# Patient Record
Sex: FEMALE | Race: WHITE | Employment: UNEMPLOYED | URBAN - METROPOLITAN AREA
[De-identification: names, ages, dates, MRNs, and addresses within clinical notes are randomized per-mention and may not be internally consistent; named-entity substitution may affect disease eponyms.]

---

## 2018-07-02 ENCOUNTER — HOSPITAL ENCOUNTER (OUTPATIENT)
Dept: GENERAL RADIOLOGY | Age: 63
Discharge: HOME | End: 2018-07-04
Payer: MEDICARE

## 2018-07-02 ENCOUNTER — HOSPITAL ENCOUNTER (OUTPATIENT)
Dept: PREADMISSION TESTING | Age: 63
Discharge: HOME | End: 2018-07-06
Payer: MEDICARE

## 2018-07-02 VITALS
WEIGHT: 214.51 LBS | HEART RATE: 79 BPM | HEIGHT: 62 IN | DIASTOLIC BLOOD PRESSURE: 61 MMHG | OXYGEN SATURATION: 98 % | RESPIRATION RATE: 16 BRPM | SYSTOLIC BLOOD PRESSURE: 131 MMHG | BODY MASS INDEX: 39.47 KG/M2

## 2018-07-02 LAB
ANION GAP SERPL CALCULATED.3IONS-SCNC: 9 MMOL/L (ref 9–17)
BUN BLDV-MCNC: 10 MG/DL (ref 8–23)
BUN/CREAT BLD: 19 (ref 9–20)
CALCIUM SERPL-MCNC: 9.5 MG/DL (ref 8.6–10.4)
CHLORIDE BLD-SCNC: 97 MMOL/L (ref 98–107)
CO2: 29 MMOL/L (ref 20–31)
CREAT SERPL-MCNC: 0.53 MG/DL (ref 0.5–0.9)
EKG ATRIAL RATE: 72 BPM
EKG P AXIS: 40 DEGREES
EKG P-R INTERVAL: 184 MS
EKG Q-T INTERVAL: 428 MS
EKG QRS DURATION: 104 MS
EKG QTC CALCULATION (BAZETT): 468 MS
EKG R AXIS: -5 DEGREES
EKG T AXIS: 46 DEGREES
EKG VENTRICULAR RATE: 72 BPM
GFR AFRICAN AMERICAN: >60 ML/MIN
GFR NON-AFRICAN AMERICAN: >60 ML/MIN
GFR SERPL CREATININE-BSD FRML MDRD: ABNORMAL ML/MIN/{1.73_M2}
GFR SERPL CREATININE-BSD FRML MDRD: ABNORMAL ML/MIN/{1.73_M2}
GLUCOSE BLD-MCNC: 425 MG/DL (ref 70–99)
HCT VFR BLD CALC: 36.6 % (ref 36–46)
HEMOGLOBIN: 12.1 G/DL (ref 12–16)
MCH RBC QN AUTO: 30.2 PG (ref 26–34)
MCHC RBC AUTO-ENTMCNC: 33.1 G/DL (ref 31–37)
MCV RBC AUTO: 91.3 FL (ref 80–100)
NRBC AUTOMATED: ABNORMAL PER 100 WBC
PDW BLD-RTO: 13.6 % (ref 11.5–14.5)
PLATELET # BLD: 94 K/UL (ref 130–400)
PMV BLD AUTO: 8.2 FL (ref 6–12)
POTASSIUM SERPL-SCNC: 4.2 MMOL/L (ref 3.7–5.3)
RBC # BLD: 4.01 M/UL (ref 4–5.2)
SODIUM BLD-SCNC: 135 MMOL/L (ref 135–144)
WBC # BLD: 4.6 K/UL (ref 3.5–11)

## 2018-07-02 PROCEDURE — 71046 X-RAY EXAM CHEST 2 VIEWS: CPT

## 2018-07-02 PROCEDURE — 93005 ELECTROCARDIOGRAM TRACING: CPT

## 2018-07-02 PROCEDURE — 36415 COLL VENOUS BLD VENIPUNCTURE: CPT

## 2018-07-02 PROCEDURE — 80048 BASIC METABOLIC PNL TOTAL CA: CPT

## 2018-07-02 PROCEDURE — 85027 COMPLETE CBC AUTOMATED: CPT

## 2018-07-02 RX ORDER — BACLOFEN 10 MG/1
10 TABLET ORAL NIGHTLY
Status: ON HOLD | COMMUNITY
End: 2018-10-01 | Stop reason: HOSPADM

## 2018-07-02 NOTE — H&P
History and Physical Service   [facility] 12    HISTORY AND PHYSICAL EXAMINATION            Date of Evaluation: 7/2/2018  Patient name:  Nakita Miguel  MRN:   [id number]  YOB: 1955  PCP:    Santos Adam    History Obtained From:     Patient    History of Present Illness: This is Nakita Miguel a 65 y.o. female who presents for a pre-admission testing appointment for an upcoming right breast lumpectomy with needle localization at 1100 by Dr. Arelis Chopra scheduled on 07- at 1300 due to right breast mass, abnormal mammogram. The  patient's chief complaint is a right breast lump which the pt discovered upon self-breast examination 4 months ago. Denies nippled drainage and breast pain. Pt states she made an appointment with her PCP one month after she discovered the right breast lump. Her PCP ordered a mammogram and breast ultrasound which were both completed on 04-. Right breast biopsy was done on June 21, 2018 in Dr. Yo American office. Prior treatment includes: none. Denies recent falls and injury. Patient presents for surgical correction. History of asthma, COPD (pt denies), diabetes, hyperlipidemia, hypertension, and sleep apnea (denies use of CPAP). Pt states she sleeps in a recliner. Alert and oriented without apparent distress. Denies chest pain, dyspnea, and dizziness. Pt's blood glucose in PAT was 425. Her fasting blood glucose at home is 329-400's and her last Hgb A1C was 13% per pt. Denies polydipsia, polyuria, and dizziness. Pt is currently taking Metformin and has intentionally lost 60 pounds since December 2017 by dieting. Pt denies seeing an endocrinologist at this time. Her last hypoglycemic event was 2 years ago when her blood glucose dropped to 100. Instructed pt to call her PCP as soon as possible prior to surgery for evaluation of her diabetes. Pt voiced understanding.     Small, open, non-draining lesion on second right toe.  Instructed pt to follow-up with her PCP if lesion does not continue to heal or becomes infected. Pt voiced understanding. Past Medical History:     Past Medical History:   Diagnosis Date    Anemia     h/o anemia,transfused 2/2014    Anxiety disorder     can get SOB with an anxiety attack    Arthritis     Asthma     Blood transfusion reaction     x 2 unit prbc's 2/2014    COPD (chronic obstructive pulmonary disease) ([facility] [address]     patient denies COPD, only has asthma    Depression     Diabetes mellitus (HCC)     GERD (gastroesophageal reflux disease)     Hammer toe of right foot     Hernia, abdominal 1998    Hyperlipidemia     Hypertension     Restless leg syndrome     Seasonal allergies     Sleep apnea     doesn't use CPAP, sleeps in a recliner    Spondylosis     lower back        Past Surgical History:     Past Surgical History:   Procedure Laterality Date    APPENDECTOMY      COLONOSCOPY      ROTATOR CUFF REPAIR Right         Medications Prior to Admission:     Prior to Admission medications    Medication Sig Start Date End Date Taking? Authorizing Provider   baclofen (LIORESAL) 10 MG tablet Take 10 mg by mouth nightly   Yes Historical Provider, MD   aspirin 81 MG EC tablet Take 81 mg by mouth daily. Yes Historical Provider, MD   citalopram (CELEXA) 40 MG tablet Take 40 mg by mouth daily. Yes Historical Provider, MD   fluticasone (FLONASE) 50 MCG/ACT nasal spray 1 spray by Nasal route 2 times daily as needed for Rhinitis. Yes Historical Provider, MD   loratadine-pseudoephedrine (LORATADINE-D 24HR)  MG per tablet Take 1 tablet by mouth daily. Yes Historical Provider, MD   metFORMIN ER (GLUCOPHAGE-XR) 500 MG XR tablet Take 1,000 mg by mouth 2 times daily (with meals). Yes Historical Provider, MD   gabapentin (NEURONTIN) 300 MG capsule Take 300 mg by mouth 2 times daily. Yes Historical Provider, MD   omeprazole (PRILOSEC) 20 MG capsule Take 20 mg by mouth daily.    Yes

## 2018-07-02 NOTE — PRE-PROCEDURE INSTRUCTIONS
[facility] ON Wednesday, 7/11/2018 at 10:00 AM    Once you enter the hospital lobby, take the elevators to the second floor. Check-In is at the surgery registration desk. Continue to take your home medications as you normally do up to and including the night before surgery with the exception of any blood thinning medications. Please stop any blood thinning medications as directed by your surgeon or prescribing physician. Failure to stop certain medications may interfere with your scheduled surgery. These may include:  Aspirin, Warfarin (Coumadin), Clopidogrel (Plavix), Ibuprofen (Motrin, Advil), Naproxen (Aleve), Meloxicam (Mobic), Celecoxib (Celebrex), Eliquis, Pradaxa, Xarelto, Effient, Fish Oil, Herbal supplements. Aspirin    If you are diabetic, do not take any of your diabetic medications by mouth the morning of surgery. If you are taking insulin contact the doctor that manages your diabetes for instructions about any changes to your insulin dosages the day before surgery. Do not inject insulin or other injectable diabetic medications the morning of surgery unless otherwise instructed by the doctor who manages your diabetes. Please take the following medication(s) the day of surgery with a small sip of water:  Percocet (if needed), Omeprazole, Loratadine, Metoprolol    Please use your inhalers at home the day of surgery. PREPARING FOR YOUR SURGERY:     Before surgery, you can play an important role in your own health. Because skin is not sterile, we need to be sure that your skin is as free of germs as possible before surgery by carefully washing before surgery. Preparing or prepping skin before surgery can reduce the risk of a surgical site infection.   Do not shave the area of your body where your surgery will be performed unless you received specific permission from your physician.     You will need to shower at home the night before surgery and the morning of surgery with a special soap called chlorhexidine gluconate (CHG*). *Not to be used by people allergic to Chlorhexidine Gluconate (CHG). Following these instructions will help you be sure that your skin is clean before surgery. Instructions on cleaning your skin before surgery: The night before your surgery:      You will need to shower with warm water (not hot) and the CHG soap.  Use a clean wash cloth and a clean towel. Have clean clothes available to put on after the shower.    First wash your hair with regular shampoo. Rinse your hair and body thoroughly to remove the shampoo.  Wash your face and genital area (private parts) with your regular soap or water only. Thoroughly rinse your body with warm water from the neck down.  Turn water off to prevent rinsing the soap off too soon.  With a clean wet washcloth and half of the CHG soap in the bottle, lather your entire body from the neck down. Do not use CHG soap near your eyes or ears to avoid injury to those areas.  Wash thoroughly, paying special attention to the area where your surgery will be performed.  Wash your body gently for five (5) minutes. Avoid scrubbing your skin too hard.  Turn the water back on and rinse your body thoroughly.  Pat yourself dry with a clean, soft towel. Do not apply lotion, cream or powder.  Dress with clean freshly washed clothes. The morning of surgery:     Repeat shower following steps above - using remaining half of CHG soap in bottle. Patient Instructions:    [facility] If you are having any type of anesthesia you are to have nothing to eat or drink after midnight the night before your surgery. This includes gum, hard candy, mints, water or smoking or chewing tobacco.  The only exception to this is a small sip of water to take with any morning dose of heart, blood pressure, or seizure medications. No alcoholic beverages for 24 hours prior to surgery.      Brush your teeth discharge instructions. A taxi cab or any other form of public transportation is not acceptable. Your friend or family member must stay at the hospital throughout your procedure. Someone must remain with you for the first 24 hours after your surgery if you receive anesthesia or medication. If you do not have someone to stay with you, your procedure may be cancelled.       If you have any other questions regarding your procedure or the day of surgery, please call [phone]      _________________________  ____________________________  Signature (Patient)              Signature (Provider) & date

## 2018-08-08 ENCOUNTER — HOSPITAL ENCOUNTER (EMERGENCY)
Age: 63
Discharge: ANOTHER HOSPITAL | DRG: 872 | End: 2018-08-08
Admitting: INTERNAL MEDICINE
Payer: MEDICARE

## 2018-08-08 ENCOUNTER — APPOINTMENT (OUTPATIENT)
Dept: GENERAL RADIOLOGY | Age: 63
DRG: 872 | End: 2018-08-08
Payer: MEDICARE

## 2018-08-08 ENCOUNTER — HOSPITAL ENCOUNTER (INPATIENT)
Age: 63
LOS: 8 days | Discharge: NURSING, REHAB OR LONG-TERM CARE | DRG: 871 | End: 2018-08-16
Attending: INTERNAL MEDICINE | Admitting: INTERNAL MEDICINE
Payer: MEDICARE

## 2018-08-08 VITALS
RESPIRATION RATE: 16 BRPM | WEIGHT: 220.7 LBS | SYSTOLIC BLOOD PRESSURE: 90 MMHG | OXYGEN SATURATION: 98 % | HEIGHT: 62 IN | TEMPERATURE: 98.3 F | DIASTOLIC BLOOD PRESSURE: 45 MMHG | BODY MASS INDEX: 40.61 KG/M2 | HEART RATE: 75 BPM

## 2018-08-08 DIAGNOSIS — L03.115 CELLULITIS OF RIGHT LOWER EXTREMITY: Primary | ICD-10-CM

## 2018-08-08 DIAGNOSIS — L03.119 CELLULITIS AND ABSCESS OF LEG: Primary | ICD-10-CM

## 2018-08-08 DIAGNOSIS — A41.9 SEPTICEMIA (HCC): ICD-10-CM

## 2018-08-08 DIAGNOSIS — L02.419 CELLULITIS AND ABSCESS OF LEG: Primary | ICD-10-CM

## 2018-08-08 PROBLEM — R65.21 SEPTIC SHOCK (HCC): Status: ACTIVE | Noted: 2018-08-08

## 2018-08-08 LAB
-: ABNORMAL
ABSOLUTE EOS #: 0 K/UL (ref 0–0.4)
ABSOLUTE IMMATURE GRANULOCYTE: ABNORMAL K/UL (ref 0–0.3)
ABSOLUTE LYMPH #: 1.82 K/UL (ref 1–4.8)
ABSOLUTE MONO #: 0.66 K/UL (ref 0.2–0.8)
ALBUMIN SERPL-MCNC: 3 G/DL (ref 3.5–5.2)
ALBUMIN/GLOBULIN RATIO: ABNORMAL (ref 1–2.5)
ALP BLD-CCNC: 144 U/L (ref 35–104)
ALT SERPL-CCNC: 40 U/L (ref 5–33)
AMORPHOUS: ABNORMAL
AMYLASE: 31 U/L (ref 28–100)
ANION GAP SERPL CALCULATED.3IONS-SCNC: 14 MMOL/L (ref 9–17)
AST SERPL-CCNC: 57 U/L
BACTERIA: ABNORMAL
BASOPHILS # BLD: 0 %
BASOPHILS ABSOLUTE: 0 K/UL (ref 0–0.2)
BILIRUB SERPL-MCNC: 1.17 MG/DL (ref 0.3–1.2)
BILIRUBIN URINE: NEGATIVE
BUN BLDV-MCNC: 29 MG/DL (ref 8–23)
BUN/CREAT BLD: 20 (ref 9–20)
CALCIUM SERPL-MCNC: 9.6 MG/DL (ref 8.6–10.4)
CASTS UA: ABNORMAL /LPF
CHLORIDE BLD-SCNC: 94 MMOL/L (ref 98–107)
CO2: 23 MMOL/L (ref 20–31)
COLOR: YELLOW
COMMENT UA: ABNORMAL
CREAT SERPL-MCNC: 1.44 MG/DL (ref 0.5–0.9)
CRYSTALS, UA: ABNORMAL /HPF
DIFFERENTIAL TYPE: ABNORMAL
EKG ATRIAL RATE: 67 BPM
EKG P AXIS: 35 DEGREES
EKG P-R INTERVAL: 184 MS
EKG Q-T INTERVAL: 450 MS
EKG QRS DURATION: 114 MS
EKG QTC CALCULATION (BAZETT): 475 MS
EKG R AXIS: -9 DEGREES
EKG T AXIS: 44 DEGREES
EKG VENTRICULAR RATE: 67 BPM
EOSINOPHILS RELATIVE PERCENT: 0 % (ref 1–4)
EPITHELIAL CELLS UA: ABNORMAL /HPF (ref 0–5)
GFR AFRICAN AMERICAN: 45 ML/MIN
GFR NON-AFRICAN AMERICAN: 37 ML/MIN
GFR SERPL CREATININE-BSD FRML MDRD: ABNORMAL ML/MIN/{1.73_M2}
GFR SERPL CREATININE-BSD FRML MDRD: ABNORMAL ML/MIN/{1.73_M2}
GLUCOSE BLD-MCNC: 331 MG/DL (ref 70–99)
GLUCOSE URINE: ABNORMAL
HCT VFR BLD CALC: 39.5 % (ref 36–46)
HEMOGLOBIN: 12.9 G/DL (ref 12–16)
IMMATURE GRANULOCYTES: ABNORMAL %
KETONES, URINE: NEGATIVE
LACTIC ACID, SEPSIS WHOLE BLOOD: ABNORMAL MMOL/L (ref 0.5–1.9)
LACTIC ACID, SEPSIS WHOLE BLOOD: ABNORMAL MMOL/L (ref 0.5–1.9)
LACTIC ACID, SEPSIS: 3 MMOL/L (ref 0.5–1.9)
LACTIC ACID, SEPSIS: 3.3 MMOL/L (ref 0.5–1.9)
LEUKOCYTE ESTERASE, URINE: ABNORMAL
LIPASE: 27 U/L (ref 13–60)
LYMPHOCYTES # BLD: 11 % (ref 24–44)
MCH RBC QN AUTO: 29.7 PG (ref 26–34)
MCHC RBC AUTO-ENTMCNC: 32.7 G/DL (ref 31–37)
MCV RBC AUTO: 91 FL (ref 80–100)
MONOCYTES # BLD: 4 % (ref 1–7)
MORPHOLOGY: ABNORMAL
MUCUS: ABNORMAL
NITRITE, URINE: NEGATIVE
NRBC AUTOMATED: ABNORMAL PER 100 WBC
OTHER OBSERVATIONS UA: ABNORMAL
PDW BLD-RTO: 14 % (ref 11.5–14.5)
PH UA: 5.5 (ref 5–8)
PLATELET # BLD: 112 K/UL (ref 130–400)
PLATELET ESTIMATE: ABNORMAL
PMV BLD AUTO: 9.6 FL (ref 6–12)
POTASSIUM SERPL-SCNC: 3.7 MMOL/L (ref 3.7–5.3)
PROCALCITONIN: 8.44 NG/ML
PROTEIN UA: NEGATIVE
RBC # BLD: 4.35 M/UL (ref 4–5.2)
RBC # BLD: ABNORMAL 10*6/UL
RBC UA: ABNORMAL /HPF (ref 0–2)
RENAL EPITHELIAL, UA: ABNORMAL /HPF
SEG NEUTROPHILS: 85 % (ref 36–66)
SEGMENTED NEUTROPHILS ABSOLUTE COUNT: 14.02 K/UL (ref 1.8–7.7)
SODIUM BLD-SCNC: 131 MMOL/L (ref 135–144)
SPECIFIC GRAVITY UA: 1.01 (ref 1–1.03)
TOTAL PROTEIN: 6.3 G/DL (ref 6.4–8.3)
TRICHOMONAS: ABNORMAL
TURBIDITY: CLEAR
URINE HGB: ABNORMAL
UROBILINOGEN, URINE: NORMAL
WBC # BLD: 16.5 K/UL (ref 3.5–11)
WBC # BLD: ABNORMAL 10*3/UL
WBC UA: ABNORMAL /HPF (ref 0–5)
YEAST: ABNORMAL

## 2018-08-08 PROCEDURE — 6360000002 HC RX W HCPCS

## 2018-08-08 PROCEDURE — 71046 X-RAY EXAM CHEST 2 VIEWS: CPT

## 2018-08-08 PROCEDURE — 96367 TX/PROPH/DG ADDL SEQ IV INF: CPT

## 2018-08-08 PROCEDURE — 2500000003 HC RX 250 WO HCPCS

## 2018-08-08 PROCEDURE — 2580000003 HC RX 258

## 2018-08-08 PROCEDURE — 87040 BLOOD CULTURE FOR BACTERIA: CPT

## 2018-08-08 PROCEDURE — 87077 CULTURE AEROBIC IDENTIFY: CPT

## 2018-08-08 PROCEDURE — 83605 ASSAY OF LACTIC ACID: CPT

## 2018-08-08 PROCEDURE — 80053 COMPREHEN METABOLIC PANEL: CPT

## 2018-08-08 PROCEDURE — 2000000000 HC ICU R&B

## 2018-08-08 PROCEDURE — 87186 SC STD MICRODIL/AGAR DIL: CPT

## 2018-08-08 PROCEDURE — 81001 URINALYSIS AUTO W/SCOPE: CPT

## 2018-08-08 PROCEDURE — 93005 ELECTROCARDIOGRAM TRACING: CPT

## 2018-08-08 PROCEDURE — 83690 ASSAY OF LIPASE: CPT

## 2018-08-08 PROCEDURE — 84145 PROCALCITONIN (PCT): CPT

## 2018-08-08 PROCEDURE — 82150 ASSAY OF AMYLASE: CPT

## 2018-08-08 PROCEDURE — 96365 THER/PROPH/DIAG IV INF INIT: CPT

## 2018-08-08 PROCEDURE — 87086 URINE CULTURE/COLONY COUNT: CPT

## 2018-08-08 PROCEDURE — 85025 COMPLETE CBC W/AUTO DIFF WBC: CPT

## 2018-08-08 PROCEDURE — 6370000000 HC RX 637 (ALT 250 FOR IP)

## 2018-08-08 PROCEDURE — 99285 EMERGENCY DEPT VISIT HI MDM: CPT

## 2018-08-08 RX ORDER — LISINOPRIL 20 MG/1
20 TABLET ORAL DAILY
Status: ON HOLD | COMMUNITY
End: 2018-08-15 | Stop reason: HOSPADM

## 2018-08-08 RX ORDER — DOCUSATE SODIUM 100 MG/1
100 CAPSULE, LIQUID FILLED ORAL 2 TIMES DAILY
Status: ON HOLD | COMMUNITY
End: 2019-05-25

## 2018-08-08 RX ORDER — OXYCODONE HYDROCHLORIDE AND ACETAMINOPHEN 5; 325 MG/1; MG/1
2 TABLET ORAL ONCE
Status: COMPLETED | OUTPATIENT
Start: 2018-08-08 | End: 2018-08-08

## 2018-08-08 RX ORDER — 0.9 % SODIUM CHLORIDE 0.9 %
30 INTRAVENOUS SOLUTION INTRAVENOUS ONCE
Status: COMPLETED | OUTPATIENT
Start: 2018-08-08 | End: 2018-08-08

## 2018-08-08 RX ORDER — SODIUM CHLORIDE 0.9 % (FLUSH) 0.9 %
10 SYRINGE (ML) INJECTION PRN
Status: DISCONTINUED | OUTPATIENT
Start: 2018-08-08 | End: 2018-08-09 | Stop reason: HOSPADM

## 2018-08-08 RX ORDER — INSULIN GLARGINE 100 [IU]/ML
40 INJECTION, SOLUTION SUBCUTANEOUS NIGHTLY
Status: ON HOLD | COMMUNITY
End: 2018-10-01

## 2018-08-08 RX ORDER — SODIUM CHLORIDE 0.9 % (FLUSH) 0.9 %
10 SYRINGE (ML) INJECTION EVERY 12 HOURS SCHEDULED
Status: DISCONTINUED | OUTPATIENT
Start: 2018-08-08 | End: 2018-08-09 | Stop reason: HOSPADM

## 2018-08-08 RX ORDER — MIDAZOLAM HYDROCHLORIDE 1 MG/ML
4 INJECTION INTRAMUSCULAR; INTRAVENOUS ONCE
Status: DISCONTINUED | OUTPATIENT
Start: 2018-08-08 | End: 2018-08-09 | Stop reason: HOSPADM

## 2018-08-08 RX ORDER — ETOMIDATE 2 MG/ML
10 INJECTION INTRAVENOUS ONCE
Status: DISCONTINUED | OUTPATIENT
Start: 2018-08-08 | End: 2018-08-09 | Stop reason: HOSPADM

## 2018-08-08 RX ADMIN — OXYCODONE HYDROCHLORIDE AND ACETAMINOPHEN 2 TABLET: 5; 325 TABLET ORAL at 21:36

## 2018-08-08 RX ADMIN — TAZOBACTAM SODIUM AND PIPERACILLIN SODIUM 3.38 G: 375; 3 INJECTION, SOLUTION INTRAVENOUS at 16:39

## 2018-08-08 RX ADMIN — SODIUM CHLORIDE 3003 ML: 9 INJECTION, SOLUTION INTRAVENOUS at 16:35

## 2018-08-08 RX ADMIN — VANCOMYCIN HYDROCHLORIDE 2000 MG: 1 INJECTION, POWDER, LYOPHILIZED, FOR SOLUTION INTRAVENOUS at 17:10

## 2018-08-08 ASSESSMENT — ENCOUNTER SYMPTOMS
COUGH: 0
ABDOMINAL PAIN: 1
NAUSEA: 1
CHOKING: 0
SHORTNESS OF BREATH: 1
PHOTOPHOBIA: 0
SORE THROAT: 0
CHEST TIGHTNESS: 0
ABDOMINAL DISTENTION: 1
SINUS PAIN: 0

## 2018-08-08 ASSESSMENT — PAIN SCALES - GENERAL
PAINLEVEL_OUTOF10: 5
PAINLEVEL_OUTOF10: 8
PAINLEVEL_OUTOF10: 9

## 2018-08-08 ASSESSMENT — PATIENT HEALTH QUESTIONNAIRE - PHQ9: SUM OF ALL RESPONSES TO PHQ QUESTIONS 1-9: 11

## 2018-08-08 NOTE — CARE COORDINATION
Sw was consulted by RN. Pt had expressed a desire to hurt herself when speaking with the RN. Sw met with pt. Pt denied any suicidal/homicidal ideation. Pt did report in June of this year, she had a plan to \"take some pills\" and hurt herself. Pt did not go into details. Pt reported living in a two bedroom apartment with her daughter, son in law and grandson. Pt shares rent with her daughter and sleeps on couch due to only having two bedrooms. Pt reported she is ok with sleeping on couch. Pt reported feeling like her grandson disrespects her and does not like her. Pt feels frustrated with her daughter, in law and grandson. Pts best support is her brother, who lives nearby with her parents. Pt reported she does feel safe in her home. Pt reported she takes Celexa she gets from her pcp for her depression/anxiety but is interested in getting some counseling. Pt has done counseling in the past but had to stop due to her insurance no longer covering it. Sw and pt discussed some mental health facilities in the area such as [facility] or [facility] as places pt can go for counseling. Pt was tearful throughout conversation, train of thought.

## 2018-08-08 NOTE — ED PROVIDER NOTES
[address] [place] [address] ED  eMERGENCY dEPARTMENT eNCOUnter      Pt Name: Treva So  MRN: [id number]  [place] 1955  Date of evaluation: 8/8/2018  Provider: Ricky Mclain MD    [address] [place] [address]       Chief Complaint   Patient presents with    Cellulitis     right leg for 3 days    Hypotension         HISTORY OF PRESENT ILLNESS  (Location/Symptom, Timing/Onset, Context/Setting, Quality, Duration, Modifying Factors, Severity.)   Treva So is a 65 y.o. female who presents to the emergency department Presents from Dr. Kitchen Pa office with cellulitis and low blood pressure. She states that her right leg has been bothering her for the past 3 days it started off as a bump in the right upper medial right thigh and has rales and is gone and involve the whole leg. She states that the pains a 8 on a scale of 1-10 exacerbated by palpation but not ambulation. Denies any history of trauma. She does have a past medical history of diabetes COPD hypertension has difficulty with compliance with her medications. He is allergic to sulfa. 8, sepsis is called at this time  5:02 PM      Nursing Notes were reviewed. ALLERGIES     Sulfa antibiotics and Tape [adhesive tape]    CURRENT MEDICATIONS       Previous Medications    ASPIRIN 81 MG EC TABLET    Take 81 mg by mouth daily. BACLOFEN (LIORESAL) 10 MG TABLET    Take 10 mg by mouth nightly    CITALOPRAM (CELEXA) 40 MG TABLET    Take 40 mg by mouth daily. DICLOFENAC SODIUM 1 % GEL    Apply 2 g topically 2 times daily    DOCUSATE SODIUM (COLACE) 100 MG CAPSULE    Take 100 mg by mouth 2 times daily    FLUTICASONE (FLONASE) 50 MCG/ACT NASAL SPRAY    1 spray by Nasal route 2 times daily as needed for Rhinitis. GABAPENTIN (NEURONTIN) 300 MG CAPSULE    Take 300 mg by mouth 2 times daily.     INSULIN GLARGINE (LANTUS) 100 UNIT/ML INJECTION VIAL    Inject 40 Units into the skin nightly    LISINOPRIL (PRINIVIL;ZESTRIL) 20 MG TABLET    Take 20 mg by mouth daily heard.  Pulmonary/Chest: Effort normal and breath sounds normal.   Abdominal: Soft. Bowel sounds are normal.   Musculoskeletal: Normal range of motion. She exhibits edema and tenderness. She exhibits no deformity. Neurological: She is alert and oriented to person, place, and time. Skin: Skin is warm and dry. Rash noted. There is erythema. Psychiatric: She has a normal mood and affect. Nursing note and vitals reviewed. DIAGNOSTIC RESULTS     EKG: All EKG's are interpreted by the Emergency Department Physician who either signs or Co-signs this chart in the absence of a cardiologist.    Normal sinus rhythm without acute process    RADIOLOGY:   Non-plain film images such as CT, Ultrasound and MRI are read by the radiologist. [place] radiographic images are visualized and preliminarily interpreted by the emergency physician with the below findings:      Xr Chest Standard (2 Vw)    Result Date: 8/8/2018  EXAMINATION: TWO VIEWS OF THE CHEST 8/8/2018 4:26 pm COMPARISON: July 2, 2018 HISTORY: ORDERING SYSTEM PROVIDED HISTORY: cellulitis TECHNOLOGIST PROVIDED HISTORY: Reason for exam:->cellulitis Acuity: Acute Type of Exam: Unknown FINDINGS: The patient's head and neck obscures a portion of the lung apices. The cardiomediastinal silhouette is unchanged in appearance. There is no consolidation, pneumothorax, or evidence of edema. No effusion is appreciated. The osseous structures are unchanged in appearance. Unchanged appearance of the chest without acute airspace disease identified. Interpretation per the Radiologist below, if available at the time of this note:    XR CHEST STANDARD (2 VW)   Final Result   Unchanged appearance of the chest without acute airspace disease identified.                ED BEDSIDE ULTRASOUND:   Performed by ED Physician - none    LABS:  Labs Reviewed   LACTATE, SEPSIS - Abnormal; Notable for the following:        Result Value    Lactic Acid, Sepsis 3.3 (*)     All other components within normal limits   LACTATE, SEPSIS - Abnormal; Notable for the following:     Lactic Acid, Sepsis 3.0 (*)     All other components within normal limits   PROCALCITONIN - Abnormal; Notable for the following:     Procalcitonin 8.44 (*)     All other components within normal limits   COMPREHENSIVE METABOLIC PANEL W/ REFLEX TO MG FOR LOW K - Abnormal; Notable for the following:     Glucose 331 (*)     BUN 29 (*)     CREATININE 1.44 (*)     Sodium 131 (*)     Chloride 94 (*)     Alkaline Phosphatase 144 (*)     ALT 40 (*)     AST 57 (*)     Total Protein 6.3 (*)     Alb 3.0 (*)     GFR Non- 37 (*)     GFR  45 (*)     All other components within normal limits   URINE RT REFLEX TO CULTURE - Abnormal; Notable for the following:     Glucose, Ur 1+ (*)     Urine Hgb TRACE (*)     Leukocyte Esterase, Urine LARGE (*)     All other components within normal limits   CBC WITH AUTO DIFFERENTIAL - Abnormal; Notable for the following:     WBC 16.5 (*)     Platelets 827 (*)     Seg Neutrophils 85 (*)     Lymphocytes 11 (*)     Eosinophils % 0 (*)     Segs Absolute 14.02 (*)     All other components within normal limits   MICROSCOPIC URINALYSIS - Abnormal; Notable for the following:     Bacteria, UA FEW (*)     All other components within normal limits   CULTURE BLOOD #1   CULTURE BLOOD #2   URINE CULTURE CLEAN CATCH   AMYLASE   LIPASE       All other labs were within normal range or not returned as of this dictation. EMERGENCY DEPARTMENT COURSE and DIFFERENTIAL DIAGNOSIS/MDM:   Vitals:    Vitals:    08/08/18 2041 08/08/18 2056 08/08/18 2111 08/08/18 2126   BP: (!) 98/44 (!) 101/40 (!) 98/43 (!) 109/38   Pulse: 72 70 72 72   Resp: 17 17 20 19   Temp:       TempSrc:       SpO2: 100% 100% 100% 100%   Weight:       Height:         Patient's had a code sepsis called antibiotics are started after cultures are obtained started on Zosyn and vancomycin.   She will be admitted to the hospital

## 2018-08-09 PROBLEM — E11.9 DIABETES MELLITUS (HCC): Status: ACTIVE | Noted: 2018-08-09

## 2018-08-09 PROBLEM — I10 ESSENTIAL HYPERTENSION: Status: ACTIVE | Noted: 2018-08-09

## 2018-08-09 PROBLEM — L03.115 CELLULITIS OF RIGHT LOWER EXTREMITY: Status: ACTIVE | Noted: 2018-08-09

## 2018-08-09 PROBLEM — E66.01 MORBID OBESITY (HCC): Status: ACTIVE | Noted: 2018-08-09

## 2018-08-09 PROBLEM — G47.33 OSA (OBSTRUCTIVE SLEEP APNEA): Status: ACTIVE | Noted: 2018-08-09

## 2018-08-09 LAB
ABSOLUTE EOS #: 0.51 K/UL (ref 0–0.44)
ABSOLUTE IMMATURE GRANULOCYTE: 0.17 K/UL (ref 0–0.3)
ABSOLUTE LYMPH #: 1.52 K/UL (ref 1.1–3.7)
ABSOLUTE MONO #: 1.35 K/UL (ref 0.1–1.2)
ANION GAP SERPL CALCULATED.3IONS-SCNC: 12 MMOL/L (ref 9–17)
BASOPHILS # BLD: 1 % (ref 0–2)
BASOPHILS ABSOLUTE: 0.17 K/UL (ref 0–0.2)
BUN BLDV-MCNC: 26 MG/DL (ref 8–23)
BUN/CREAT BLD: ABNORMAL (ref 9–20)
C-REACTIVE PROTEIN: 254.9 MG/L (ref 0–5)
CALCIUM SERPL-MCNC: 8.8 MG/DL (ref 8.6–10.4)
CHLORIDE BLD-SCNC: 96 MMOL/L (ref 98–107)
CO2: 21 MMOL/L (ref 20–31)
CREAT SERPL-MCNC: 0.77 MG/DL (ref 0.5–0.9)
DIFFERENTIAL TYPE: ABNORMAL
EOSINOPHILS RELATIVE PERCENT: 3 % (ref 1–4)
GFR AFRICAN AMERICAN: >60 ML/MIN
GFR NON-AFRICAN AMERICAN: >60 ML/MIN
GFR SERPL CREATININE-BSD FRML MDRD: ABNORMAL ML/MIN/{1.73_M2}
GFR SERPL CREATININE-BSD FRML MDRD: ABNORMAL ML/MIN/{1.73_M2}
GLUCOSE BLD-MCNC: 182 MG/DL (ref 65–105)
GLUCOSE BLD-MCNC: 221 MG/DL (ref 65–105)
GLUCOSE BLD-MCNC: 229 MG/DL (ref 70–99)
GLUCOSE BLD-MCNC: 255 MG/DL (ref 65–105)
GLUCOSE BLD-MCNC: 298 MG/DL (ref 65–105)
HCT VFR BLD CALC: 41.1 % (ref 36.3–47.1)
HEMOGLOBIN: 13.1 G/DL (ref 11.9–15.1)
IMMATURE GRANULOCYTES: 1 %
LACTIC ACID, WHOLE BLOOD: 2.8 MMOL/L (ref 0.7–2.1)
LYMPHOCYTES # BLD: 9 % (ref 24–43)
MAGNESIUM: 1.8 MG/DL (ref 1.6–2.6)
MCH RBC QN AUTO: 29.5 PG (ref 25.2–33.5)
MCHC RBC AUTO-ENTMCNC: 31.9 G/DL (ref 28.4–34.8)
MCV RBC AUTO: 92.6 FL (ref 82.6–102.9)
MONOCYTES # BLD: 8 % (ref 3–12)
MORPHOLOGY: ABNORMAL
MRSA, DNA, NASAL: NORMAL
NRBC AUTOMATED: 0 PER 100 WBC
PDW BLD-RTO: 13.2 % (ref 11.8–14.4)
PHOSPHORUS: 2.6 MG/DL (ref 2.6–4.5)
PLATELET # BLD: ABNORMAL K/UL (ref 138–453)
PLATELET ESTIMATE: ABNORMAL
PLATELET, FLUORESCENCE: 99 K/UL (ref 138–453)
PLATELET, IMMATURE FRACTION: 5.1 % (ref 1.1–10.3)
PMV BLD AUTO: ABNORMAL FL (ref 8.1–13.5)
POTASSIUM SERPL-SCNC: 4 MMOL/L (ref 3.7–5.3)
RBC # BLD: 4.44 M/UL (ref 3.95–5.11)
RBC # BLD: ABNORMAL 10*6/UL
SEG NEUTROPHILS: 78 % (ref 36–65)
SEGMENTED NEUTROPHILS ABSOLUTE COUNT: 13.18 K/UL (ref 1.5–8.1)
SODIUM BLD-SCNC: 129 MMOL/L (ref 135–144)
SPECIMEN DESCRIPTION: NORMAL
WBC # BLD: 16.9 K/UL (ref 3.5–11.3)
WBC # BLD: ABNORMAL 10*3/UL

## 2018-08-09 PROCEDURE — 6370000000 HC RX 637 (ALT 250 FOR IP): Performed by: HOSPITALIST

## 2018-08-09 PROCEDURE — 1200000000 HC SEMI PRIVATE

## 2018-08-09 PROCEDURE — 80048 BASIC METABOLIC PNL TOTAL CA: CPT

## 2018-08-09 PROCEDURE — 86140 C-REACTIVE PROTEIN: CPT

## 2018-08-09 PROCEDURE — 83735 ASSAY OF MAGNESIUM: CPT

## 2018-08-09 PROCEDURE — 76937 US GUIDE VASCULAR ACCESS: CPT

## 2018-08-09 PROCEDURE — 85055 RETICULATED PLATELET ASSAY: CPT

## 2018-08-09 PROCEDURE — 82947 ASSAY GLUCOSE BLOOD QUANT: CPT

## 2018-08-09 PROCEDURE — 2580000003 HC RX 258: Performed by: EMERGENCY MEDICINE

## 2018-08-09 PROCEDURE — 2580000003 HC RX 258: Performed by: INTERNAL MEDICINE

## 2018-08-09 PROCEDURE — 84100 ASSAY OF PHOSPHORUS: CPT

## 2018-08-09 PROCEDURE — 6360000002 HC RX W HCPCS: Performed by: INTERNAL MEDICINE

## 2018-08-09 PROCEDURE — 6360000002 HC RX W HCPCS: Performed by: EMERGENCY MEDICINE

## 2018-08-09 PROCEDURE — 85025 COMPLETE CBC W/AUTO DIFF WBC: CPT

## 2018-08-09 PROCEDURE — 6370000000 HC RX 637 (ALT 250 FOR IP): Performed by: EMERGENCY MEDICINE

## 2018-08-09 PROCEDURE — 2500000003 HC RX 250 WO HCPCS: Performed by: EMERGENCY MEDICINE

## 2018-08-09 PROCEDURE — 36415 COLL VENOUS BLD VENIPUNCTURE: CPT

## 2018-08-09 PROCEDURE — 99223 1ST HOSP IP/OBS HIGH 75: CPT | Performed by: INTERNAL MEDICINE

## 2018-08-09 PROCEDURE — 87641 MR-STAPH DNA AMP PROBE: CPT

## 2018-08-09 PROCEDURE — 83605 ASSAY OF LACTIC ACID: CPT

## 2018-08-09 RX ORDER — LORATADINE AND PSEUDOEPHEDRINE 10; 240 MG/1; MG/1
1 TABLET, EXTENDED RELEASE ORAL DAILY
Status: DISCONTINUED | OUTPATIENT
Start: 2018-08-09 | End: 2018-08-09 | Stop reason: CLARIF

## 2018-08-09 RX ORDER — BACLOFEN 10 MG/1
10 TABLET ORAL NIGHTLY
Status: DISCONTINUED | OUTPATIENT
Start: 2018-08-09 | End: 2018-08-16 | Stop reason: HOSPADM

## 2018-08-09 RX ORDER — SODIUM CHLORIDE 0.9 % (FLUSH) 0.9 %
10 SYRINGE (ML) INJECTION EVERY 12 HOURS SCHEDULED
Status: DISCONTINUED | OUTPATIENT
Start: 2018-08-09 | End: 2018-08-16 | Stop reason: HOSPADM

## 2018-08-09 RX ORDER — NICOTINE POLACRILEX 4 MG
15 LOZENGE BUCCAL PRN
Status: DISCONTINUED | OUTPATIENT
Start: 2018-08-09 | End: 2018-08-16 | Stop reason: HOSPADM

## 2018-08-09 RX ORDER — DEXTROSE MONOHYDRATE 25 G/50ML
12.5 INJECTION, SOLUTION INTRAVENOUS PRN
Status: DISCONTINUED | OUTPATIENT
Start: 2018-08-09 | End: 2018-08-16 | Stop reason: HOSPADM

## 2018-08-09 RX ORDER — SODIUM CHLORIDE 0.9 % (FLUSH) 0.9 %
10 SYRINGE (ML) INJECTION PRN
Status: DISCONTINUED | OUTPATIENT
Start: 2018-08-09 | End: 2018-08-16 | Stop reason: HOSPADM

## 2018-08-09 RX ORDER — 0.9 % SODIUM CHLORIDE 0.9 %
500 INTRAVENOUS SOLUTION INTRAVENOUS ONCE
Status: COMPLETED | OUTPATIENT
Start: 2018-08-09 | End: 2018-08-09

## 2018-08-09 RX ORDER — GABAPENTIN 300 MG/1
300 CAPSULE ORAL 2 TIMES DAILY
Status: DISCONTINUED | OUTPATIENT
Start: 2018-08-09 | End: 2018-08-14

## 2018-08-09 RX ORDER — SODIUM CHLORIDE 9 MG/ML
INJECTION, SOLUTION INTRAVENOUS CONTINUOUS
Status: DISCONTINUED | OUTPATIENT
Start: 2018-08-09 | End: 2018-08-09

## 2018-08-09 RX ORDER — OXYCODONE HYDROCHLORIDE AND ACETAMINOPHEN 5; 325 MG/1; MG/1
1 TABLET ORAL EVERY 6 HOURS PRN
Status: DISCONTINUED | OUTPATIENT
Start: 2018-08-09 | End: 2018-08-16 | Stop reason: HOSPADM

## 2018-08-09 RX ORDER — ASPIRIN 81 MG/1
81 TABLET ORAL DAILY
Status: DISCONTINUED | OUTPATIENT
Start: 2018-08-09 | End: 2018-08-16 | Stop reason: HOSPADM

## 2018-08-09 RX ORDER — CETIRIZINE HYDROCHLORIDE 10 MG/1
10 TABLET ORAL DAILY
Status: DISCONTINUED | OUTPATIENT
Start: 2018-08-09 | End: 2018-08-16 | Stop reason: HOSPADM

## 2018-08-09 RX ORDER — INSULIN GLARGINE 100 [IU]/ML
40 INJECTION, SOLUTION SUBCUTANEOUS NIGHTLY
Status: DISCONTINUED | OUTPATIENT
Start: 2018-08-09 | End: 2018-08-16 | Stop reason: HOSPADM

## 2018-08-09 RX ORDER — ONDANSETRON 2 MG/ML
4 INJECTION INTRAMUSCULAR; INTRAVENOUS EVERY 6 HOURS PRN
Status: DISCONTINUED | OUTPATIENT
Start: 2018-08-09 | End: 2018-08-16 | Stop reason: HOSPADM

## 2018-08-09 RX ORDER — DOCUSATE SODIUM 100 MG/1
100 CAPSULE, LIQUID FILLED ORAL 2 TIMES DAILY
Status: DISCONTINUED | OUTPATIENT
Start: 2018-08-09 | End: 2018-08-16 | Stop reason: HOSPADM

## 2018-08-09 RX ORDER — PRAVASTATIN SODIUM 20 MG
10 TABLET ORAL DAILY
Status: DISCONTINUED | OUTPATIENT
Start: 2018-08-09 | End: 2018-08-16 | Stop reason: HOSPADM

## 2018-08-09 RX ORDER — ALBUTEROL SULFATE 2.5 MG/3ML
2.5 SOLUTION RESPIRATORY (INHALATION) EVERY 6 HOURS PRN
Status: DISCONTINUED | OUTPATIENT
Start: 2018-08-09 | End: 2018-08-16 | Stop reason: HOSPADM

## 2018-08-09 RX ORDER — DEXTROSE MONOHYDRATE 50 MG/ML
100 INJECTION, SOLUTION INTRAVENOUS PRN
Status: DISCONTINUED | OUTPATIENT
Start: 2018-08-09 | End: 2018-08-16 | Stop reason: HOSPADM

## 2018-08-09 RX ORDER — CITALOPRAM 20 MG/1
40 TABLET ORAL DAILY
Status: DISCONTINUED | OUTPATIENT
Start: 2018-08-09 | End: 2018-08-16 | Stop reason: HOSPADM

## 2018-08-09 RX ORDER — METFORMIN HYDROCHLORIDE 500 MG/1
1000 TABLET, EXTENDED RELEASE ORAL 2 TIMES DAILY WITH MEALS
Status: DISCONTINUED | OUTPATIENT
Start: 2018-08-09 | End: 2018-08-09

## 2018-08-09 RX ADMIN — ASPIRIN 81 MG: 81 TABLET, DELAYED RELEASE ORAL at 08:07

## 2018-08-09 RX ADMIN — DEXTROSE MONOHYDRATE 5 MILLION UNITS: 5 INJECTION INTRAVENOUS at 18:09

## 2018-08-09 RX ADMIN — ENOXAPARIN SODIUM 40 MG: 100 INJECTION SUBCUTANEOUS at 08:07

## 2018-08-09 RX ADMIN — OXYCODONE HYDROCHLORIDE AND ACETAMINOPHEN 1 TABLET: 5; 325 TABLET ORAL at 04:46

## 2018-08-09 RX ADMIN — INSULIN LISPRO 3 UNITS: 100 INJECTION, SOLUTION INTRAVENOUS; SUBCUTANEOUS at 16:50

## 2018-08-09 RX ADMIN — DOCUSATE SODIUM 100 MG: 100 CAPSULE ORAL at 08:08

## 2018-08-09 RX ADMIN — VANCOMYCIN HYDROCHLORIDE 1500 MG: 10 INJECTION, POWDER, LYOPHILIZED, FOR SOLUTION INTRAVENOUS at 18:50

## 2018-08-09 RX ADMIN — INSULIN LISPRO 3 UNITS: 100 INJECTION, SOLUTION INTRAVENOUS; SUBCUTANEOUS at 12:27

## 2018-08-09 RX ADMIN — BACLOFEN 10 MG: 10 TABLET ORAL at 01:41

## 2018-08-09 RX ADMIN — BACLOFEN 10 MG: 10 TABLET ORAL at 21:18

## 2018-08-09 RX ADMIN — DOCUSATE SODIUM 100 MG: 100 CAPSULE ORAL at 01:41

## 2018-08-09 RX ADMIN — DOCUSATE SODIUM 100 MG: 100 CAPSULE ORAL at 21:18

## 2018-08-09 RX ADMIN — Medication 10 ML: at 08:07

## 2018-08-09 RX ADMIN — SODIUM CHLORIDE 500 ML: 9 INJECTION, SOLUTION INTRAVENOUS at 02:38

## 2018-08-09 RX ADMIN — INSULIN LISPRO 3 UNITS: 100 INJECTION, SOLUTION INTRAVENOUS; SUBCUTANEOUS at 21:08

## 2018-08-09 RX ADMIN — Medication 2 MCG/MIN: at 03:42

## 2018-08-09 RX ADMIN — PSEUDOEPHEDRINE HYDROCHLORIDE 240 MG: 240 TABLET, EXTENDED RELEASE ORAL at 12:27

## 2018-08-09 RX ADMIN — CITALOPRAM 40 MG: 20 TABLET, FILM COATED ORAL at 08:07

## 2018-08-09 RX ADMIN — CETIRIZINE HYDROCHLORIDE 10 MG: 10 TABLET ORAL at 12:27

## 2018-08-09 RX ADMIN — PIPERACILLIN AND TAZOBACTAM 4.5 G: 4; .5 INJECTION, POWDER, LYOPHILIZED, FOR SOLUTION INTRAVENOUS; PARENTERAL at 03:01

## 2018-08-09 RX ADMIN — PIPERACILLIN AND TAZOBACTAM 4.5 G: 4; .5 INJECTION, POWDER, LYOPHILIZED, FOR SOLUTION INTRAVENOUS; PARENTERAL at 08:09

## 2018-08-09 RX ADMIN — SODIUM CHLORIDE: 9 INJECTION, SOLUTION INTRAVENOUS at 01:39

## 2018-08-09 RX ADMIN — PRAVASTATIN SODIUM 10 MG: 20 TABLET ORAL at 08:06

## 2018-08-09 RX ADMIN — GABAPENTIN 300 MG: 300 CAPSULE ORAL at 12:27

## 2018-08-09 RX ADMIN — INSULIN GLARGINE 40 UNITS: 100 INJECTION, SOLUTION SUBCUTANEOUS at 21:09

## 2018-08-09 RX ADMIN — GABAPENTIN 300 MG: 300 CAPSULE ORAL at 21:18

## 2018-08-09 RX ADMIN — DEXTROSE MONOHYDRATE 5 MILLION UNITS: 5 INJECTION INTRAVENOUS at 23:50

## 2018-08-09 RX ADMIN — INSULIN GLARGINE 40 UNITS: 100 INJECTION, SOLUTION SUBCUTANEOUS at 02:52

## 2018-08-09 RX ADMIN — OXYCODONE HYDROCHLORIDE AND ACETAMINOPHEN 1 TABLET: 5; 325 TABLET ORAL at 12:27

## 2018-08-09 RX ADMIN — OXYCODONE HYDROCHLORIDE AND ACETAMINOPHEN 1 TABLET: 5; 325 TABLET ORAL at 21:18

## 2018-08-09 ASSESSMENT — PAIN DESCRIPTION - ORIENTATION
ORIENTATION: RIGHT;LOWER
ORIENTATION: RIGHT
ORIENTATION: RIGHT

## 2018-08-09 ASSESSMENT — ENCOUNTER SYMPTOMS
ALLERGIC/IMMUNOLOGIC NEGATIVE: 1
GASTROINTESTINAL NEGATIVE: 1
RESPIRATORY NEGATIVE: 1
COLOR CHANGE: 1
EYES NEGATIVE: 1

## 2018-08-09 ASSESSMENT — PAIN SCALES - GENERAL
PAINLEVEL_OUTOF10: 2
PAINLEVEL_OUTOF10: 6
PAINLEVEL_OUTOF10: 9
PAINLEVEL_OUTOF10: 4
PAINLEVEL_OUTOF10: 9
PAINLEVEL_OUTOF10: 0

## 2018-08-09 ASSESSMENT — PAIN DESCRIPTION - PAIN TYPE
TYPE: ACUTE PAIN

## 2018-08-09 ASSESSMENT — PAIN DESCRIPTION - LOCATION
LOCATION: LEG

## 2018-08-09 NOTE — CARE COORDINATION
Case Management Initial Discharge Plan  Manuel Quinonez,         Readmission Risk              Risk of Unplanned Readmission:        15               Met with:patient to discuss discharge plans. Information verified: address, contacts, phone number, , insurance Yes  PCP: Benjamin Kent  Date of last visit: 18    Insurance Provider: [address] Medicare    Discharge Planning    Living Arrangements:  Children   Support Systems:  Family Members    Home has 1 stories  no stairs to climb to get into front door, no stairs to climb to reach second floor  Location of bedroom/bathroom in home main level    Patient able to perform ADL's:Independent    Current Services (outpatient & in home) None  DME equipment:  [address] [employer], [employer] [email or web address]  DME provider: n/a    Pharmacy: [facility]   Potential Assistance Purchasing Medications:  Yes  Does patient want to participate in local refill/ meds to beds program?  Yes (May need Med Assist)    Potential Assistance Needed:  Durable Medical Equipment    Patient agreeable to home care: Yes  Freedom of choice provided:  yes    Prior SNF/Rehab Placement and Facility: No  Agreeable to SNF/Rehab: No  [place] of choice provided: yes   Evaluation: yes    Expected Discharge date:  18  Patient expects to be discharged to:  Home  Follow Up Appointment: Best Day/ Time:      Transportation provider: Self  Transportation arrangements needed for discharge: No pt daughter to provide transportation    Discharge Plan: Met with pt she states she lives in a 2-BR apartment with her daughter and grandson. She sleeps in a recliner due to needing to be upright as she does not have a CPAP as the one she had fell and broke greater than 1 yr ago. She has difficulty paying for and obtaining her medications.  Referral placed to HELP - spoke with Chris Velasquez he will see the pt 8/10 AM, Call placed to [address] [place] [address] spoke with Liliam Day to inquire on ability to obtain

## 2018-08-09 NOTE — H&P
Negative             []  Positive (Details:  )        Assessment and Plan     Patient Active Problem List   Diagnosis    Septic shock ([address]         Additional assessment:    Plan:  Cellulitis : Iv vancomycin, Zosyn  Monitor hydration status  Fluids : 0.9 % NaCl for one bolus : iv 500 ml. Blood culture. Pain control : Asprin and percocet prn    Uncontrolled and unmonitored diabetic :  Insulin- Lantus  40 units sc   Monitor glucose levels  Hold oral hypoglycemics : patient was on home dose of metformin. On hold per pharmacy.     DVT prophylaxis  :  Enoxaparin  Gi prophylaxis : Alice 64  PGY-1 Internal Medicine Resident  [facility], [address] [place] [address]    8/9/2018, 4:08 AM

## 2018-08-09 NOTE — CONSULTS
red, erysipelas on exam, w small blister on the right foot  No past hx      Interval [email or web address]     Summary of relevant labs:  Labs:    Micro:    Imaging:      I have personally reviewed the past medical history, past surgical history, medications, social history, and family history, and I have updated the database accordingly. Past Medical History:     Past Medical History:   Diagnosis Date    Anemia     h/o anemia,transfused 2/2014    Anxiety disorder     can get SOB with an anxiety attack    Arthritis     Asthma     Blood transfusion reaction     x 2 unit prbc's 2/2014    COPD (chronic obstructive pulmonary disease) ([facility])     patient denies COPD, only has asthma    Depression     Diabetes mellitus ([facility])     GERD (gastroesophageal reflux disease)     Hammer toe of right foot     Hernia, abdominal 1998    Hyperlipidemia     Hypertension     Restless leg syndrome     Seasonal allergies     Sleep apnea     doesn't use CPAP, sleeps in a recliner    Spondylosis     lower back       Past Surgical  History:     Past Surgical History:   Procedure Laterality Date    APPENDECTOMY      COLONOSCOPY      ROTATOR CUFF REPAIR Right        Medications:      aspirin  81 mg Oral Daily    baclofen  10 mg Oral Nightly    citalopram  40 mg Oral Daily    pravastatin  10 mg Oral Daily    insulin glargine  40 Units Subcutaneous Nightly    gabapentin  300 mg Oral BID    docusate sodium  100 mg Oral BID    sodium chloride flush  10 mL Intravenous 2 times per day    enoxaparin  40 mg Subcutaneous Daily    piperacillin-tazobactam  4.5 g Intravenous Q8H    vancomycin  1,500 mg Intravenous Q24H    vancomycin (VANCOCIN) intermittent dosing (placeholder)   Other RX Placeholder    insulin lispro  0-6 Units Subcutaneous 4x Daily AC & HS    cetirizine  10 mg Oral Daily    And    pseudoephedrine  240 mg Oral Daily       Social History:     Social History     Social History    Marital status:   care of this patient. Please call with questions. Ferny Samson MD  Office: [phone]    This note is created with the assistance of a speech recognition program.  While intending to generate a document that actually reflects the content of the visit, the document can still have some errors including those of syntax and sound a like substitutions which may escape proof reading. It such instances, actual meaning can be extrapolated by contextual diversion.

## 2018-08-09 NOTE — PROGRESS NOTES
and/or poor aeration   Sputum    []  Small amount of thin secretions []  Moderate amount of viscous secretions []  Copius, Viscious Yellow/ Secretions   CXR as reported by physician []  clear  []  Unavailable []  Infiltrates and/or consolidation  []  Unavailable []  Mucus Plugging and or lobar consolidation  []  Unavailable   Cough []  Strong, productive cough []  Weak productive cough []  No cough or weak non-productive cough   Shelly Lundy  1:04 AM                            FEMALE                                  MALE                            FEV1 Predicted Normal Values                        FEV1 Predicted Normal Values          Age                                     Height in Feet and Inches       Age                                     Height in Feet and Inches       4' 11\" 5' 1\" 5' 3\" 5' 5\" 5' 7\" 5' 9\" 5' 11\" 6' 1\"  4' 11\" 5' 1\" 5' 3\" 5' 5\" 5' 7\" 5' 9\" 5' 11\" 6' 1\"   42 - 45 2.49 2.66 2.84 3.03 3.22 3.42 3.62 3.83 42 - 45 2.82 3.03 3.26 3.49 3.72 3.96 4.22 4.47   46 - 49 2.40 2.57 2.76 2.94 3.14 3.33 3.54 3.75 46 - 49 2.70 2.92 3.14 3.37 3.61 3.85 4.10 4.36   50 - 53 2.31 2.48 2.66 2.85 3.04 3.24 3.45 3.66 50 - 53 2.58 2.80 3.02 3.25 3.49 3.73 3.98 4.24   54 - 57 2.21 2.38 2.57 2.75 2.95 3.14 3.35 3.56 54 - 57 2.46 2.67 2.89 3.12 3.36 3.60 3.85 4.11   58 - 61 2.10 2.28 2.46 2.65 2.84 3.04 3.24 3.45 58 - 61 2.32 2.54 2.76 2.99 3.23 3.47 3.72 3.98   62 - 65 1.99 2.17 2.35 2.54 2.73 2.93 3.13 3.34 62 - 65 2.19 2.40 2.62 2.85 3.09 3.33 3.58 3.84   66 - 69 1.88 2.05 2.23 2.42 2.61 2.81 3.02 3.23 66 - 69 2.04 2.26 2.48 2.71 2.95 3.19 3.44 3.70   70+ 1.82 1.99 2.17 2.36 2.55 2.75 2.95 3.16 70+ 1.97 2.19 2.41 2.64 2.87 3.12 3.37 3.62             Predicted Peak Expiratory Flow Rate                                       Height (in)  Female       Height (in) Male           Age 64 62 64 63 57 71 78 74 Age            21 344 357 372 [phone]  60 62 64 66 68 70 72 74 76   25 337 [phone]

## 2018-08-10 LAB
ABSOLUTE EOS #: 0.51 K/UL (ref 0–0.4)
ABSOLUTE IMMATURE GRANULOCYTE: 0 K/UL (ref 0–0.3)
ABSOLUTE LYMPH #: 1.72 K/UL (ref 1–4.8)
ABSOLUTE MONO #: 0.71 K/UL (ref 0.1–0.8)
ANION GAP SERPL CALCULATED.3IONS-SCNC: 11 MMOL/L (ref 9–17)
BASOPHILS # BLD: 0 % (ref 0–2)
BASOPHILS ABSOLUTE: 0 K/UL (ref 0–0.2)
BUN BLDV-MCNC: 15 MG/DL (ref 8–23)
BUN/CREAT BLD: ABNORMAL (ref 9–20)
C-REACTIVE PROTEIN: 228.3 MG/L (ref 0–5)
CALCIUM SERPL-MCNC: 8.7 MG/DL (ref 8.6–10.4)
CHLORIDE BLD-SCNC: 97 MMOL/L (ref 98–107)
CO2: 24 MMOL/L (ref 20–31)
CREAT SERPL-MCNC: 0.52 MG/DL (ref 0.5–0.9)
DIFFERENTIAL TYPE: ABNORMAL
EOSINOPHILS RELATIVE PERCENT: 5 % (ref 1–4)
GFR AFRICAN AMERICAN: >60 ML/MIN
GFR NON-AFRICAN AMERICAN: >60 ML/MIN
GFR SERPL CREATININE-BSD FRML MDRD: ABNORMAL ML/MIN/{1.73_M2}
GFR SERPL CREATININE-BSD FRML MDRD: ABNORMAL ML/MIN/{1.73_M2}
GLUCOSE BLD-MCNC: 167 MG/DL (ref 65–105)
GLUCOSE BLD-MCNC: 199 MG/DL (ref 65–105)
GLUCOSE BLD-MCNC: 201 MG/DL (ref 70–99)
GLUCOSE BLD-MCNC: 245 MG/DL (ref 65–105)
GLUCOSE BLD-MCNC: 251 MG/DL (ref 65–105)
GLUCOSE BLD-MCNC: 256 MG/DL (ref 65–105)
HCT VFR BLD CALC: 35.3 % (ref 36.3–47.1)
HEMOGLOBIN: 11.4 G/DL (ref 11.9–15.1)
IMMATURE GRANULOCYTES: 0 %
LYMPHOCYTES # BLD: 17 % (ref 24–44)
MAGNESIUM: 1.6 MG/DL (ref 1.6–2.6)
MCH RBC QN AUTO: 29.3 PG (ref 25.2–33.5)
MCHC RBC AUTO-ENTMCNC: 32.3 G/DL (ref 28.4–34.8)
MCV RBC AUTO: 90.7 FL (ref 82.6–102.9)
MONOCYTES # BLD: 7 % (ref 1–7)
MORPHOLOGY: NORMAL
NRBC AUTOMATED: 0 PER 100 WBC
PDW BLD-RTO: 13.5 % (ref 11.8–14.4)
PHOSPHORUS: 2.9 MG/DL (ref 2.6–4.5)
PLATELET # BLD: 125 K/UL (ref 138–453)
PLATELET ESTIMATE: ABNORMAL
PMV BLD AUTO: 12 FL (ref 8.1–13.5)
POTASSIUM SERPL-SCNC: 3.8 MMOL/L (ref 3.7–5.3)
RBC # BLD: 3.89 M/UL (ref 3.95–5.11)
RBC # BLD: ABNORMAL 10*6/UL
SEG NEUTROPHILS: 71 % (ref 36–66)
SEGMENTED NEUTROPHILS ABSOLUTE COUNT: 7.16 K/UL (ref 1.8–7.7)
SODIUM BLD-SCNC: 132 MMOL/L (ref 135–144)
WBC # BLD: 10.1 K/UL (ref 3.5–11.3)
WBC # BLD: ABNORMAL 10*3/UL

## 2018-08-10 PROCEDURE — 94762 N-INVAS EAR/PLS OXIMTRY CONT: CPT

## 2018-08-10 PROCEDURE — 93970 EXTREMITY STUDY: CPT

## 2018-08-10 PROCEDURE — 84100 ASSAY OF PHOSPHORUS: CPT

## 2018-08-10 PROCEDURE — 83735 ASSAY OF MAGNESIUM: CPT

## 2018-08-10 PROCEDURE — 99223 1ST HOSP IP/OBS HIGH 75: CPT | Performed by: INTERNAL MEDICINE

## 2018-08-10 PROCEDURE — 2580000003 HC RX 258: Performed by: EMERGENCY MEDICINE

## 2018-08-10 PROCEDURE — 99232 SBSQ HOSP IP/OBS MODERATE 35: CPT | Performed by: INTERNAL MEDICINE

## 2018-08-10 PROCEDURE — 36415 COLL VENOUS BLD VENIPUNCTURE: CPT

## 2018-08-10 PROCEDURE — 6360000002 HC RX W HCPCS: Performed by: EMERGENCY MEDICINE

## 2018-08-10 PROCEDURE — 1200000000 HC SEMI PRIVATE

## 2018-08-10 PROCEDURE — 6370000000 HC RX 637 (ALT 250 FOR IP): Performed by: EMERGENCY MEDICINE

## 2018-08-10 PROCEDURE — 6360000002 HC RX W HCPCS: Performed by: INTERNAL MEDICINE

## 2018-08-10 PROCEDURE — 80048 BASIC METABOLIC PNL TOTAL CA: CPT

## 2018-08-10 PROCEDURE — 85025 COMPLETE CBC W/AUTO DIFF WBC: CPT

## 2018-08-10 PROCEDURE — 6370000000 HC RX 637 (ALT 250 FOR IP): Performed by: STUDENT IN AN ORGANIZED HEALTH CARE EDUCATION/TRAINING PROGRAM

## 2018-08-10 PROCEDURE — 6370000000 HC RX 637 (ALT 250 FOR IP): Performed by: HOSPITALIST

## 2018-08-10 PROCEDURE — 86140 C-REACTIVE PROTEIN: CPT

## 2018-08-10 PROCEDURE — 82947 ASSAY GLUCOSE BLOOD QUANT: CPT

## 2018-08-10 PROCEDURE — 2580000003 HC RX 258: Performed by: INTERNAL MEDICINE

## 2018-08-10 RX ORDER — PANTOPRAZOLE SODIUM 40 MG/1
40 TABLET, DELAYED RELEASE ORAL
Status: DISCONTINUED | OUTPATIENT
Start: 2018-08-11 | End: 2018-08-16 | Stop reason: HOSPADM

## 2018-08-10 RX ORDER — SENNA PLUS 8.6 MG/1
1 TABLET ORAL NIGHTLY
Status: DISCONTINUED | OUTPATIENT
Start: 2018-08-10 | End: 2018-08-16 | Stop reason: HOSPADM

## 2018-08-10 RX ADMIN — BACLOFEN 10 MG: 10 TABLET ORAL at 21:37

## 2018-08-10 RX ADMIN — DEXTROSE MONOHYDRATE 5 MILLION UNITS: 5 INJECTION INTRAVENOUS at 23:30

## 2018-08-10 RX ADMIN — CITALOPRAM 40 MG: 20 TABLET, FILM COATED ORAL at 08:06

## 2018-08-10 RX ADMIN — INSULIN LISPRO 1 UNITS: 100 INJECTION, SOLUTION INTRAVENOUS; SUBCUTANEOUS at 07:54

## 2018-08-10 RX ADMIN — Medication 10 ML: at 08:07

## 2018-08-10 RX ADMIN — ASPIRIN 81 MG: 81 TABLET, DELAYED RELEASE ORAL at 08:06

## 2018-08-10 RX ADMIN — INSULIN GLARGINE 40 UNITS: 100 INJECTION, SOLUTION SUBCUTANEOUS at 23:37

## 2018-08-10 RX ADMIN — Medication 1500 MG: at 17:57

## 2018-08-10 RX ADMIN — DEXTROSE MONOHYDRATE 5 MILLION UNITS: 5 INJECTION INTRAVENOUS at 16:52

## 2018-08-10 RX ADMIN — GABAPENTIN 300 MG: 300 CAPSULE ORAL at 12:27

## 2018-08-10 RX ADMIN — DEXTROSE MONOHYDRATE 5 MILLION UNITS: 5 INJECTION INTRAVENOUS at 12:26

## 2018-08-10 RX ADMIN — PRAVASTATIN SODIUM 10 MG: 20 TABLET ORAL at 08:13

## 2018-08-10 RX ADMIN — SENNA 8.6 MG: 8.6 TABLET, COATED ORAL at 23:36

## 2018-08-10 RX ADMIN — CETIRIZINE HYDROCHLORIDE 10 MG: 10 TABLET ORAL at 08:06

## 2018-08-10 RX ADMIN — GABAPENTIN 300 MG: 300 CAPSULE ORAL at 21:37

## 2018-08-10 RX ADMIN — INSULIN LISPRO 3 UNITS: 100 INJECTION, SOLUTION INTRAVENOUS; SUBCUTANEOUS at 18:00

## 2018-08-10 RX ADMIN — DOCUSATE SODIUM 100 MG: 100 CAPSULE ORAL at 21:37

## 2018-08-10 RX ADMIN — DEXTROSE MONOHYDRATE 5 MILLION UNITS: 5 INJECTION INTRAVENOUS at 05:35

## 2018-08-10 RX ADMIN — ENOXAPARIN SODIUM 40 MG: 100 INJECTION SUBCUTANEOUS at 08:14

## 2018-08-10 RX ADMIN — OXYCODONE HYDROCHLORIDE AND ACETAMINOPHEN 1 TABLET: 5; 325 TABLET ORAL at 21:37

## 2018-08-10 RX ADMIN — OXYCODONE HYDROCHLORIDE AND ACETAMINOPHEN 1 TABLET: 5; 325 TABLET ORAL at 08:04

## 2018-08-10 RX ADMIN — PSEUDOEPHEDRINE HYDROCHLORIDE 240 MG: 240 TABLET, EXTENDED RELEASE ORAL at 08:06

## 2018-08-10 RX ADMIN — DOCUSATE SODIUM 100 MG: 100 CAPSULE ORAL at 08:14

## 2018-08-10 RX ADMIN — INSULIN LISPRO 1 UNITS: 100 INJECTION, SOLUTION INTRAVENOUS; SUBCUTANEOUS at 23:34

## 2018-08-10 ASSESSMENT — ENCOUNTER SYMPTOMS
EYES NEGATIVE: 1
ALLERGIC/IMMUNOLOGIC NEGATIVE: 1
RESPIRATORY NEGATIVE: 1
COLOR CHANGE: 1
GASTROINTESTINAL NEGATIVE: 1

## 2018-08-10 ASSESSMENT — PAIN SCALES - GENERAL
PAINLEVEL_OUTOF10: 2
PAINLEVEL_OUTOF10: 9
PAINLEVEL_OUTOF10: 7
PAINLEVEL_OUTOF10: 6

## 2018-08-10 ASSESSMENT — PAIN DESCRIPTION - ORIENTATION: ORIENTATION: RIGHT

## 2018-08-10 ASSESSMENT — PAIN DESCRIPTION - LOCATION: LOCATION: GENERALIZED;LEG

## 2018-08-10 ASSESSMENT — PAIN DESCRIPTION - PAIN TYPE: TYPE: CHRONIC PAIN;ACUTE PAIN

## 2018-08-10 ASSESSMENT — PAIN DESCRIPTION - ONSET: ONSET: ON-GOING

## 2018-08-10 ASSESSMENT — PAIN DESCRIPTION - DESCRIPTORS: DESCRIPTORS: TENDER;SORE;ACHING;DISCOMFORT

## 2018-08-10 ASSESSMENT — PAIN DESCRIPTION - FREQUENCY: FREQUENCY: CONTINUOUS

## 2018-08-10 ASSESSMENT — PAIN DESCRIPTION - PROGRESSION: CLINICAL_PROGRESSION: NOT CHANGED

## 2018-08-10 NOTE — PROGRESS NOTES
[address] [facility]    PROGRESS NOTE             Date:   8/10/2018  Patient name:  Beatriz Kiran  Date of admission:  8/9/2018 12:06 AM  MRN:   [id number]  YOB: 1955    CHIEF COMPLAINT     History Obtained From:  Patient and chart review. HISTORY OF PRESENT ILLNESS      Patient with past medical history of diabetes mellitus, depression, came to the emergency room for right lower extremity swelling and pain since monday. Initially it was a small dimple that progressed into or thigh associated with pain, fever or chills. Per patient, she has not noticed any drug bite or travel outside. On initial encounter, patient was hypertensive. Patient was started on IV hydration that improve the blood pressure. Patient started on IV vancomycin and Zosyn. Redness improving. 8-10-18  Patient remained hemodynamically stable over the last 24 hours. Patient remained afebrile. Patient has more blister formation on the right lower extremity. Patient denies any nausea, vomiting or abdominal pain. PAST MEDICAL HISTORY       has a past medical history of Anemia; Anxiety disorder; Arthritis; Asthma; Blood transfusion reaction; COPD (chronic obstructive pulmonary disease) ([address] Depression; Diabetes mellitus ([address] GERD (gastroesophageal reflux disease); Hammer toe of right foot; Hernia, abdominal; Hyperlipidemia; Hypertension; Restless leg syndrome; Seasonal allergies; Sleep apnea; and Spondylosis. PAST SURGICAL HISTORY       has a past surgical history that includes Appendectomy; Rotator cuff repair (Right); and Colonoscopy. HOME MEDICATIONS        Prior to Admission medications    Medication Sig Start Date End Date Taking?  Authorizing Provider   diclofenac sodium 1 % GEL Apply 2 g topically 2 times daily    Historical Provider, MD   insulin glargine (LANTUS) 100 UNIT/ML injection vial Inject 40 Units into the skin nightly

## 2018-08-10 NOTE — PROGRESS NOTES
Critical care team - Resident sign-out to medicine service      Date and time: 8/9/2018 8:31 PM  Patient's name:  Anna Ang Record Number: [id number]  Patient's account/billing number: [de-identified]  Patient's YOB: 1955  Age: 65 y.o. Date of Admission: 8/9/2018 12:06 AM  Length of stay during current admission: 0    Primary Care Physician: Allegra Olmstead    Code Status: Full Code    Mode of physician to physician communication:        [x] Via telephone   [] In person     Date and time of sign-out: 8/9/2018 8:31 PM    Accepting Internal Medicine resident: Dr. Chris Rodriguez    Accepting Medicine team: IM Team     Accepting team's attending: Dr. Linette Abdul    Patient's current ICU Bed:  119      Patient's assigned bed on floor:  244        [x] Med-Surg Monitored [] Step-down       [] Psychiatry ICU       [] Psych floor     Reason for ICU admission:     Concern for septic shock    ICU course summary:     Patient was admitted for sepsis likely 2/2 cellulitus that was rapidly progressing. Patient was hypotensive and started on Levophed. She was treated with IV hydration, levophed, vanc and zosyn. Patient BP improved significantly and was weaned off of Levophed. ID was consulted for cellulitus treatment. Patient is stable for transfer to med/surg monitored.      Procedures during patient's ICU stay:     None    Current Vitals:     BP (!) 104/54   Pulse 82   Temp 98.1 °F (36.7 °C) (Oral)   Resp 22   Ht 5' 1.81\" (1.57 m)   Wt 220 lb 10.9 oz (100.1 kg)   SpO2 99%   BMI 40.61 kg/m²       Cultures:     Blood cultures:                 [] None drawn      [x] Negative             [x]  Positive (Details:  )   Urine Culture:                   [x] None drawn      [] Negative             []  Positive (Details:  )  Sputum Culture:               [x] None drawn       [] Negative             []  Positive (Details:  )   Endotracheal aspirate:     [x] None drawn       [] Negative             []  Positive

## 2018-08-10 NOTE — PROGRESS NOTES
GRACY LIZAMA, [facility] Septic shock ([address] [A41.9, R65.21] . Vitals:    08/10/18 0341   BP: (!) 117/53   Pulse: 74   Resp: 22   Temp: 98.4 °F (36.9 °C)   SpO2:    . Patients home meds are   Prior to Admission medications    Medication Sig Start Date End Date Taking? Authorizing Provider   diclofenac sodium 1 % GEL Apply 2 g topically 2 times daily    Historical Provider, MD   insulin glargine (LANTUS) 100 UNIT/ML injection vial Inject 40 Units into the skin nightly    Historical Provider, MD   lisinopril (PRINIVIL;ZESTRIL) 20 MG tablet Take 20 mg by mouth daily    Historical Provider, MD   docusate sodium (COLACE) 100 MG capsule Take 100 mg by mouth 2 times daily    Historical Provider, MD   baclofen (LIORESAL) 10 MG tablet Take 10 mg by mouth nightly    Historical Provider, MD   aspirin 81 MG EC tablet Take 81 mg by mouth daily. Historical Provider, MD   citalopram (CELEXA) 40 MG tablet Take 40 mg by mouth daily. Historical Provider, MD   fluticasone (FLONASE) 50 MCG/ACT nasal spray 1 spray by Nasal route 2 times daily as needed for Rhinitis. Historical Provider, MD   loratadine-pseudoephedrine (LORATADINE-D 24HR)  MG per tablet Take 1 tablet by mouth daily. Historical Provider, MD   metFORMIN ER (GLUCOPHAGE-XR) 500 MG XR tablet Take 1,000 mg by mouth 2 times daily (with meals). Historical Provider, MD   gabapentin (NEURONTIN) 300 MG capsule Take 300 mg by mouth 2 times daily. Historical Provider, MD   omeprazole (PRILOSEC) 20 MG capsule Take 20 mg by mouth daily. Historical Provider, MD   oxyCODONE-acetaminophen (PERCOCET) 5-325 MG per tablet Take 1 tablet by mouth 3 times daily as needed for Pain. Historical Provider, MD   pravastatin (PRAVACHOL) 10 MG tablet Take 10 mg by mouth daily. Historical Provider, MD   montelukast (SINGULAIR) 10 MG tablet Take 10 mg by mouth nightly.     Historical Provider, MD   metoprolol succinate (TOPROL XL) 25 MG extended release tablet Take 25 mg by mouth daily     Historical Provider, MD   }       RR 18  Breath Sounds: clear      · Bronchodilator assessment at level  1  · Hyperinflation assessment at level   · Secretion Management assessment at level    ·   · [x]    Bronchodilator Assessment  BRONCHODILATOR ASSESSMENT SCORE  Score 0 1 2 3 4 5   Breath Sounds   []  Patient Baseline [x]  No Wheeze good aeration []  Faint, scattered wheezing, good aeration []  Expiratory Wheezing and or moderately diminished []  Insp/Exp wheeze and/or very diminished []  Insp/Exp and/ or marked distress   Respiratory Rate   []  Patient Baseline [x]  Less than 20 [x]  Less than 20 []  20-25 []  Greater than 25 []  Greater than 25   Peak flow % of Pred or PB [x]  NA   []  Greater than 90%  []  81-90% []  71-80% []  Less than or equal to 70%  or unable to perform []  Unable due to Respiratory Distress   Dyspnea re []  Patient Baseline [x]  No SOB [x]  No SOB []  SOB on exertion []  SOB min activity []  At rest/acute   e FEV% Predicted       [x]  NA []  Above 69%  []  Unable []  Above 60-69%  []  Unable []  Above 50-59%  []  Unable []  Above 35-49%  []  Unable []  Less than 35%  []  Unable                 []  Hyperinflation Assessment  Score 1 2 3   CXR and Breath Sounds   []  Clear []  No atelectasis  Basilar aeration []  Atelectasis or absent basilar breath sounds   Incentive Spirometry Volume  (Per IBW)   []  Greater than or equal to 15ml/Kg []  less than 15ml/Kg []  less than 15ml/Kg   Surgery within last 2 weeks []  None or general   []  Abdominal or thoracic surgery  []  Abdominal or thoracic   Chronic Pulmonary Historyre []  No []  Yes []  Yes     []  Secretion Management Assessment  Score 1 2 3   Bilateral Breath Sounds   []  Occasional Rhonchi []  Scattered Rhonchi []  Course Rhonchi and/or poor aeration   Sputum    []  Small amount of thin secretions []  Moderate amount of viscous secretions []  Copius, Viscious Yellow/ Secretions   CXR as reported by physician []  clear  []  Unavailable []  Infiltrates and/or consolidation  []  Unavailable []  Mucus Plugging and or lobar consolidation  []  Unavailable   Cough []  Strong, productive cough []  Weak productive cough []  No cough or weak non-productive cough   GRACY LIZAMA  2:05 PM                            FEMALE                                  MALE                            FEV1 Predicted Normal Values                        FEV1 Predicted Normal Values          Age                                     Height in Feet and Inches       Age                                     Height in Feet and Inches       4' 11\" 5' 1\" 5' 3\" 5' 5\" 5' 7\" 5' 9\" 5' 11\" 6' 1\"  4' 11\" 5' 1\" 5' 3\" 5' 5\" 5' 7\" 5' 9\" 5' 11\" 6' 1\"   42 - 45 2.49 2.66 2.84 3.03 3.22 3.42 3.62 3.83 42 - 45 2.82 3.03 3.26 3.49 3.72 3.96 4.22 4.47   46 - 49 2.40 2.57 2.76 2.94 3.14 3.33 3.54 3.75 46 - 49 2.70 2.92 3.14 3.37 3.61 3.85 4.10 4.36   50 - 53 2.31 2.48 2.66 2.85 3.04 3.24 3.45 3.66 50 - 53 2.58 2.80 3.02 3.25 3.49 3.73 3.98 4.24   54 - 57 2.21 2.38 2.57 2.75 2.95 3.14 3.35 3.56 54 - 57 2.46 2.67 2.89 3.12 3.36 3.60 3.85 4.11   58 - 61 2.10 2.28 2.46 2.65 2.84 3.04 3.24 3.45 58 - 61 2.32 2.54 2.76 2.99 3.23 3.47 3.72 3.98   62 - 65 1.99 2.17 2.35 2.54 2.73 2.93 3.13 3.34 62 - 65 2.19 2.40 2.62 2.85 3.09 3.33 3.58 3.84   66 - 69 1.88 2.05 2.23 2.42 2.61 2.81 3.02 3.23 66 - 69 2.04 2.26 2.48 2.71 2.95 3.19 3.44 3.70   70+ 1.82 1.99 2.17 2.36 2.55 2.75 2.95 3.16 70+ 1.97 2.19 2.41 2.64 2.87 3.12 3.37 3.62             Predicted Peak Expiratory Flow Rate                                       Height (in)  Female       Height (in) Male           Age 64 62 64 63 57 71 78 74 Age            20 837 483 758 439 900 536 053 352  91 16 96 04 65 18 20 82 93   25 337 352 366 381 396 411 426 441 25 447 476 505 533 562 [phone]   30 329 344 359 374 389 404 419 434 30 437 466 494 523 552 [phone]   35 322 337 351 366 381 [phone]

## 2018-08-10 NOTE — PROGRESS NOTES
[facility] - Initial Consult Note  admission date 8/9/2018  Impression :   Current:  · Severe whole  R lower extremity cellulitis -strept likely  · Septic shock  · Leukocytosis  · Lactic acidosis  ·  CRP elevation     Other:  ·    Discussion / summary of stay / plan of care   · Rapid progression of the R lower extremity cellulitis starting from the thigh and involving very quickly The whole leg. Treated with a severe sepsis and septic shock, leukocytosis, lactic acidosis  · Picture of erysipelas, no culture available  · no DVT right leg,  ACE started to improve the swelling  · Slow resolution of the cellulitis     Recommendations   · PNG iv  and Vanco-aim level 12-15  ·  ACE wrap to thr whole right leg starting from the toes till the thigh  · Supportive care  · Following the redness, mostly at the groin area where it seems to be progressing  · No signs of fasciitis at this time  · Watch creatinine under Vanco     Infection Control Recommendations   · [place] Precautions     Antimicrobial Stewardship Recommendations   · Simplification of therapy  · Targeted therapy        Coordination of Outpatient Care:   · Estimated Length of IV antimicrobials:  · Patient will need Midline Catheter Insertion:   · Patient will need PICC line Insertion:   · Patient will need SNF :  · Patient will need outpatient wound care:   Chief complaint/reason for consultation:   Severe lower extremity cellulitis/chest pain     History of Present Illness:   Initial history: Nakita Miguel is a 65y.o.-year-old  Who presented because of redness over the right lower extremity, ongoing for about 5 days now, started like a pimple and progressed into the thigh with a sharp pain. Went to the whole leg within a day. warm and she had a fever, presented to the emergency room today, the edema is associated with redness. No nausea, vomiting, diarrhea.   In the hospital.  She was found hypotensive and septic shock.  Leukocytosis associated at 16.  UA normal   CXR normal     Right whole leg is red, erysipelas on exam, w small blister on the right foot  No past hx        Interval changes8/10/2018   No fever, chills, right leg DVT is negative and Ace wrap started, redness seems to have improved over the thigh area, but continues over the leg, a new fungal rash over the groin area is present. No abdominal pain or diarrhea not short of breath. Feels much better than when she came in  Has been transferred to the floor from ICU  No positive cultures so far and the CRP has a slow decline since yesterday  WBC corrected from 16-10  Summary of relevant labs:  Labs:   -  228  WBC 10  Creatinine normal  Micro:  Urine culture E. Coli  Blood culture ×2 negative  Imaging:  Venous Doppler both lower extremities -8/10/18    I have personally reviewed the past medical history, past surgical history, medications, social history, and family history, and I have updated the database accordingly.   Past Medical History:     Past Medical History:   Diagnosis Date    Anemia     h/o anemia,transfused 2/2014    Anxiety disorder     can get SOB with an anxiety attack    Arthritis     Asthma     Blood transfusion reaction     x 2 unit prbc's 2/2014    COPD (chronic obstructive pulmonary disease) [address] [address]     patient denies COPD, only has asthma    Depression     Diabetes mellitus (HCC)     GERD (gastroesophageal reflux disease)     Hammer toe of right foot     Hernia, abdominal 1998    Hyperlipidemia     Hypertension     Restless leg syndrome     Seasonal allergies     Sleep apnea     doesn't use CPAP, sleeps in a recliner    Spondylosis     lower back       Past Surgical  History:     Past Surgical History:   Procedure Laterality Date    APPENDECTOMY      COLONOSCOPY      ROTATOR CUFF REPAIR Right        Medications:      vancomycin  1,500 mg Intravenous Q12H    [START ON 8/11/2018] pantoprazole  40 mg Oral QAM AC    Oral 84 20 95 %       Physical Exam   Constitutional: She is oriented to person, place, and time and well-developed, well-nourished, and in no distress. No distress. HENT:   Head: Normocephalic and atraumatic. Mouth/Throat: No oropharyngeal exudate. Eyes: Conjunctivae and EOM are normal. No scleral icterus. Neck: Neck supple. No tracheal deviation present. Cardiovascular: Normal rate and normal heart sounds. No murmur heard. Pulmonary/Chest: Breath sounds normal. No respiratory distress. She has no wheezes. Abdominal: Bowel sounds are normal. She exhibits no distension. There is no tenderness. Genitourinary:   Genitourinary Comments: No Best in place, no vaginal drainage   Musculoskeletal: She exhibits edema. Neurological: She is alert and oriented to person, place, and time. No cranial nerve deficit. Skin: Skin is warm and dry. Rash noted. She is not diaphoretic. There is erythema. Erythema. The whole right leg, rash, patchy over the groin area bilaterally, more like a fungal rash   Psychiatric: Affect and judgment normal.         Medical Decision Making:   I have independently reviewed/ordered the following labs:    CBC with Differential: Recent Labs      08/09/18   0502  08/10/18   0615   WBC  16.9*  10.1   HGB  13.1  11.4*   HCT  41.1  35.3*   PLT  See Reflexed IPF Result  125*   LYMPHOPCT  9*  17*   MONOPCT  8  7     BMP: Recent Labs      08/09/18   0502  08/10/18   0615   NA  129*  132*   K  4.0  3.8   CL  96*  97*   CO2  21  24   BUN  26*  15   CREATININE  0.77  0.52   MG  1.8  1.6     Hepatic Function Panel: Recent Labs      08/08/18   1620   PROT  6.3*   LABALBU  3.0*   BILITOT  1.17   ALKPHOS  144*   ALT  40*   AST  57*     No results for input(s): RPR in the last 72 hours. No results for input(s): HIV in the last 72 hours. No results for input(s): BC in the last 72 hours.   Lab Results   Component Value Date    CREATININE 0.52 08/10/2018    GLUCOSE 201 08/10/2018       Detailed

## 2018-08-11 LAB
ABSOLUTE EOS #: 0.27 K/UL (ref 0–0.44)
ABSOLUTE IMMATURE GRANULOCYTE: 0.13 K/UL (ref 0–0.3)
ABSOLUTE LYMPH #: 1.71 K/UL (ref 1.1–3.7)
ABSOLUTE MONO #: 1.18 K/UL (ref 0.1–1.2)
ANION GAP SERPL CALCULATED.3IONS-SCNC: 15 MMOL/L (ref 9–17)
BASOPHILS # BLD: 1 % (ref 0–2)
BASOPHILS ABSOLUTE: 0.04 K/UL (ref 0–0.2)
BUN BLDV-MCNC: 11 MG/DL (ref 8–23)
BUN/CREAT BLD: ABNORMAL (ref 9–20)
CALCIUM SERPL-MCNC: 8.6 MG/DL (ref 8.6–10.4)
CHLORIDE BLD-SCNC: 100 MMOL/L (ref 98–107)
CO2: 21 MMOL/L (ref 20–31)
CREAT SERPL-MCNC: 0.46 MG/DL (ref 0.5–0.9)
CULTURE: ABNORMAL
DIFFERENTIAL TYPE: ABNORMAL
EOSINOPHILS RELATIVE PERCENT: 3 % (ref 1–4)
GFR AFRICAN AMERICAN: >60 ML/MIN
GFR NON-AFRICAN AMERICAN: >60 ML/MIN
GFR SERPL CREATININE-BSD FRML MDRD: ABNORMAL ML/MIN/{1.73_M2}
GFR SERPL CREATININE-BSD FRML MDRD: ABNORMAL ML/MIN/{1.73_M2}
GLUCOSE BLD-MCNC: 129 MG/DL (ref 65–105)
GLUCOSE BLD-MCNC: 154 MG/DL (ref 70–99)
GLUCOSE BLD-MCNC: 240 MG/DL (ref 65–105)
GLUCOSE BLD-MCNC: 243 MG/DL (ref 65–105)
GLUCOSE BLD-MCNC: 248 MG/DL (ref 65–105)
HCT VFR BLD CALC: 35.1 % (ref 36.3–47.1)
HEMOGLOBIN: 11 G/DL (ref 11.9–15.1)
IMMATURE GRANULOCYTES: 2 %
LYMPHOCYTES # BLD: 20 % (ref 24–43)
Lab: ABNORMAL
MAGNESIUM: 1.7 MG/DL (ref 1.6–2.6)
MCH RBC QN AUTO: 28.9 PG (ref 25.2–33.5)
MCHC RBC AUTO-ENTMCNC: 31.3 G/DL (ref 28.4–34.8)
MCV RBC AUTO: 92.4 FL (ref 82.6–102.9)
MONOCYTES # BLD: 14 % (ref 3–12)
NRBC AUTOMATED: 0 PER 100 WBC
ORGANISM: ABNORMAL
PDW BLD-RTO: 13.8 % (ref 11.8–14.4)
PHOSPHORUS: 3.9 MG/DL (ref 2.6–4.5)
PLATELET # BLD: 128 K/UL (ref 138–453)
PLATELET ESTIMATE: ABNORMAL
PMV BLD AUTO: 11.2 FL (ref 8.1–13.5)
POTASSIUM SERPL-SCNC: 3.8 MMOL/L (ref 3.7–5.3)
RBC # BLD: 3.8 M/UL (ref 3.95–5.11)
RBC # BLD: ABNORMAL 10*6/UL
SEG NEUTROPHILS: 60 % (ref 36–65)
SEGMENTED NEUTROPHILS ABSOLUTE COUNT: 5.1 K/UL (ref 1.5–8.1)
SODIUM BLD-SCNC: 136 MMOL/L (ref 135–144)
SPECIMEN DESCRIPTION: ABNORMAL
STATUS: ABNORMAL
WBC # BLD: 8.4 K/UL (ref 3.5–11.3)
WBC # BLD: ABNORMAL 10*3/UL

## 2018-08-11 PROCEDURE — 6360000002 HC RX W HCPCS: Performed by: EMERGENCY MEDICINE

## 2018-08-11 PROCEDURE — 6360000002 HC RX W HCPCS: Performed by: INTERNAL MEDICINE

## 2018-08-11 PROCEDURE — 6370000000 HC RX 637 (ALT 250 FOR IP): Performed by: HOSPITALIST

## 2018-08-11 PROCEDURE — 83735 ASSAY OF MAGNESIUM: CPT

## 2018-08-11 PROCEDURE — 82947 ASSAY GLUCOSE BLOOD QUANT: CPT

## 2018-08-11 PROCEDURE — 99233 SBSQ HOSP IP/OBS HIGH 50: CPT | Performed by: INTERNAL MEDICINE

## 2018-08-11 PROCEDURE — 99232 SBSQ HOSP IP/OBS MODERATE 35: CPT | Performed by: INTERNAL MEDICINE

## 2018-08-11 PROCEDURE — 80048 BASIC METABOLIC PNL TOTAL CA: CPT

## 2018-08-11 PROCEDURE — 6370000000 HC RX 637 (ALT 250 FOR IP): Performed by: STUDENT IN AN ORGANIZED HEALTH CARE EDUCATION/TRAINING PROGRAM

## 2018-08-11 PROCEDURE — 6370000000 HC RX 637 (ALT 250 FOR IP): Performed by: EMERGENCY MEDICINE

## 2018-08-11 PROCEDURE — 36415 COLL VENOUS BLD VENIPUNCTURE: CPT

## 2018-08-11 PROCEDURE — 2580000003 HC RX 258: Performed by: INTERNAL MEDICINE

## 2018-08-11 PROCEDURE — 84100 ASSAY OF PHOSPHORUS: CPT

## 2018-08-11 PROCEDURE — 2580000003 HC RX 258: Performed by: EMERGENCY MEDICINE

## 2018-08-11 PROCEDURE — 85025 COMPLETE CBC W/AUTO DIFF WBC: CPT

## 2018-08-11 PROCEDURE — 1200000000 HC SEMI PRIVATE

## 2018-08-11 RX ORDER — PENICILLIN G 2000000 [IU]/50ML
2 INJECTION, SOLUTION INTRAVENOUS EVERY 6 HOURS
Status: DISCONTINUED | OUTPATIENT
Start: 2018-08-11 | End: 2018-08-11

## 2018-08-11 RX ADMIN — SENNA 8.6 MG: 8.6 TABLET, COATED ORAL at 21:06

## 2018-08-11 RX ADMIN — GABAPENTIN 300 MG: 300 CAPSULE ORAL at 11:44

## 2018-08-11 RX ADMIN — INSULIN GLARGINE 40 UNITS: 100 INJECTION, SOLUTION SUBCUTANEOUS at 21:06

## 2018-08-11 RX ADMIN — DEXTROSE MONOHYDRATE 5 MILLION UNITS: 5 INJECTION INTRAVENOUS at 04:45

## 2018-08-11 RX ADMIN — OXYCODONE HYDROCHLORIDE AND ACETAMINOPHEN 1 TABLET: 5; 325 TABLET ORAL at 21:41

## 2018-08-11 RX ADMIN — DOCUSATE SODIUM 100 MG: 100 CAPSULE ORAL at 21:06

## 2018-08-11 RX ADMIN — Medication 10 ML: at 21:07

## 2018-08-11 RX ADMIN — DOCUSATE SODIUM 100 MG: 100 CAPSULE ORAL at 08:41

## 2018-08-11 RX ADMIN — OXYCODONE HYDROCHLORIDE AND ACETAMINOPHEN 1 TABLET: 5; 325 TABLET ORAL at 11:33

## 2018-08-11 RX ADMIN — PRAVASTATIN SODIUM 10 MG: 20 TABLET ORAL at 08:42

## 2018-08-11 RX ADMIN — ASPIRIN 81 MG: 81 TABLET, DELAYED RELEASE ORAL at 08:40

## 2018-08-11 RX ADMIN — INSULIN LISPRO 2 UNITS: 100 INJECTION, SOLUTION INTRAVENOUS; SUBCUTANEOUS at 18:02

## 2018-08-11 RX ADMIN — GABAPENTIN 300 MG: 300 CAPSULE ORAL at 18:06

## 2018-08-11 RX ADMIN — ENOXAPARIN SODIUM 40 MG: 100 INJECTION SUBCUTANEOUS at 08:42

## 2018-08-11 RX ADMIN — PSEUDOEPHEDRINE HYDROCHLORIDE 240 MG: 240 TABLET, EXTENDED RELEASE ORAL at 08:41

## 2018-08-11 RX ADMIN — DEXTROSE MONOHYDRATE 2000000 UNITS: 50 INJECTION, SOLUTION INTRAVENOUS at 17:29

## 2018-08-11 RX ADMIN — INSULIN LISPRO 2 UNITS: 100 INJECTION, SOLUTION INTRAVENOUS; SUBCUTANEOUS at 21:06

## 2018-08-11 RX ADMIN — OXYCODONE HYDROCHLORIDE AND ACETAMINOPHEN 1 TABLET: 5; 325 TABLET ORAL at 17:36

## 2018-08-11 RX ADMIN — PANTOPRAZOLE SODIUM 40 MG: 40 TABLET, DELAYED RELEASE ORAL at 05:50

## 2018-08-11 RX ADMIN — OXYCODONE HYDROCHLORIDE AND ACETAMINOPHEN 1 TABLET: 5; 325 TABLET ORAL at 05:50

## 2018-08-11 RX ADMIN — Medication 1500 MG: at 05:55

## 2018-08-11 RX ADMIN — BACLOFEN 10 MG: 10 TABLET ORAL at 21:06

## 2018-08-11 RX ADMIN — DEXTROSE MONOHYDRATE 5 MILLION UNITS: 5 INJECTION INTRAVENOUS at 11:34

## 2018-08-11 RX ADMIN — CETIRIZINE HYDROCHLORIDE 10 MG: 10 TABLET ORAL at 08:41

## 2018-08-11 RX ADMIN — CITALOPRAM 40 MG: 20 TABLET, FILM COATED ORAL at 08:41

## 2018-08-11 RX ADMIN — INSULIN LISPRO 2 UNITS: 100 INJECTION, SOLUTION INTRAVENOUS; SUBCUTANEOUS at 11:49

## 2018-08-11 RX ADMIN — DEXTROSE MONOHYDRATE 2000000 UNITS: 50 INJECTION, SOLUTION INTRAVENOUS at 23:55

## 2018-08-11 ASSESSMENT — ENCOUNTER SYMPTOMS
COLOR CHANGE: 1
GASTROINTESTINAL NEGATIVE: 1
RESPIRATORY NEGATIVE: 1
EYES NEGATIVE: 1
ALLERGIC/IMMUNOLOGIC NEGATIVE: 1

## 2018-08-11 ASSESSMENT — PAIN SCALES - GENERAL
PAINLEVEL_OUTOF10: 4
PAINLEVEL_OUTOF10: 5
PAINLEVEL_OUTOF10: 2
PAINLEVEL_OUTOF10: 5
PAINLEVEL_OUTOF10: 7
PAINLEVEL_OUTOF10: 5
PAINLEVEL_OUTOF10: 6
PAINLEVEL_OUTOF10: 2

## 2018-08-11 ASSESSMENT — PAIN DESCRIPTION - DESCRIPTORS: DESCRIPTORS: DISCOMFORT;SORE;TENDER;ACHING

## 2018-08-11 ASSESSMENT — PAIN DESCRIPTION - LOCATION: LOCATION: GENERALIZED;LEG

## 2018-08-11 ASSESSMENT — PAIN DESCRIPTION - PAIN TYPE: TYPE: ACUTE PAIN;CHRONIC PAIN

## 2018-08-11 ASSESSMENT — PAIN DESCRIPTION - ONSET: ONSET: ON-GOING

## 2018-08-11 ASSESSMENT — PAIN DESCRIPTION - FREQUENCY: FREQUENCY: CONTINUOUS

## 2018-08-11 ASSESSMENT — PAIN DESCRIPTION - ORIENTATION: ORIENTATION: RIGHT

## 2018-08-11 ASSESSMENT — PAIN DESCRIPTION - PROGRESSION: CLINICAL_PROGRESSION: NOT CHANGED

## 2018-08-11 NOTE — PROGRESS NOTES
changes, diplopia, scleral icterus. · ENT: Negative for Headaches, hearing loss, vertigo, mouth sores, sore throat. · Cardiovascular: Negative for lightheadedness/orthostatic symptoms ,chest pain, dyspnea on exertion, palpitations or loss of consciousness. · Respiratory: Negative for cough or wheezing, sputum production, hemoptysis, pleuritic pain. · Gastrointestinal: Negative for nausea/vomiting, change in bowel habits, abdominal pain, dysphagia/appetite loss, hematemesis, blood in stools. · Genitourinary:Negative for change in bladder habits, dysuria, trouble voiding, hematuria. · Musculoskeletal: Right lower extremity pain. · Neurological: Negative for headache, dizziness, change in muscle strength, numbness/tingling, balance, coordination,   · Psychiatric: negative for change in mood, affect, memory, mentation, behavior. · Endocrine: negative for temperature intolerance, excessive thirst, fluid intake, or urination, tremor. · Hematologic/Lymphatic: negative for abnormal bruising or bleeding, blood clots, swollen lymph nodes. · Allergic/Immunologic: negative for nasal congestion, pruritis, hives. PHYSICAL EXAM      BP (!) 117/53   Pulse 77   Temp 98.2 °F (36.8 °C) (Oral)   Resp 16   Ht 5' 1.81\" (1.57 m)   Wt 239 lb 8 oz (108.6 kg)   SpO2 98%   BMI 44.07 kg/m²      · General appearance: well nourished,Morbid obesity  · HEENT: Head: Normocephalic, no lesions, without obvious abnormality. · Lungs: clear to auscultation bilaterally  · Heart: regular rate and rhythm, S1, S2 normal, no murmur, click, rub or gallop  · Abdomen: soft, non-tender; bowel sounds normal; no masses,  no organomegaly  · Extremities: Right lower extremity redness and swelling more pronounced below knee. 5 cm bullous posterior side of the leg   · Neurological: Gait normal. Reflexes normal and symmetric.  Sensation grossly normal  · Skin - no rash, no lump   · Eye no icterus no redness  · Psych-normal affect   · NEURO-no limb

## 2018-08-12 LAB
ABSOLUTE EOS #: 0.32 K/UL (ref 0–0.44)
ABSOLUTE IMMATURE GRANULOCYTE: 0.24 K/UL (ref 0–0.3)
ABSOLUTE LYMPH #: 1.58 K/UL (ref 1.1–3.7)
ABSOLUTE MONO #: 0.94 K/UL (ref 0.1–1.2)
ANION GAP SERPL CALCULATED.3IONS-SCNC: 10 MMOL/L (ref 9–17)
BASOPHILS # BLD: 1 % (ref 0–2)
BASOPHILS ABSOLUTE: 0.03 K/UL (ref 0–0.2)
BUN BLDV-MCNC: 8 MG/DL (ref 8–23)
BUN/CREAT BLD: ABNORMAL (ref 9–20)
CALCIUM SERPL-MCNC: 8.5 MG/DL (ref 8.6–10.4)
CHLORIDE BLD-SCNC: 100 MMOL/L (ref 98–107)
CO2: 26 MMOL/L (ref 20–31)
CREAT SERPL-MCNC: 0.44 MG/DL (ref 0.5–0.9)
DIFFERENTIAL TYPE: ABNORMAL
EOSINOPHILS RELATIVE PERCENT: 5 % (ref 1–4)
GFR AFRICAN AMERICAN: >60 ML/MIN
GFR NON-AFRICAN AMERICAN: >60 ML/MIN
GFR SERPL CREATININE-BSD FRML MDRD: ABNORMAL ML/MIN/{1.73_M2}
GFR SERPL CREATININE-BSD FRML MDRD: ABNORMAL ML/MIN/{1.73_M2}
GLUCOSE BLD-MCNC: 140 MG/DL (ref 65–105)
GLUCOSE BLD-MCNC: 147 MG/DL (ref 70–99)
GLUCOSE BLD-MCNC: 189 MG/DL (ref 65–105)
GLUCOSE BLD-MCNC: 238 MG/DL (ref 65–105)
GLUCOSE BLD-MCNC: 263 MG/DL (ref 65–105)
HCT VFR BLD CALC: 33.5 % (ref 36.3–47.1)
HEMOGLOBIN: 10.7 G/DL (ref 11.9–15.1)
IMMATURE GRANULOCYTES: 4 %
LYMPHOCYTES # BLD: 25 % (ref 24–43)
MAGNESIUM: 1.6 MG/DL (ref 1.6–2.6)
MCH RBC QN AUTO: 28.6 PG (ref 25.2–33.5)
MCHC RBC AUTO-ENTMCNC: 31.9 G/DL (ref 28.4–34.8)
MCV RBC AUTO: 89.6 FL (ref 82.6–102.9)
MONOCYTES # BLD: 15 % (ref 3–12)
NRBC AUTOMATED: 0 PER 100 WBC
PDW BLD-RTO: 13.7 % (ref 11.8–14.4)
PHOSPHORUS: 3.7 MG/DL (ref 2.6–4.5)
PLATELET # BLD: 133 K/UL (ref 138–453)
PLATELET ESTIMATE: ABNORMAL
PMV BLD AUTO: 11.4 FL (ref 8.1–13.5)
POTASSIUM SERPL-SCNC: 3.7 MMOL/L (ref 3.7–5.3)
RBC # BLD: 3.74 M/UL (ref 3.95–5.11)
RBC # BLD: ABNORMAL 10*6/UL
SEG NEUTROPHILS: 50 % (ref 36–65)
SEGMENTED NEUTROPHILS ABSOLUTE COUNT: 3.14 K/UL (ref 1.5–8.1)
SODIUM BLD-SCNC: 136 MMOL/L (ref 135–144)
WBC # BLD: 6.3 K/UL (ref 3.5–11.3)
WBC # BLD: ABNORMAL 10*3/UL

## 2018-08-12 PROCEDURE — 6370000000 HC RX 637 (ALT 250 FOR IP): Performed by: HOSPITALIST

## 2018-08-12 PROCEDURE — 2580000003 HC RX 258: Performed by: EMERGENCY MEDICINE

## 2018-08-12 PROCEDURE — 80048 BASIC METABOLIC PNL TOTAL CA: CPT

## 2018-08-12 PROCEDURE — 83735 ASSAY OF MAGNESIUM: CPT

## 2018-08-12 PROCEDURE — 99233 SBSQ HOSP IP/OBS HIGH 50: CPT | Performed by: INTERNAL MEDICINE

## 2018-08-12 PROCEDURE — 6370000000 HC RX 637 (ALT 250 FOR IP): Performed by: STUDENT IN AN ORGANIZED HEALTH CARE EDUCATION/TRAINING PROGRAM

## 2018-08-12 PROCEDURE — 85025 COMPLETE CBC W/AUTO DIFF WBC: CPT

## 2018-08-12 PROCEDURE — 6370000000 HC RX 637 (ALT 250 FOR IP): Performed by: INTERNAL MEDICINE

## 2018-08-12 PROCEDURE — 6360000002 HC RX W HCPCS: Performed by: INTERNAL MEDICINE

## 2018-08-12 PROCEDURE — 6370000000 HC RX 637 (ALT 250 FOR IP): Performed by: EMERGENCY MEDICINE

## 2018-08-12 PROCEDURE — 84100 ASSAY OF PHOSPHORUS: CPT

## 2018-08-12 PROCEDURE — 2580000003 HC RX 258: Performed by: INTERNAL MEDICINE

## 2018-08-12 PROCEDURE — 36415 COLL VENOUS BLD VENIPUNCTURE: CPT

## 2018-08-12 PROCEDURE — 6360000002 HC RX W HCPCS: Performed by: EMERGENCY MEDICINE

## 2018-08-12 PROCEDURE — 1200000000 HC SEMI PRIVATE

## 2018-08-12 PROCEDURE — 99232 SBSQ HOSP IP/OBS MODERATE 35: CPT | Performed by: INTERNAL MEDICINE

## 2018-08-12 PROCEDURE — 82947 ASSAY GLUCOSE BLOOD QUANT: CPT

## 2018-08-12 RX ORDER — FLUCONAZOLE 200 MG/1
200 TABLET ORAL DAILY
Status: DISCONTINUED | OUTPATIENT
Start: 2018-08-12 | End: 2018-08-14

## 2018-08-12 RX ADMIN — BACLOFEN 10 MG: 10 TABLET ORAL at 20:31

## 2018-08-12 RX ADMIN — PSEUDOEPHEDRINE HYDROCHLORIDE 240 MG: 240 TABLET, EXTENDED RELEASE ORAL at 08:56

## 2018-08-12 RX ADMIN — GABAPENTIN 300 MG: 300 CAPSULE ORAL at 18:35

## 2018-08-12 RX ADMIN — INSULIN LISPRO 1 UNITS: 100 INJECTION, SOLUTION INTRAVENOUS; SUBCUTANEOUS at 13:19

## 2018-08-12 RX ADMIN — OXYCODONE HYDROCHLORIDE AND ACETAMINOPHEN 1 TABLET: 5; 325 TABLET ORAL at 09:57

## 2018-08-12 RX ADMIN — OXYCODONE HYDROCHLORIDE AND ACETAMINOPHEN 1 TABLET: 5; 325 TABLET ORAL at 22:26

## 2018-08-12 RX ADMIN — INSULIN LISPRO 2 UNITS: 100 INJECTION, SOLUTION INTRAVENOUS; SUBCUTANEOUS at 17:19

## 2018-08-12 RX ADMIN — INSULIN LISPRO 3 UNITS: 100 INJECTION, SOLUTION INTRAVENOUS; SUBCUTANEOUS at 20:32

## 2018-08-12 RX ADMIN — DEXTROSE MONOHYDRATE 2000000 UNITS: 50 INJECTION, SOLUTION INTRAVENOUS at 20:31

## 2018-08-12 RX ADMIN — Medication 10 ML: at 20:31

## 2018-08-12 RX ADMIN — DOCUSATE SODIUM 100 MG: 100 CAPSULE ORAL at 08:56

## 2018-08-12 RX ADMIN — ENOXAPARIN SODIUM 40 MG: 100 INJECTION SUBCUTANEOUS at 08:56

## 2018-08-12 RX ADMIN — PANTOPRAZOLE SODIUM 40 MG: 40 TABLET, DELAYED RELEASE ORAL at 06:22

## 2018-08-12 RX ADMIN — CETIRIZINE HYDROCHLORIDE 10 MG: 10 TABLET ORAL at 08:57

## 2018-08-12 RX ADMIN — PRAVASTATIN SODIUM 10 MG: 20 TABLET ORAL at 08:56

## 2018-08-12 RX ADMIN — CITALOPRAM 40 MG: 20 TABLET, FILM COATED ORAL at 08:56

## 2018-08-12 RX ADMIN — DEXTROSE MONOHYDRATE 2000000 UNITS: 50 INJECTION, SOLUTION INTRAVENOUS at 15:03

## 2018-08-12 RX ADMIN — INSULIN GLARGINE 40 UNITS: 100 INJECTION, SOLUTION SUBCUTANEOUS at 20:32

## 2018-08-12 RX ADMIN — OXYCODONE HYDROCHLORIDE AND ACETAMINOPHEN 1 TABLET: 5; 325 TABLET ORAL at 03:30

## 2018-08-12 RX ADMIN — DOCUSATE SODIUM 100 MG: 100 CAPSULE ORAL at 20:31

## 2018-08-12 RX ADMIN — FLUCONAZOLE 200 MG: 200 TABLET ORAL at 12:41

## 2018-08-12 RX ADMIN — OXYCODONE HYDROCHLORIDE AND ACETAMINOPHEN 1 TABLET: 5; 325 TABLET ORAL at 16:03

## 2018-08-12 RX ADMIN — SENNA 8.6 MG: 8.6 TABLET, COATED ORAL at 20:31

## 2018-08-12 RX ADMIN — DEXTROSE MONOHYDRATE 2000000 UNITS: 50 INJECTION, SOLUTION INTRAVENOUS at 06:50

## 2018-08-12 RX ADMIN — ASPIRIN 81 MG: 81 TABLET, DELAYED RELEASE ORAL at 08:57

## 2018-08-12 RX ADMIN — GABAPENTIN 300 MG: 300 CAPSULE ORAL at 12:41

## 2018-08-12 ASSESSMENT — PAIN SCALES - GENERAL
PAINLEVEL_OUTOF10: 6
PAINLEVEL_OUTOF10: 2
PAINLEVEL_OUTOF10: 5
PAINLEVEL_OUTOF10: 5
PAINLEVEL_OUTOF10: 7
PAINLEVEL_OUTOF10: 5

## 2018-08-12 ASSESSMENT — PAIN DESCRIPTION - FREQUENCY: FREQUENCY: CONTINUOUS

## 2018-08-12 ASSESSMENT — PAIN DESCRIPTION - PROGRESSION
CLINICAL_PROGRESSION: NOT CHANGED

## 2018-08-12 ASSESSMENT — PAIN DESCRIPTION - LOCATION: LOCATION: GENERALIZED;LEG

## 2018-08-12 ASSESSMENT — PAIN DESCRIPTION - DESCRIPTORS: DESCRIPTORS: CONSTANT;TENDER;SORE

## 2018-08-12 ASSESSMENT — PAIN DESCRIPTION - PAIN TYPE: TYPE: ACUTE PAIN;CHRONIC PAIN

## 2018-08-12 ASSESSMENT — PAIN DESCRIPTION - ONSET: ONSET: ON-GOING

## 2018-08-12 ASSESSMENT — PAIN DESCRIPTION - ORIENTATION: ORIENTATION: RIGHT

## 2018-08-12 NOTE — PLAN OF CARE
Problem: Suicide risk  Goal: Provide patient with safe environment  Provide patient with safe environment   Outcome: Met This Shift      Problem: Falls - Risk of:  Goal: Will remain free from falls  Will remain free from falls   Outcome: Met This Shift    Goal: Absence of physical injury  Absence of physical injury   Outcome: Met This Shift      Problem: Discharge Planning:  Goal: Participates in care planning  Participates in care planning   Outcome: Met This Shift    Goal: Discharged to appropriate level of care  Discharged to appropriate level of care   Outcome: Ongoing      Problem: Cardiac Output - Decreased:  Goal: Hemodynamic stability will improve  Hemodynamic stability will improve   Outcome: Met This Shift      Problem: Fluid Volume - Imbalance:  Goal: Absence of imbalanced fluid volume signs and symptoms  Absence of imbalanced fluid volume signs and symptoms   Outcome: Ongoing      Problem: Pain:  Goal: Pain level will decrease  Pain level will decrease   Outcome: Met This Shift    Goal: Recognizes and communicates pain  Recognizes and communicates pain   Outcome: Met This Shift    Goal: Control of acute pain  Control of acute pain   Outcome: Met This Shift    Goal: Control of chronic pain  Control of chronic pain   Outcome: Met This Shift      Problem: Serum Glucose Level - Abnormal:  Goal: Ability to maintain appropriate glucose levels will improve to within specified parameters  Ability to maintain appropriate glucose levels will improve to within specified parameters   Outcome: Ongoing      Problem: Skin Integrity - Impaired:  Goal: Will show no infection signs and symptoms  Will show no infection signs and symptoms   Outcome: Ongoing    Goal: Absence of new skin breakdown  Absence of new skin breakdown   Outcome: Ongoing      Problem: Sleep Pattern Disturbance:  Goal: Appears well-rested  Appears well-rested   Outcome: Met This Shift      Problem: Risk for Impaired Skin Integrity  Goal: Tissue

## 2018-08-12 NOTE — PROGRESS NOTES
[address].    PROGRESS NOTE             Date:   8/12/2018  Patient name:  Gabino Soliman  Date of admission:  8/9/2018 12:06 AM  MRN:   [id number]  YOB: 1955    CHIEF COMPLAINT     History Obtained From:  Patient and chart review. HISTORY OF PRESENT ILLNESS      Patient with past medical history of diabetes mellitus, depression, came to the emergency room for right lower extremity swelling and pain since monday. Initially it was a small dimple that progressed into or thigh associated with pain, fever or chills. Per patient, she has not noticed any drug bite or travel outside. On initial encounter, patient was hypertensive. Patient was started on IV hydration that improve the blood pressure. Patient started on IV vancomycin and Zosyn. Redness improving. CURRENT EVALUATION 08/12/18     Afebrile , hemodynamically stable   On penicillin G,   vancomycin discontinued   Swelling is better than yesterday. Pain is better now. BC remained negative, WBC normal, Hb stable 10.7  On oral diet, tolerating well     PAST MEDICAL HISTORY       has a past medical history of Anemia; Anxiety disorder; Arthritis; Asthma; Blood transfusion reaction; COPD (chronic obstructive pulmonary disease) ([address] Depression; Diabetes mellitus ([address] GERD (gastroesophageal reflux disease); Hammer toe of right foot; Hernia, abdominal; Hyperlipidemia; Hypertension; Restless leg syndrome; Seasonal allergies; Sleep apnea; and Spondylosis. PAST SURGICAL HISTORY       has a past surgical history that includes Appendectomy; Rotator cuff repair (Right); and Colonoscopy. HOME MEDICATIONS        Prior to Admission medications    Medication Sig Start Date End Date Taking?  Authorizing Provider   diclofenac sodium 1 % GEL Apply 2 g topically 2 times daily    Historical Provider, MD   insulin glargine (LANTUS) 100 UNIT/ML injection vial Inject 40 Units extremity    Diabetes mellitus ([facility] [address]    ARLYN (obstructive sleep apnea)    Morbid obesity (HCC)    Essential hypertension    Lactic acidosis    Bandemia    CRP elevated       PLAN        Right lower extremity cellulitis. likley streptococcal , BC negative   Continue Penicillin G  2 mU q 6 hr  as per ID recommendations. Compression with Ace bandage  Diabetes mellitus. 40 units of Lantus nightly. start medium dose sliding scale,  Hold metformin  Essential hypertension. holding lisinopril , BP is normal  ARLYN:  broke CPAP 2 years back. Patient will follow-up with Dr. Memo Briseno as an outpatient. DVT prophylaxis. Lovenox 40 mg once a day. Jorge Klein MD  PGY-2, Internal Medicine resident  Critical access hospital, [facility].    8/12/2018 at 9:26 AM

## 2018-08-12 NOTE — CARE COORDINATION
Transition Plan:    Met with patient today to discuss transition planning and to see if there would be any changes. Patient states she will be going home with her daughter. [facility]. Patient states she has talked to SW and was told she makes too much money. Patient verbalized she will be able to care for herself at home. Will continue to follow. Offered [place] [facility] for [place] again, patient adamantly refused HH.

## 2018-08-12 NOTE — PROGRESS NOTES
[facility] - Progress Note  Today's Date and Time: 8/12/2018, 11:27 AM    Impression :   · Severe whole  R lower extremity cellulitis -streptococcal  · Septic shock  · Leukocytosis  · Lactic acidosis  ·  CRP elevation    Recommendations:   · PNG 2 million U q 6 hr V  · ACE wraps to decrease edema  · Supportive care       Medical Decision Making/Summary/Discussion:   · Rapid progression of the R lower extremity cellulitis starting from the thigh and involving very quickly the whole leg.  Treated for severe sepsis and septic shock, leukocytosis, lactic acidosis  · Picture of erysipelas, no culture available  · no DVT right leg,  ACE started to improve the swelling  · Slow resolution of the cellulitis  Infection Control Recommendations   · Bond Precautions    Antimicrobial Stewardship Recommendations     · Simplification of therapy  ·   Coordination of Outpatient Care:     · Estimated Length of IV antimicrobials: TBD  · Patient will need Midline Catheter Insertion: No  · Patient will need PICC line Insertion: No  · Patient will need SNF :TBD  · Patient will need outpatient wound care: Yes  Chief complaint/reason for consultation:   Severe lower extremity cellulitis/chest pain      History of Present Illness: Beatriz Kiran is a 65y.o.-year-old  female who was initially admitted on 8/9/2018. Patient seen at the request of Ann-Marie Deleon. INITIAL HISTORY:     Marshal Eid is a 65y.o.-year-old who presented because of redness over the right lower extremity. Reports sudden onset in Rt thigh area with rapid spread. Ongoing for about 5 days by time of admission.      Reports malaise and feeling poorly at onset. No nausea, vomiting, diarrhea. In the hospital she was found to be hypotensive and in septic shock.   Leukocytosis  UA normal   CXR normal     Rt leg with erysipelas.     CURRENT EVALUATION :8/11/2018     Afebrile  VS stable     Feels better  Not toxic Daily    insulin glargine  40 Units Subcutaneous Nightly    gabapentin  300 mg Oral BID    docusate sodium  100 mg Oral BID    sodium chloride flush  10 mL Intravenous 2 times per day    enoxaparin  40 mg Subcutaneous Daily    insulin lispro  0-6 Units Subcutaneous 4x Daily AC & HS    cetirizine  10 mg Oral Daily    And    pseudoephedrine  240 mg Oral Daily       Social History:     Social History     Social History    Marital status:      Spouse name: N/A    Number of children: N/A    Years of education: N/A     Occupational History    Not on file. Social History Main Topics    Smoking status: Never Smoker    Smokeless tobacco: Never Used    Alcohol use No    Drug use: No    Sexual activity: Not on file     Other Topics Concern    Not on file     Social History Narrative    No narrative on file       Family History:     Family History   Problem Relation Age of Onset    Heart Disease Mother     Pacemaker Mother     Hypertension Mother     Diabetes Father     Breast Cancer Neg Hx         Allergies:   Sulfa antibiotics and Tape [adhesive tape]     Review of Systems:   Constitutional: No fevers or chills. No systemic complaints  Head: No headaches  Eyes: No double vision or blurry vision. No conjunctival inflammation. ENT: No sore throat or runny nose. . No hearing loss, tinnitus or vertigo. Cardiovascular: No chest pain or palpitations. No shortness of breath. No SORTO  Lung: No shortness of breath or cough. No sputum production  Abdomen: No nausea, vomiting, diarrhea, or abdominal pain. Jenet Lanius No cramps. Genitourinary: No increased urinary frequency, or dysuria. No hematuria. No suprapubic or CVA pain  Musculoskeletal: No muscle aches or pains. No joint effusions, swelling or deformities  Hematologic: No bleeding or bruising. Neurologic: No headache, weakness, numbness, or tingling. Integument: Extensive cellulitis  Psychiatric: No depression.    Endocrine: No polyuria, no polydipsia, no the last 72 hours. Lab Results   Component Value Date    MUCUS NOT REPORTED 08/08/2018    RBC 3.74 08/12/2018    TRICHOMONAS NOT REPORTED 08/08/2018    WBC 6.3 08/12/2018    YEAST NOT REPORTED 08/08/2018    TURBIDITY CLEAR 08/08/2018     Lab Results   Component Value Date    CREATININE 0.44 08/12/2018    GLUCOSE 147 08/12/2018       Medical Decision Making-Imaging:     EXAMINATION:   TWO VIEWS OF THE CHEST       8/8/2018 4:26 pm       COMPARISON:   July 2, 2018       HISTORY:   ORDERING SYSTEM PROVIDED HISTORY: cellulitis   TECHNOLOGIST PROVIDED HISTORY:   Reason for exam:->cellulitis   Acuity: Acute   Type of Exam: Unknown       FINDINGS:   The patient's head and neck obscures a portion of the lung apices.  The   cardiomediastinal silhouette is unchanged in appearance. There is no   consolidation, pneumothorax, or evidence of edema. No effusion is   appreciated. The osseous structures are unchanged in appearance.           Impression   Unchanged appearance of the chest without acute airspace disease identified. Medical Decision Making-Other:   8/11/2018  9:55 AM - German Brandt Incoming Lab Results From [facility]     Component Results     Component Collected Lab   Specimen Description 08/08/2018  6:30  [facility]   . CLEAN CATCH URINE    Special Requests 08/08/2018  6:30  [facility]   NOT REPORTED    Culture  (Abnormal) 08/08/2018  6:30 PM [address] 10 to 50,000 CFU/ML     Status 08/08/2018  6:30  [address]   FINAL 08/11/2018    Organism 08/08/2018  6:30  [address]   EC    Testing Performed By     Lab - Abbreviation Name Director Address Valid Date Range   [address] [address] 08/30/17 1201-Present   [address] [place] [address] 1246 [address] LAB Dayana Bermudez  [address] 08/30/17 1157-Present   Culture & Susceptibility     ESCHERICHIA COLI     Antibiotic Interpretation LEONARD Status   Confirmatory Extended Spectrum Beta-Lactamase Sensitive NEGATIVE Final   amikacin  NOT REPORTED Final   ampicillin Resistant >=32 RESISTANT Final   ampicillin-sulbactam  NOT REPORTED Final   aztreonam Sensitive <=1 SUSCEPTIBLE Final   ceFAZolin Sensitive <=4 SUSCEPTIBLE  Cefazolin sensitivity results can be used to predict the effectiveness of oral   cephalosporins (eg. Cephalexin) in uncomplicated Urinary Tract Infections due   to E. coli, K. pneumoniae, and P. mirabilis Final   cefTRIAXone Sensitive <=1 SUSCEPTIBLE Final   cefepime  NOT REPORTED Final   ciprofloxacin Sensitive <=0.25 SUSCEPTIBLE Final   ertapenem  NOT REPORTED Final   gentamicin Sensitive <=1 SUSCEPTIBLE Final   meropenem  NOT REPORTED Final   nitrofurantoin Sensitive <=16 SUSCEPTIBLE Final   piperacillin-tazobactam Sensitive <=4 SUSCEPTIBLE Final   tigecycline  NOT REPORTED Final   tobramycin Sensitive <=1 SUSCEPTIBLE Final   trimethoprim-sulfamethoxazole Sensitive <=20 SUSCEPTIBLE Final        8/9/2018  3:00 PM - Rikki, [facility] Incoming Lab Results From [facility]     Component Results     Component Value Ref Range & Units Status Collected Lab   Specimen Description . NASAL SWAB   Final 08/09/2018  7:38  [address]   MRSA, DNA, Nasal  NMRSAA Final 08/09/2018  7:38  [address]   NEGATIVE:  MRSA DNA not detected by nucleic acid amplification. Thank you for allowing us to participate in the care of this patient. Please call with questions.     Spencer Cedeno MD  Pager: [phone] - Office: [phone]

## 2018-08-12 NOTE — PROGRESS NOTES
[facility] Septic [facility] ([facility] [address] [A41.9, R65.21] . Vitals:    08/12/18 1614   BP: (!) 91/56   Pulse: 84   Resp: 14   Temp: 99.6 °F (37.6 °C)   SpO2: 97%   . Patients home meds are   Prior to Admission medications    Medication Sig Start Date End Date Taking? Authorizing Provider   diclofenac sodium 1 % GEL Apply 2 g topically 2 times daily    Historical Provider, MD   insulin glargine (LANTUS) 100 UNIT/ML injection vial Inject 40 Units into the skin nightly    Historical Provider, MD   lisinopril (PRINIVIL;ZESTRIL) 20 MG tablet Take 20 mg by mouth daily    Historical Provider, MD   docusate sodium (COLACE) 100 MG capsule Take 100 mg by mouth 2 times daily    Historical Provider, MD   baclofen (LIORESAL) 10 MG tablet Take 10 mg by mouth nightly    Historical Provider, MD   aspirin 81 MG EC tablet Take 81 mg by mouth daily. Historical Provider, MD   citalopram (CELEXA) 40 MG tablet Take 40 mg by mouth daily. Historical Provider, MD   fluticasone (FLONASE) 50 MCG/ACT nasal spray 1 spray by Nasal route 2 times daily as needed for Rhinitis. Historical Provider, MD   loratadine-pseudoephedrine (LORATADINE-D 24HR)  MG per tablet Take 1 tablet by mouth daily. Historical Provider, MD   metFORMIN ER (GLUCOPHAGE-XR) 500 MG XR tablet Take 1,000 mg by mouth 2 times daily (with meals). Historical Provider, MD   gabapentin (NEURONTIN) 300 MG capsule Take 300 mg by mouth 2 times daily. Historical Provider, MD   omeprazole (PRILOSEC) 20 MG capsule Take 20 mg by mouth daily. Historical Provider, MD   oxyCODONE-acetaminophen (PERCOCET) 5-325 MG per tablet Take 1 tablet by mouth 3 times daily as needed for Pain. Historical Provider, MD   pravastatin (PRAVACHOL) 10 MG tablet Take 10 mg by mouth daily. Historical Provider, MD   montelukast (SINGULAIR) 10 MG tablet Take 10 mg by mouth nightly.     Historical Provider, MD   metoprolol succinate (TOPROL XL) 25 MG extended 76   25 337 352 366 381 396 66 31 35   45 989 116 978 494 820 318 477 256 15 523 736 261 920 260 886 096 234 368   88 963 341 272 646 757 266 729 221 57 178 127 774 386 994 445 151 006 046   89 879 558 015 084 223 486 173 735 62 598 795 645 006 148 542 751 600 686   04 122 309 445 378 997 773 836 624 97 575 606 467 440 880 772 359 127 040   20 801 359 938 599 929 333 437 889 21 759 459 189 000 964 347 357 878 998   70 269 284 299 314 329 344 359 374 70 353 382 410 439 468 496 525 554 583   75 261 274 289 305 319 334 348 364 75 344 372 400 429 458 487 515 544 573   80 253 266 282 296 312 327 342 356 80 335 362 390 419 448 [phone]

## 2018-08-13 LAB
ABSOLUTE EOS #: 0.45 K/UL (ref 0–0.44)
ABSOLUTE IMMATURE GRANULOCYTE: 0.25 K/UL (ref 0–0.3)
ABSOLUTE LYMPH #: 1.88 K/UL (ref 1.1–3.7)
ABSOLUTE MONO #: 1.06 K/UL (ref 0.1–1.2)
ANION GAP SERPL CALCULATED.3IONS-SCNC: 13 MMOL/L (ref 9–17)
BASOPHILS # BLD: 0 % (ref 0–2)
BASOPHILS ABSOLUTE: 0.03 K/UL (ref 0–0.2)
BUN BLDV-MCNC: 6 MG/DL (ref 8–23)
BUN/CREAT BLD: ABNORMAL (ref 9–20)
CALCIUM SERPL-MCNC: 8.8 MG/DL (ref 8.6–10.4)
CHLORIDE BLD-SCNC: 99 MMOL/L (ref 98–107)
CO2: 25 MMOL/L (ref 20–31)
CREAT SERPL-MCNC: 0.52 MG/DL (ref 0.5–0.9)
DIFFERENTIAL TYPE: ABNORMAL
EOSINOPHILS RELATIVE PERCENT: 6 % (ref 1–4)
GFR AFRICAN AMERICAN: >60 ML/MIN
GFR NON-AFRICAN AMERICAN: >60 ML/MIN
GFR SERPL CREATININE-BSD FRML MDRD: ABNORMAL ML/MIN/{1.73_M2}
GFR SERPL CREATININE-BSD FRML MDRD: ABNORMAL ML/MIN/{1.73_M2}
GLUCOSE BLD-MCNC: 101 MG/DL (ref 65–105)
GLUCOSE BLD-MCNC: 110 MG/DL (ref 70–99)
GLUCOSE BLD-MCNC: 174 MG/DL (ref 65–105)
GLUCOSE BLD-MCNC: 188 MG/DL (ref 65–105)
GLUCOSE BLD-MCNC: 258 MG/DL (ref 65–105)
HCT VFR BLD CALC: 35.7 % (ref 36.3–47.1)
HEMOGLOBIN: 11.2 G/DL (ref 11.9–15.1)
IMMATURE GRANULOCYTES: 3 %
LYMPHOCYTES # BLD: 24 % (ref 24–43)
MAGNESIUM: 1.7 MG/DL (ref 1.6–2.6)
MCH RBC QN AUTO: 28.4 PG (ref 25.2–33.5)
MCHC RBC AUTO-ENTMCNC: 31.4 G/DL (ref 28.4–34.8)
MCV RBC AUTO: 90.6 FL (ref 82.6–102.9)
MONOCYTES # BLD: 14 % (ref 3–12)
NRBC AUTOMATED: 0 PER 100 WBC
PDW BLD-RTO: 13.5 % (ref 11.8–14.4)
PHOSPHORUS: 3.8 MG/DL (ref 2.6–4.5)
PLATELET # BLD: 166 K/UL (ref 138–453)
PLATELET ESTIMATE: ABNORMAL
PMV BLD AUTO: 10.8 FL (ref 8.1–13.5)
POTASSIUM SERPL-SCNC: 4.2 MMOL/L (ref 3.7–5.3)
RBC # BLD: 3.94 M/UL (ref 3.95–5.11)
RBC # BLD: ABNORMAL 10*6/UL
SEG NEUTROPHILS: 53 % (ref 36–65)
SEGMENTED NEUTROPHILS ABSOLUTE COUNT: 4.13 K/UL (ref 1.5–8.1)
SODIUM BLD-SCNC: 137 MMOL/L (ref 135–144)
WBC # BLD: 7.8 K/UL (ref 3.5–11.3)
WBC # BLD: ABNORMAL 10*3/UL

## 2018-08-13 PROCEDURE — 2580000003 HC RX 258: Performed by: EMERGENCY MEDICINE

## 2018-08-13 PROCEDURE — 2580000003 HC RX 258: Performed by: INTERNAL MEDICINE

## 2018-08-13 PROCEDURE — 82947 ASSAY GLUCOSE BLOOD QUANT: CPT

## 2018-08-13 PROCEDURE — 6370000000 HC RX 637 (ALT 250 FOR IP): Performed by: HOSPITALIST

## 2018-08-13 PROCEDURE — 80048 BASIC METABOLIC PNL TOTAL CA: CPT

## 2018-08-13 PROCEDURE — 6370000000 HC RX 637 (ALT 250 FOR IP): Performed by: EMERGENCY MEDICINE

## 2018-08-13 PROCEDURE — 83735 ASSAY OF MAGNESIUM: CPT

## 2018-08-13 PROCEDURE — 6370000000 HC RX 637 (ALT 250 FOR IP): Performed by: STUDENT IN AN ORGANIZED HEALTH CARE EDUCATION/TRAINING PROGRAM

## 2018-08-13 PROCEDURE — 1200000000 HC SEMI PRIVATE

## 2018-08-13 PROCEDURE — 6370000000 HC RX 637 (ALT 250 FOR IP): Performed by: INTERNAL MEDICINE

## 2018-08-13 PROCEDURE — 99232 SBSQ HOSP IP/OBS MODERATE 35: CPT | Performed by: INTERNAL MEDICINE

## 2018-08-13 PROCEDURE — 2500000003 HC RX 250 WO HCPCS: Performed by: INTERNAL MEDICINE

## 2018-08-13 PROCEDURE — 84100 ASSAY OF PHOSPHORUS: CPT

## 2018-08-13 PROCEDURE — 6360000002 HC RX W HCPCS: Performed by: INTERNAL MEDICINE

## 2018-08-13 PROCEDURE — 94762 N-INVAS EAR/PLS OXIMTRY CONT: CPT

## 2018-08-13 PROCEDURE — 85025 COMPLETE CBC W/AUTO DIFF WBC: CPT

## 2018-08-13 PROCEDURE — 76937 US GUIDE VASCULAR ACCESS: CPT

## 2018-08-13 PROCEDURE — 36415 COLL VENOUS BLD VENIPUNCTURE: CPT

## 2018-08-13 PROCEDURE — 6360000002 HC RX W HCPCS: Performed by: EMERGENCY MEDICINE

## 2018-08-13 RX ORDER — ACETIC ACID 0.25 G/100ML
1000 IRRIGANT IRRIGATION 2 TIMES DAILY
Status: DISCONTINUED | OUTPATIENT
Start: 2018-08-13 | End: 2018-08-16 | Stop reason: HOSPADM

## 2018-08-13 RX ADMIN — GABAPENTIN 300 MG: 300 CAPSULE ORAL at 12:27

## 2018-08-13 RX ADMIN — ACETIC ACID 1000 ML: 250 IRRIGANT IRRIGATION at 22:58

## 2018-08-13 RX ADMIN — OXYCODONE HYDROCHLORIDE AND ACETAMINOPHEN 1 TABLET: 5; 325 TABLET ORAL at 03:48

## 2018-08-13 RX ADMIN — Medication 10 ML: at 08:31

## 2018-08-13 RX ADMIN — ENOXAPARIN SODIUM 40 MG: 100 INJECTION SUBCUTANEOUS at 08:31

## 2018-08-13 RX ADMIN — FLUCONAZOLE 200 MG: 200 TABLET ORAL at 08:32

## 2018-08-13 RX ADMIN — CITALOPRAM 40 MG: 20 TABLET, FILM COATED ORAL at 08:32

## 2018-08-13 RX ADMIN — INSULIN LISPRO 1 UNITS: 100 INJECTION, SOLUTION INTRAVENOUS; SUBCUTANEOUS at 22:52

## 2018-08-13 RX ADMIN — DEXTROSE MONOHYDRATE 2000000 UNITS: 50 INJECTION, SOLUTION INTRAVENOUS at 15:10

## 2018-08-13 RX ADMIN — Medication 10 ML: at 22:47

## 2018-08-13 RX ADMIN — DOCUSATE SODIUM 100 MG: 100 CAPSULE ORAL at 22:46

## 2018-08-13 RX ADMIN — INSULIN LISPRO 1 UNITS: 100 INJECTION, SOLUTION INTRAVENOUS; SUBCUTANEOUS at 12:26

## 2018-08-13 RX ADMIN — OXYCODONE HYDROCHLORIDE AND ACETAMINOPHEN 1 TABLET: 5; 325 TABLET ORAL at 10:03

## 2018-08-13 RX ADMIN — ASPIRIN 81 MG: 81 TABLET, DELAYED RELEASE ORAL at 08:32

## 2018-08-13 RX ADMIN — SENNA 8.6 MG: 8.6 TABLET, COATED ORAL at 22:46

## 2018-08-13 RX ADMIN — DEXTROSE MONOHYDRATE 2000000 UNITS: 50 INJECTION, SOLUTION INTRAVENOUS at 08:31

## 2018-08-13 RX ADMIN — DEXTROSE MONOHYDRATE 2000000 UNITS: 50 INJECTION, SOLUTION INTRAVENOUS at 22:46

## 2018-08-13 RX ADMIN — BACLOFEN 10 MG: 10 TABLET ORAL at 22:46

## 2018-08-13 RX ADMIN — OXYCODONE HYDROCHLORIDE AND ACETAMINOPHEN 1 TABLET: 5; 325 TABLET ORAL at 16:57

## 2018-08-13 RX ADMIN — INSULIN GLARGINE 40 UNITS: 100 INJECTION, SOLUTION SUBCUTANEOUS at 22:51

## 2018-08-13 RX ADMIN — CETIRIZINE HYDROCHLORIDE 10 MG: 10 TABLET ORAL at 08:32

## 2018-08-13 RX ADMIN — DOCUSATE SODIUM 100 MG: 100 CAPSULE ORAL at 08:32

## 2018-08-13 RX ADMIN — PANTOPRAZOLE SODIUM 40 MG: 40 TABLET, DELAYED RELEASE ORAL at 05:52

## 2018-08-13 RX ADMIN — GABAPENTIN 300 MG: 300 CAPSULE ORAL at 17:00

## 2018-08-13 RX ADMIN — PSEUDOEPHEDRINE HYDROCHLORIDE 240 MG: 240 TABLET, EXTENDED RELEASE ORAL at 08:32

## 2018-08-13 RX ADMIN — DEXTROSE MONOHYDRATE 2000000 UNITS: 50 INJECTION, SOLUTION INTRAVENOUS at 02:14

## 2018-08-13 RX ADMIN — INSULIN LISPRO 3 UNITS: 100 INJECTION, SOLUTION INTRAVENOUS; SUBCUTANEOUS at 17:00

## 2018-08-13 RX ADMIN — OXYCODONE HYDROCHLORIDE AND ACETAMINOPHEN 1 TABLET: 5; 325 TABLET ORAL at 22:47

## 2018-08-13 RX ADMIN — PRAVASTATIN SODIUM 10 MG: 20 TABLET ORAL at 08:31

## 2018-08-13 ASSESSMENT — PAIN DESCRIPTION - LOCATION
LOCATION: LEG
LOCATION: LEG

## 2018-08-13 ASSESSMENT — PAIN DESCRIPTION - PROGRESSION

## 2018-08-13 ASSESSMENT — PAIN SCALES - GENERAL
PAINLEVEL_OUTOF10: 3
PAINLEVEL_OUTOF10: 4
PAINLEVEL_OUTOF10: 6
PAINLEVEL_OUTOF10: 7
PAINLEVEL_OUTOF10: 6
PAINLEVEL_OUTOF10: 6
PAINLEVEL_OUTOF10: 4
PAINLEVEL_OUTOF10: 4

## 2018-08-13 ASSESSMENT — PAIN DESCRIPTION - PAIN TYPE
TYPE: CHRONIC PAIN
TYPE: ACUTE PAIN

## 2018-08-13 ASSESSMENT — PAIN DESCRIPTION - FREQUENCY: FREQUENCY: CONTINUOUS

## 2018-08-13 ASSESSMENT — ENCOUNTER SYMPTOMS
CHOKING: 0
ALLERGIC/IMMUNOLOGIC NEGATIVE: 1
EYES NEGATIVE: 1
RESPIRATORY NEGATIVE: 1
GASTROINTESTINAL NEGATIVE: 1
COLOR CHANGE: 1

## 2018-08-13 ASSESSMENT — PAIN DESCRIPTION - ORIENTATION
ORIENTATION: RIGHT
ORIENTATION: RIGHT

## 2018-08-13 ASSESSMENT — PAIN DESCRIPTION - DESCRIPTORS: DESCRIPTORS: ACHING

## 2018-08-13 NOTE — PROGRESS NOTES
Received fax report from [facility] of urine culture taken on 8/8 that was growing E. Coli. Dr Carolina Hong notified via perfect.

## 2018-08-13 NOTE — PROGRESS NOTES
[facility]    PROGRESS NOTE             Date:   8/13/2018  Patient name:  Gianfranco Victor  Date of admission:  8/9/2018 12:06 AM  MRN:   [id number]  YOB: 1955    CHIEF COMPLAINT     History Obtained From:  Patient and chart review. HISTORY OF PRESENT ILLNESS      Patient with past medical history of diabetes mellitus, depression, came to the emergency room for right lower extremity swelling and pain since monday. Initially it was a small dimple that progressed into or thigh associated with pain, fever or chills. Per patient, she has not noticed any drug bite or travel outside. On initial encounter, patient was hypertensive. Patient was started on IV hydration that improve the blood pressure. Patient started on IV vancomycin and Zosyn. Redness improving. CURRENT EVALUATION 08/13/18   Afebrile , hemodynamically stable   On penicillin G,   vancomycin discontinued   Swelling is better than yesterday. Pain is better now. Sleeping comfortably. BC remained negative, WBC normal, Hb stable  On oral diet, tolerating well     PAST MEDICAL HISTORY       has a past medical history of Anemia; Anxiety disorder; Arthritis; Asthma; Blood transfusion reaction; COPD (chronic obstructive pulmonary disease) ([address] Depression; Diabetes mellitus ([address] GERD (gastroesophageal reflux disease); Hammer toe of right foot; Hernia, abdominal; Hyperlipidemia; Hypertension; Restless leg syndrome; Seasonal allergies; Sleep apnea; and Spondylosis. PAST SURGICAL HISTORY       has a past surgical history that includes Appendectomy; Rotator cuff repair (Right); and Colonoscopy. HOME MEDICATIONS        Prior to Admission medications    Medication Sig Start Date End Date Taking?  Authorizing Provider   diclofenac sodium 1 % GEL Apply 2 g topically 2 times daily    Historical Provider, MD   insulin glargine (LANTUS) 100 UNIT/ML injection rash, no lump   · Eye no icterus no redness  · Psych-normal affect   · NEURO-no limb weakness  No facial droop  · Lymphatic system-no lymphadenopathy no splenomegaly      8/8/2018 on admission                      DIAGNOSTICS      Laboratory Testing:  CBC:   Recent Labs      08/13/18   0551   WBC  7.8   HGB  11.2*   PLT  166     BMP:    Recent Labs      08/11/18   0610  08/12/18   0555  08/13/18   0551   NA  136  136  137   K  3.8  3.7  4.2   CL  100  100  99   CO2  21  26  25   BUN  11  8  6*   CREATININE  0.46*  0.44*  0.52   GLUCOSE  154*  147*  110*     S. Calcium:  Recent Labs      08/13/18   0551   CALCIUM  8.8     S. Ionized Calcium:No results for input(s): IONCA in the last 72 hours. S. Magnesium:  Recent Labs      08/13/18   0551   MG  1.7     S. Phosphorus:  Recent Labs      08/13/18   0551   PHOS  3.8     S. Glucose:  Recent Labs      08/12/18   1716  08/12/18   2026  08/13/18   0551   POCGLU  238*  263*  101     Glycosylated hemoglobin A1C: No results for input(s): LABA1C in the last 72 hours. INR: No results for input(s): INR in the last 72 hours. Hepatic functions:   No results for input(s): ALKPHOS, ALT, AST, PROT, BILITOT, BILIDIR, LABALBU in the last 72 hours. Pancreatic functions:  No results for input(s): LACTA, AMYLASE in the last 72 hours. S. Lactic Acid: No results for input(s): LACTA in the last 72 hours. Cardiac enzymes:No results for input(s): CKTOTAL, CKMB, CKMBINDEX, TROPONINI in the last 72 hours. BNP:No results for input(s): BNP in the last 72 hours. Lipid profile: No results for input(s): CHOL, TRIG, HDL, LDLCALC in the last 72 hours. Invalid input(s): LDL  Blood Gases: No results found for: PH, PCO2, PO2, HCO3, O2SAT  Thyroid functions:   Lab Results   Component Value Date    TSH 2.34 05/19/2014        Imaging/Diagonstics:      CXR: No acute cardiopulmonary findings.     ASSESSMENT     Patient Active Problem List   Diagnosis    Septic shock (HCC)    Cellulitis of right

## 2018-08-13 NOTE — PLAN OF CARE
Problem: Suicide risk  Goal: Provide patient with safe environment  Provide patient with safe environment   Outcome: Met This Shift      Problem: Falls - Risk of:  Goal: Will remain free from falls  Will remain free from falls   Outcome: Met This Shift    Goal: Absence of physical injury  Absence of physical injury   Outcome: Met This Shift      Problem: Discharge Planning:  Goal: Participates in care planning  Participates in care planning   Outcome: Met This Shift    Goal: Discharged to appropriate level of care  Discharged to appropriate level of care   Outcome: Ongoing      Problem: Cardiac Output - Decreased:  Goal: Hemodynamic stability will improve  Hemodynamic stability will improve   Outcome: Ongoing      Problem: Fluid Volume - Imbalance:  Goal: Absence of imbalanced fluid volume signs and symptoms  Absence of imbalanced fluid volume signs and symptoms   Outcome: Ongoing      Problem: Pain:  Goal: Pain level will decrease  Pain level will decrease   Outcome: Met This Shift    Goal: Recognizes and communicates pain  Recognizes and communicates pain   Outcome: Met This Shift    Goal: Control of acute pain  Control of acute pain   Outcome: Met This Shift    Goal: Control of chronic pain  Control of chronic pain   Outcome: Met This Shift      Problem: Serum Glucose Level - Abnormal:  Goal: Ability to maintain appropriate glucose levels will improve to within specified parameters  Ability to maintain appropriate glucose levels will improve to within specified parameters   Outcome: Ongoing      Problem: Skin Integrity - Impaired:  Goal: Will show no infection signs and symptoms  Will show no infection signs and symptoms   Outcome: Ongoing      Problem: Sleep Pattern Disturbance:  Goal: Appears well-rested  Appears well-rested   Outcome: Met This Shift      Problem: Risk for Impaired Skin Integrity  Goal: Tissue integrity - skin and mucous membranes  Structural intactness and normal physiological function of skin

## 2018-08-13 NOTE — PROGRESS NOTES
NOT FOR BILLING PURPOSE--STUDENT NOTE    PROGRESS NOTE             Date:   8/13/2018  Patient name:  Veneta Ahumada  Date of admission:  8/9/2018 12:06 AM  MRN:   [id number]  YOB: 1955    CHIEF COMPLAINT     History Obtained From:  Patient and chart review. HISTORY OF PRESENT ILLNESS      The patient is a 65 y.o.  female who was transferred here from [address] [place] [address] and was found to have right lower extremity edema, erythema, and pain since Monday 8/6/18. Initially she noticed a small papule on the inner right thigh and it quickly progressed to cover the entirety of the thigh and spread distally and was associated with pain, fever, and chills. Patient is unaware of a specific bug bite, although she believes that maybe while she was sleeping something did bite her. She denies any recent travel. The patient was initially hypotensive and she was given 3000ml Sepsis bolus at [facility]. Adriana's 8/9/18. She was also started on IV vancomycin and Zosyn. Patient has a medical history significant of hypertension, diabetes mellitus, hyperlipidemia, ARLYN, and COPD. Her blood pressure medications was stopped and held on initial encounter due to hypotension, have not been restarted. Eval 8/10/18  Erythema of nearly the entire leg from the mid thigh and distal. Anterior surface of R. Thigh remains clear, posterior surface erythematous. Multiple bullae can be seen on the lateral forefoot, lateral distal leg, and medial proximal leg. Patient states that she is feeling better, pain is more manageable, but leg occasionally still hurts. Fever has subsided. CURRENT EVALUATION 08/13/18   · Patient currently afebrile, hemodynamically stable--blood pressure improved 130/  · Vancomycin was discontinued and patient is currently on Penicillin G 2mU q 6 hrs  · Proximal erythema has decreased, only area that remains is from foot to epicondylar femur  · Swelling has decreased, ACE bandage wrap kept over infected leg.  Medial bullae ruptured, other bullae remain around distal foot. · Pain has subsided except for when she stands and says she feels fluid collecting in her feet. · Has had no problem sleeping, feels comfortable overall  · Blood cultures were negative and WBC count 7.8, Hb stable at 11.2  · Glucose in afternoon 174 (8/13/18)    PAST MEDICAL HISTORY       has a past medical history of Anemia; Anxiety disorder; Arthritis; Asthma; Blood transfusion reaction; COPD (chronic obstructive pulmonary disease) ([facility] [address] Depression; Diabetes mellitus ([facility] [address] GERD (gastroesophageal reflux disease); Hammer toe of right foot; Hernia, abdominal; Hyperlipidemia; Hypertension; Restless leg syndrome; Seasonal allergies; Sleep apnea; and Spondylosis. PAST SURGICAL HISTORY       has a past surgical history that includes Appendectomy; Rotator cuff repair (Right); and Colonoscopy. HOME MEDICATIONS        Prior to Admission medications    Medication Sig Start Date End Date Taking? Authorizing Provider   diclofenac sodium 1 % GEL Apply 2 g topically 2 times daily    Historical Provider, MD   insulin glargine (LANTUS) 100 UNIT/ML injection vial Inject 40 Units into the skin nightly    Historical Provider, MD   lisinopril (PRINIVIL;ZESTRIL) 20 MG tablet Take 20 mg by mouth daily    Historical Provider, MD   docusate sodium (COLACE) 100 MG capsule Take 100 mg by mouth 2 times daily    Historical Provider, MD   baclofen (LIORESAL) 10 MG tablet Take 10 mg by mouth nightly    Historical Provider, MD   aspirin 81 MG EC tablet Take 81 mg by mouth daily. Historical Provider, MD   citalopram (CELEXA) 40 MG tablet Take 40 mg by mouth daily. Historical Provider, MD   fluticasone (FLONASE) 50 MCG/ACT nasal spray 1 spray by Nasal route 2 times daily as needed for Rhinitis. Historical Provider, MD   loratadine-pseudoephedrine (LORATADINE-D 24HR)  MG per tablet Take 1 tablet by mouth daily.     Historical Provider, MD   metFORMIN ER (GLUCOPHAGE-XR) 500 MG XR tablet Take 1,000 mg by mouth 2 times daily (with meals). Historical Provider, MD   gabapentin (NEURONTIN) 300 MG capsule Take 300 mg by mouth 2 times daily. Historical Provider, MD   omeprazole (PRILOSEC) 20 MG capsule Take 20 mg by mouth daily. Historical Provider, MD   oxyCODONE-acetaminophen (PERCOCET) 5-325 MG per tablet Take 1 tablet by mouth 3 times daily as needed for Pain. Historical Provider, MD   pravastatin (PRAVACHOL) 10 MG tablet Take 10 mg by mouth daily. Historical Provider, MD   montelukast (SINGULAIR) 10 MG tablet Take 10 mg by mouth nightly. Historical Provider, MD   metoprolol succinate (TOPROL XL) 25 MG extended release tablet Take 25 mg by mouth daily     Historical Provider, MD       ALLERGIES      Sulfa antibiotics and Tape [adhesive tape]    SOCIAL HISTORY       reports that she has never smoked. She has never used smokeless tobacco. She reports that she does not drink alcohol or use drugs. FAMILY HISTORY      family history includes Diabetes in her father; Heart Disease in her mother; Hypertension in her mother; Pacemaker in her mother. REVIEW OF SYSTEMS      · Constitutional: Negative for weight loss. · Eyes: Negative for visual changes, diplopia, scleral icterus. · ENT: Negative for Headaches, hearing loss, vertigo, mouth sores, sore throat. · Cardiovascular: Negative for lightheadedness/orthostatic symptoms ,chest pain, dyspnea on exertion, palpitations or loss of consciousness. · Respiratory: Negative for cough or wheezing, sputum production, hemoptysis, pleuritic pain. · Gastrointestinal: Negative for nausea/vomiting, change in bowel habits, abdominal pain, dysphagia/appetite loss, hematemesis, blood in stools. · Genitourinary:Negative for change in bladder habits, dysuria, trouble voiding, hematuria. · Musculoskeletal: Negative for gait disturbance, weakness, joint complaints.   · Integumentary: + Erythematous rash R. LE, edema, bullae  · Neurological: Negative for headache, dizziness, change in muscle strength, numbness/tingling, change in gait, balance, coordination,   · Psychiatric: negative for change in mood, affect, memory, mentation, behavior. · Endocrine: negative for temperature intolerance, excessive thirst, fluid intake, or urination, tremor. · Hematologic/Lymphatic: negative for abnormal bruising or bleeding, blood clots, swollen lymph nodes. · Allergic/Immunologic: negative for nasal congestion, pruritis, hives. PHYSICAL EXAM      BP (!) 130/40   Pulse 89   Temp 98.5 °F (36.9 °C) (Oral)   Resp 17   Ht 5' 1.81\" (1.57 m)   Wt 240 lb (108.9 kg)   SpO2 100%   BMI 44.17 kg/m²      · General appearance: well nourished, alert and oriented, pleasant and compliant  · HEENT: Head: Normocephalic, no lesions, without obvious abnormality. · Lungs: clear to auscultation bilaterally  · Heart: regular rate and rhythm, S1, S2 normal, no murmur, click, rub or gallop  · Abdomen: soft, non-tender; bowel sounds normal; no masses,  no organomegaly  · Extremities: + R. LE erythema and edema distal to femur epicondyles. Movement in extremities equal.  · Neurological: Gait normal. Reflexes normal and symmetric. Sensation grossly normal  · Skin - Erythema, bullae formation on distal R. Leg and distal R. Foot. · Eye no icterus no redness  · Psych-normal affect   · NEURO-no limb weakness  No facial droop  · Lymphatic system-no lymphadenopathy no splenomegaly     DIAGNOSTICS      Laboratory Testing:  CBC:   Recent Labs      08/13/18   0551   WBC  7.8   HGB  11.2*   PLT  166     BMP:    Recent Labs      08/11/18   0610  08/12/18   0555  08/13/18   0551   NA  136  136  137   K  3.8  3.7  4.2   CL  100  100  99   CO2  21  26  25   BUN  11  8  6*   CREATININE  0.46*  0.44*  0.52   GLUCOSE  154*  147*  110*     S. Calcium:  Recent Labs      08/13/18   0551   CALCIUM  8.8     S.  Ionized Calcium:No results for input(s): IONCA in the last 72 Kiera Oconnell   Medical Isaac

## 2018-08-13 NOTE — PROGRESS NOTES
[facility] - Initial Consult Note  admission date 8/9/2018  Impression :   Current:  · Severe whole  R lower extremity cellulitis -strept likely  · Septic shock  · Leukocytosis  · Lactic acidosis  ·  CRP elevation     Other:  ·    Discussion / summary of stay / plan of care   · Rapid progression of the R lower extremity cellulitis starting from the thigh and involving very quickly The whole leg. Treated with a severe sepsis and septic shock, leukocytosis, lactic acidosis  · Picture of erysipelas, no culture available  · no DVT right leg,  ACE started to improve the swelling  · Slow resolution of the cellulitis     Recommendations   · PNG iv  x 2 more weeks in infusion pump at home, possibly po amox after that if still needed  · Midline ordered  · ACE wrap to the whole right leg starting from the toes till the thigh  · Acetic acid on the right inner leg wounds, bid W/D  · Supportive care  · See office in 2-3 weeks    Infection Control Recommendations   · Arrey Precautions     Antimicrobial Stewardship Recommendations   · Simplification of therapy  · Targeted therapy        Coordination of Outpatient Care:   · Estimated Length of IV antimicrobials:  · Patient will need Midline Catheter Insertion:   · Patient will need PICC line Insertion:   · Patient will need SNF :  · Patient will need outpatient wound care:   Chief complaint/reason for consultation:   Severe lower extremity cellulitis/chest pain     History of Present Illness:   Initial history: Joshua Alston is a 65y.o.-year-old  Who presented because of redness over the right lower extremity, ongoing for about 5 days now, started like a pimple and progressed into the thigh with a sharp pain. Went to the whole leg within a day. warm and she had a fever, presented to the emergency room today, the edema is associated with redness. No nausea, vomiting, diarrhea.   In the hospital.  She was found hypotensive and septic 2,000,000 Units Intravenous Q6H    pantoprazole  40 mg Oral QAM AC    senna  1 tablet Oral Nightly    aspirin  81 mg Oral Daily    baclofen  10 mg Oral Nightly    citalopram  40 mg Oral Daily    pravastatin  10 mg Oral Daily    insulin glargine  40 Units Subcutaneous Nightly    gabapentin  300 mg Oral BID    docusate sodium  100 mg Oral BID    sodium chloride flush  10 mL Intravenous 2 times per day    enoxaparin  40 mg Subcutaneous Daily    insulin lispro  0-6 Units Subcutaneous 4x Daily AC & HS    cetirizine  10 mg Oral Daily    And    pseudoephedrine  240 mg Oral Daily       Social History:     Social History     Social History    Marital status:      Spouse name: N/A    Number of children: N/A    Years of education: N/A     Occupational History    Not on file. Social History Main Topics    Smoking status: Never Smoker    Smokeless tobacco: Never Used    Alcohol use No    Drug use: No    Sexual activity: Not on file     Other Topics Concern    Not on file     Social History Narrative    No narrative on file       Family History:     Family History   Problem Relation Age of Onset    Heart Disease Mother     Pacemaker Mother     Hypertension Mother     Diabetes Father     Breast Cancer Neg Hx         Allergies:   Sulfa antibiotics and Tape [adhesive tape]     Review of Systems:     Review of Systems   Constitutional: Negative. HENT: Negative. Eyes: Negative. Respiratory: Negative. Negative for choking. Cardiovascular: Positive for leg swelling. Gastrointestinal: Negative. Endocrine: Negative. Genitourinary: Negative. Negative for dysuria. Flank pain:      Musculoskeletal: Negative. Skin: Positive for color change. Allergic/Immunologic: Negative. Neurological: Negative. Hematological: Negative. Psychiatric/Behavioral: Negative.         Physical Examination :     Patient Vitals for the past 24 hrs:   BP Temp Temp src Pulse Resp SpO2 Weight

## 2018-08-14 LAB
ABSOLUTE EOS #: 0.36 K/UL (ref 0–0.44)
ABSOLUTE IMMATURE GRANULOCYTE: 0.12 K/UL (ref 0–0.3)
ABSOLUTE LYMPH #: 1.46 K/UL (ref 1.1–3.7)
ABSOLUTE MONO #: 0.88 K/UL (ref 0.1–1.2)
ANION GAP SERPL CALCULATED.3IONS-SCNC: 9 MMOL/L (ref 9–17)
BASOPHILS # BLD: 1 % (ref 0–2)
BASOPHILS ABSOLUTE: 0.03 K/UL (ref 0–0.2)
BUN BLDV-MCNC: 6 MG/DL (ref 8–23)
BUN/CREAT BLD: ABNORMAL (ref 9–20)
CALCIUM SERPL-MCNC: 8.7 MG/DL (ref 8.6–10.4)
CHLORIDE BLD-SCNC: 98 MMOL/L (ref 98–107)
CO2: 26 MMOL/L (ref 20–31)
CREAT SERPL-MCNC: 0.49 MG/DL (ref 0.5–0.9)
CULTURE: NORMAL
CULTURE: NORMAL
DIFFERENTIAL TYPE: ABNORMAL
EOSINOPHILS RELATIVE PERCENT: 6 % (ref 1–4)
GFR AFRICAN AMERICAN: >60 ML/MIN
GFR NON-AFRICAN AMERICAN: >60 ML/MIN
GFR SERPL CREATININE-BSD FRML MDRD: ABNORMAL ML/MIN/{1.73_M2}
GFR SERPL CREATININE-BSD FRML MDRD: ABNORMAL ML/MIN/{1.73_M2}
GLUCOSE BLD-MCNC: 133 MG/DL (ref 70–99)
GLUCOSE BLD-MCNC: 165 MG/DL (ref 65–105)
GLUCOSE BLD-MCNC: 201 MG/DL (ref 65–105)
GLUCOSE BLD-MCNC: 233 MG/DL (ref 65–105)
HCT VFR BLD CALC: 32.2 % (ref 36.3–47.1)
HEMOGLOBIN: 10.3 G/DL (ref 11.9–15.1)
IMMATURE GRANULOCYTES: 2 %
LYMPHOCYTES # BLD: 24 % (ref 24–43)
Lab: NORMAL
Lab: NORMAL
MAGNESIUM: 1.8 MG/DL (ref 1.6–2.6)
MCH RBC QN AUTO: 29.4 PG (ref 25.2–33.5)
MCHC RBC AUTO-ENTMCNC: 32 G/DL (ref 28.4–34.8)
MCV RBC AUTO: 92 FL (ref 82.6–102.9)
MONOCYTES # BLD: 14 % (ref 3–12)
MYOGLOBIN: 64 NG/ML (ref 25–58)
NRBC AUTOMATED: 0 PER 100 WBC
PDW BLD-RTO: 13.6 % (ref 11.8–14.4)
PHOSPHORUS: 3.6 MG/DL (ref 2.6–4.5)
PLATELET # BLD: 154 K/UL (ref 138–453)
PLATELET ESTIMATE: ABNORMAL
PMV BLD AUTO: 10.9 FL (ref 8.1–13.5)
POTASSIUM SERPL-SCNC: 4 MMOL/L (ref 3.7–5.3)
RBC # BLD: 3.5 M/UL (ref 3.95–5.11)
RBC # BLD: ABNORMAL 10*6/UL
SEG NEUTROPHILS: 53 % (ref 36–65)
SEGMENTED NEUTROPHILS ABSOLUTE COUNT: 3.29 K/UL (ref 1.5–8.1)
SODIUM BLD-SCNC: 133 MMOL/L (ref 135–144)
SPECIMEN DESCRIPTION: NORMAL
SPECIMEN DESCRIPTION: NORMAL
STATUS: NORMAL
STATUS: NORMAL
TOTAL CK: 53 U/L (ref 26–192)
WBC # BLD: 6.1 K/UL (ref 3.5–11.3)
WBC # BLD: ABNORMAL 10*3/UL

## 2018-08-14 PROCEDURE — 6360000002 HC RX W HCPCS: Performed by: EMERGENCY MEDICINE

## 2018-08-14 PROCEDURE — 76937 US GUIDE VASCULAR ACCESS: CPT

## 2018-08-14 PROCEDURE — 6370000000 HC RX 637 (ALT 250 FOR IP): Performed by: EMERGENCY MEDICINE

## 2018-08-14 PROCEDURE — 83874 ASSAY OF MYOGLOBIN: CPT

## 2018-08-14 PROCEDURE — 84100 ASSAY OF PHOSPHORUS: CPT

## 2018-08-14 PROCEDURE — 36415 COLL VENOUS BLD VENIPUNCTURE: CPT

## 2018-08-14 PROCEDURE — 6370000000 HC RX 637 (ALT 250 FOR IP): Performed by: HOSPITALIST

## 2018-08-14 PROCEDURE — 99221 1ST HOSP IP/OBS SF/LOW 40: CPT | Performed by: SURGERY

## 2018-08-14 PROCEDURE — 1200000000 HC SEMI PRIVATE

## 2018-08-14 PROCEDURE — 85025 COMPLETE CBC W/AUTO DIFF WBC: CPT

## 2018-08-14 PROCEDURE — 82947 ASSAY GLUCOSE BLOOD QUANT: CPT

## 2018-08-14 PROCEDURE — C1751 CATH, INF, PER/CENT/MIDLINE: HCPCS

## 2018-08-14 PROCEDURE — 6370000000 HC RX 637 (ALT 250 FOR IP): Performed by: STUDENT IN AN ORGANIZED HEALTH CARE EDUCATION/TRAINING PROGRAM

## 2018-08-14 PROCEDURE — 82550 ASSAY OF CK (CPK): CPT

## 2018-08-14 PROCEDURE — 83735 ASSAY OF MAGNESIUM: CPT

## 2018-08-14 PROCEDURE — 6360000002 HC RX W HCPCS: Performed by: INTERNAL MEDICINE

## 2018-08-14 PROCEDURE — 99232 SBSQ HOSP IP/OBS MODERATE 35: CPT | Performed by: INTERNAL MEDICINE

## 2018-08-14 PROCEDURE — 2580000003 HC RX 258: Performed by: INTERNAL MEDICINE

## 2018-08-14 PROCEDURE — 80048 BASIC METABOLIC PNL TOTAL CA: CPT

## 2018-08-14 PROCEDURE — 6370000000 HC RX 637 (ALT 250 FOR IP): Performed by: INTERNAL MEDICINE

## 2018-08-14 RX ORDER — AMOXICILLIN 500 MG/1
500 CAPSULE ORAL 3 TIMES DAILY
Qty: 21 CAPSULE | Refills: 0 | Status: SHIPPED | OUTPATIENT
Start: 2018-08-14 | End: 2018-08-21

## 2018-08-14 RX ORDER — GABAPENTIN 300 MG/1
300 CAPSULE ORAL 3 TIMES DAILY
Status: DISCONTINUED | OUTPATIENT
Start: 2018-08-14 | End: 2018-08-16 | Stop reason: HOSPADM

## 2018-08-14 RX ORDER — ACETIC ACID 0.25 G/100ML
IRRIGANT IRRIGATION
Qty: 1000 ML | Refills: 0 | Status: ON HOLD | OUTPATIENT
Start: 2018-08-14 | End: 2019-05-23

## 2018-08-14 RX ADMIN — OXYCODONE HYDROCHLORIDE AND ACETAMINOPHEN 1 TABLET: 5; 325 TABLET ORAL at 17:15

## 2018-08-14 RX ADMIN — GABAPENTIN 300 MG: 300 CAPSULE ORAL at 11:15

## 2018-08-14 RX ADMIN — FLUCONAZOLE 200 MG: 200 TABLET ORAL at 08:32

## 2018-08-14 RX ADMIN — DEXTROSE MONOHYDRATE 2000000 UNITS: 50 INJECTION, SOLUTION INTRAVENOUS at 03:58

## 2018-08-14 RX ADMIN — INSULIN LISPRO 1 UNITS: 100 INJECTION, SOLUTION INTRAVENOUS; SUBCUTANEOUS at 17:16

## 2018-08-14 RX ADMIN — PRAVASTATIN SODIUM 10 MG: 20 TABLET ORAL at 08:32

## 2018-08-14 RX ADMIN — ASPIRIN 81 MG: 81 TABLET, DELAYED RELEASE ORAL at 08:32

## 2018-08-14 RX ADMIN — PANTOPRAZOLE SODIUM 40 MG: 40 TABLET, DELAYED RELEASE ORAL at 08:32

## 2018-08-14 RX ADMIN — BACLOFEN 10 MG: 10 TABLET ORAL at 23:46

## 2018-08-14 RX ADMIN — PSEUDOEPHEDRINE HYDROCHLORIDE 240 MG: 240 TABLET, EXTENDED RELEASE ORAL at 08:32

## 2018-08-14 RX ADMIN — ENOXAPARIN SODIUM 40 MG: 100 INJECTION SUBCUTANEOUS at 08:32

## 2018-08-14 RX ADMIN — INSULIN GLARGINE 40 UNITS: 100 INJECTION, SOLUTION SUBCUTANEOUS at 23:52

## 2018-08-14 RX ADMIN — DEXTROSE MONOHYDRATE 2000000 UNITS: 50 INJECTION, SOLUTION INTRAVENOUS at 23:51

## 2018-08-14 RX ADMIN — CETIRIZINE HYDROCHLORIDE 10 MG: 10 TABLET ORAL at 08:32

## 2018-08-14 RX ADMIN — DEXTROSE MONOHYDRATE 2000000 UNITS: 50 INJECTION, SOLUTION INTRAVENOUS at 15:28

## 2018-08-14 RX ADMIN — DOCUSATE SODIUM 100 MG: 100 CAPSULE ORAL at 23:46

## 2018-08-14 RX ADMIN — DOCUSATE SODIUM 100 MG: 100 CAPSULE ORAL at 08:32

## 2018-08-14 RX ADMIN — GABAPENTIN 300 MG: 300 CAPSULE ORAL at 23:45

## 2018-08-14 RX ADMIN — DEXTROSE MONOHYDRATE 2000000 UNITS: 50 INJECTION, SOLUTION INTRAVENOUS at 08:29

## 2018-08-14 RX ADMIN — INSULIN LISPRO 2 UNITS: 100 INJECTION, SOLUTION INTRAVENOUS; SUBCUTANEOUS at 12:35

## 2018-08-14 RX ADMIN — INSULIN LISPRO 2 UNITS: 100 INJECTION, SOLUTION INTRAVENOUS; SUBCUTANEOUS at 23:52

## 2018-08-14 RX ADMIN — ACETIC ACID 1000 ML: 250 IRRIGANT IRRIGATION at 08:32

## 2018-08-14 RX ADMIN — SENNA 8.6 MG: 8.6 TABLET, COATED ORAL at 23:45

## 2018-08-14 RX ADMIN — OXYCODONE HYDROCHLORIDE AND ACETAMINOPHEN 1 TABLET: 5; 325 TABLET ORAL at 11:15

## 2018-08-14 RX ADMIN — OXYCODONE HYDROCHLORIDE AND ACETAMINOPHEN 1 TABLET: 5; 325 TABLET ORAL at 23:46

## 2018-08-14 RX ADMIN — CITALOPRAM 40 MG: 20 TABLET, FILM COATED ORAL at 08:32

## 2018-08-14 RX ADMIN — OXYCODONE HYDROCHLORIDE AND ACETAMINOPHEN 1 TABLET: 5; 325 TABLET ORAL at 05:01

## 2018-08-14 ASSESSMENT — PAIN DESCRIPTION - PROGRESSION

## 2018-08-14 ASSESSMENT — ENCOUNTER SYMPTOMS
CHOKING: 0
ALLERGIC/IMMUNOLOGIC NEGATIVE: 1
RESPIRATORY NEGATIVE: 1
EYES NEGATIVE: 1
COUGH: 0
COLOR CHANGE: 1
GASTROINTESTINAL NEGATIVE: 1

## 2018-08-14 ASSESSMENT — PAIN DESCRIPTION - ORIENTATION: ORIENTATION: RIGHT

## 2018-08-14 ASSESSMENT — PAIN DESCRIPTION - PAIN TYPE
TYPE: ACUTE PAIN
TYPE: ACUTE PAIN

## 2018-08-14 ASSESSMENT — PAIN SCALES - GENERAL
PAINLEVEL_OUTOF10: 7
PAINLEVEL_OUTOF10: 10
PAINLEVEL_OUTOF10: 6
PAINLEVEL_OUTOF10: 5
PAINLEVEL_OUTOF10: 7
PAINLEVEL_OUTOF10: 6

## 2018-08-14 ASSESSMENT — PAIN DESCRIPTION - LOCATION: LOCATION: LEG

## 2018-08-14 ASSESSMENT — PAIN DESCRIPTION - FREQUENCY: FREQUENCY: CONTINUOUS

## 2018-08-14 NOTE — PLAN OF CARE
Problem: Falls - Risk of:  Goal: Will remain free from falls  Will remain free from falls   Outcome: Ongoing    Goal: Absence of physical injury  Absence of physical injury   Outcome: Ongoing      Problem: Risk for Impaired Skin Integrity  Goal: Tissue integrity - skin and mucous membranes  Structural intactness and normal physiological function of skin and  mucous membranes.    Outcome: Ongoing      Problem: Pain:  Goal: Control of acute pain  Control of acute pain   Outcome: Ongoing

## 2018-08-14 NOTE — CARE COORDINATION
Met with pt. Pt states it was too painful to get to the bathroom and she is weak. Offered snf list, freedom of choice. Pt thinks snf may be a good idea for short term. Pt also given hc list.   Pt will need iv abx: pcn for 2 weeks. Line to be placed today. Await choices. Pt chose [facility]  Referrals sent to both. 1620 recd call from [facility] [place]. They are unable to accept pt at this time. Referral sent to [facility]. no no

## 2018-08-14 NOTE — CONSULTS
reactive, extraocular eye movements intact, conjunctiva clear  Ears - hearing appears to be intact  Nose - no drainage noted  Mouth - mucous membranes moist  Neck - supple, no carotid bruits, thyroid not palpable, no JVD  Chest - clear to auscultation, normal effort  Heart - normal rate, regular rhythm, no murmurs  Abdomen - soft, non-tender, non-distended, bowel sounds present all four quadrants, no masses, hepatomegaly, splenomegaly or aortic enlargement, obese   Neurological - normal speech, no focal findings or movement disorder noted, cranial nerves II through XII grossly intact  Extremities - peripheral pulses palpable, right leg blisters and edema Skin - right leg induration to the knee and multiple blisters. Pseudomonal colonization based on dressings. Foot Exam - swollen on the right, normal left. R brachial 2+ L brachial 2+   R radial 2+ L radial 2+   R femoral 2+ L femoral 2+   R popliteal 2+ L popliteal 2+   R posterior tibial 2+ L posterior tibial 2+   R dorsalis pedis 2+ L dorsalis pedis 2+                                  Imaging:         Conclusions        Summary        No evidence of superficial or deep venous thrombosis in both lower    extremities.        Signature        ----------------------------------------------------------------    Electronically signed by Christin Rosa(Sonographer) on    08/10/2018 04:11 PM    ----------------------------------------------------------------        ----------------------------------------------------------------    Electronically signed by Sami pelayo    on 08/11/2018 12:21 AM    ----------------------------------------------------------------           Assessment:     Umer Mccain is a 65 y.o.  female who    1. Diabetes  2. Fibromyalgia  3. Right lower extremity cellulitis  4. No signs of peripheral vascular disease  5. No signs of venous obstructive disease      Plan:     1.  Recommend ambulate as tolerated out of bed  2. DVT prophylaxis  3. Smoking cessation continuation  4. Best medical management with diabetes and reduced HbA1c  5. IV antibiotics with topical washing as per infectious disease to cover Pseudomonas  6. No signs of peripheral vascular disease  7.   8. No role for vascular surgery for revascularization. Do recommend leg elevation and compression and ambulation as soon as possible. 9.   10. We'll sign off but please do not hesitate to call with any questions      ----------------------------------------    Jamari Denny M.D., FACS, RVT, [address] [facility] Director of Vascular Services  Medical Director of the Vascular [address] [place] [address] Ne: [phone]  C: [phone]  Email: [email or web address]

## 2018-08-14 NOTE — PLAN OF CARE
Problem: Falls - Risk of:  Goal: Will remain free from falls  Will remain free from falls   Outcome: Met This Shift    Goal: Absence of physical injury  Absence of physical injury   Outcome: Met This Shift      Problem: Risk for Impaired Skin Integrity  Goal: Tissue integrity - skin and mucous membranes  Structural intactness and normal physiological function of skin and  mucous membranes.    Outcome: Ongoing      Problem: Pain:  Goal: Control of acute pain  Control of acute pain   Outcome: Ongoing    Goal: Control of chronic pain  Control of chronic pain   Outcome: Ongoing

## 2018-08-14 NOTE — PROGRESS NOTES
Patient called out that she had to use bathroom and asked to use bedpan stating that it hurt too much to get up, even though she was going in to the bathroom minimal assist prior. Attempted to roll patient on to bedpan, but patient began crying out in pain that it hurt too much and she was unable to. Crying stating, \"just put a brief on me and i'll pee in it! \" Explained to patient that we really did not want to have to do that, but patient insisted. Brief applied per patient request. Dr Miriam Saul notified of increased pain, PT/OT ordered in case of possible placement.

## 2018-08-14 NOTE — PROGRESS NOTES
QAM AC    senna  1 tablet Oral Nightly    aspirin  81 mg Oral Daily    baclofen  10 mg Oral Nightly    citalopram  40 mg Oral Daily    pravastatin  10 mg Oral Daily    insulin glargine  40 Units Subcutaneous Nightly    docusate sodium  100 mg Oral BID    sodium chloride flush  10 mL Intravenous 2 times per day    enoxaparin  40 mg Subcutaneous Daily    insulin lispro  0-6 Units Subcutaneous 4x Daily AC & HS    cetirizine  10 mg Oral Daily    And    pseudoephedrine  240 mg Oral Daily       Social History:     Social History     Social History    Marital status:      Spouse name: N/A    Number of children: N/A    Years of education: N/A     Occupational History    Not on file. Social History Main Topics    Smoking status: Never Smoker    Smokeless tobacco: Never Used    Alcohol use No    Drug use: No    Sexual activity: Not on file     Other Topics Concern    Not on file     Social History Narrative    No narrative on file       Family History:     Family History   Problem Relation Age of Onset    Heart Disease Mother     Pacemaker Mother     Hypertension Mother     Diabetes Father     Breast Cancer Neg Hx         Allergies:   Sulfa antibiotics and Tape [adhesive tape]     Review of Systems:     Review of Systems   Constitutional: Negative. HENT: Negative. Eyes: Negative. Respiratory: Negative. Negative for cough and choking. Cardiovascular: Positive for leg swelling. Gastrointestinal: Negative. Endocrine: Negative. Genitourinary: Negative. Negative for dysuria. Flank pain:      Musculoskeletal: Negative. Skin: Positive for color change. Allergic/Immunologic: Negative. Neurological: Negative. Negative for numbness. Hematological: Negative. Psychiatric/Behavioral: Negative.         Physical Examination :     Patient Vitals for the past 24 hrs:   BP Temp Temp src Pulse Resp SpO2   08/14/18 0815 - - - 82 - -   08/14/18 0800 (!) 120/48 98.2 °F (36.8 0.49*   MG  1.7  1.8     Hepatic Function Panel: No results for input(s): PROT, LABALBU, BILIDIR, IBILI, BILITOT, ALKPHOS, ALT, AST in the last 72 hours. No results for input(s): RPR in the last 72 hours. No results for input(s): HIV in the last 72 hours. No results for input(s): BC in the last 72 hours. Lab Results   Component Value Date    CREATININE 0.49 08/14/2018    GLUCOSE 133 08/14/2018       Detailed results: Thank you for allowing us to participate in the care of this patient. Please call with questions. This note is created with the assistance of a speech recognition program.  While intending to generate a document that actually reflects the content of the visit, the document can still have some errors including those of syntax and sound a like substitutions which may escape proof reading. It such instances, actual meaning can be extrapolated by contextual diversion.     Moe Jin MD  Office: [phone]

## 2018-08-14 NOTE — PROGRESS NOTES
by mouth daily     Historical Provider, MD   .  Recent Surgical History: None = 0     Assessment     Peak Flow (asthma only)    Predicted: 329  Personal Best: NA  PEF   % Predicted   Peak Flow :     FEV1/FVC    FEV1 Predicted 2.48      FEV1 NA    FEV1 % Predicted   FVC   IS volume   IBW 47.8 kg    RR 16  Breath Sounds: clear      · Bronchodilator assessment at level  1  · Hyperinflation assessment at level   · Secretion Management assessment at level    ·   · [x]    Bronchodilator Assessment  BRONCHODILATOR ASSESSMENT SCORE  Score 0 1 2 3 4 5   Breath Sounds   []  Patient Baseline [x]  No Wheeze good aeration []  Faint, scattered wheezing, good aeration []  Expiratory Wheezing and or moderately diminished []  Insp/Exp wheeze and/or very diminished []  Insp/Exp and/ or marked distress   Respiratory Rate   []  Patient Baseline [x]  Less than 20 [x]  Less than 20 []  20-25 []  Greater than 25 []  Greater than 25   Peak flow % of Pred or PB []  NA   []  Greater than 90%  []  81-90% []  71-80% []  Less than or equal to 70%  or unable to perform []  Unable due to Respiratory Distress   Dyspnea re []  Patient Baseline [x]  No SOB [x]  No SOB []  SOB on exertion []  SOB min activity []  At rest/acute   e FEV% Predicted       []  NA []  Above 69%  []  Unable []  Above 60-69%  []  Unable []  Above 50-59%  []  Unable []  Above 35-49%  []  Unable []  Less than 35%  []  Unable                 []  Hyperinflation Assessment  Score 1 2 3   CXR and Breath Sounds   []  Clear []  No atelectasis  Basilar aeration []  Atelectasis or absent basilar breath sounds   Incentive Spirometry Volume  (Per IBW)   []  Greater than or equal to 15ml/Kg []  less than 15ml/Kg []  less than 15ml/Kg   Surgery within last 2 weeks []  None or general   []  Abdominal or thoracic surgery  []  Abdominal or thoracic   Chronic Pulmonary Historyre []  No []  Yes []  Yes     []  Secretion Management Assessment  Score 1 2 3   Bilateral Breath Sounds []  Occasional Rhonchi []  Scattered Rhonchi []  Course Rhonchi and/or poor aeration   Sputum    []  Small amount of thin secretions []  Moderate amount of viscous secretions []  Copius, Viscious Yellow/ Secretions   CXR as reported by physician []  clear  []  Unavailable []  Infiltrates and/or consolidation  []  Unavailable []  Mucus Plugging and or lobar consolidation  []  Unavailable   Cough []  Strong, productive cough []  Weak productive cough []  No cough or weak non-productive cough   JUAN ELIAS  9:24 AM                            FEMALE                                  MALE                            FEV1 Predicted Normal Values                        FEV1 Predicted Normal Values          Age                                     Height in Feet and Inches       Age                                     Height in Feet and Inches       4' 11\" 5' 1\" 5' 3\" 5' 5\" 5' 7\" 5' 9\" 5' 11\" 6' 1\"  4' 11\" 5' 1\" 5' 3\" 5' 5\" 5' 7\" 5' 9\" 5' 11\" 6' 1\"   42 - 45 2.49 2.66 2.84 3.03 3.22 3.42 3.62 3.83 42 - 45 2.82 3.03 3.26 3.49 3.72 3.96 4.22 4.47   46 - 49 2.40 2.57 2.76 2.94 3.14 3.33 3.54 3.75 46 - 49 2.70 2.92 3.14 3.37 3.61 3.85 4.10 4.36   50 - 53 2.31 2.48 2.66 2.85 3.04 3.24 3.45 3.66 50 - 53 2.58 2.80 3.02 3.25 3.49 3.73 3.98 4.24   54 - 57 2.21 2.38 2.57 2.75 2.95 3.14 3.35 3.56 54 - 57 2.46 2.67 2.89 3.12 3.36 3.60 3.85 4.11   58 - 61 2.10 2.28 2.46 2.65 2.84 3.04 3.24 3.45 58 - 61 2.32 2.54 2.76 2.99 3.23 3.47 3.72 3.98   62 - 65 1.99 2.17 2.35 2.54 2.73 2.93 3.13 3.34 62 - 65 2.19 2.40 2.62 2.85 3.09 3.33 3.58 3.84   66 - 69 1.88 2.05 2.23 2.42 2.61 2.81 3.02 3.23 66 - 69 2.04 2.26 2.48 2.71 2.95 3.19 3.44 3.70   70+ 1.82 1.99 2.17 2.36 2.55 2.75 2.95 3.16 70+ 1.97 2.19 2.41 2.64 2.87 3.12 3.37 3.62             Predicted Peak Expiratory Flow Rate                                       Height (in)  Female       Height (in) Male           Age 64 62 64 63 57 71 78 74 Age            21 344 357 372 [phone]

## 2018-08-14 NOTE — PROGRESS NOTES
[facility]    PROGRESS NOTE             Date:   8/14/2018  Patient name:  Tejal Quiroz  Date of admission:  8/9/2018 12:06 AM  MRN:   [id number]  YOB: 1955    CHIEF COMPLAINT     History Obtained From:  Patient and chart review. HISTORY OF PRESENT ILLNESS      Patient with past medical history of diabetes mellitus, depression, came to the emergency room for right lower extremity swelling and pain since monday. Initially it was a small dimple that progressed into or thigh associated with pain, fever or chills. Per patient, she has not noticed any drug bite or travel outside. On initial encounter, patient was hypertensive. Patient was started on IV hydration that improve the blood pressure. Patient started on IV vancomycin and Zosyn. Redness improving. CURRENT EVALUATION 08/14/18   Afebrile , hemodynamically stable   On penicillin G for 2 more weeks as OP  Midline will be placed today  Pain is worse today , extreme tenderness on right foot and shin, but redness is getting better   On oral diet, tolerating well     PAST MEDICAL HISTORY       has a past medical history of Anemia; Anxiety disorder; Arthritis; Asthma; Blood transfusion reaction; COPD (chronic obstructive pulmonary disease) ([address] Depression; Diabetes mellitus ([facility] [address] GERD (gastroesophageal reflux disease); Hammer toe of right foot; Hernia, abdominal; Hyperlipidemia; Hypertension; Restless leg syndrome; Seasonal allergies; Sleep apnea; and Spondylosis. PAST SURGICAL HISTORY       has a past surgical history that includes Appendectomy; Rotator cuff repair (Right); and Colonoscopy. HOME MEDICATIONS        Prior to Admission medications    Medication Sig Start Date End Date Taking?  Authorizing Provider   diclofenac sodium 1 % GEL Apply 2 g topically 2 times daily    Historical Provider, MD   insulin glargine (LANTUS) 100 UNIT/ML injection vial

## 2018-08-15 LAB
ABSOLUTE EOS #: 0.27 K/UL (ref 0–0.44)
ABSOLUTE IMMATURE GRANULOCYTE: 0.07 K/UL (ref 0–0.3)
ABSOLUTE LYMPH #: 1.37 K/UL (ref 1.1–3.7)
ABSOLUTE MONO #: 0.75 K/UL (ref 0.1–1.2)
ANION GAP SERPL CALCULATED.3IONS-SCNC: 9 MMOL/L (ref 9–17)
BASOPHILS # BLD: 0 % (ref 0–2)
BASOPHILS ABSOLUTE: <0.03 K/UL (ref 0–0.2)
BUN BLDV-MCNC: 6 MG/DL (ref 8–23)
BUN/CREAT BLD: ABNORMAL (ref 9–20)
CALCIUM SERPL-MCNC: 8.7 MG/DL (ref 8.6–10.4)
CHLORIDE BLD-SCNC: 102 MMOL/L (ref 98–107)
CO2: 26 MMOL/L (ref 20–31)
CREAT SERPL-MCNC: 0.5 MG/DL (ref 0.5–0.9)
DIFFERENTIAL TYPE: ABNORMAL
EOSINOPHILS RELATIVE PERCENT: 5 % (ref 1–4)
GFR AFRICAN AMERICAN: >60 ML/MIN
GFR NON-AFRICAN AMERICAN: >60 ML/MIN
GFR SERPL CREATININE-BSD FRML MDRD: ABNORMAL ML/MIN/{1.73_M2}
GFR SERPL CREATININE-BSD FRML MDRD: ABNORMAL ML/MIN/{1.73_M2}
GLUCOSE BLD-MCNC: 124 MG/DL (ref 70–99)
GLUCOSE BLD-MCNC: 192 MG/DL (ref 65–105)
GLUCOSE BLD-MCNC: 198 MG/DL (ref 65–105)
GLUCOSE BLD-MCNC: 212 MG/DL (ref 65–105)
HCT VFR BLD CALC: 31.2 % (ref 36.3–47.1)
HEMOGLOBIN: 10 G/DL (ref 11.9–15.1)
IMMATURE GRANULOCYTES: 1 %
LYMPHOCYTES # BLD: 27 % (ref 24–43)
MAGNESIUM: 1.8 MG/DL (ref 1.6–2.6)
MCH RBC QN AUTO: 29.2 PG (ref 25.2–33.5)
MCHC RBC AUTO-ENTMCNC: 32.1 G/DL (ref 28.4–34.8)
MCV RBC AUTO: 91.2 FL (ref 82.6–102.9)
MONOCYTES # BLD: 15 % (ref 3–12)
NRBC AUTOMATED: 0 PER 100 WBC
PDW BLD-RTO: 13.6 % (ref 11.8–14.4)
PHOSPHORUS: 3.6 MG/DL (ref 2.6–4.5)
PLATELET # BLD: 154 K/UL (ref 138–453)
PLATELET ESTIMATE: ABNORMAL
PMV BLD AUTO: 10.9 FL (ref 8.1–13.5)
POTASSIUM SERPL-SCNC: 3.9 MMOL/L (ref 3.7–5.3)
RBC # BLD: 3.42 M/UL (ref 3.95–5.11)
RBC # BLD: ABNORMAL 10*6/UL
SEG NEUTROPHILS: 52 % (ref 36–65)
SEGMENTED NEUTROPHILS ABSOLUTE COUNT: 2.67 K/UL (ref 1.5–8.1)
SODIUM BLD-SCNC: 137 MMOL/L (ref 135–144)
WBC # BLD: 5.2 K/UL (ref 3.5–11.3)
WBC # BLD: ABNORMAL 10*3/UL

## 2018-08-15 PROCEDURE — 99239 HOSP IP/OBS DSCHRG MGMT >30: CPT | Performed by: INTERNAL MEDICINE

## 2018-08-15 PROCEDURE — G8988 SELF CARE GOAL STATUS: HCPCS

## 2018-08-15 PROCEDURE — 6370000000 HC RX 637 (ALT 250 FOR IP): Performed by: EMERGENCY MEDICINE

## 2018-08-15 PROCEDURE — 80048 BASIC METABOLIC PNL TOTAL CA: CPT

## 2018-08-15 PROCEDURE — G8978 MOBILITY CURRENT STATUS: HCPCS

## 2018-08-15 PROCEDURE — 97535 SELF CARE MNGMENT TRAINING: CPT

## 2018-08-15 PROCEDURE — G8987 SELF CARE CURRENT STATUS: HCPCS

## 2018-08-15 PROCEDURE — 99232 SBSQ HOSP IP/OBS MODERATE 35: CPT | Performed by: INTERNAL MEDICINE

## 2018-08-15 PROCEDURE — 6360000002 HC RX W HCPCS: Performed by: INTERNAL MEDICINE

## 2018-08-15 PROCEDURE — 83735 ASSAY OF MAGNESIUM: CPT

## 2018-08-15 PROCEDURE — 6360000002 HC RX W HCPCS: Performed by: EMERGENCY MEDICINE

## 2018-08-15 PROCEDURE — 2580000003 HC RX 258: Performed by: EMERGENCY MEDICINE

## 2018-08-15 PROCEDURE — 84100 ASSAY OF PHOSPHORUS: CPT

## 2018-08-15 PROCEDURE — 97166 OT EVAL MOD COMPLEX 45 MIN: CPT

## 2018-08-15 PROCEDURE — G8979 MOBILITY GOAL STATUS: HCPCS

## 2018-08-15 PROCEDURE — 97162 PT EVAL MOD COMPLEX 30 MIN: CPT

## 2018-08-15 PROCEDURE — 36415 COLL VENOUS BLD VENIPUNCTURE: CPT

## 2018-08-15 PROCEDURE — 6370000000 HC RX 637 (ALT 250 FOR IP): Performed by: HOSPITALIST

## 2018-08-15 PROCEDURE — 6370000000 HC RX 637 (ALT 250 FOR IP): Performed by: STUDENT IN AN ORGANIZED HEALTH CARE EDUCATION/TRAINING PROGRAM

## 2018-08-15 PROCEDURE — 2580000003 HC RX 258: Performed by: INTERNAL MEDICINE

## 2018-08-15 PROCEDURE — 82947 ASSAY GLUCOSE BLOOD QUANT: CPT

## 2018-08-15 PROCEDURE — 97530 THERAPEUTIC ACTIVITIES: CPT

## 2018-08-15 PROCEDURE — 1200000000 HC SEMI PRIVATE

## 2018-08-15 PROCEDURE — 85025 COMPLETE CBC W/AUTO DIFF WBC: CPT

## 2018-08-15 RX ORDER — PENICILLIN G 2000000 [IU]/50ML
2 INJECTION, SOLUTION INTRAVENOUS EVERY 6 HOURS
Status: DISCONTINUED | OUTPATIENT
Start: 2018-08-15 | End: 2018-08-15

## 2018-08-15 RX ORDER — OXYCODONE HYDROCHLORIDE AND ACETAMINOPHEN 5; 325 MG/1; MG/1
1 TABLET ORAL EVERY 6 HOURS PRN
Qty: 5 TABLET | Refills: 0 | Status: SHIPPED | OUTPATIENT
Start: 2018-08-15 | End: 2018-08-17

## 2018-08-15 RX ORDER — METOPROLOL SUCCINATE 25 MG/1
12.5 TABLET, EXTENDED RELEASE ORAL DAILY
Qty: 30 TABLET | Refills: 3 | Status: ON HOLD | OUTPATIENT
Start: 2018-08-15 | End: 2018-10-01 | Stop reason: HOSPADM

## 2018-08-15 RX ADMIN — OXYCODONE HYDROCHLORIDE AND ACETAMINOPHEN 1 TABLET: 5; 325 TABLET ORAL at 12:03

## 2018-08-15 RX ADMIN — GABAPENTIN 300 MG: 300 CAPSULE ORAL at 21:57

## 2018-08-15 RX ADMIN — GABAPENTIN 300 MG: 300 CAPSULE ORAL at 06:58

## 2018-08-15 RX ADMIN — SENNA 8.6 MG: 8.6 TABLET, COATED ORAL at 21:57

## 2018-08-15 RX ADMIN — Medication 10 ML: at 00:05

## 2018-08-15 RX ADMIN — DEXTROSE MONOHYDRATE 2000000 UNITS: 50 INJECTION, SOLUTION INTRAVENOUS at 12:03

## 2018-08-15 RX ADMIN — ACETIC ACID 1000 ML: 250 IRRIGANT IRRIGATION at 01:16

## 2018-08-15 RX ADMIN — OXYCODONE HYDROCHLORIDE AND ACETAMINOPHEN 1 TABLET: 5; 325 TABLET ORAL at 17:58

## 2018-08-15 RX ADMIN — GABAPENTIN 300 MG: 300 CAPSULE ORAL at 13:54

## 2018-08-15 RX ADMIN — ENOXAPARIN SODIUM 40 MG: 100 INJECTION SUBCUTANEOUS at 08:48

## 2018-08-15 RX ADMIN — Medication 10 ML: at 21:57

## 2018-08-15 RX ADMIN — DEXTROSE MONOHYDRATE 2 MILLION UNITS: 50 INJECTION, SOLUTION INTRAVENOUS at 17:58

## 2018-08-15 RX ADMIN — INSULIN LISPRO 1 UNITS: 100 INJECTION, SOLUTION INTRAVENOUS; SUBCUTANEOUS at 17:56

## 2018-08-15 RX ADMIN — ASPIRIN 81 MG: 81 TABLET, DELAYED RELEASE ORAL at 08:48

## 2018-08-15 RX ADMIN — PANTOPRAZOLE SODIUM 40 MG: 40 TABLET, DELAYED RELEASE ORAL at 06:58

## 2018-08-15 RX ADMIN — CITALOPRAM 40 MG: 20 TABLET, FILM COATED ORAL at 08:48

## 2018-08-15 RX ADMIN — INSULIN LISPRO 2 UNITS: 100 INJECTION, SOLUTION INTRAVENOUS; SUBCUTANEOUS at 12:12

## 2018-08-15 RX ADMIN — Medication 10 ML: at 08:49

## 2018-08-15 RX ADMIN — BACLOFEN 10 MG: 10 TABLET ORAL at 21:57

## 2018-08-15 RX ADMIN — PSEUDOEPHEDRINE HYDROCHLORIDE 240 MG: 240 TABLET, EXTENDED RELEASE ORAL at 08:48

## 2018-08-15 RX ADMIN — INSULIN LISPRO 1 UNITS: 100 INJECTION, SOLUTION INTRAVENOUS; SUBCUTANEOUS at 21:57

## 2018-08-15 RX ADMIN — CETIRIZINE HYDROCHLORIDE 10 MG: 10 TABLET ORAL at 08:48

## 2018-08-15 RX ADMIN — PRAVASTATIN SODIUM 10 MG: 20 TABLET ORAL at 08:48

## 2018-08-15 RX ADMIN — ACETIC ACID 1000 ML: 250 IRRIGANT IRRIGATION at 08:49

## 2018-08-15 RX ADMIN — ACETIC ACID 1000 ML: 250 IRRIGANT IRRIGATION at 21:56

## 2018-08-15 RX ADMIN — DEXTROSE MONOHYDRATE 2000000 UNITS: 50 INJECTION, SOLUTION INTRAVENOUS at 05:44

## 2018-08-15 RX ADMIN — DOCUSATE SODIUM 100 MG: 100 CAPSULE ORAL at 21:57

## 2018-08-15 RX ADMIN — DOCUSATE SODIUM 100 MG: 100 CAPSULE ORAL at 08:48

## 2018-08-15 RX ADMIN — INSULIN GLARGINE 40 UNITS: 100 INJECTION, SOLUTION SUBCUTANEOUS at 21:57

## 2018-08-15 RX ADMIN — OXYCODONE HYDROCHLORIDE AND ACETAMINOPHEN 1 TABLET: 5; 325 TABLET ORAL at 05:44

## 2018-08-15 ASSESSMENT — PAIN DESCRIPTION - LOCATION: LOCATION: LEG

## 2018-08-15 ASSESSMENT — PAIN DESCRIPTION - PROGRESSION
CLINICAL_PROGRESSION: NOT CHANGED

## 2018-08-15 ASSESSMENT — PAIN SCALES - GENERAL
PAINLEVEL_OUTOF10: 8
PAINLEVEL_OUTOF10: 5
PAINLEVEL_OUTOF10: 6
PAINLEVEL_OUTOF10: 4
PAINLEVEL_OUTOF10: 10
PAINLEVEL_OUTOF10: 4

## 2018-08-15 ASSESSMENT — PAIN DESCRIPTION - PAIN TYPE: TYPE: ACUTE PAIN

## 2018-08-15 ASSESSMENT — PAIN DESCRIPTION - ORIENTATION: ORIENTATION: RIGHT

## 2018-08-15 ASSESSMENT — PAIN DESCRIPTION - DESCRIPTORS: DESCRIPTORS: DISCOMFORT;SORE;TENDER

## 2018-08-15 ASSESSMENT — PAIN DESCRIPTION - FREQUENCY: FREQUENCY: CONTINUOUS

## 2018-08-15 ASSESSMENT — PAIN DESCRIPTION - ONSET: ONSET: ON-GOING

## 2018-08-15 NOTE — CARE COORDINATION
Spoke with Aidee Perez at [facility]. Writer will need to initiate level of care for placement. Called [phone], spoke to [place]. [facility] medicare advantage. Clinicals given. Insurances agrees with level of care for SNF. Antonia Brooks at [facility], update, they will send their part to [facility].     [address] [facility] to Aidee Perez at [facility]  She has not recd pre-cert from [employer] as of this time. Await pre-cert.

## 2018-08-15 NOTE — PROGRESS NOTES
CLINICAL PHARMACY NOTE: MEDS TO [address] [facility] Patient?: No  Total # of Prescriptions Filled: 3   The following medications were delivered to the patient:  · ACETIC ACID SOL  · METOPROLOL SUC ER 25MG  · AMOXICILLIN 500MG  Total # of Interventions Completed: 1  Time Spent (min): 30    Additional Documentation:WE DID NOT DELIVER, WENT TO ROOM TO  DELIVER THE 3 PRESCRIPTIONS BUT THE NURSE SAID THEY ARE GOING TO A SNF.

## 2018-08-15 NOTE — PROGRESS NOTES
reports that pt will ask for help but not recieve any b/c family does not want to help)  ADL Assistance: Independent  Homemaking Assistance: Independent  Homemaking Responsibilities: Yes  Ambulation Assistance: Independent  Transfer Assistance: Independent  Active : Yes  Mode of Transportation: Car  Occupation: Retired, On disability  Type of occupation: Office work    Objective   Vision: Impaired  Vision Exceptions: Wears glasses at all times  Hearing: Within functional limits    Orientation  Overall Orientation Status: Within Functional Limits     Balance  Sitting Balance: Stand by assistance  Standing Balance: Maximum assistance    ADL  Feeding: Independent;Setup; Increased time to complete  Grooming: Independent;Setup; Increased time to complete  UE Bathing: Minimal assistance;Setup; Increased time to complete  LE Bathing: Moderate assistance;Setup; Increased time to complete  UE Dressing: Minimal assistance;Setup; Increased time to complete  LE Dressing: Moderate assistance;Setup; Increased time to complete  Toileting: Modified independent ;Setup; Increased time to complete (Pt attempted use of bedpan)    RUE Tone: Normotonic  LUE Tone: Normotonic  Movements Are Fluid And Coordinated: Yes     Bed mobility  Bridging: Stand by assistance  Rolling to Left: Contact guard assistance  Rolling to Right: Contact guard assistance  Supine to Sit: Moderate assistance (x2 pt required assistance progressing legs off of bed and stabilizing trunk)  Sit to Supine: Moderate assistance (x2 pt sat near foot of bed and laid back with feet hanging off bed d/t pts inability to scoot towards [facility]. Pt required assistance boosting towards [facility] and needed many vc's.)  Scooting: Contact guard assistance    Transfers  Sit to stand: Maximum assistance (Pt needed many vc's and encouragement for standing.  Pt able to successfully stand for ~5 seconds one time)  Stand to sit: Moderate assistance     Cognition  Overall Cognitive Status: [facility]

## 2018-08-15 NOTE — PROGRESS NOTES
when trying to stand     Orientation  Orientation  Overall Orientation Status: Within Functional Limits (Very lethargic upon arrival)    Social/Functional History  Social/Functional History  Lives With: Family (Pt lives with dtr (39) and grandson (24).)  Type of Home: Apartment (pt lives on 3rd floor with elevator, no stairs needed to get inside)  Home Layout: Multi-level  Home Access: Elevator  Bathroom Shower/Tub: Tub/Shower unit  Bathroom Toilet: Standard  Bathroom Equipment: Grab bars in shower, Shower chair, Commode  Bathroom Accessibility: Accessible  Home Equipment: 4 wheeled walker, [facility] [address] Help From: Family (pt reports that dtr and grandson do not assit with pt.  Pt reports that pt will ask for help but not recieve any b/c family does not want to help)  ADL Assistance: Independent  Homemaking Assistance: Independent  Homemaking Responsibilities: Yes  Laundry Responsibility: Primary  Cleaning Responsibility: Primary  Shopping Responsibility: Primary  Ambulation Assistance: Independent  Transfer Assistance: Independent  Active : Yes  Mode of Transportation: Car  Occupation: Retired, On disability  Type of occupation: Office work  Additional Comments: Pt reports she asks for help with ADLs/iADLs, but family does not want to help  Objective          AROM RLE (degrees)  RLE AROM: WFL  AROM LLE (degrees)  LLE AROM : WFL  AROM RUE (degrees)  RUE AROM : WFL  AROM LUE (degrees)  LUE AROM : WFL  Strength RLE  Strength RLE: WFL  Comment: Not formally tested; antigravity positions noted   Strength LLE  Strength LLE: WFL  Comment: Not formally tested; antigravity positions noted   Strength RUE  Strength RUE: WFL  Comment: Not formally tested; antigravity positions noted   Strength LUE  Strength LUE: WFL  Comment: Not formally tested; antigravity positions noted      Sensation  Overall Sensation Status: WFL  Bed mobility  Bridging: Stand by assistance  Rolling to Left: Contact guard assistance  Rolling to but less than 80 percent impaired, limited or restricted  Mobility: Walking and Moving Around Goal Status (): At least 40 percent but less than 60 percent impaired, limited or restricted    AM-PAC Score  AM-PAC Inpatient Mobility Raw Score : 11  AM-PAC Inpatient T-Scale Score : 33.86  Mobility Inpatient CMS 0-100% Score: 72.57  Mobility Inpatient CMS G-Code Modifier : CL          Goals  Short term goals  Time Frame for Short term goals: 14 visits  Short term goal 1: Pt will be independent with bed mobility  Short term goal 2: Pt will be independent with transfers  Short term goal 3: Pt will ambulate 75ft with CGA and least restrictive device   Short term goal 4: Pt will demonstrate good standing balance  Short term goal 5: Pt will tolerate 30 minutes of activity  Patient Goals   Patient goals : Pt wants to be able to walk in the park again       Therapy Time   Individual Concurrent Group Co-treatment   Time In 1038         Time Out 1122         Minutes 44         Timed Code Treatment Minutes: 606/706 Katrina Rivero SPT  Evaluation/treatment performed by Student PT under the supervision of co-signing PT who agrees with all evaluation/treatment and documentation.

## 2018-08-16 VITALS
SYSTOLIC BLOOD PRESSURE: 109 MMHG | DIASTOLIC BLOOD PRESSURE: 50 MMHG | RESPIRATION RATE: 18 BRPM | TEMPERATURE: 99.1 F | HEIGHT: 62 IN | OXYGEN SATURATION: 98 % | BODY MASS INDEX: 44.16 KG/M2 | HEART RATE: 88 BPM | WEIGHT: 240 LBS

## 2018-08-16 LAB
ABSOLUTE EOS #: 0.24 K/UL (ref 0–0.44)
ABSOLUTE IMMATURE GRANULOCYTE: 0.03 K/UL (ref 0–0.3)
ABSOLUTE LYMPH #: 1.44 K/UL (ref 1.1–3.7)
ABSOLUTE MONO #: 0.77 K/UL (ref 0.1–1.2)
ANION GAP SERPL CALCULATED.3IONS-SCNC: 9 MMOL/L (ref 9–17)
BASOPHILS # BLD: 0 % (ref 0–2)
BASOPHILS ABSOLUTE: <0.03 K/UL (ref 0–0.2)
BUN BLDV-MCNC: 5 MG/DL (ref 8–23)
BUN/CREAT BLD: ABNORMAL (ref 9–20)
CALCIUM SERPL-MCNC: 8.8 MG/DL (ref 8.6–10.4)
CHLORIDE BLD-SCNC: 102 MMOL/L (ref 98–107)
CO2: 27 MMOL/L (ref 20–31)
CREAT SERPL-MCNC: 0.5 MG/DL (ref 0.5–0.9)
DIFFERENTIAL TYPE: ABNORMAL
EOSINOPHILS RELATIVE PERCENT: 4 % (ref 1–4)
GFR AFRICAN AMERICAN: >60 ML/MIN
GFR NON-AFRICAN AMERICAN: >60 ML/MIN
GFR SERPL CREATININE-BSD FRML MDRD: ABNORMAL ML/MIN/{1.73_M2}
GFR SERPL CREATININE-BSD FRML MDRD: ABNORMAL ML/MIN/{1.73_M2}
GLUCOSE BLD-MCNC: 109 MG/DL (ref 70–99)
HCT VFR BLD CALC: 31.1 % (ref 36.3–47.1)
HEMOGLOBIN: 9.8 G/DL (ref 11.9–15.1)
IMMATURE GRANULOCYTES: 1 %
LYMPHOCYTES # BLD: 27 % (ref 24–43)
MAGNESIUM: 1.8 MG/DL (ref 1.6–2.6)
MCH RBC QN AUTO: 28.9 PG (ref 25.2–33.5)
MCHC RBC AUTO-ENTMCNC: 31.5 G/DL (ref 28.4–34.8)
MCV RBC AUTO: 91.7 FL (ref 82.6–102.9)
MONOCYTES # BLD: 14 % (ref 3–12)
NRBC AUTOMATED: 0 PER 100 WBC
PDW BLD-RTO: 13.5 % (ref 11.8–14.4)
PHOSPHORUS: 3.8 MG/DL (ref 2.6–4.5)
PLATELET # BLD: 181 K/UL (ref 138–453)
PLATELET ESTIMATE: ABNORMAL
PMV BLD AUTO: 10.8 FL (ref 8.1–13.5)
POTASSIUM SERPL-SCNC: 4 MMOL/L (ref 3.7–5.3)
RBC # BLD: 3.39 M/UL (ref 3.95–5.11)
RBC # BLD: ABNORMAL 10*6/UL
SEG NEUTROPHILS: 54 % (ref 36–65)
SEGMENTED NEUTROPHILS ABSOLUTE COUNT: 2.9 K/UL (ref 1.5–8.1)
SODIUM BLD-SCNC: 138 MMOL/L (ref 135–144)
WBC # BLD: 5.4 K/UL (ref 3.5–11.3)
WBC # BLD: ABNORMAL 10*3/UL

## 2018-08-16 PROCEDURE — 83735 ASSAY OF MAGNESIUM: CPT

## 2018-08-16 PROCEDURE — 6370000000 HC RX 637 (ALT 250 FOR IP): Performed by: STUDENT IN AN ORGANIZED HEALTH CARE EDUCATION/TRAINING PROGRAM

## 2018-08-16 PROCEDURE — 6370000000 HC RX 637 (ALT 250 FOR IP): Performed by: EMERGENCY MEDICINE

## 2018-08-16 PROCEDURE — 6360000002 HC RX W HCPCS: Performed by: INTERNAL MEDICINE

## 2018-08-16 PROCEDURE — 2580000003 HC RX 258: Performed by: EMERGENCY MEDICINE

## 2018-08-16 PROCEDURE — 80048 BASIC METABOLIC PNL TOTAL CA: CPT

## 2018-08-16 PROCEDURE — 97530 THERAPEUTIC ACTIVITIES: CPT

## 2018-08-16 PROCEDURE — 85025 COMPLETE CBC W/AUTO DIFF WBC: CPT

## 2018-08-16 PROCEDURE — 97110 THERAPEUTIC EXERCISES: CPT

## 2018-08-16 PROCEDURE — 36415 COLL VENOUS BLD VENIPUNCTURE: CPT

## 2018-08-16 PROCEDURE — 6360000002 HC RX W HCPCS: Performed by: EMERGENCY MEDICINE

## 2018-08-16 PROCEDURE — 84100 ASSAY OF PHOSPHORUS: CPT

## 2018-08-16 PROCEDURE — 2580000003 HC RX 258: Performed by: INTERNAL MEDICINE

## 2018-08-16 PROCEDURE — 6370000000 HC RX 637 (ALT 250 FOR IP): Performed by: HOSPITALIST

## 2018-08-16 RX ADMIN — PANTOPRAZOLE SODIUM 40 MG: 40 TABLET, DELAYED RELEASE ORAL at 06:10

## 2018-08-16 RX ADMIN — PSEUDOEPHEDRINE HYDROCHLORIDE 240 MG: 240 TABLET, EXTENDED RELEASE ORAL at 08:35

## 2018-08-16 RX ADMIN — DOCUSATE SODIUM 100 MG: 100 CAPSULE ORAL at 08:35

## 2018-08-16 RX ADMIN — ACETIC ACID 1000 ML: 250 IRRIGANT IRRIGATION at 08:37

## 2018-08-16 RX ADMIN — CITALOPRAM 40 MG: 20 TABLET, FILM COATED ORAL at 08:35

## 2018-08-16 RX ADMIN — DEXTROSE MONOHYDRATE 2 MILLION UNITS: 50 INJECTION, SOLUTION INTRAVENOUS at 00:20

## 2018-08-16 RX ADMIN — ENOXAPARIN SODIUM 40 MG: 100 INJECTION SUBCUTANEOUS at 08:35

## 2018-08-16 RX ADMIN — ASPIRIN 81 MG: 81 TABLET, DELAYED RELEASE ORAL at 08:35

## 2018-08-16 RX ADMIN — CETIRIZINE HYDROCHLORIDE 10 MG: 10 TABLET ORAL at 08:35

## 2018-08-16 RX ADMIN — OXYCODONE HYDROCHLORIDE AND ACETAMINOPHEN 1 TABLET: 5; 325 TABLET ORAL at 06:31

## 2018-08-16 RX ADMIN — DEXTROSE MONOHYDRATE 2 MILLION UNITS: 50 INJECTION, SOLUTION INTRAVENOUS at 06:07

## 2018-08-16 RX ADMIN — PRAVASTATIN SODIUM 10 MG: 20 TABLET ORAL at 08:34

## 2018-08-16 RX ADMIN — OXYCODONE HYDROCHLORIDE AND ACETAMINOPHEN 1 TABLET: 5; 325 TABLET ORAL at 00:20

## 2018-08-16 RX ADMIN — Medication 10 ML: at 08:36

## 2018-08-16 ASSESSMENT — PAIN DESCRIPTION - ORIENTATION
ORIENTATION: RIGHT
ORIENTATION: RIGHT

## 2018-08-16 ASSESSMENT — PAIN DESCRIPTION - LOCATION
LOCATION: LEG
LOCATION: LEG

## 2018-08-16 ASSESSMENT — PAIN SCALES - GENERAL
PAINLEVEL_OUTOF10: 8
PAINLEVEL_OUTOF10: 2
PAINLEVEL_OUTOF10: 8
PAINLEVEL_OUTOF10: 5

## 2018-08-16 ASSESSMENT — PAIN DESCRIPTION - FREQUENCY: FREQUENCY: CONTINUOUS

## 2018-08-16 ASSESSMENT — PAIN DESCRIPTION - DESCRIPTORS: DESCRIPTORS: DISCOMFORT

## 2018-08-16 ASSESSMENT — PAIN DESCRIPTION - PROGRESSION: CLINICAL_PROGRESSION: NOT CHANGED

## 2018-08-16 ASSESSMENT — PAIN DESCRIPTION - ONSET: ONSET: ON-GOING

## 2018-08-16 ASSESSMENT — PAIN DESCRIPTION - PAIN TYPE: TYPE: ACUTE PAIN

## 2018-08-16 NOTE — PROGRESS NOTES
Carlos Early MD   oxyCODONE-acetaminophen (PERCOCET) 5-325 MG per tablet Take 1 tablet by mouth every 6 hours as needed for Pain for up to 2 days. . 8/15/18 8/17/18 Yes Carlos Early MD   penicillin GK infusion Infuse 2 Million Units intravenously every 6 hours for 16 days Till 8/30/18   then stop and pull the line and start amoxicillin 500 mg po tid x 7 days  Compound per protocol. 8/14/18 8/30/18 Yes Rosy Sotelo MD   amoxicillin (AMOXIL) 500 MG capsule Take 1 capsule by mouth 3 times daily for 7 days Start 9/1/18 8/14/18 8/21/18 Yes Blanca Roblero MD   acetic acid 0.25 % irrigation Clean and do wet to dry t the right inner leg wounds, bid 8/14/18  Yes Blanca Roblero MD   diclofenac sodium 1 % GEL Apply 2 g topically 2 times daily    Historical Provider, MD   insulin glargine (LANTUS) 100 UNIT/ML injection vial Inject 40 Units into the skin nightly    Historical Provider, MD   docusate sodium (COLACE) 100 MG capsule Take 100 mg by mouth 2 times daily    Historical Provider, MD   baclofen (LIORESAL) 10 MG tablet Take 10 mg by mouth nightly    Historical Provider, MD   aspirin 81 MG EC tablet Take 81 mg by mouth daily. Historical Provider, MD   citalopram (CELEXA) 40 MG tablet Take 40 mg by mouth daily. Historical Provider, MD   fluticasone (FLONASE) 50 MCG/ACT nasal spray 1 spray by Nasal route 2 times daily as needed for Rhinitis. Historical Provider, MD   loratadine-pseudoephedrine (LORATADINE-D 24HR)  MG per tablet Take 1 tablet by mouth daily. Historical Provider, MD   metFORMIN ER (GLUCOPHAGE-XR) 500 MG XR tablet Take 1,000 mg by mouth 2 times daily (with meals). Historical Provider, MD   gabapentin (NEURONTIN) 300 MG capsule Take 300 mg by mouth 2 times daily. Historical Provider, MD   omeprazole (PRILOSEC) 20 MG capsule Take 20 mg by mouth daily.     Historical Provider, MD   oxyCODONE-acetaminophen (PERCOCET) 5-325 MG per tablet Take 1 tablet by mouth 3 times daily as needed for Pain. Historical Provider, MD   pravastatin (PRAVACHOL) 10 MG tablet Take 10 mg by mouth daily. Historical Provider, MD   montelukast (SINGULAIR) 10 MG tablet Take 10 mg by mouth nightly. Historical Provider, MD       ALLERGIES      Sulfa antibiotics and Tape [adhesive tape]    SOCIAL HISTORY       reports that she has never smoked. She has never used smokeless tobacco. She reports that she does not drink alcohol or use drugs. FAMILY HISTORY      family history includes Diabetes in her father; Heart Disease in her mother; Hypertension in her mother; Pacemaker in her mother. REVIEW OF SYSTEMS      · Constitutional: Negative for weight loss  · Eyes: Negative for visual changes, diplopia, scleral icterus. · ENT: Negative for Headaches, hearing loss, vertigo, mouth sores, sore throat. · Cardiovascular: Negative for lightheadedness/orthostatic symptoms ,chest pain, dyspnea on exertion, palpitations or loss of consciousness. · Respiratory: Negative for cough or wheezing, sputum production, hemoptysis, pleuritic pain. · Gastrointestinal: Negative for nausea/vomiting, change in bowel habits, abdominal pain, dysphagia/appetite loss, hematemesis, blood in stools. · Genitourinary:Negative for change in bladder habits, dysuria, trouble voiding, hematuria. · Musculoskeletal: Right lower extremity pain. · Neurological: Negative for headache, dizziness, change in muscle strength, numbness/tingling, balance, coordination,   · Psychiatric: negative for change in mood, affect, memory, mentation, behavior. · Endocrine: negative for temperature intolerance, excessive thirst, fluid intake, or urination, tremor. · Hematologic/Lymphatic: negative for abnormal bruising or bleeding, blood clots, swollen lymph nodes. · Allergic/Immunologic: negative for nasal congestion, pruritis, hives.     PHYSICAL EXAM      BP (!) 109/50   Pulse 88   Temp 99.1 °F (37.3 °C) (Oral)   Resp 18 Ht 5' 1.81\" (1.57 m)   Wt 240 lb (108.9 kg)   SpO2 98%   BMI 44.17 kg/m²      · General appearance: well nourished,Morbid obesity  · HEENT: Head: Normocephalic, no lesions, without obvious abnormality. · Lungs: clear to auscultation bilaterally  · Heart: regular rate and rhythm, S1, S2 normal, no murmur, click, rub or gallop  · Abdomen: soft, non-tender; bowel sounds normal; no masses,  no organomegaly  · Extremities: Right lower extremity redness and swelling more pronounced below knee. 5 cm bullous posterior side of the leg   · Neurological: Gait normal. Reflexes normal and symmetric. Sensation grossly normal  · Skin - no rash, no lump   · Eye no icterus no redness  · Psych-normal affect   · NEURO-no limb weakness  No facial droop  · Lymphatic system-no lymphadenopathy no splenomegaly      8/8/2018 on admission                      DIAGNOSTICS      Laboratory Testing:  CBC:   Recent Labs      08/16/18   0529   WBC  5.4   HGB  9.8*   PLT  181     BMP:    Recent Labs      08/14/18   0534  08/15/18   0549  08/16/18   0529   NA  133*  137  138   K  4.0  3.9  4.0   CL  98  102  102   CO2  26  26  27   BUN  6*  6*  5*   CREATININE  0.49*  0.50  0.50   GLUCOSE  133*  124*  109*     S. Calcium:  Recent Labs      08/16/18   0529   CALCIUM  8.8     S. Ionized Calcium:No results for input(s): IONCA in the last 72 hours. S. Magnesium:  Recent Labs      08/16/18   0529   MG  1.8     S. Phosphorus:  Recent Labs      08/16/18   0529   PHOS  3.8     S. Glucose:  Recent Labs      08/15/18   1204  08/15/18   1714  08/15/18   2148   POCGLU  212*  192*  198*     Glycosylated hemoglobin A1C: No results for input(s): LABA1C in the last 72 hours. INR: No results for input(s): INR in the last 72 hours. Hepatic functions:   No results for input(s): ALKPHOS, ALT, AST, PROT, BILITOT, BILIDIR, LABALBU in the last 72 hours. Pancreatic functions:  No results for input(s): LACTA, AMYLASE in the last 72 hours.   S. Lactic Acid: No results for input(s): LACTA in the last 72 hours. Cardiac enzymes:  Recent Labs      08/14/18   1222   CKTOTAL  53     BNP:No results for input(s): BNP in the last 72 hours. Lipid profile: No results for input(s): CHOL, TRIG, HDL, LDLCALC in the last 72 hours. Invalid input(s): LDL  Blood Gases: No results found for: PH, PCO2, PO2, HCO3, O2SAT  Thyroid functions:   Lab Results   Component Value Date    TSH 2.34 05/19/2014        Imaging/Diagonstics:      CXR: No acute cardiopulmonary findings. ASSESSMENT     Patient Active Problem List   Diagnosis    Septic shock (HCC)    Cellulitis of right lower extremity    Diabetes mellitus ([facility] [address]    ARLYN (obstructive sleep apnea)    Morbid obesity ([facility] [address]    Essential hypertension    Lactic acidosis    Bandemia    CRP elevated       PLAN        Right lower extremity cellulitis. likley streptococcal , BC negative   Continue Penicillin G  2 mU q 6 hr  as per ID recommendations. Compression with Ace bandage  Diabetes mellitus. 40 units of Lantus nightly. start medium dose sliding scale,  Resume metformin on discharge   Essential hypertension. holding lisinopril and Toprol  We will hold lisinopril and Toprol on discharge as blood pressure is in lower normal, need to follow PCP for further adjustment in antihypertensives     ARLYN:  broke CPAP 2 years back. Patient will follow-up with Dr. Asaf Quiñones as an outpatient. DVT prophylaxis. Lovenox 40 mg once a day. Discharging home with home care and home IV Abx set up , today       [facility], MD  PGY-2, Internal Medicine resident  [place].    8/16/2018 at 2:00 PM

## 2018-08-16 NOTE — DISCHARGE SUMMARY
4:47 PM   Result Value Ref Range    POC Glucose 255 (H) 65 - 105 mg/dL   POC Glucose Fingerstick    Collection Time: 08/09/18  9:07 PM   Result Value Ref Range    POC Glucose 256 (H) 65 - 105 mg/dL   CBC auto differential    Collection Time: 08/10/18  6:15 AM   Result Value Ref Range    WBC 10.1 3.5 - 11.3 k/uL    RBC 3.89 (L) 3.95 - 5.11 m/uL    Hemoglobin 11.4 (L) 11.9 - 15.1 g/dL    Hematocrit 35.3 (L) 36.3 - 47.1 %    MCV 90.7 82.6 - 102.9 fL    MCH 29.3 25.2 - 33.5 pg    MCHC 32.3 28.4 - 34.8 g/dL    RDW 13.5 11.8 - 14.4 %    Platelets 027 (L) 450 - 453 k/uL    MPV 12.0 8.1 - 13.5 fL    NRBC Automated 0.0 0.0 per 100 WBC    Differential Type NOT REPORTED     WBC Morphology NOT REPORTED     RBC Morphology NOT REPORTED     Platelet Estimate NOT REPORTED     Immature Granulocytes 0 0 %    Seg Neutrophils 71 (H) 36 - 66 %    Lymphocytes 17 (L) 24 - 44 %    Monocytes 7 1 - 7 %    Eosinophils % 5 (H) 1 - 4 %    Basophils 0 0 - 2 %    Absolute Immature Granulocyte 0.00 0.00 - 0.30 k/uL    Segs Absolute 7.16 1.8 - 7.7 k/uL    Absolute Lymph # 1.72 1.0 - 4.8 k/uL    Absolute Mono # 0.71 0.1 - 0.8 k/uL    Absolute Eos # 0.51 (H) 0.0 - 0.4 k/uL    Basophils # 0.00 0.0 - 0.2 k/uL    Morphology Normal    Basic Metabolic Panel w/ Reflex to MG    Collection Time: 08/10/18  6:15 AM   Result Value Ref Range    Glucose 201 (H) 70 - 99 mg/dL    BUN 15 8 - 23 mg/dL    CREATININE 0.52 0.50 - 0.90 mg/dL    Bun/Cre Ratio NOT REPORTED 9 - 20    Calcium 8.7 8.6 - 10.4 mg/dL    Sodium 132 (L) 135 - 144 mmol/L    Potassium 3.8 3.7 - 5.3 mmol/L    Chloride 97 (L) 98 - 107 mmol/L    CO2 24 20 - 31 mmol/L    Anion Gap 11 9 - 17 mmol/L    GFR Non-African American >60 >60 mL/min    GFR African American >60 >60 mL/min    GFR Comment          GFR Staging NOT REPORTED    Magnesium    Collection Time: 08/10/18  6:15 AM   Result Value Ref Range    Magnesium 1.6 1.6 - 2.6 mg/dL   Phosphorus    Collection Time: 08/10/18  6:15 AM   Result Value Ref mg/dL    CREATININE 0.46 (L) 0.50 - 0.90 mg/dL    Bun/Cre Ratio NOT REPORTED 9 - 20    Calcium 8.6 8.6 - 10.4 mg/dL    Sodium 136 135 - 144 mmol/L    Potassium 3.8 3.7 - 5.3 mmol/L    Chloride 100 98 - 107 mmol/L    CO2 21 20 - 31 mmol/L    Anion Gap 15 9 - 17 mmol/L    GFR Non-African American >60 >60 mL/min    GFR African American >60 >60 mL/min    GFR Comment          GFR Staging NOT REPORTED    Magnesium    Collection Time: 08/11/18  6:10 AM   Result Value Ref Range    Magnesium 1.7 1.6 - 2.6 mg/dL   Phosphorus    Collection Time: 08/11/18  6:10 AM   Result Value Ref Range    Phosphorus 3.9 2.6 - 4.5 mg/dL   POC Glucose Fingerstick    Collection Time: 08/11/18 11:46 AM   Result Value Ref Range    POC Glucose 248 (H) 65 - 105 mg/dL   POC Glucose Fingerstick    Collection Time: 08/11/18  4:29 PM   Result Value Ref Range    POC Glucose 240 (H) 65 - 105 mg/dL   POC Glucose Fingerstick    Collection Time: 08/11/18  9:01 PM   Result Value Ref Range    POC Glucose 243 (H) 65 - 105 mg/dL   CBC auto differential    Collection Time: 08/12/18  5:55 AM   Result Value Ref Range    WBC 6.3 3.5 - 11.3 k/uL    RBC 3.74 (L) 3.95 - 5.11 m/uL    Hemoglobin 10.7 (L) 11.9 - 15.1 g/dL    Hematocrit 33.5 (L) 36.3 - 47.1 %    MCV 89.6 82.6 - 102.9 fL    MCH 28.6 25.2 - 33.5 pg    MCHC 31.9 28.4 - 34.8 g/dL    RDW 13.7 11.8 - 14.4 %    Platelets 800 (L) 226 - 453 k/uL    MPV 11.4 8.1 - 13.5 fL    NRBC Automated 0.0 0.0 per 100 WBC    Differential Type NOT REPORTED     Seg Neutrophils 50 36 - 65 %    Lymphocytes 25 24 - 43 %    Monocytes 15 (H) 3 - 12 %    Eosinophils % 5 (H) 1 - 4 %    Basophils 1 0 - 2 %    Immature Granulocytes 4 (H) 0 %    Segs Absolute 3.14 1.50 - 8.10 k/uL    Absolute Lymph # 1.58 1.10 - 3.70 k/uL    Absolute Mono # 0.94 0.10 - 1.20 k/uL    Absolute Eos # 0.32 0.00 - 0.44 k/uL    Basophils # 0.03 0.00 - 0.20 k/uL    Absolute Immature Granulocyte 0.24 0.00 - 0.30 k/uL    WBC Morphology NOT REPORTED     RBC 105 mg/dL   POC Glucose Fingerstick    Collection Time: 08/14/18  9:34 PM   Result Value Ref Range    POC Glucose 201 (H) 65 - 105 mg/dL   CBC auto differential    Collection Time: 08/15/18  5:49 AM   Result Value Ref Range    WBC 5.2 3.5 - 11.3 k/uL    RBC 3.42 (L) 3.95 - 5.11 m/uL    Hemoglobin 10.0 (L) 11.9 - 15.1 g/dL    Hematocrit 31.2 (L) 36.3 - 47.1 %    MCV 91.2 82.6 - 102.9 fL    MCH 29.2 25.2 - 33.5 pg    MCHC 32.1 28.4 - 34.8 g/dL    RDW 13.6 11.8 - 14.4 %    Platelets 038 625 - 492 k/uL    MPV 10.9 8.1 - 13.5 fL    NRBC Automated 0.0 0.0 per 100 WBC    Differential Type NOT REPORTED     Seg Neutrophils 52 36 - 65 %    Lymphocytes 27 24 - 43 %    Monocytes 15 (H) 3 - 12 %    Eosinophils % 5 (H) 1 - 4 %    Basophils 0 0 - 2 %    Immature Granulocytes 1 (H) 0 %    Segs Absolute 2.67 1.50 - 8.10 k/uL    Absolute Lymph # 1.37 1.10 - 3.70 k/uL    Absolute Mono # 0.75 0.10 - 1.20 k/uL    Absolute Eos # 0.27 0.00 - 0.44 k/uL    Basophils # <0.03 0.00 - 0.20 k/uL    Absolute Immature Granulocyte 0.07 0.00 - 0.30 k/uL    WBC Morphology NOT REPORTED     RBC Morphology NOT REPORTED     Platelet Estimate NOT REPORTED    Basic Metabolic Panel w/ Reflex to MG    Collection Time: 08/15/18  5:49 AM   Result Value Ref Range    Glucose 124 (H) 70 - 99 mg/dL    BUN 6 (L) 8 - 23 mg/dL    CREATININE 0.50 0.50 - 0.90 mg/dL    Bun/Cre Ratio NOT REPORTED 9 - 20    Calcium 8.7 8.6 - 10.4 mg/dL    Sodium 137 135 - 144 mmol/L    Potassium 3.9 3.7 - 5.3 mmol/L    Chloride 102 98 - 107 mmol/L    CO2 26 20 - 31 mmol/L    Anion Gap 9 9 - 17 mmol/L    GFR Non-African American >60 >60 mL/min    GFR African American >60 >60 mL/min    GFR Comment          GFR Staging NOT REPORTED    Magnesium    Collection Time: 08/15/18  5:49 AM   Result Value Ref Range    Magnesium 1.8 1.6 - 2.6 mg/dL   Phosphorus    Collection Time: 08/15/18  5:49 AM   Result Value Ref Range    Phosphorus 3.6 2.6 - 4.5 mg/dL   POC Glucose Fingerstick    Collection Time: 08/15/18 12:04 PM   Result Value Ref Range    POC Glucose 212 (H) 65 - 105 mg/dL   POC Glucose Fingerstick    Collection Time: 08/15/18  5:14 PM   Result Value Ref Range    POC Glucose 192 (H) 65 - 105 mg/dL   POC Glucose Fingerstick    Collection Time: 08/15/18  9:48 PM   Result Value Ref Range    POC Glucose 198 (H) 65 - 105 mg/dL   CBC auto differential    Collection Time: 08/16/18  5:29 AM   Result Value Ref Range    WBC 5.4 3.5 - 11.3 k/uL    RBC 3.39 (L) 3.95 - 5.11 m/uL    Hemoglobin 9.8 (L) 11.9 - 15.1 g/dL    Hematocrit 31.1 (L) 36.3 - 47.1 %    MCV 91.7 82.6 - 102.9 fL    MCH 28.9 25.2 - 33.5 pg    MCHC 31.5 28.4 - 34.8 g/dL    RDW 13.5 11.8 - 14.4 %    Platelets 924 105 - 170 k/uL    MPV 10.8 8.1 - 13.5 fL    NRBC Automated 0.0 0.0 per 100 WBC    Differential Type NOT REPORTED     Seg Neutrophils 54 36 - 65 %    Lymphocytes 27 24 - 43 %    Monocytes 14 (H) 3 - 12 %    Eosinophils % 4 1 - 4 %    Basophils 0 0 - 2 %    Immature Granulocytes 1 (H) 0 %    Segs Absolute 2.90 1.50 - 8.10 k/uL    Absolute Lymph # 1.44 1.10 - 3.70 k/uL    Absolute Mono # 0.77 0.10 - 1.20 k/uL    Absolute Eos # 0.24 0.00 - 0.44 k/uL    Basophils # <0.03 0.00 - 0.20 k/uL    Absolute Immature Granulocyte 0.03 0.00 - 0.30 k/uL    WBC Morphology NOT REPORTED     RBC Morphology NOT REPORTED     Platelet Estimate NOT REPORTED    Basic Metabolic Panel w/ Reflex to MG    Collection Time: 08/16/18  5:29 AM   Result Value Ref Range    Glucose 109 (H) 70 - 99 mg/dL    BUN 5 (L) 8 - 23 mg/dL    CREATININE 0.50 0.50 - 0.90 mg/dL    Bun/Cre Ratio NOT REPORTED 9 - 20    Calcium 8.8 8.6 - 10.4 mg/dL    Sodium 138 135 - 144 mmol/L    Potassium 4.0 3.7 - 5.3 mmol/L    Chloride 102 98 - 107 mmol/L    CO2 27 20 - 31 mmol/L    Anion Gap 9 9 - 17 mmol/L    GFR Non-African American >60 >60 mL/min    GFR African American >60 >60 mL/min    GFR Comment          GFR Staging NOT REPORTED    Magnesium    Collection Time: 08/16/18  5:29 AM   Result Value Ref (msec)                   ! +---------------------------+------+------+--------------------------------+ ! Common Femoral             !Phasic!      !                                ! +---------------------------+------+------+--------------------------------+ ! Prox Femoral               !Phasic!      !                                ! +---------------------------+------+------+--------------------------------+ ! Popliteal                  !Phasic!      !                                ! +---------------------------+------+------+--------------------------------+ Left Lower Extremities DVT Study Measurements Left 2D Measurements +------------------------------------+----------+---------------+----------+ ! Location                            ! Visualized! Compressibility! Thrombosis! +------------------------------------+----------+---------------+----------+ ! Common Femoral                      !Yes       ! Yes            ! None      ! +------------------------------------+----------+---------------+----------+ ! Prox Femoral                        !Yes       ! Yes            ! None      ! +------------------------------------+----------+---------------+----------+ ! Mid Femoral                         !Yes       ! Yes            ! None      ! +------------------------------------+----------+---------------+----------+ ! Dist Femoral                        !Yes       ! Yes            ! None      ! +------------------------------------+----------+---------------+----------+ ! Deep Femoral                        !No        !               !          ! +------------------------------------+----------+---------------+----------+ ! Popliteal                           !Yes       ! Yes            ! None      ! +------------------------------------+----------+---------------+----------+ ! Sapheno Femoral Junction            ! Yes       ! Yes            ! None      ! +------------------------------------+----------+---------------+----------+ ! PTV !Partial   !Yes            ! None      ! +------------------------------------+----------+---------------+----------+ ! Peroneal                            !Partial   !Yes            ! None      ! +------------------------------------+----------+---------------+----------+ ! Gastroc                             ! Yes       ! Yes            ! None      ! +------------------------------------+----------+---------------+----------+ ! GSV Thigh                           ! Yes       ! Yes            ! None      ! +------------------------------------+----------+---------------+----------+ ! GSV Knee                            ! Yes       ! Yes            ! None      ! +------------------------------------+----------+---------------+----------+ ! GSV Ankle                           ! Yes       ! Yes            ! None      ! +------------------------------------+----------+---------------+----------+ ! SSV                                 ! Yes       ! Yes            ! None      ! +------------------------------------+----------+---------------+----------+ Left Doppler Measurements +---------------------------+------+------+--------------------------------+ ! Location                   ! Signal!Reflux! Reflux (msec)                   ! +---------------------------+------+------+--------------------------------+ ! Common Femoral             !Phasic!      !                                ! +---------------------------+------+------+--------------------------------+ ! Prox Femoral               !Phasic!      !                                ! +---------------------------+------+------+--------------------------------+ ! Popliteal                  !Phasic!      !                                ! +---------------------------+------+------+--------------------------------+        Consultations:    Consults:     Final Specialist Recommendations/Findings:   PHARMACY TO DOSE VANCOMYCIN  IP CONSULT TO SOCIAL WORK  IP CONSULT TO IV MG per tablet  Commonly known as:  PERCOCET  Take 1 tablet by mouth every 6 hours as needed for Pain for up to 2 days. .  What changed: You were already taking a medication with the same name, and this prescription was added. Make sure you understand how and when to take each. * This list has 2 medication(s) that are the same as other medications prescribed for you. Read the directions carefully, and ask your doctor or other care provider to review them with you.             CONTINUE taking these medications    aspirin 81 MG EC tablet     baclofen 10 MG tablet  Commonly known as:  LIORESAL     citalopram 40 MG tablet  Commonly known as:  CELEXA     diclofenac sodium 1 % Gel     docusate sodium 100 MG capsule  Commonly known as:  COLACE     fluticasone 50 MCG/ACT nasal spray  Commonly known as:  FLONASE     gabapentin 300 MG capsule  Commonly known as:  NEURONTIN     insulin glargine 100 UNIT/ML injection vial  Commonly known as:  LANTUS     LORATADINE-D 24HR  MG per extended release tablet  Generic drug:  loratadine-pseudoephedrine     metFORMIN 500 MG extended release tablet  Commonly known as:  GLUCOPHAGE-XR     montelukast 10 MG tablet  Commonly known as:  SINGULAIR     omeprazole 20 MG delayed release capsule  Commonly known as:  PRILOSEC     pravastatin 10 MG tablet  Commonly known as:  PRAVACHOL        STOP taking these medications    lisinopril 20 MG tablet  Commonly known as:  PRINIVIL;ZESTRIL           Where to Get Your Medications      [address]  [address], [address] [place] [address]    Phone:  [phone]   · acetic acid 0.25 % irrigation  · amoxicillin 500 MG capsule  · metoprolol succinate 25 MG extended release tablet     You can get these medications from any pharmacy    Bring a paper prescription for each of these medications  · oxyCODONE-acetaminophen 5-325 MG per

## 2018-09-26 ENCOUNTER — APPOINTMENT (OUTPATIENT)
Dept: GENERAL RADIOLOGY | Age: 63
DRG: 853 | End: 2018-09-26
Payer: MEDICARE

## 2018-09-26 ENCOUNTER — HOSPITAL ENCOUNTER (INPATIENT)
Age: 63
LOS: 6 days | Discharge: NURSING, REHAB OR LONG-TERM CARE | DRG: 853 | End: 2018-10-02
Attending: EMERGENCY MEDICINE | Admitting: INTERNAL MEDICINE
Payer: MEDICARE

## 2018-09-26 DIAGNOSIS — L03.319 CELLULITIS AND ABSCESS OF TRUNK: ICD-10-CM

## 2018-09-26 DIAGNOSIS — N61.0 CELLULITIS OF RIGHT BREAST: ICD-10-CM

## 2018-09-26 DIAGNOSIS — R79.89 LACTATE BLOOD INCREASED: ICD-10-CM

## 2018-09-26 DIAGNOSIS — A41.9 SEPTICEMIA (HCC): Primary | ICD-10-CM

## 2018-09-26 DIAGNOSIS — L02.219 CELLULITIS AND ABSCESS OF TRUNK: ICD-10-CM

## 2018-09-26 DIAGNOSIS — D72.829 LEUKOCYTOSIS, UNSPECIFIED TYPE: ICD-10-CM

## 2018-09-26 PROBLEM — N17.9 AKI (ACUTE KIDNEY INJURY) (HCC): Status: ACTIVE | Noted: 2018-09-26

## 2018-09-26 PROBLEM — M19.90 OSTEOARTHRITIS: Status: ACTIVE | Noted: 2018-09-26

## 2018-09-26 PROBLEM — F32.A DEPRESSION: Status: ACTIVE | Noted: 2018-09-26

## 2018-09-26 LAB
-: ABNORMAL
ABSOLUTE EOS #: 0.2 K/UL (ref 0–0.4)
ABSOLUTE IMMATURE GRANULOCYTE: 0.39 K/UL (ref 0–0.3)
ABSOLUTE LYMPH #: 1.18 K/UL (ref 1–4.8)
ABSOLUTE MONO #: 1.18 K/UL (ref 0.1–0.8)
ALBUMIN SERPL-MCNC: 2.8 G/DL (ref 3.5–5.2)
ALBUMIN/GLOBULIN RATIO: 0.6 (ref 1–2.5)
ALP BLD-CCNC: 137 U/L (ref 35–104)
ALT SERPL-CCNC: 32 U/L (ref 5–33)
AMORPHOUS: ABNORMAL
ANION GAP SERPL CALCULATED.3IONS-SCNC: 20 MMOL/L (ref 9–17)
ANION GAP SERPL CALCULATED.3IONS-SCNC: ABNORMAL MMOL/L (ref 9–17)
AST SERPL-CCNC: 111 U/L
BACTERIA: ABNORMAL
BASOPHILS # BLD: 0 % (ref 0–2)
BASOPHILS ABSOLUTE: 0 K/UL (ref 0–0.2)
BETA-HYDROXYBUTYRATE: 0.17 MMOL/L (ref 0.02–0.27)
BILIRUB SERPL-MCNC: 1.02 MG/DL (ref 0.3–1.2)
BILIRUBIN URINE: ABNORMAL
BUN BLDV-MCNC: 33 MG/DL (ref 8–23)
BUN/CREAT BLD: ABNORMAL (ref 9–20)
CALCIUM SERPL-MCNC: 9.1 MG/DL (ref 8.6–10.4)
CASTS UA: ABNORMAL /LPF (ref 0–2)
CHLORIDE BLD-SCNC: 95 MMOL/L (ref 98–107)
CHLORIDE BLD-SCNC: ABNORMAL MMOL/L (ref 98–107)
CO2: 17 MMOL/L (ref 20–31)
CO2: ABNORMAL MMOL/L (ref 20–31)
COLOR: ABNORMAL
CREAT SERPL-MCNC: 1.86 MG/DL (ref 0.5–0.9)
CREATININE URINE: 180.8 MG/DL (ref 28–217)
CRYSTALS, UA: ABNORMAL /HPF
DIFFERENTIAL TYPE: ABNORMAL
EOSINOPHILS RELATIVE PERCENT: 1 % (ref 1–4)
EPITHELIAL CELLS UA: ABNORMAL /HPF (ref 0–5)
GFR AFRICAN AMERICAN: 33 ML/MIN
GFR NON-AFRICAN AMERICAN: 28 ML/MIN
GFR SERPL CREATININE-BSD FRML MDRD: ABNORMAL ML/MIN/{1.73_M2}
GFR SERPL CREATININE-BSD FRML MDRD: ABNORMAL ML/MIN/{1.73_M2}
GLUCOSE BLD-MCNC: 121 MG/DL (ref 65–105)
GLUCOSE BLD-MCNC: 140 MG/DL (ref 70–99)
GLUCOSE BLD-MCNC: 144 MG/DL (ref 65–105)
GLUCOSE BLD-MCNC: 151 MG/DL (ref 65–105)
GLUCOSE URINE: NEGATIVE
HCT VFR BLD CALC: 34.3 % (ref 36.3–47.1)
HEMOGLOBIN: 10.4 G/DL (ref 11.9–15.1)
IMMATURE GRANULOCYTES: 2 %
INR BLD: 1.3
KETONES, URINE: ABNORMAL
LACTIC ACID, WHOLE BLOOD: 10.3 MMOL/L (ref 0.7–2.1)
LACTIC ACID, WHOLE BLOOD: 7.6 MMOL/L (ref 0.7–2.1)
LACTIC ACID: ABNORMAL MMOL/L
LEUKOCYTE ESTERASE, URINE: ABNORMAL
LYMPHOCYTES # BLD: 6 % (ref 24–44)
MCH RBC QN AUTO: 27.9 PG (ref 25.2–33.5)
MCHC RBC AUTO-ENTMCNC: 30.3 G/DL (ref 28.4–34.8)
MCV RBC AUTO: 92 FL (ref 82.6–102.9)
MONOCYTES # BLD: 6 % (ref 1–7)
MORPHOLOGY: ABNORMAL
MUCUS: ABNORMAL
NITRITE, URINE: NEGATIVE
NRBC AUTOMATED: 0 PER 100 WBC
OTHER OBSERVATIONS UA: ABNORMAL
PARTIAL THROMBOPLASTIN TIME: 32.2 SEC (ref 20.5–30.5)
PDW BLD-RTO: 14.8 % (ref 11.8–14.4)
PH UA: 5 (ref 5–8)
PLATELET # BLD: 150 K/UL (ref 138–453)
PLATELET ESTIMATE: ABNORMAL
PMV BLD AUTO: 12.9 FL (ref 8.1–13.5)
POTASSIUM SERPL-SCNC: 4.9 MMOL/L (ref 3.7–5.3)
POTASSIUM SERPL-SCNC: ABNORMAL MMOL/L (ref 3.7–5.3)
PROTEIN UA: ABNORMAL
PROTHROMBIN TIME: 13.4 SEC (ref 9–12)
RBC # BLD: 3.73 M/UL (ref 3.95–5.11)
RBC # BLD: ABNORMAL 10*6/UL
RBC UA: ABNORMAL /HPF (ref 0–4)
RENAL EPITHELIAL, UA: ABNORMAL /HPF
SEG NEUTROPHILS: 85 % (ref 36–66)
SEGMENTED NEUTROPHILS ABSOLUTE COUNT: 16.75 K/UL (ref 1.8–7.7)
SODIUM BLD-SCNC: 132 MMOL/L (ref 135–144)
SODIUM BLD-SCNC: ABNORMAL MMOL/L (ref 135–144)
SODIUM,UR: <20 MMOL/L
SPECIFIC GRAVITY UA: 1.02 (ref 1–1.03)
TOTAL PROTEIN: 7.7 G/DL (ref 6.4–8.3)
TRICHOMONAS: ABNORMAL
TURBIDITY: ABNORMAL
URINE HGB: NEGATIVE
UROBILINOGEN, URINE: NORMAL
WBC # BLD: 19.7 K/UL (ref 3.5–11.3)
WBC # BLD: ABNORMAL 10*3/UL
WBC UA: ABNORMAL /HPF (ref 0–5)
YEAST: ABNORMAL

## 2018-09-26 PROCEDURE — 94762 N-INVAS EAR/PLS OXIMTRY CONT: CPT

## 2018-09-26 PROCEDURE — 82947 ASSAY GLUCOSE BLOOD QUANT: CPT

## 2018-09-26 PROCEDURE — 81001 URINALYSIS AUTO W/SCOPE: CPT

## 2018-09-26 PROCEDURE — 82570 ASSAY OF URINE CREATININE: CPT

## 2018-09-26 PROCEDURE — 6370000000 HC RX 637 (ALT 250 FOR IP): Performed by: STUDENT IN AN ORGANIZED HEALTH CARE EDUCATION/TRAINING PROGRAM

## 2018-09-26 PROCEDURE — 2580000003 HC RX 258: Performed by: INTERNAL MEDICINE

## 2018-09-26 PROCEDURE — 85610 PROTHROMBIN TIME: CPT

## 2018-09-26 PROCEDURE — 84300 ASSAY OF URINE SODIUM: CPT

## 2018-09-26 PROCEDURE — 1200000000 HC SEMI PRIVATE

## 2018-09-26 PROCEDURE — 82010 KETONE BODYS QUAN: CPT

## 2018-09-26 PROCEDURE — 87040 BLOOD CULTURE FOR BACTERIA: CPT

## 2018-09-26 PROCEDURE — 83605 ASSAY OF LACTIC ACID: CPT

## 2018-09-26 PROCEDURE — 2580000003 HC RX 258: Performed by: STUDENT IN AN ORGANIZED HEALTH CARE EDUCATION/TRAINING PROGRAM

## 2018-09-26 PROCEDURE — 2500000003 HC RX 250 WO HCPCS: Performed by: STUDENT IN AN ORGANIZED HEALTH CARE EDUCATION/TRAINING PROGRAM

## 2018-09-26 PROCEDURE — 36415 COLL VENOUS BLD VENIPUNCTURE: CPT

## 2018-09-26 PROCEDURE — 85025 COMPLETE CBC W/AUTO DIFF WBC: CPT

## 2018-09-26 PROCEDURE — 6360000002 HC RX W HCPCS: Performed by: STUDENT IN AN ORGANIZED HEALTH CARE EDUCATION/TRAINING PROGRAM

## 2018-09-26 PROCEDURE — 71046 X-RAY EXAM CHEST 2 VIEWS: CPT

## 2018-09-26 PROCEDURE — 87086 URINE CULTURE/COLONY COUNT: CPT

## 2018-09-26 PROCEDURE — 0H9T0ZZ DRAINAGE OF RIGHT BREAST, OPEN APPROACH: ICD-10-PCS | Performed by: SURGERY

## 2018-09-26 PROCEDURE — 85730 THROMBOPLASTIN TIME PARTIAL: CPT

## 2018-09-26 PROCEDURE — 0X940ZZ DRAINAGE OF RIGHT AXILLA, OPEN APPROACH: ICD-10-PCS | Performed by: SURGERY

## 2018-09-26 PROCEDURE — 99285 EMERGENCY DEPT VISIT HI MDM: CPT

## 2018-09-26 PROCEDURE — 83036 HEMOGLOBIN GLYCOSYLATED A1C: CPT

## 2018-09-26 PROCEDURE — 6370000000 HC RX 637 (ALT 250 FOR IP): Performed by: INTERNAL MEDICINE

## 2018-09-26 PROCEDURE — 80053 COMPREHEN METABOLIC PANEL: CPT

## 2018-09-26 PROCEDURE — 0W980ZZ DRAINAGE OF CHEST WALL, OPEN APPROACH: ICD-10-PCS | Performed by: SURGERY

## 2018-09-26 RX ORDER — PROMETHAZINE HYDROCHLORIDE 25 MG/ML
12.5 INJECTION, SOLUTION INTRAMUSCULAR; INTRAVENOUS EVERY 6 HOURS PRN
Status: DISCONTINUED | OUTPATIENT
Start: 2018-09-26 | End: 2018-10-02 | Stop reason: HOSPADM

## 2018-09-26 RX ORDER — DEXTROSE MONOHYDRATE 25 G/50ML
12.5 INJECTION, SOLUTION INTRAVENOUS PRN
Status: CANCELLED | OUTPATIENT
Start: 2018-09-26

## 2018-09-26 RX ORDER — GABAPENTIN 300 MG/1
300 CAPSULE ORAL 2 TIMES DAILY
Status: DISCONTINUED | OUTPATIENT
Start: 2018-09-26 | End: 2018-09-26

## 2018-09-26 RX ORDER — OXYCODONE HYDROCHLORIDE AND ACETAMINOPHEN 5; 325 MG/1; MG/1
1 TABLET ORAL 3 TIMES DAILY PRN
Status: CANCELLED | OUTPATIENT
Start: 2018-09-26

## 2018-09-26 RX ORDER — FENTANYL CITRATE 50 UG/ML
25 INJECTION, SOLUTION INTRAMUSCULAR; INTRAVENOUS
Status: DISCONTINUED | OUTPATIENT
Start: 2018-09-26 | End: 2018-09-26

## 2018-09-26 RX ORDER — GLUCAGON 1 MG/ML
1 KIT INJECTION PRN
Status: DISCONTINUED | OUTPATIENT
Start: 2018-09-26 | End: 2018-10-02 | Stop reason: HOSPADM

## 2018-09-26 RX ORDER — 0.9 % SODIUM CHLORIDE 0.9 %
2000 INTRAVENOUS SOLUTION INTRAVENOUS ONCE
Status: DISCONTINUED | OUTPATIENT
Start: 2018-09-26 | End: 2018-09-26

## 2018-09-26 RX ORDER — SODIUM CHLORIDE 0.9 % (FLUSH) 0.9 %
10 SYRINGE (ML) INJECTION EVERY 12 HOURS SCHEDULED
Status: DISCONTINUED | OUTPATIENT
Start: 2018-09-26 | End: 2018-10-02 | Stop reason: HOSPADM

## 2018-09-26 RX ORDER — ONDANSETRON 2 MG/ML
4 INJECTION INTRAMUSCULAR; INTRAVENOUS EVERY 6 HOURS PRN
Status: DISCONTINUED | OUTPATIENT
Start: 2018-09-26 | End: 2018-10-02 | Stop reason: HOSPADM

## 2018-09-26 RX ORDER — PSEUDOEPHEDRINE HCL 120 MG/1
120 TABLET, FILM COATED, EXTENDED RELEASE ORAL 2 TIMES DAILY
Status: DISCONTINUED | OUTPATIENT
Start: 2018-09-26 | End: 2018-09-27

## 2018-09-26 RX ORDER — ACETAMINOPHEN 325 MG/1
650 TABLET ORAL EVERY 4 HOURS PRN
Status: DISCONTINUED | OUTPATIENT
Start: 2018-09-26 | End: 2018-10-02 | Stop reason: HOSPADM

## 2018-09-26 RX ORDER — DEXTROSE MONOHYDRATE 25 G/50ML
12.5 INJECTION, SOLUTION INTRAVENOUS PRN
Status: DISCONTINUED | OUTPATIENT
Start: 2018-09-26 | End: 2018-10-02 | Stop reason: HOSPADM

## 2018-09-26 RX ORDER — SODIUM CHLORIDE 9 MG/ML
INJECTION, SOLUTION INTRAVENOUS CONTINUOUS
Status: DISCONTINUED | OUTPATIENT
Start: 2018-09-26 | End: 2018-09-27

## 2018-09-26 RX ORDER — SODIUM CHLORIDE 0.9 % (FLUSH) 0.9 %
10 SYRINGE (ML) INJECTION EVERY 12 HOURS SCHEDULED
Status: CANCELLED | OUTPATIENT
Start: 2018-09-26

## 2018-09-26 RX ORDER — 0.9 % SODIUM CHLORIDE 0.9 %
1000 INTRAVENOUS SOLUTION INTRAVENOUS ONCE
Status: CANCELLED | OUTPATIENT
Start: 2018-09-26 | End: 2018-09-26

## 2018-09-26 RX ORDER — LORATADINE AND PSEUDOEPHEDRINE 10; 240 MG/1; MG/1
1 TABLET, EXTENDED RELEASE ORAL DAILY
Status: CANCELLED | OUTPATIENT
Start: 2018-09-26

## 2018-09-26 RX ORDER — SODIUM CHLORIDE 0.9 % (FLUSH) 0.9 %
10 SYRINGE (ML) INJECTION PRN
Status: DISCONTINUED | OUTPATIENT
Start: 2018-09-26 | End: 2018-10-01 | Stop reason: SDUPTHER

## 2018-09-26 RX ORDER — ACETIC ACID 0.25 G/100ML
250 IRRIGANT IRRIGATION PRN
Status: DISCONTINUED | OUTPATIENT
Start: 2018-09-26 | End: 2018-10-02 | Stop reason: HOSPADM

## 2018-09-26 RX ORDER — DEXTROSE MONOHYDRATE 50 MG/ML
100 INJECTION, SOLUTION INTRAVENOUS PRN
Status: CANCELLED | OUTPATIENT
Start: 2018-09-26

## 2018-09-26 RX ORDER — 0.9 % SODIUM CHLORIDE 0.9 %
1000 INTRAVENOUS SOLUTION INTRAVENOUS ONCE
Status: COMPLETED | OUTPATIENT
Start: 2018-09-26 | End: 2018-09-26

## 2018-09-26 RX ORDER — 0.9 % SODIUM CHLORIDE 0.9 %
3000 INTRAVENOUS SOLUTION INTRAVENOUS ONCE
Status: COMPLETED | OUTPATIENT
Start: 2018-09-26 | End: 2018-09-26

## 2018-09-26 RX ORDER — LORATADINE AND PSEUDOEPHEDRINE 10; 240 MG/1; MG/1
1 TABLET, EXTENDED RELEASE ORAL DAILY
Status: DISCONTINUED | OUTPATIENT
Start: 2018-09-26 | End: 2018-09-26 | Stop reason: CLARIF

## 2018-09-26 RX ORDER — HEPARIN SODIUM 5000 [USP'U]/ML
5000 INJECTION, SOLUTION INTRAVENOUS; SUBCUTANEOUS EVERY 8 HOURS SCHEDULED
Status: DISCONTINUED | OUTPATIENT
Start: 2018-09-26 | End: 2018-09-28

## 2018-09-26 RX ORDER — ACETIC ACID 0.25 G/100ML
250 IRRIGANT IRRIGATION PRN
Status: CANCELLED | OUTPATIENT
Start: 2018-09-26

## 2018-09-26 RX ORDER — ONDANSETRON 2 MG/ML
4 INJECTION INTRAMUSCULAR; INTRAVENOUS EVERY 6 HOURS PRN
Status: CANCELLED | OUTPATIENT
Start: 2018-09-26

## 2018-09-26 RX ORDER — MONTELUKAST SODIUM 10 MG/1
10 TABLET ORAL NIGHTLY
Status: DISCONTINUED | OUTPATIENT
Start: 2018-09-26 | End: 2018-10-02 | Stop reason: HOSPADM

## 2018-09-26 RX ORDER — NICOTINE POLACRILEX 4 MG
15 LOZENGE BUCCAL PRN
Status: CANCELLED | OUTPATIENT
Start: 2018-09-26

## 2018-09-26 RX ORDER — CETIRIZINE HYDROCHLORIDE 10 MG/1
5 TABLET ORAL DAILY
Status: DISCONTINUED | OUTPATIENT
Start: 2018-09-26 | End: 2018-09-27

## 2018-09-26 RX ORDER — DEXTROSE MONOHYDRATE 50 MG/ML
100 INJECTION, SOLUTION INTRAVENOUS PRN
Status: DISCONTINUED | OUTPATIENT
Start: 2018-09-26 | End: 2018-10-02 | Stop reason: HOSPADM

## 2018-09-26 RX ORDER — OXYCODONE HYDROCHLORIDE AND ACETAMINOPHEN 5; 325 MG/1; MG/1
1 TABLET ORAL EVERY 6 HOURS PRN
Status: DISCONTINUED | OUTPATIENT
Start: 2018-09-26 | End: 2018-10-02 | Stop reason: HOSPADM

## 2018-09-26 RX ORDER — OMEPRAZOLE 20 MG/1
20 CAPSULE, DELAYED RELEASE ORAL DAILY
Status: DISCONTINUED | OUTPATIENT
Start: 2018-09-26 | End: 2018-10-02 | Stop reason: HOSPADM

## 2018-09-26 RX ORDER — PRAVASTATIN SODIUM 20 MG
10 TABLET ORAL DAILY
Status: CANCELLED | OUTPATIENT
Start: 2018-09-26

## 2018-09-26 RX ORDER — 0.9 % SODIUM CHLORIDE 0.9 %
1000 INTRAVENOUS SOLUTION INTRAVENOUS ONCE
Status: COMPLETED | OUTPATIENT
Start: 2018-09-26 | End: 2018-09-27

## 2018-09-26 RX ORDER — CITALOPRAM 20 MG/1
40 TABLET ORAL DAILY
Status: CANCELLED | OUTPATIENT
Start: 2018-09-26

## 2018-09-26 RX ORDER — SODIUM CHLORIDE 0.9 % (FLUSH) 0.9 %
10 SYRINGE (ML) INJECTION PRN
Status: DISCONTINUED | OUTPATIENT
Start: 2018-09-26 | End: 2018-10-02 | Stop reason: HOSPADM

## 2018-09-26 RX ORDER — FLUTICASONE PROPIONATE 50 MCG
1 SPRAY, SUSPENSION (ML) NASAL 2 TIMES DAILY PRN
Status: DISCONTINUED | OUTPATIENT
Start: 2018-09-26 | End: 2018-10-02 | Stop reason: HOSPADM

## 2018-09-26 RX ORDER — OMEPRAZOLE 20 MG/1
20 CAPSULE, DELAYED RELEASE ORAL DAILY
Status: CANCELLED | OUTPATIENT
Start: 2018-09-26

## 2018-09-26 RX ORDER — FENTANYL CITRATE 50 UG/ML
50 INJECTION, SOLUTION INTRAMUSCULAR; INTRAVENOUS
Status: DISCONTINUED | OUTPATIENT
Start: 2018-09-26 | End: 2018-09-26

## 2018-09-26 RX ORDER — ASPIRIN 81 MG/1
81 TABLET ORAL DAILY
Status: CANCELLED | OUTPATIENT
Start: 2018-09-26

## 2018-09-26 RX ORDER — CITALOPRAM 20 MG/1
40 TABLET ORAL DAILY
Status: DISCONTINUED | OUTPATIENT
Start: 2018-09-26 | End: 2018-10-02 | Stop reason: HOSPADM

## 2018-09-26 RX ORDER — OXYCODONE HYDROCHLORIDE AND ACETAMINOPHEN 5; 325 MG/1; MG/1
1 TABLET ORAL 3 TIMES DAILY PRN
Status: DISCONTINUED | OUTPATIENT
Start: 2018-09-26 | End: 2018-09-26

## 2018-09-26 RX ORDER — ASPIRIN 81 MG/1
81 TABLET ORAL DAILY
Status: DISCONTINUED | OUTPATIENT
Start: 2018-09-26 | End: 2018-10-02 | Stop reason: HOSPADM

## 2018-09-26 RX ORDER — GABAPENTIN 300 MG/1
300 CAPSULE ORAL 2 TIMES DAILY
Status: CANCELLED | OUTPATIENT
Start: 2018-09-26

## 2018-09-26 RX ORDER — SODIUM CHLORIDE 0.9 % (FLUSH) 0.9 %
10 SYRINGE (ML) INJECTION PRN
Status: CANCELLED | OUTPATIENT
Start: 2018-09-26

## 2018-09-26 RX ORDER — MONTELUKAST SODIUM 10 MG/1
10 TABLET ORAL NIGHTLY
Status: CANCELLED | OUTPATIENT
Start: 2018-09-26

## 2018-09-26 RX ORDER — PRAVASTATIN SODIUM 20 MG
10 TABLET ORAL DAILY
Status: DISCONTINUED | OUTPATIENT
Start: 2018-09-26 | End: 2018-10-02 | Stop reason: HOSPADM

## 2018-09-26 RX ORDER — SODIUM CHLORIDE 9 MG/ML
INJECTION, SOLUTION INTRAVENOUS CONTINUOUS
Status: CANCELLED | OUTPATIENT
Start: 2018-09-26

## 2018-09-26 RX ORDER — METOPROLOL SUCCINATE 25 MG/1
12.5 TABLET, EXTENDED RELEASE ORAL DAILY
Status: CANCELLED | OUTPATIENT
Start: 2018-09-26

## 2018-09-26 RX ORDER — INSULIN GLARGINE 100 [IU]/ML
20 INJECTION, SOLUTION SUBCUTANEOUS NIGHTLY
Status: CANCELLED | OUTPATIENT
Start: 2018-09-26

## 2018-09-26 RX ORDER — GABAPENTIN 100 MG/1
100 CAPSULE ORAL 2 TIMES DAILY
Status: DISCONTINUED | OUTPATIENT
Start: 2018-09-26 | End: 2018-09-28

## 2018-09-26 RX ORDER — NICOTINE POLACRILEX 4 MG
15 LOZENGE BUCCAL PRN
Status: DISCONTINUED | OUTPATIENT
Start: 2018-09-26 | End: 2018-10-02 | Stop reason: HOSPADM

## 2018-09-26 RX ORDER — FLUTICASONE PROPIONATE 50 MCG
1 SPRAY, SUSPENSION (ML) NASAL 2 TIMES DAILY PRN
Status: CANCELLED | OUTPATIENT
Start: 2018-09-26

## 2018-09-26 RX ADMIN — GABAPENTIN 100 MG: 100 CAPSULE ORAL at 22:43

## 2018-09-26 RX ADMIN — SODIUM CHLORIDE 3000 ML: 9 INJECTION, SOLUTION INTRAVENOUS at 15:37

## 2018-09-26 RX ADMIN — OMEPRAZOLE 20 MG: 20 CAPSULE, DELAYED RELEASE ORAL at 16:19

## 2018-09-26 RX ADMIN — OXYCODONE HYDROCHLORIDE AND ACETAMINOPHEN 1 TABLET: 5; 325 TABLET ORAL at 16:25

## 2018-09-26 RX ADMIN — PRAVASTATIN SODIUM 10 MG: 20 TABLET ORAL at 16:19

## 2018-09-26 RX ADMIN — SODIUM CHLORIDE 1000 ML: 9 INJECTION, SOLUTION INTRAVENOUS at 13:03

## 2018-09-26 RX ADMIN — PIPERACILLIN AND TAZOBACTAM 3.38 G: 3; .375 INJECTION, POWDER, LYOPHILIZED, FOR SOLUTION INTRAVENOUS; PARENTERAL at 15:01

## 2018-09-26 RX ADMIN — HEPARIN SODIUM 5000 UNITS: 5000 INJECTION, SOLUTION INTRAVENOUS; SUBCUTANEOUS at 16:26

## 2018-09-26 RX ADMIN — PIPERACILLIN AND TAZOBACTAM 3.38 G: 3; .375 INJECTION, POWDER, LYOPHILIZED, FOR SOLUTION INTRAVENOUS; PARENTERAL at 23:40

## 2018-09-26 RX ADMIN — ASPIRIN 81 MG: 81 TABLET ORAL at 16:19

## 2018-09-26 RX ADMIN — SODIUM CHLORIDE: 9 INJECTION, SOLUTION INTRAVENOUS at 23:43

## 2018-09-26 RX ADMIN — PSEUDOEPHEDRINE HYDROCHLORIDE 120 MG: 120 TABLET, FILM COATED, EXTENDED RELEASE ORAL at 22:15

## 2018-09-26 RX ADMIN — SODIUM CHLORIDE: 9 INJECTION, SOLUTION INTRAVENOUS at 15:38

## 2018-09-26 RX ADMIN — Medication 10 ML: at 22:16

## 2018-09-26 RX ADMIN — MONTELUKAST SODIUM 10 MG: 10 TABLET, FILM COATED ORAL at 22:15

## 2018-09-26 RX ADMIN — ACETAMINOPHEN 650 MG: 325 TABLET ORAL at 22:14

## 2018-09-26 RX ADMIN — Medication 1500 MG: at 13:24

## 2018-09-26 RX ADMIN — ACETIC ACID 250 ML: 250 IRRIGANT IRRIGATION at 18:08

## 2018-09-26 RX ADMIN — SODIUM CHLORIDE 1000 ML: 9 INJECTION, SOLUTION INTRAVENOUS at 22:42

## 2018-09-26 RX ADMIN — CETIRIZINE HYDROCHLORIDE 5 MG: 10 TABLET ORAL at 16:18

## 2018-09-26 RX ADMIN — HEPARIN SODIUM 5000 UNITS: 5000 INJECTION, SOLUTION INTRAVENOUS; SUBCUTANEOUS at 22:23

## 2018-09-26 RX ADMIN — CITALOPRAM 40 MG: 20 TABLET, FILM COATED ORAL at 16:19

## 2018-09-26 ASSESSMENT — PAIN DESCRIPTION - LOCATION: LOCATION: LEG

## 2018-09-26 ASSESSMENT — PAIN SCALES - GENERAL
PAINLEVEL_OUTOF10: 0
PAINLEVEL_OUTOF10: 6
PAINLEVEL_OUTOF10: 3
PAINLEVEL_OUTOF10: 3

## 2018-09-26 ASSESSMENT — PAIN DESCRIPTION - FREQUENCY: FREQUENCY: CONTINUOUS

## 2018-09-26 ASSESSMENT — PAIN DESCRIPTION - PAIN TYPE: TYPE: ACUTE PAIN

## 2018-09-26 ASSESSMENT — PAIN DESCRIPTION - PROGRESSION: CLINICAL_PROGRESSION: NOT CHANGED

## 2018-09-26 ASSESSMENT — PAIN DESCRIPTION - DESCRIPTORS: DESCRIPTORS: DULL

## 2018-09-26 ASSESSMENT — PAIN DESCRIPTION - ORIENTATION: ORIENTATION: RIGHT

## 2018-09-26 ASSESSMENT — PAIN DESCRIPTION - ONSET: ONSET: ON-GOING

## 2018-09-26 NOTE — ED NOTES
Pt to room via wheelchair  Pt states that she was seen at [facility] for leg pain and swelling, was diagnosed with cellulitis and transferred to this facility  Pt was admitted to this facility for 1 week and then discharged to [facility] for an additional 2 weeks  Pt reports that she has been utilizing a visiting nurse service to dress wounds to leg, as well as changing dressings herself  Pt chief complaint today is of non-healing wound to right leg along with new onset erythematous area to right flank  Pt states that she is not experiencing any pain, but the affected areas are \"just uncomfortable\"  Dr. Day Valencia at bedside to remove wound dressing for eval  IV inserted, blood cultures collected, Pt placed on BP/SPO2     Maritza Almaguer RN  09/26/18 [id number]

## 2018-09-26 NOTE — ED PROVIDER NOTES
[address] [facility]     Emergency Department     Faculty Attestation    I performed a history and physical examination of the patient and discussed management with the resident. I reviewed the residents note and agree with the documented findings and plan of care. Any areas of disagreement are noted on the chart. I was personally present for the key portions of any procedures. I have documented in the chart those procedures where I was not present during the key portions. I have reviewed the emergency nurses triage note. I agree with the chief complaint, past medical history, past surgical history, allergies, medications, social and family history as documented unless otherwise noted below. Documentation of the HPI, Physical Exam and Medical Decision Making performed by medical students or scribes is based on my personal performance of the HPI, PE and MDM. For Physician Assistant/ Nurse Practitioner cases/documentation I have personally evaluated this patient and have completed at least one if not all key elements of the E/M (history, physical exam, and MDM). Additional findings are as noted. Vital signs:   Vitals:    09/26/18 1250   BP: (!) 96/38   Pulse: 84   Resp: 20   Temp: 98.5 °F (36.9 °C)   SpO2: 100%      Multiple lower extremity wounds in various stages of healing and a diabetic female. With also a new cellulitis on her right chest wall. Plan for admission with IV antibiotics.             Sarina Armenta M.D,  Attending Emergency  Physician           James Payton MD  09/26/18 [id number]

## 2018-09-26 NOTE — ED PROVIDER NOTES
[facility]  ED  Emergency Department Encounter  Emergency Medicine Resident     Pt Name: Teofilo Hill  MRN: [id number]  Lemuelgfalley 1955  Date of evaluation: 9/26/18  PCP:  Kaylin Sweeney    Chief Complaint     Chief Complaint   Patient presents with    Wound Infection     HISTORY OF PRESENT ILLNESS     Teofilo Hill is a 65 y.o. female who presented to the emergency department with a 2 day history of swelling and erythema to a wound of the skin overlying the right chest wall. The patient states that the wound is painful. Patient reports fevers and chills but denies nausea, or vomiting. The patient is breathing easy with even and unlabored respirations and shows no signs of anaphylaxis or anaphylactoid reactions. She also has numerous open wounds in the right lower extremity some of which are actively draining pus . Patient currently follows with the wound clinic and infectious disease, Dr. Peggy Em. REVIEW OF SYSTEMS       Constitutional: Denies recent fever, chills. Eyes: No visual changes. Neck: No midline neck pain but does complain of paraspinal muscle pain. Respiratory: Denies recent shortness of breath. Cardiac:  Denies recent chest pain. GI: denies any recent abdominal pain nausea or vomiting. Denies Blood in the stool or black tarry stools. : Denies dysuria. Musculoskeletal: Denies focal weakness. Neurologic: denies headache or focal weakness. Skin:  Complains of a wound to the skin overlying the right chest wall    PAST MEDICAL/SURGICAL/FAMILY HISTORY     PMH:  has no past medical history on file. Surgical History:  has no past surgical history on file. Social History:  reports that he has been smoking. He has never used smokeless tobacco.  Family History: Noncontributory at this time  Psychiatric History: Noncontributory at this time    Allergies:has No Known Allergies.     PHYSICAL EXAM       /87   Pulse 78   Temp 98.1 °F (36.7 °C) (Oral)

## 2018-09-26 NOTE — H&P
Internal Medicine - History and Physical Examination    Patient's name:  Rob Foss  Medical Record Number: [id number]  Patient's account/billing number: [de-identified]  Patient's YOB: 1955  Age: 65 y.o. Date of Admission: 9/26/2018 12:39 PM  Primary Care Physician: Yissel Edwards    Code Status: Full Code    Chief complaint: Right Chest Wall Cellulitis    HISTORY OF PRESENT ILLNESS:   History was obtained from chart review and the patientIsaias Foss is a 65 y.o. female with a PMH of DM Type 2 with an HbA1c of 13 in June 2018, history of right lower extremity cellulitis a month ago HTN, and osteoarthritis presented to the hospital with complaints of redness of the skin on the right chest wall. The patient states that she noted changes on her skin which she describes as discoloration two days back and then the skin started to swell. She denied any discharge from the skin but describes it as discomforting. The patient also has a history of right lower leg cellulitis for which she was admitted to [facility] last month and she was discharged with PICC Line and follow up with ID OP. Debridement of the wound was performed. She was given penicillin Q6H in infusion pump for 2 weeks with stop date of 8/29/2018. After that she was given Ciprofloxacin 500 mg BD for two weeks. According to the patient, the right lower extremity cellulitis is under control. The patient also complains of SOB, dizziness and lightheadedness that started this AM. She also complains of brown and smelly urine. She denies any complaints of nausea, vomiting, chest pain, palpitations, headaches, diarrhea, constipation or joint pains. Her systolic blood pressure is usually in the range of 110s but this morning it was in the 70s according to the patient. The patient states that she has not taken her insulin for the last 5 days due to financial problems.  The blood sugar of the patient is usually in the range of 140-150s

## 2018-09-26 NOTE — PROGRESS NOTES
Internal Medicine Senior note     NAME:  Baljeet Browning  MRN:  [id number]  Acct: [de-identified]   : 1955  PCP:Arpan Laurent  Admit date:2018  History Obtained From:  Patient     This is a 65years old lady with Type 2 DM, not taking her insulin for last 5 days, recently had cellulitis of right leg initially treated with penicillin G then treated with PO amoxicillin and then with cipro which she finished yesterday. Since last 2 days started with swelling and erythema of right lateral breast spreading backwards. Denies fever, chills, nausea or vomiting  Her BP at home was recorded low and hence came to hospital  Urine was dark colored. In Er BP 86/54- received 1L fluid bolus and a dose of Vancomycin and Zosyn    Significant co morbidities: Type 2 DM, Hypertension, osteoarthritis    Physical exam was positive for cellulitis of right breast, resolving cellulitis of right leg, clean base ulcer on right leg    Significant lab: Na 132, CO2 17, Lactic acid 10.3, Glucose 140  BUN 33, Creatinine 1.86  WBC 19.7, Hb 10.4    Assessment & plan :    Principal Problem:    Sepsis (HCC)  Active Problems:    Cellulitis of right breast    Lactic acidosis    HARRISON (acute kidney injury) ([facility] [address]    Cellulitis of right lower extremity- resolving    Diabetes mellitus ([facility] [address]    Morbid obesity (HCC)    Essential hypertension    Depression    Osteoarthritis  Resolved Problems:    * No resolved hospital problems. *    1. Received 1l of saline in ER, will give another 3l of saline and maintenance of 125ml/hr  2. Continue vancomycin and zosyn for now  3. Blood culture collected in Er  4. Check beta hydroxybutyrate  To r/o DKA- LA 10.3, CO2 17, AG 20  5. Check urine ketone  6. Urine culture  7. ID consult  8. Wound care  9. Monitor renal function, repeat lactic acid   10. Resume Neurontin at low dose  11. Hold Toprol XL, will resume when BP improves  12. Continue home medication- Celexa, Pravachol, ASA  13.  Check A1C  14 If beta

## 2018-09-27 ENCOUNTER — APPOINTMENT (OUTPATIENT)
Dept: GENERAL RADIOLOGY | Age: 63
DRG: 853 | End: 2018-09-27
Payer: MEDICARE

## 2018-09-27 LAB
ABSOLUTE EOS #: 0 K/UL (ref 0–0.4)
ABSOLUTE IMMATURE GRANULOCYTE: 0.58 K/UL (ref 0–0.3)
ABSOLUTE LYMPH #: 0.96 K/UL (ref 1–4.8)
ABSOLUTE MONO #: 1.15 K/UL (ref 0.1–0.8)
ALLEN TEST: ABNORMAL
ANION GAP SERPL CALCULATED.3IONS-SCNC: 15 MMOL/L (ref 9–17)
ANION GAP SERPL CALCULATED.3IONS-SCNC: 16 MMOL/L (ref 9–17)
BASOPHILS # BLD: 0 % (ref 0–2)
BASOPHILS ABSOLUTE: 0 K/UL (ref 0–0.2)
BNP INTERPRETATION: ABNORMAL
BUN BLDV-MCNC: 36 MG/DL (ref 8–23)
BUN BLDV-MCNC: 37 MG/DL (ref 8–23)
BUN/CREAT BLD: ABNORMAL (ref 9–20)
BUN/CREAT BLD: ABNORMAL (ref 9–20)
CALCIUM SERPL-MCNC: 8.3 MG/DL (ref 8.6–10.4)
CALCIUM SERPL-MCNC: 8.5 MG/DL (ref 8.6–10.4)
CARBOXYHEMOGLOBIN: 1.2 % (ref 0–5)
CHLORIDE BLD-SCNC: 100 MMOL/L (ref 98–107)
CHLORIDE BLD-SCNC: 101 MMOL/L (ref 98–107)
CO2: 15 MMOL/L (ref 20–31)
CO2: 17 MMOL/L (ref 20–31)
CORTISOL COLLECTION INFO: NORMAL
CORTISOL: 15.3 UG/DL (ref 2.7–18.4)
CREAT SERPL-MCNC: 1.39 MG/DL (ref 0.5–0.9)
CREAT SERPL-MCNC: 1.49 MG/DL (ref 0.5–0.9)
CULTURE: NORMAL
DIFFERENTIAL TYPE: ABNORMAL
DIRECT EXAM: NORMAL
EOSINOPHILS RELATIVE PERCENT: 0 % (ref 1–4)
ESTIMATED AVERAGE GLUCOSE: 169 MG/DL
FIO2: ABNORMAL
GFR AFRICAN AMERICAN: 43 ML/MIN
GFR AFRICAN AMERICAN: 47 ML/MIN
GFR NON-AFRICAN AMERICAN: 36 ML/MIN
GFR NON-AFRICAN AMERICAN: 39 ML/MIN
GFR SERPL CREATININE-BSD FRML MDRD: ABNORMAL ML/MIN/{1.73_M2}
GLUCOSE BLD-MCNC: 133 MG/DL (ref 70–99)
GLUCOSE BLD-MCNC: 150 MG/DL (ref 65–105)
GLUCOSE BLD-MCNC: 231 MG/DL (ref 65–105)
GLUCOSE BLD-MCNC: 243 MG/DL (ref 65–105)
GLUCOSE BLD-MCNC: 271 MG/DL (ref 65–105)
GLUCOSE BLD-MCNC: 80 MG/DL (ref 65–105)
GLUCOSE BLD-MCNC: 93 MG/DL (ref 70–99)
HBA1C MFR BLD: 7.5 % (ref 4–6)
HCO3 VENOUS: 16.7 MMOL/L (ref 24–30)
HCT VFR BLD CALC: 29.9 % (ref 36.3–47.1)
HEMOGLOBIN: 9.3 G/DL (ref 11.9–15.1)
IMMATURE GRANULOCYTES: 3 %
LACTIC ACID, WHOLE BLOOD: 4.8 MMOL/L (ref 0.7–2.1)
LACTIC ACID, WHOLE BLOOD: 4.8 MMOL/L (ref 0.7–2.1)
LACTIC ACID, WHOLE BLOOD: 5.7 MMOL/L (ref 0.7–2.1)
LACTIC ACID, WHOLE BLOOD: 5.7 MMOL/L (ref 0.7–2.1)
LV EF: 53 %
LVEF MODALITY: NORMAL
LYMPHOCYTES # BLD: 5 % (ref 24–44)
Lab: NORMAL
MCH RBC QN AUTO: 27.4 PG (ref 25.2–33.5)
MCHC RBC AUTO-ENTMCNC: 31.1 G/DL (ref 28.4–34.8)
MCV RBC AUTO: 88.2 FL (ref 82.6–102.9)
METHEMOGLOBIN: ABNORMAL % (ref 0–1.5)
MODE: ABNORMAL
MONOCYTES # BLD: 6 % (ref 1–7)
MORPHOLOGY: ABNORMAL
MRSA, DNA, NASAL: NORMAL
NEGATIVE BASE EXCESS, VEN: 7.4 MMOL/L (ref 0–2)
NOTIFICATION TIME: ABNORMAL
NOTIFICATION: ABNORMAL
NRBC AUTOMATED: 0 PER 100 WBC
O2 DEVICE/FLOW/%: ABNORMAL
O2 SAT, VEN: 98.8 % (ref 60–85)
OXYHEMOGLOBIN: ABNORMAL % (ref 95–98)
PATIENT TEMP: 37
PCO2, VEN, TEMP ADJ: ABNORMAL MMHG (ref 39–55)
PCO2, VEN: 30.2 (ref 39–55)
PDW BLD-RTO: 14.5 % (ref 11.8–14.4)
PEEP/CPAP: ABNORMAL
PH VENOUS: 7.36 (ref 7.32–7.42)
PH, VEN, TEMP ADJ: ABNORMAL (ref 7.32–7.42)
PLATELET # BLD: 131 K/UL (ref 138–453)
PLATELET ESTIMATE: ABNORMAL
PMV BLD AUTO: 11.3 FL (ref 8.1–13.5)
PO2, VEN, TEMP ADJ: ABNORMAL MMHG (ref 30–50)
PO2, VEN: 117 (ref 30–50)
POSITIVE BASE EXCESS, VEN: ABNORMAL MMOL/L (ref 0–2)
POTASSIUM SERPL-SCNC: 4.3 MMOL/L (ref 3.7–5.3)
POTASSIUM SERPL-SCNC: 5.1 MMOL/L (ref 3.7–5.3)
PRO-BNP: 1080 PG/ML
PSV: ABNORMAL
PT. POSITION: ABNORMAL
RBC # BLD: 3.39 M/UL (ref 3.95–5.11)
RBC # BLD: ABNORMAL 10*6/UL
RESPIRATORY RATE: ABNORMAL
SAMPLE SITE: ABNORMAL
SEG NEUTROPHILS: 86 % (ref 36–66)
SEGMENTED NEUTROPHILS ABSOLUTE COUNT: 16.51 K/UL (ref 1.8–7.7)
SET RATE: ABNORMAL
SODIUM BLD-SCNC: 132 MMOL/L (ref 135–144)
SODIUM BLD-SCNC: 132 MMOL/L (ref 135–144)
SPECIMEN DESCRIPTION: NORMAL
STATUS: NORMAL
TEXT FOR RESPIRATORY: ABNORMAL
TOTAL HB: ABNORMAL G/DL (ref 12–16)
TOTAL RATE: ABNORMAL
TSH SERPL DL<=0.05 MIU/L-ACNC: 3.93 MIU/L (ref 0.3–5)
VANCOMYCIN RANDOM DATE LAST DOSE: NORMAL
VANCOMYCIN RANDOM DOSE AMOUNT: NORMAL
VANCOMYCIN RANDOM TIME LAST DOSE: NORMAL
VANCOMYCIN RANDOM: 8.1 UG/ML
VT: ABNORMAL
WBC # BLD: 19.2 K/UL (ref 3.5–11.3)
WBC # BLD: ABNORMAL 10*3/UL

## 2018-09-27 PROCEDURE — G8987 SELF CARE CURRENT STATUS: HCPCS | Performed by: OCCUPATIONAL THERAPIST

## 2018-09-27 PROCEDURE — 36415 COLL VENOUS BLD VENIPUNCTURE: CPT

## 2018-09-27 PROCEDURE — 97162 PT EVAL MOD COMPLEX 30 MIN: CPT

## 2018-09-27 PROCEDURE — 6360000002 HC RX W HCPCS: Performed by: SURGERY

## 2018-09-27 PROCEDURE — 93306 TTE W/DOPPLER COMPLETE: CPT

## 2018-09-27 PROCEDURE — 6360000002 HC RX W HCPCS

## 2018-09-27 PROCEDURE — 2580000003 HC RX 258: Performed by: STUDENT IN AN ORGANIZED HEALTH CARE EDUCATION/TRAINING PROGRAM

## 2018-09-27 PROCEDURE — 99213 OFFICE O/P EST LOW 20 MIN: CPT

## 2018-09-27 PROCEDURE — 97166 OT EVAL MOD COMPLEX 45 MIN: CPT | Performed by: OCCUPATIONAL THERAPIST

## 2018-09-27 PROCEDURE — 99291 CRITICAL CARE FIRST HOUR: CPT | Performed by: INTERNAL MEDICINE

## 2018-09-27 PROCEDURE — 87641 MR-STAPH DNA AMP PROBE: CPT

## 2018-09-27 PROCEDURE — G8978 MOBILITY CURRENT STATUS: HCPCS

## 2018-09-27 PROCEDURE — 6360000002 HC RX W HCPCS: Performed by: STUDENT IN AN ORGANIZED HEALTH CARE EDUCATION/TRAINING PROGRAM

## 2018-09-27 PROCEDURE — 97535 SELF CARE MNGMENT TRAINING: CPT | Performed by: OCCUPATIONAL THERAPIST

## 2018-09-27 PROCEDURE — 82947 ASSAY GLUCOSE BLOOD QUANT: CPT

## 2018-09-27 PROCEDURE — 2500000003 HC RX 250 WO HCPCS: Performed by: SURGERY

## 2018-09-27 PROCEDURE — 80202 ASSAY OF VANCOMYCIN: CPT

## 2018-09-27 PROCEDURE — G8988 SELF CARE GOAL STATUS: HCPCS | Performed by: OCCUPATIONAL THERAPIST

## 2018-09-27 PROCEDURE — 84443 ASSAY THYROID STIM HORMONE: CPT

## 2018-09-27 PROCEDURE — 71045 X-RAY EXAM CHEST 1 VIEW: CPT

## 2018-09-27 PROCEDURE — 83880 ASSAY OF NATRIURETIC PEPTIDE: CPT

## 2018-09-27 PROCEDURE — 99223 1ST HOSP IP/OBS HIGH 75: CPT | Performed by: INTERNAL MEDICINE

## 2018-09-27 PROCEDURE — 97530 THERAPEUTIC ACTIVITIES: CPT

## 2018-09-27 PROCEDURE — 6370000000 HC RX 637 (ALT 250 FOR IP): Performed by: INTERNAL MEDICINE

## 2018-09-27 PROCEDURE — 82533 TOTAL CORTISOL: CPT

## 2018-09-27 PROCEDURE — 2000000000 HC ICU R&B

## 2018-09-27 PROCEDURE — 6370000000 HC RX 637 (ALT 250 FOR IP): Performed by: STUDENT IN AN ORGANIZED HEALTH CARE EDUCATION/TRAINING PROGRAM

## 2018-09-27 PROCEDURE — 76937 US GUIDE VASCULAR ACCESS: CPT

## 2018-09-27 PROCEDURE — 2580000003 HC RX 258: Performed by: INTERNAL MEDICINE

## 2018-09-27 PROCEDURE — 87186 SC STD MICRODIL/AGAR DIL: CPT

## 2018-09-27 PROCEDURE — 87070 CULTURE OTHR SPECIMN AEROBIC: CPT

## 2018-09-27 PROCEDURE — 80048 BASIC METABOLIC PNL TOTAL CA: CPT

## 2018-09-27 PROCEDURE — 87205 SMEAR GRAM STAIN: CPT

## 2018-09-27 PROCEDURE — 74018 RADEX ABDOMEN 1 VIEW: CPT

## 2018-09-27 PROCEDURE — 2500000003 HC RX 250 WO HCPCS: Performed by: INTERNAL MEDICINE

## 2018-09-27 PROCEDURE — 86403 PARTICLE AGGLUT ANTBDY SCRN: CPT

## 2018-09-27 PROCEDURE — 96365 THER/PROPH/DIAG IV INF INIT: CPT

## 2018-09-27 PROCEDURE — G8979 MOBILITY GOAL STATUS: HCPCS

## 2018-09-27 PROCEDURE — 6370000000 HC RX 637 (ALT 250 FOR IP): Performed by: EMERGENCY MEDICINE

## 2018-09-27 PROCEDURE — 83605 ASSAY OF LACTIC ACID: CPT

## 2018-09-27 PROCEDURE — 85025 COMPLETE CBC W/AUTO DIFF WBC: CPT

## 2018-09-27 PROCEDURE — 87075 CULTR BACTERIA EXCEPT BLOOD: CPT

## 2018-09-27 PROCEDURE — 6360000002 HC RX W HCPCS: Performed by: INTERNAL MEDICINE

## 2018-09-27 PROCEDURE — 82805 BLOOD GASES W/O2 SATURATION: CPT

## 2018-09-27 RX ORDER — 0.9 % SODIUM CHLORIDE 0.9 %
500 INTRAVENOUS SOLUTION INTRAVENOUS ONCE
Status: COMPLETED | OUTPATIENT
Start: 2018-09-27 | End: 2018-09-27

## 2018-09-27 RX ORDER — DEXTROSE AND SODIUM CHLORIDE 5; .45 G/100ML; G/100ML
INJECTION, SOLUTION INTRAVENOUS CONTINUOUS
Status: DISCONTINUED | OUTPATIENT
Start: 2018-09-27 | End: 2018-09-27

## 2018-09-27 RX ORDER — FENTANYL CITRATE 50 UG/ML
50 INJECTION, SOLUTION INTRAMUSCULAR; INTRAVENOUS ONCE
Status: COMPLETED | OUTPATIENT
Start: 2018-09-27 | End: 2018-09-27

## 2018-09-27 RX ORDER — LIDOCAINE HYDROCHLORIDE 10 MG/ML
5 INJECTION, SOLUTION INFILTRATION; PERINEURAL ONCE
Status: COMPLETED | OUTPATIENT
Start: 2018-09-27 | End: 2018-09-27

## 2018-09-27 RX ORDER — 0.9 % SODIUM CHLORIDE 0.9 %
1000 INTRAVENOUS SOLUTION INTRAVENOUS ONCE
Status: COMPLETED | OUTPATIENT
Start: 2018-09-27 | End: 2018-09-27

## 2018-09-27 RX ORDER — MIDODRINE HYDROCHLORIDE 5 MG/1
5 TABLET ORAL
Status: DISCONTINUED | OUTPATIENT
Start: 2018-09-27 | End: 2018-09-28

## 2018-09-27 RX ORDER — FENTANYL CITRATE 50 UG/ML
INJECTION, SOLUTION INTRAMUSCULAR; INTRAVENOUS
Status: COMPLETED
Start: 2018-09-27 | End: 2018-09-27

## 2018-09-27 RX ORDER — SODIUM CHLORIDE 9 MG/ML
INJECTION, SOLUTION INTRAVENOUS CONTINUOUS
Status: DISCONTINUED | OUTPATIENT
Start: 2018-09-27 | End: 2018-10-01 | Stop reason: SDUPTHER

## 2018-09-27 RX ADMIN — PSEUDOEPHEDRINE HYDROCHLORIDE 120 MG: 120 TABLET, FILM COATED, EXTENDED RELEASE ORAL at 08:55

## 2018-09-27 RX ADMIN — SODIUM CHLORIDE 1000 ML: 9 INJECTION, SOLUTION INTRAVENOUS at 00:35

## 2018-09-27 RX ADMIN — SODIUM CHLORIDE: 9 INJECTION, SOLUTION INTRAVENOUS at 15:12

## 2018-09-27 RX ADMIN — MIDODRINE HYDROCHLORIDE 5 MG: 5 TABLET ORAL at 12:36

## 2018-09-27 RX ADMIN — MIDODRINE HYDROCHLORIDE 5 MG: 5 TABLET ORAL at 16:39

## 2018-09-27 RX ADMIN — CETIRIZINE HYDROCHLORIDE 5 MG: 10 TABLET ORAL at 09:00

## 2018-09-27 RX ADMIN — CITALOPRAM 40 MG: 20 TABLET, FILM COATED ORAL at 08:54

## 2018-09-27 RX ADMIN — OMEPRAZOLE 20 MG: 20 CAPSULE, DELAYED RELEASE ORAL at 08:54

## 2018-09-27 RX ADMIN — SODIUM CHLORIDE 500 ML: 9 INJECTION, SOLUTION INTRAVENOUS at 02:33

## 2018-09-27 RX ADMIN — HEPARIN SODIUM 5000 UNITS: 5000 INJECTION, SOLUTION INTRAVENOUS; SUBCUTANEOUS at 06:33

## 2018-09-27 RX ADMIN — SODIUM CHLORIDE 1000 ML: 9 INJECTION, SOLUTION INTRAVENOUS at 09:05

## 2018-09-27 RX ADMIN — HEPARIN SODIUM 5000 UNITS: 5000 INJECTION, SOLUTION INTRAVENOUS; SUBCUTANEOUS at 15:39

## 2018-09-27 RX ADMIN — OXYCODONE HYDROCHLORIDE AND ACETAMINOPHEN 1 TABLET: 5; 325 TABLET ORAL at 06:45

## 2018-09-27 RX ADMIN — OXYCODONE HYDROCHLORIDE AND ACETAMINOPHEN 1 TABLET: 5; 325 TABLET ORAL at 15:40

## 2018-09-27 RX ADMIN — ASPIRIN 81 MG: 81 TABLET ORAL at 08:55

## 2018-09-27 RX ADMIN — DEXTROSE AND SODIUM CHLORIDE: 5; 450 INJECTION, SOLUTION INTRAVENOUS at 10:29

## 2018-09-27 RX ADMIN — PIPERACILLIN AND TAZOBACTAM 3.38 G: 3; .375 INJECTION, POWDER, LYOPHILIZED, FOR SOLUTION INTRAVENOUS; PARENTERAL at 10:30

## 2018-09-27 RX ADMIN — FENTANYL CITRATE 50 MCG: 50 INJECTION INTRAMUSCULAR; INTRAVENOUS at 17:56

## 2018-09-27 RX ADMIN — PRAVASTATIN SODIUM: 20 TABLET ORAL at 08:55

## 2018-09-27 RX ADMIN — VANCOMYCIN HYDROCHLORIDE 1500 MG: 10 INJECTION, POWDER, LYOPHILIZED, FOR SOLUTION INTRAVENOUS at 18:08

## 2018-09-27 RX ADMIN — SODIUM BICARBONATE: 84 INJECTION, SOLUTION INTRAVENOUS at 15:12

## 2018-09-27 RX ADMIN — GABAPENTIN 100 MG: 100 CAPSULE ORAL at 08:54

## 2018-09-27 RX ADMIN — INSULIN LISPRO 2 UNITS: 100 INJECTION, SOLUTION INTRAVENOUS; SUBCUTANEOUS at 16:39

## 2018-09-27 RX ADMIN — FENTANYL CITRATE 50 MCG: 50 INJECTION, SOLUTION INTRAMUSCULAR; INTRAVENOUS at 17:56

## 2018-09-27 RX ADMIN — FENTANYL CITRATE 50 MCG: 50 INJECTION INTRAMUSCULAR; INTRAVENOUS at 17:43

## 2018-09-27 RX ADMIN — LIDOCAINE HYDROCHLORIDE: 10 INJECTION, SOLUTION INFILTRATION; PERINEURAL at 17:42

## 2018-09-27 RX ADMIN — SODIUM CHLORIDE 1000 ML: 9 INJECTION, SOLUTION INTRAVENOUS at 06:41

## 2018-09-27 RX ADMIN — INSULIN LISPRO 5 UNITS: 100 INJECTION, SOLUTION INTRAVENOUS; SUBCUTANEOUS at 20:38

## 2018-09-27 RX ADMIN — SODIUM CHLORIDE: 9 INJECTION, SOLUTION INTRAVENOUS at 06:16

## 2018-09-27 RX ADMIN — HYDROCORTISONE SODIUM SUCCINATE 100 MG: 100 INJECTION, POWDER, FOR SOLUTION INTRAMUSCULAR; INTRAVENOUS at 08:55

## 2018-09-27 RX ADMIN — PIPERACILLIN AND TAZOBACTAM 3.38 G: 3; .375 INJECTION, POWDER, LYOPHILIZED, FOR SOLUTION INTRAVENOUS; PARENTERAL at 18:08

## 2018-09-27 ASSESSMENT — ENCOUNTER SYMPTOMS
ABDOMINAL PAIN: 0
CONSTIPATION: 0
BACK PAIN: 0
EYES NEGATIVE: 1
ALLERGIC/IMMUNOLOGIC NEGATIVE: 1
SHORTNESS OF BREATH: 0
COUGH: 0
VOMITING: 0
NAUSEA: 0
CHEST TIGHTNESS: 0
DIARRHEA: 0

## 2018-09-27 ASSESSMENT — PAIN SCALES - GENERAL
PAINLEVEL_OUTOF10: 4
PAINLEVEL_OUTOF10: 4
PAINLEVEL_OUTOF10: 8
PAINLEVEL_OUTOF10: 0
PAINLEVEL_OUTOF10: 1
PAINLEVEL_OUTOF10: 0
PAINLEVEL_OUTOF10: 7
PAINLEVEL_OUTOF10: 8
PAINLEVEL_OUTOF10: 5
PAINLEVEL_OUTOF10: 0

## 2018-09-27 ASSESSMENT — PAIN DESCRIPTION - PROGRESSION: CLINICAL_PROGRESSION: GRADUALLY IMPROVING

## 2018-09-27 ASSESSMENT — PAIN DESCRIPTION - PAIN TYPE
TYPE: ACUTE PAIN
TYPE: ACUTE PAIN
TYPE: SURGICAL PAIN

## 2018-09-27 ASSESSMENT — PAIN DESCRIPTION - FREQUENCY: FREQUENCY: INTERMITTENT

## 2018-09-27 ASSESSMENT — PAIN DESCRIPTION - ORIENTATION
ORIENTATION: RIGHT

## 2018-09-27 ASSESSMENT — PAIN DESCRIPTION - LOCATION
LOCATION: FOOT
LOCATION: FOOT
LOCATION: BREAST;CHEST

## 2018-09-27 ASSESSMENT — PAIN DESCRIPTION - DESCRIPTORS: DESCRIPTORS: SHARP

## 2018-09-27 NOTE — H&P
diclofenac sodium 1 % GEL Apply 2 g topically 2 times daily    Historical Provider, MD   insulin glargine (LANTUS) 100 UNIT/ML injection vial Inject 40 Units into the skin nightly    Historical Provider, MD   docusate sodium (COLACE) 100 MG capsule Take 100 mg by mouth 2 times daily    Historical Provider, MD   baclofen (LIORESAL) 10 MG tablet Take 10 mg by mouth nightly    Historical Provider, MD   aspirin 81 MG EC tablet Take 81 mg by mouth daily. Historical Provider, MD   citalopram (CELEXA) 40 MG tablet Take 40 mg by mouth daily. Historical Provider, MD   fluticasone (FLONASE) 50 MCG/ACT nasal spray 1 spray by Nasal route 2 times daily as needed for Rhinitis. Historical Provider, MD   loratadine-pseudoephedrine (LORATADINE-D 24HR)  MG per tablet Take 1 tablet by mouth daily. Historical Provider, MD   metFORMIN ER (GLUCOPHAGE-XR) 500 MG XR tablet Take 1,000 mg by mouth 2 times daily (with meals). Historical Provider, MD   gabapentin (NEURONTIN) 300 MG capsule Take 300 mg by mouth 2 times daily. Historical Provider, MD   omeprazole (PRILOSEC) 20 MG capsule Take 20 mg by mouth daily. Historical Provider, MD   oxyCODONE-acetaminophen (PERCOCET) 5-325 MG per tablet Take 1 tablet by mouth 3 times daily as needed for Pain. Historical Provider, MD   pravastatin (PRAVACHOL) 10 MG tablet Take 10 mg by mouth daily. Historical Provider, MD   montelukast (SINGULAIR) 10 MG tablet Take 10 mg by mouth nightly. Historical Provider, MD       Social History:   TOBACCO:   reports that she has never smoked. She has never used smokeless tobacco.  ETOH:   reports that she does not drink alcohol. DRUGS:  reports that she does not use drugs.     Family History:   Family History   Problem Relation Age of Onset    Heart Disease Mother     Pacemaker Mother     Hypertension Mother     Diabetes Father     Breast Cancer Neg Hx      REVIEW OF SYSTEMS:  Constitutional: negative fever, negative chills, positive fatigue  HEENT: negative visual disturbances, negative headaches  Respiratory: negative dyspnea, negative cough  Cardiovascular: negative chest pain,  negative palpitations  Gastrointestinal: negative abdominal pain, negative RUQ pain, negative N/V, negative diarrhea, negative constipation  Genitourinary: negative dysuria, negative vaginal discharge  Dermatological: negative rash  Hematologic: negative bruising  Immunologic/Lymphatic: negative recent illness, negative recent sick contact  Musculoskeletal: negative back pain  Neurological:  negative dizziness, negative weakness  Behavior/Psych: negative depression, negative anxiety      Physical Exam:    Vitals: BP (!) 94/45   Pulse 92   Temp 99.5 °F (37.5 °C) (Oral)   Resp 16   Ht 5' 2\" (1.575 m)   Wt 225 lb (102.1 kg)   SpO2 96%   BMI 41.15 kg/m²     Last Body weight:   Wt Readings from Last 3 Encounters:   09/26/18 225 lb (102.1 kg)   08/13/18 240 lb (108.9 kg)   08/08/18 220 lb 11.2 oz (100.1 kg)       Body Mass Index : Body mass index is 41.15 kg/m².         PHYSICAL EXAMINATION :  General appearance - alert, well appearing, and in no distress  Mental status - alert, oriented to person, place, and time, normal mood, behavior, speech, dress, motor activity, and thought processes  Eyes - pupils equal and reactive, extraocular eye movements intact  Nose - normal and patent, no erythema, discharge or polyps  Mouth - mucous membranes moist, pharynx normal without lesions  Neck - supple, no significant adenopathy  Chest - crackles noted in right lung base on auscultation  Heart - normal rate, regular rhythm, normal S1, S2, no murmurs, rubs, clicks or gallops  Abdomen - soft, nontender, nondistended, no masses or organomegaly  Neurological - alert and oriented x 3, normal speech, no focal findings or movement disorder noted  Extremities - 1+ pitting edema, 2+ peripheral pulses, and no tenderness to palpation on bilateral lower extremities   - Right lower

## 2018-09-27 NOTE — PROGRESS NOTES
Equipment: 4 wheeled walker (Pt reports use at all times)  ADL Assistance: [address]: Independent  Homemaking Responsibilities: Yes  Meal Prep Responsibility: Primary  Laundry Responsibility: Primary  Cleaning Responsibility: Primary  Shopping Responsibility: Primary  Ambulation Assistance: Independent  Transfer Assistance: Independent  Active : Yes  Mode of Transportation: Car  Occupation: Retired, On disability  Type of occupation: Customer service, computer jobs  Leisure & Hobbies: Likes to walk  Additional Comments: Pt reports she had home therapy but she \"graduated\" from it. Pt with home \"tele\" nursing and home wound care. Objective   Vision: Within Functional Limits  Hearing: Within functional limits    Orientation  Overall Orientation Status: Within Functional Limits     Balance  Sitting Balance: Supervision  Standing Balance: Stand by assistance  Standing Balance  Time: ~4 minutes  Activity: functional mobility   Sit to stand: Stand by assistance  Stand to sit: Stand by assistance  Functional Mobility  Functional - Mobility Device: Rolling Walker  Activity: Other  Assist Level: Stand by assistance  ADL  Feeding: Independent  Grooming: Setup;Supervision  UE Bathing: Setup;Minimal assistance  LE Bathing: Setup; Moderate assistance  UE Dressing: Setup;Minimal assistance  LE Dressing: Setup; Moderate assistance  Toileting: Setup;Minimal assistance        Bed mobility  Sit to Supine: Stand by assistance  Scooting: Stand by assistance  Comment: Pt up in room with nursing upon arrival  Transfers  Sit to stand: Stand by assistance  Stand to sit: Stand by assistance     Cognition  Overall Cognitive Status: WFL       LUE AROM (degrees)  LUE AROM : WFL  RUE AROM (degrees)  RUE AROM : WFL  RUE General AROM: Pt with flank pain upon raising UE- able to remain WFL  LUE Strength  Gross LUE Strength: WFL  RUE Strength  Gross RUE Strength: WFL      Assessment   Performance deficits / Approximate Degree of Functional Impairment     6 17.07 3.74 100%   7 20.13 3.68 92%   8 22.86 3.43 86%   9 25.33 3.17 80%   10 27.31 2.96 75%   11 29.04 2.79 70%   12 30.60 2.68 67%   13 32.03 2.62 63%   14 33.39 2.61 60%   15 34.69 2.65 56%   16 35.96 2.71 53%   17 37.26 2.82 50%   18 38.66 2.97 47%   19 40.22 3.20 43%   20 42.03 3.55 38%   21 44.27 4.08 33%   22 47.10 4.81 26%   23 51.12 5.88 16%   24 57.54 7.36 0%     Goals  Short term goals  Time Frame for Short term goals: By discharge pt will. ..   Short term goal 1: demo independent functional transfers/mobility with mod I  Short term goal 2: demo UB ADL with set up and SBA  Short term goal 3: demo LB ADL with set up and min A  Short term goal 4: demo 10 minutes standing tolerance to engage in functional tasks given supervision  Short term goal 5: demo 30 minutes of activity tolerance to increase participation in ADLs/IADLs       Therapy Time   Individual Concurrent Group Co-treatment   Time In  2:14         Time Out  2:30         Minutes  [address], OTR/L

## 2018-09-27 NOTE — PROCEDURES
tunneling beneath the skin. Cavity was irrigated copiously. Wet to dry dressing using 4x4 fluff using NS was used to pack the area on right chest/axillary region. Then 1/2 inch iodoform strip was used to pack the right lateral breast area. Fluffs and abd pads were used. No immediate complication was evident. All sponge, instrument and needle counts were correct at the completion of the procedure. Another line was drawn where redness stops. Will recheck the wound in few hours to make sure redness is not spreading further. Will keep patient NPO in case we need to take patient to the OR.       Electronically signed by Minna Deshpande DO  on 9/27/2018 at 6:37 PM

## 2018-09-27 NOTE — PROGRESS NOTES
Pharmacy Vancomycin Consult     Vancomycin Day: 2  Current Dosing: By levels for HARRISON    Temp max:  101.6    Recent Labs      09/27/18   0001  09/27/18   0634   BUN  36*  37*       Recent Labs      09/27/18   0001  09/27/18   0634   CREATININE  1.49*  1.39*       Recent Labs      09/26/18   1325  09/27/18   0634   WBC  19.7*  19.2*         Intake/Output Summary (Last 24 hours) at 09/27/18 1719  Last data filed at 09/27/18 0400   Gross per 24 hour   Intake 7562 ml   Output 500 ml   Net 7062 ml       Culture Date      Source                       Results  9/27                    wound                         gram positive coccobacilli    Ht Readings from Last 1 Encounters:   09/26/18 5' 2\" (1.575 m)        Wt Readings from Last 1 Encounters:   09/26/18 225 lb (102.1 kg)         Body mass index is 41.15 kg/m². Estimated Creatinine Clearance: 48 mL/min (A) (based on SCr of 1.39 mg/dL (H)).     Trough: 8.1 random    Assessment/Plan:  Re-dose with 1500mg ONCE    -Fausto Loyd Allen, BCPS 9/27/2018 5:20 PM

## 2018-09-27 NOTE — PLAN OF CARE
Unsuccessful Insert Arterial Line  Date/Time:  09/27/18, 9:44 AM  Performed by: Leoma Landau    Patient identity confirmed: arm band and provided demographic data   Time out: Immediately prior to procedure a \"time out\" was called to verify the correct patient, procedure, equipment, support staff. Preparation: Patient was prepped and draped in the usual sterile fashion.     Location:left radial    Rodo's test normal: yes  Needle gauge: 20     Number of attempts: 2  Post-procedure:  dressing applied and secured    Patient tolerance: well

## 2018-09-27 NOTE — PROGRESS NOTES
Wound Other   Dressing/Treatment (moisture wicking underpads)   Wound Cleansed Wound cleanser   Wound Assessment Drainage;Fragile;Edema;Painful   Drainage Amount Moderate   Drainage Description Yellow  (orange)   Odor None   Radha-wound Assessment Dry;Blanchable erythema       Response to treatment:  With complaints of pain. Plan:     Plan of Care:   Rinse wounds with acetic acid instead of saline. Medihoney alginate pieces to fill RLE wounds, cover with dry fluffs  Secure with roll gauze and ACE wrap. Wrap from base of toes to knees change daily. moisture wicking under pads to contain drainage from right breast/torso.     Specialty Bed Required : No   [] Low Air Loss   [] Pressure Redistribution  [] Fluid Immersion  [] Bariatric  [] Total Pressure Relief  [] Other:     Discharge Plan:  TBD    Patient/Caregiver Teaching:    [] Indicates understanding       [] Needs reinforcement  [] Unsuccessful      [x] Verbal Understanding  [] Demonstrated understanding       [] No evidence of learning  [] Refused teaching         [] N/A       Electronically signed by Mateusz Cox RN, CWON on 9/27/2018 at 2:12 PM

## 2018-09-27 NOTE — PROGRESS NOTES
Patient SBP in 80s despite IVF boluses in setting of sepsis. On examination patient was awake, alert, oriented to self, time and place. Gave another 1L IVF bolus with SBP remaining in 70s. Critical care consulted, spoke with ICU resident regarding transfer and will come evaluate as patient needs pressor support. Patient is already on broad spectrum antibiotics.     Dafne Rivera MD  Internal Medicine, PGY3

## 2018-09-27 NOTE — PROGRESS NOTES
Repositioned; Ambulation/Increased activity; Distraction  Response to Pain Intervention: Patient Satisfied  Vital Signs  Patient Currently in Pain: Yes       Orientation  Orientation  Overall Orientation Status: Within Functional Limits    Social/Functional History  Social/Functional History  Lives With: Alone  Type of Home: Apartment  Home Layout: One level (3rd level with elevator)  Home Access: Level entry, Elevator  Bathroom Shower/Tub: Tub/Shower unit  Bathroom Toilet: Standard  Bathroom Equipment: Grab bars in [address]: 4 wheeled walker (Pt reports use at all times)  ADL Assistance: [address]: Independent  Homemaking Responsibilities: Yes  Meal Prep Responsibility: Primary  Laundry Responsibility: Primary  Cleaning Responsibility: Primary  Shopping Responsibility: Primary  Ambulation Assistance: Independent  Transfer Assistance: Independent  Active : Yes  Mode of Transportation: Car  Occupation: Retired, On disability  Type of occupation: Customer service, computer jobs  Leisure & Hobbies: Likes to walk  Additional Comments: Pt reports she had home therapy but she \"graduated\" from it. Pt with home \"tele\" nursing and home wound care.       Objective          AROM RLE (degrees)  RLE AROM: WFL  AROM LLE (degrees)  LLE AROM : WFL  AROM RUE (degrees)  RUE General AROM: co Eval with OT- see OT Assessment  AROM LUE (degrees)  LUE General AROM: co Eval with OT- see OT Assessment  Strength RLE  Comment: 4/5 grossly  Strength LLE  Comment: 4/5 grossly  Strength RUE  Comment: co Eval with OT- see OT Assessment  Strength LUE  Comment: co Eval with OT- see OT Assessment        Bed mobility  Supine to Sit: Unable to assess (Pt up in room with nursing upon arrival.)  Sit to Supine: Stand by assistance  Scooting: Stand by assistance  Transfers  Sit to Stand: Stand by assistance  Stand to sit: Stand by assistance  Comment: Cues for hand placement  Ambulation  Ambulation?: Yes  More Ambulation?: No  Ambulation 1  Surface: level tile  Device: Rolling Walker  Assistance: Stand by assistance  Quality of Gait: decreased step length bilaterally, antalgic gait, decreased endurance, decreased gait speed, reliance on RW  Distance: 10ft  Stairs/Curb  Stairs?: No     Balance  Posture: Good  Sitting - Static: Good  Sitting - Dynamic: Good  Standing - Static: Fair;+  Standing - Dynamic: Fair;+  Comments: Standing balance assessed with RW        Assessment   Body structures, Functions, Activity limitations: Decreased functional mobility ; Decreased strength;Decreased balance;Decreased endurance  Assessment: Pt able to ambulate 10ft SBA with RW this date. Pt with noted antalgia during gait but no LOB noted with mobility. Pt with noted decreased endurance and quick to fatigue. Pt to benefit from additional therapy while in the hospital to address above noted deficits and continued home services. Prognosis: Good  Decision Making: Medium Complexity  Patient Education: Educated on importance of mobility  Barriers to Learning: None  REQUIRES PT FOLLOW UP: Yes  Activity Tolerance  Activity Tolerance: Patient Tolerated treatment well;Patient limited by fatigue;Patient limited by endurance         Plan   Plan  Times per week: 5-6x  Times per day: Daily  Current Treatment Recommendations: Strengthening, ROM, Balance Training, Functional Mobility Training, Transfer Training, Gait Training, Home Exercise Program, Safety Education & Training, Patient/Caregiver Education & Training, Endurance Training  Safety Devices  Type of devices: Call light within reach, Gait belt, Left in bed, Nurse notified  Restraints  Initially in place: No    G-Code  PT G-Codes  Functional Assessment Tool Used: [facility]  Score: 16  Functional Limitation: Mobility: Walking and moving around  Mobility: Walking and Moving Around Current Status ():  At least 40 percent but less than 60 percent impaired, limited or restricted  Mobility:

## 2018-09-28 PROBLEM — A41.9 SEPTIC SHOCK (HCC): Status: RESOLVED | Noted: 2018-08-08 | Resolved: 2018-09-28

## 2018-09-28 PROBLEM — N17.9 AKI (ACUTE KIDNEY INJURY) (HCC): Status: RESOLVED | Noted: 2018-09-26 | Resolved: 2018-09-28

## 2018-09-28 PROBLEM — R65.21 SEPTIC SHOCK (HCC): Status: RESOLVED | Noted: 2018-08-08 | Resolved: 2018-09-28

## 2018-09-28 LAB
ABSOLUTE EOS #: 0.23 K/UL (ref 0–0.44)
ABSOLUTE IMMATURE GRANULOCYTE: 0.46 K/UL (ref 0–0.3)
ABSOLUTE LYMPH #: 1.37 K/UL (ref 1.1–3.7)
ABSOLUTE MONO #: 1.6 K/UL (ref 0.1–1.2)
ANION GAP SERPL CALCULATED.3IONS-SCNC: 11 MMOL/L (ref 9–17)
BASOPHILS # BLD: 0 % (ref 0–2)
BASOPHILS ABSOLUTE: 0 K/UL (ref 0–0.2)
BUN BLDV-MCNC: 31 MG/DL (ref 8–23)
BUN/CREAT BLD: ABNORMAL (ref 9–20)
CALCIUM SERPL-MCNC: 9.1 MG/DL (ref 8.6–10.4)
CHLORIDE BLD-SCNC: 105 MMOL/L (ref 98–107)
CO2: 23 MMOL/L (ref 20–31)
CREAT SERPL-MCNC: 0.7 MG/DL (ref 0.5–0.9)
DIFFERENTIAL TYPE: ABNORMAL
EOSINOPHILS RELATIVE PERCENT: 1 % (ref 1–4)
GFR AFRICAN AMERICAN: >60 ML/MIN
GFR NON-AFRICAN AMERICAN: >60 ML/MIN
GFR SERPL CREATININE-BSD FRML MDRD: ABNORMAL ML/MIN/{1.73_M2}
GFR SERPL CREATININE-BSD FRML MDRD: ABNORMAL ML/MIN/{1.73_M2}
GLUCOSE BLD-MCNC: 100 MG/DL (ref 65–105)
GLUCOSE BLD-MCNC: 105 MG/DL (ref 65–105)
GLUCOSE BLD-MCNC: 125 MG/DL (ref 65–105)
GLUCOSE BLD-MCNC: 130 MG/DL (ref 70–99)
GLUCOSE BLD-MCNC: 132 MG/DL (ref 65–105)
GLUCOSE BLD-MCNC: 135 MG/DL (ref 65–105)
GLUCOSE BLD-MCNC: 139 MG/DL (ref 65–105)
GLUCOSE BLD-MCNC: 151 MG/DL (ref 65–105)
GLUCOSE BLD-MCNC: 162 MG/DL (ref 65–105)
GLUCOSE BLD-MCNC: 163 MG/DL (ref 65–105)
GLUCOSE BLD-MCNC: 81 MG/DL (ref 65–105)
GLUCOSE BLD-MCNC: 84 MG/DL (ref 65–105)
GLUCOSE BLD-MCNC: 85 MG/DL (ref 65–105)
GLUCOSE BLD-MCNC: 90 MG/DL (ref 65–105)
HCT VFR BLD CALC: 29 % (ref 36.3–47.1)
HEMOGLOBIN: 9.1 G/DL (ref 11.9–15.1)
IMMATURE GRANULOCYTES: 2 %
LACTIC ACID, WHOLE BLOOD: 2.4 MMOL/L (ref 0.7–2.1)
LYMPHOCYTES # BLD: 6 % (ref 24–43)
MCH RBC QN AUTO: 27.7 PG (ref 25.2–33.5)
MCHC RBC AUTO-ENTMCNC: 31.4 G/DL (ref 28.4–34.8)
MCV RBC AUTO: 88.1 FL (ref 82.6–102.9)
MONOCYTES # BLD: 7 % (ref 3–12)
MORPHOLOGY: ABNORMAL
NRBC AUTOMATED: 0 PER 100 WBC
PDW BLD-RTO: 14.6 % (ref 11.8–14.4)
PLATELET # BLD: 133 K/UL (ref 138–453)
PLATELET ESTIMATE: ABNORMAL
PMV BLD AUTO: 11.3 FL (ref 8.1–13.5)
POTASSIUM SERPL-SCNC: 3.8 MMOL/L (ref 3.7–5.3)
RBC # BLD: 3.29 M/UL (ref 3.95–5.11)
RBC # BLD: ABNORMAL 10*6/UL
SEG NEUTROPHILS: 84 % (ref 36–65)
SEGMENTED NEUTROPHILS ABSOLUTE COUNT: 19.24 K/UL (ref 1.5–8.1)
SODIUM BLD-SCNC: 139 MMOL/L (ref 135–144)
WBC # BLD: 22.9 K/UL (ref 3.5–11.3)
WBC # BLD: ABNORMAL 10*3/UL

## 2018-09-28 PROCEDURE — 99232 SBSQ HOSP IP/OBS MODERATE 35: CPT | Performed by: INTERNAL MEDICINE

## 2018-09-28 PROCEDURE — 6360000002 HC RX W HCPCS: Performed by: STUDENT IN AN ORGANIZED HEALTH CARE EDUCATION/TRAINING PROGRAM

## 2018-09-28 PROCEDURE — 6370000000 HC RX 637 (ALT 250 FOR IP): Performed by: STUDENT IN AN ORGANIZED HEALTH CARE EDUCATION/TRAINING PROGRAM

## 2018-09-28 PROCEDURE — 85025 COMPLETE CBC W/AUTO DIFF WBC: CPT

## 2018-09-28 PROCEDURE — 83605 ASSAY OF LACTIC ACID: CPT

## 2018-09-28 PROCEDURE — 2580000003 HC RX 258: Performed by: STUDENT IN AN ORGANIZED HEALTH CARE EDUCATION/TRAINING PROGRAM

## 2018-09-28 PROCEDURE — 36415 COLL VENOUS BLD VENIPUNCTURE: CPT

## 2018-09-28 PROCEDURE — 6370000000 HC RX 637 (ALT 250 FOR IP): Performed by: INTERNAL MEDICINE

## 2018-09-28 PROCEDURE — 82947 ASSAY GLUCOSE BLOOD QUANT: CPT

## 2018-09-28 PROCEDURE — 2580000003 HC RX 258: Performed by: EMERGENCY MEDICINE

## 2018-09-28 PROCEDURE — 99291 CRITICAL CARE FIRST HOUR: CPT | Performed by: INTERNAL MEDICINE

## 2018-09-28 PROCEDURE — 6370000000 HC RX 637 (ALT 250 FOR IP): Performed by: HOSPITALIST

## 2018-09-28 PROCEDURE — 2580000003 HC RX 258: Performed by: INTERNAL MEDICINE

## 2018-09-28 PROCEDURE — 2500000003 HC RX 250 WO HCPCS: Performed by: INTERNAL MEDICINE

## 2018-09-28 PROCEDURE — 6370000000 HC RX 637 (ALT 250 FOR IP): Performed by: EMERGENCY MEDICINE

## 2018-09-28 PROCEDURE — 6360000002 HC RX W HCPCS: Performed by: INTERNAL MEDICINE

## 2018-09-28 PROCEDURE — 80048 BASIC METABOLIC PNL TOTAL CA: CPT

## 2018-09-28 PROCEDURE — 6370000000 HC RX 637 (ALT 250 FOR IP): Performed by: SURGERY

## 2018-09-28 PROCEDURE — 1200000000 HC SEMI PRIVATE

## 2018-09-28 RX ORDER — INSULIN GLARGINE 100 [IU]/ML
20 INJECTION, SOLUTION SUBCUTANEOUS NIGHTLY
Status: DISCONTINUED | OUTPATIENT
Start: 2018-09-28 | End: 2018-09-28

## 2018-09-28 RX ORDER — SODIUM HYPOCHLORITE 1.25 MG/ML
SOLUTION TOPICAL DAILY
Status: DISCONTINUED | OUTPATIENT
Start: 2018-09-28 | End: 2018-10-02 | Stop reason: HOSPADM

## 2018-09-28 RX ORDER — MIDODRINE HYDROCHLORIDE 5 MG/1
5 TABLET ORAL 3 TIMES DAILY PRN
Status: DISCONTINUED | OUTPATIENT
Start: 2018-09-28 | End: 2018-10-02 | Stop reason: HOSPADM

## 2018-09-28 RX ORDER — FENTANYL CITRATE 50 UG/ML
50 INJECTION, SOLUTION INTRAMUSCULAR; INTRAVENOUS
Status: DISCONTINUED | OUTPATIENT
Start: 2018-09-28 | End: 2018-10-02 | Stop reason: HOSPADM

## 2018-09-28 RX ORDER — GABAPENTIN 100 MG/1
200 CAPSULE ORAL ONCE
Status: COMPLETED | OUTPATIENT
Start: 2018-09-28 | End: 2018-09-28

## 2018-09-28 RX ORDER — FENTANYL CITRATE 50 UG/ML
25 INJECTION, SOLUTION INTRAMUSCULAR; INTRAVENOUS
Status: DISCONTINUED | OUTPATIENT
Start: 2018-09-28 | End: 2018-09-28

## 2018-09-28 RX ORDER — FENTANYL CITRATE 50 UG/ML
25 INJECTION, SOLUTION INTRAMUSCULAR; INTRAVENOUS ONCE
Status: COMPLETED | OUTPATIENT
Start: 2018-09-28 | End: 2018-09-28

## 2018-09-28 RX ORDER — GABAPENTIN 300 MG/1
300 CAPSULE ORAL 2 TIMES DAILY
Status: DISCONTINUED | OUTPATIENT
Start: 2018-09-28 | End: 2018-10-02 | Stop reason: HOSPADM

## 2018-09-28 RX ORDER — INSULIN GLARGINE 100 [IU]/ML
20 INJECTION, SOLUTION SUBCUTANEOUS NIGHTLY
Status: DISCONTINUED | OUTPATIENT
Start: 2018-09-28 | End: 2018-10-02 | Stop reason: HOSPADM

## 2018-09-28 RX ORDER — FENTANYL CITRATE 50 UG/ML
25 INJECTION, SOLUTION INTRAMUSCULAR; INTRAVENOUS
Status: DISCONTINUED | OUTPATIENT
Start: 2018-09-28 | End: 2018-10-02 | Stop reason: HOSPADM

## 2018-09-28 RX ORDER — SODIUM CHLORIDE 9 MG/ML
INJECTION, SOLUTION INTRAVENOUS CONTINUOUS
Status: DISCONTINUED | OUTPATIENT
Start: 2018-09-28 | End: 2018-10-02 | Stop reason: HOSPADM

## 2018-09-28 RX ADMIN — PIPERACILLIN AND TAZOBACTAM 3.38 G: 3; .375 INJECTION, POWDER, LYOPHILIZED, FOR SOLUTION INTRAVENOUS; PARENTERAL at 23:30

## 2018-09-28 RX ADMIN — DAKIN'S SOLUTION 0.125% (QUARTER STRENGTH): 0.12 SOLUTION at 07:50

## 2018-09-28 RX ADMIN — GABAPENTIN 300 MG: 300 CAPSULE ORAL at 23:45

## 2018-09-28 RX ADMIN — OXYCODONE HYDROCHLORIDE AND ACETAMINOPHEN 1 TABLET: 5; 325 TABLET ORAL at 23:57

## 2018-09-28 RX ADMIN — FENTANYL CITRATE 50 MCG: 50 INJECTION INTRAMUSCULAR; INTRAVENOUS at 15:53

## 2018-09-28 RX ADMIN — PIPERACILLIN AND TAZOBACTAM 3.38 G: 3; .375 INJECTION, POWDER, LYOPHILIZED, FOR SOLUTION INTRAVENOUS; PARENTERAL at 01:33

## 2018-09-28 RX ADMIN — FENTANYL CITRATE 50 MCG: 50 INJECTION INTRAMUSCULAR; INTRAVENOUS at 13:26

## 2018-09-28 RX ADMIN — VANCOMYCIN HYDROCHLORIDE 1250 MG: 10 INJECTION, POWDER, LYOPHILIZED, FOR SOLUTION INTRAVENOUS at 16:30

## 2018-09-28 RX ADMIN — SODIUM BICARBONATE: 84 INJECTION, SOLUTION INTRAVENOUS at 02:37

## 2018-09-28 RX ADMIN — SODIUM CHLORIDE: 9 INJECTION, SOLUTION INTRAVENOUS at 09:21

## 2018-09-28 RX ADMIN — MONTELUKAST SODIUM 10 MG: 10 TABLET, FILM COATED ORAL at 23:45

## 2018-09-28 RX ADMIN — INSULIN GLARGINE 20 UNITS: 100 INJECTION, SOLUTION SUBCUTANEOUS at 13:18

## 2018-09-28 RX ADMIN — CITALOPRAM 40 MG: 20 TABLET, FILM COATED ORAL at 08:59

## 2018-09-28 RX ADMIN — PIPERACILLIN AND TAZOBACTAM 3.38 G: 3; .375 INJECTION, POWDER, LYOPHILIZED, FOR SOLUTION INTRAVENOUS; PARENTERAL at 09:53

## 2018-09-28 RX ADMIN — GABAPENTIN 100 MG: 100 CAPSULE ORAL at 08:59

## 2018-09-28 RX ADMIN — OXYCODONE HYDROCHLORIDE AND ACETAMINOPHEN 1 TABLET: 5; 325 TABLET ORAL at 07:57

## 2018-09-28 RX ADMIN — ASPIRIN 81 MG: 81 TABLET ORAL at 08:59

## 2018-09-28 RX ADMIN — FENTANYL CITRATE 25 MCG: 50 INJECTION INTRAMUSCULAR; INTRAVENOUS at 07:00

## 2018-09-28 RX ADMIN — HEPARIN SODIUM 5000 UNITS: 5000 INJECTION, SOLUTION INTRAVENOUS; SUBCUTANEOUS at 14:25

## 2018-09-28 RX ADMIN — OMEPRAZOLE 20 MG: 20 CAPSULE, DELAYED RELEASE ORAL at 08:59

## 2018-09-28 RX ADMIN — SODIUM CHLORIDE: 9 INJECTION, SOLUTION INTRAVENOUS at 23:54

## 2018-09-28 RX ADMIN — GABAPENTIN 200 MG: 100 CAPSULE ORAL at 13:50

## 2018-09-28 RX ADMIN — SODIUM CHLORIDE 3.09 UNITS/HR: 9 INJECTION, SOLUTION INTRAVENOUS at 01:32

## 2018-09-28 RX ADMIN — PRAVASTATIN SODIUM 10 MG: 20 TABLET ORAL at 09:14

## 2018-09-28 ASSESSMENT — PAIN DESCRIPTION - LOCATION
LOCATION: BACK

## 2018-09-28 ASSESSMENT — PAIN DESCRIPTION - FREQUENCY
FREQUENCY: CONTINUOUS
FREQUENCY: CONTINUOUS

## 2018-09-28 ASSESSMENT — PAIN DESCRIPTION - DESCRIPTORS
DESCRIPTORS: SHARP

## 2018-09-28 ASSESSMENT — PAIN DESCRIPTION - PAIN TYPE
TYPE: SURGICAL PAIN

## 2018-09-28 ASSESSMENT — PAIN DESCRIPTION - ONSET
ONSET: SUDDEN

## 2018-09-28 ASSESSMENT — ENCOUNTER SYMPTOMS
EYES NEGATIVE: 1
ALLERGIC/IMMUNOLOGIC NEGATIVE: 1
NAUSEA: 0
VOMITING: 0
CONSTIPATION: 0
CHEST TIGHTNESS: 0
ABDOMINAL PAIN: 0
BACK PAIN: 0
DIARRHEA: 0
COUGH: 0
SHORTNESS OF BREATH: 0

## 2018-09-28 ASSESSMENT — PAIN DESCRIPTION - ORIENTATION
ORIENTATION: ANTERIOR;POSTERIOR

## 2018-09-28 ASSESSMENT — PAIN SCALES - GENERAL
PAINLEVEL_OUTOF10: 8
PAINLEVEL_OUTOF10: 10
PAINLEVEL_OUTOF10: 6
PAINLEVEL_OUTOF10: 10
PAINLEVEL_OUTOF10: 9
PAINLEVEL_OUTOF10: 8
PAINLEVEL_OUTOF10: 10
PAINLEVEL_OUTOF10: 0
PAINLEVEL_OUTOF10: 10
PAINLEVEL_OUTOF10: 0
PAINLEVEL_OUTOF10: 10

## 2018-09-28 NOTE — PROGRESS NOTES
1.60 09/28/2018    LYMPHSABS 1.37 09/28/2018    EOSABS 0.23 09/28/2018    BASOSABS 0.00 09/28/2018    DIFFTYPE NOT REPORTED 09/28/2018     CMP:    Lab Results   Component Value Date     09/28/2018    K 3.8 09/28/2018     09/28/2018    CO2 23 09/28/2018    BUN 31 09/28/2018    CREATININE 0.70 09/28/2018    GFRAA >60 09/28/2018    LABGLOM >60 09/28/2018    GLUCOSE 130 09/28/2018    PROT 7.7 09/26/2018    LABALBU 2.8 09/26/2018    CALCIUM 9.1 09/28/2018    BILITOT 1.02 09/26/2018    ALKPHOS 137 09/26/2018     09/26/2018    ALT 32 09/26/2018       Radiology Review:  Xr Chest Standard (2 Vw)    Result Date: 9/26/2018  EXAMINATION: TWO VIEWS OF THE CHEST 9/26/2018 1:30 pm COMPARISON: August 8, 2018 HISTORY: ORDERING SYSTEM PROVIDED HISTORY: R/o sepsis TECHNOLOGIST PROVIDED HISTORY: R/o sepsis FINDINGS: Cardiac silhouette is enlarged, increased in size. No pneumothorax. No pleural effusion. No focal consolidation. Interstitial prominence and pulmonary vascular congestion. Findings as above likely related to congestive heart failure and edema. Xr Abdomen (kub) (single Ap View)    Result Date: 9/27/2018  EXAMINATION: SINGLE SUPINE XRAY VIEW(S) OF THE ABDOMEN 9/27/2018 6:55 am COMPARISON: None. HISTORY: ORDERING SYSTEM PROVIDED HISTORY: abdominal wall cellulitis. elevated lactic acid. TECHNOLOGIST PROVIDED HISTORY: abdominal wall cellulitis. elevated lactic acid. Initial examination FINDINGS: There is a nonspecific bowel gas pattern, without evidence of obstruction. No free air is demonstrated. No urinary tract calculi are seen. No acute osseous abnormality is demonstrated. Nonobstructive bowel gas pattern.      Xr Chest Portable    Result Date: 9/27/2018  EXAMINATION: SINGLE XRAY VIEW OF THE CHEST 9/27/2018 6:09 am COMPARISON: 09/26/2018, 08/08/2018 HISTORY: ORDERING SYSTEM PROVIDED HISTORY: pulm edema TECHNOLOGIST PROVIDED HISTORY: pulm edema FINDINGS: Cardiac silhouette is mildly discussed the findings, established the care plan and recommendations with Resident.     Electronically signed by Pauly Williamson IV, DO on 10/1/18 at 9:00 AM

## 2018-09-28 NOTE — PROGRESS NOTES
INTERNAL MEDICINE                                                                             ICU PATIENT TRANSFER NOTE        Patient:  Lou Hollingsworth  YOB: 1955  MRN: [id number]     Acct: [de-identified]     Admit date: 9/26/2018    Code Status:-  Full code     Reason for ICU Admission:-   Septic shock    SUPPORT DEVICES: [] Ventilator [] BIPAP  [] Nasal Cannula [x] Room Air    Consultations:- [] Cardiology [] Nephrology  [] Hemo onco  [] GI                               [x] ID [] ENT  [] Rheum [] Endo   []Physiotherapy                                 Others:-  General surgery    NUTRITION:  [] NPO [] Tube Feeding (Specify: ) [] TPN  [x] PO    Central Lines:- [x] No   [] Yes           If yes - Days/Date of Insertion. Pt seen,examined and Chart reviewed. ICU COURSE :-     Lou Hollingsworth is a 65 y.o. with PMH of Type II DM, Hx of RLE streptococcal cellulitis was admitted on 9-26-18 right upper chest wall cellulitis involving the right breast.  Patient was transferred to the medical ICU for persistent hypotension after receiving 5 L fluid bolus. Patient was likely having septic shock from right chest wall cellulitis leading to abscess formation. After patient was transferred to the medical ICU, she was having persistent lactic acidosis. Patient received 2 L fluid bolus more and  Blood pressure improved. Gen. Surgery was consulted as patient was developing abscess. GS performed  incision and drainage of the abscess. Patient is on vancomycin and Zosyn. ID is following the patient. Wound culture growing gram-positive cocci in clusters. Patient was also started on insulin drip for better blood glucose control as per general surgery. Wean insulin drip off. Started back on Lantuss 20 units.       REVIEW OF SYSTEMS:  · Constitutional: Negative for Fever, Subcutaneous TID     insulin lispro  0-6 Units Subcutaneous Nightly    gabapentin  300 mg Oral BID    vancomycin  1,250 mg Intravenous Q12H    enoxaparin  40 mg Subcutaneous Daily    [START ON 9/30/2018] influenza virus vaccine  0.5 mL Intramuscular Once    vancomycin (VANCOCIN) intermittent dosing (placeholder)   Other RX Placeholder    sodium chloride flush  10 mL Intravenous 2 times per day    piperacillin-tazobactam  3.375 g Intravenous Q8H    aspirin  81 mg Oral Daily    citalopram  40 mg Oral Daily    montelukast  10 mg Oral Nightly    omeprazole  20 mg Oral Daily    pravastatin  10 mg Oral Daily    sodium chloride flush  10 mL Intravenous 2 times per day     Continuous Infusions:   sodium chloride 75 mL/hr at 09/28/18 0921    sodium chloride 10 mL/hr at 09/27/18 1512    dextrose       PRN Meds:fentanNYL **OR** fentanNYL, midodrine, sodium chloride flush, acetaminophen, promethazine, acetic acid, fluticasone, sodium chloride flush, magnesium hydroxide, ondansetron, glucose, dextrose, glucagon (rDNA), dextrose, oxyCODONE-acetaminophen    Objective:    CBC:   Recent Labs      09/26/18   1325  09/27/18   0634  09/28/18   0516   WBC  19.7*  19.2*  22.9*   HGB  10.4*  9.3*  9.1*   PLT  150  131*  133*     BMP:    Recent Labs      09/27/18   0001  09/27/18   0634  09/28/18   0516   NA  132*  132*  139   K  4.3  5.1  3.8   CL  100  101  105   CO2  17*  15*  23   BUN  36*  37*  31*   CREATININE  1.49*  1.39*  0.70   GLUCOSE  133*  93  130*     Calcium:  Recent Labs      09/28/18   0516   CALCIUM  9.1     Ionized Calcium:No results for input(s): IONCA in the last 72 hours. Magnesium:No results for input(s): MG in the last 72 hours. Phosphorus:No results for input(s): PHOS in the last 72 hours. BNP:No results for input(s): BNP in the last 72 hours.   Glucose:  Recent Labs      09/28/18   1003  09/28/18   1111  09/28/18   1206   POCGLU  84  81  90     HgbA1C:   Recent Labs      09/26/18   1325 stopped and patient given 20 units of Lantus(Takes 40 at home). Monitor glucose closely, may need extra 20 units at night  · Fibromyalgia: Resume home medications including Neurontin. · DVT prophylaxis. D/C heparin and change to Lovenox.          Terence Martinez MD  PGY-2, Internal medicine resident  [facility], [place], [place]

## 2018-09-28 NOTE — PROGRESS NOTES
09/28/18 0500 (!) 134/53 - - 75 23 99 %   09/28/18 0445 - - - 73 25 99 %   09/28/18 0430 (!) 134/51 - - 72 22 100 %   09/28/18 0415 - - - 73 24 99 %   09/28/18 0400 (!) 120/57 98.1 °F (36.7 °C) Oral 73 22 100 %   09/28/18 0345 - - - 75 25 99 %   09/28/18 0330 (!) 130/49 - - 76 23 97 %   09/28/18 0315 - - - 77 23 -   09/28/18 0300 (!) 128/50 - - 77 24 98 %   09/28/18 0245 - - - 77 27 98 %   09/28/18 0230 (!) 129/47 - - 77 25 97 %   09/28/18 0215 - - - 81 19 99 %   09/28/18 0200 (!) 138/55 - - 82 14 96 %   09/28/18 0145 - - - 78 27 98 %   09/28/18 0130 (!) 130/49 - - 81 21 99 %   09/28/18 0115 - - - 79 23 98 %   09/28/18 0100 (!) 124/53 - - 80 21 99 %   09/28/18 0045 - - - 80 23 98 %   09/28/18 0030 (!) 125/52 - - 81 23 97 %   09/28/18 0015 - - - 81 24 97 %   09/28/18 0000 (!) 141/47 98.2 °F (36.8 °C) Oral 82 22 97 %         Intake/Output Summary (Last 24 hours) at 09/28/18 0754  Last data filed at 09/28/18 0000   Gross per 24 hour   Intake          2330.49 ml   Output             1975 ml   Net           355.49 ml       Date 09/28/18 0000 - 09/28/18 2359   Shift 9517-3669 7422-4474 9498-5920 24 Hour Total   I  N  T  A  K  E   Shift Total  (mL/kg)       O  U  T  P  U  T   Urine  (mL/kg/hr) 400   400    Shift Total  (mL/kg) 400  (3.7)   400  (3.7)   Weight (kg) 108. 2 108. 2 108. 2 108. 2     Wt Readings from Last 3 Encounters:   09/27/18 238 lb 9.6 oz (108.2 kg)   08/13/18 240 lb (108.9 kg)   08/08/18 220 lb 11.2 oz (100.1 kg)     Body mass index is 43.64 kg/m². PHYSICAL EXAM:  General appearance - alert, well appearing, and in distress  Mental status - alert, oriented to person, place, and time, normal mood, behavior, speech, dress, motor activity, and thought processes  Chest - decreased present bilaterally with prolonged expiration.   Heart - normal rate, regular rhythm, normal S1, S2, no murmurs, rubs, clicks or gallops  Abdomen - soft, nontender, nondistended, no masses or organomegaly  Neurological - alert injury) ([facility] [address]    Septicemia ([facility] [address]  Resolved Problems:    * No resolved hospital problems. *          Plan:  · Chest wall abscess involving the breast status post incision and drainage. Wound culture growing gram-positive cocci in clusters. Continue vancomycin and Zosyn for now. Infectious disease on board. Patient is an for re-evaluation of  incision and drainage. · Septic shock resolved. midodrine 5 mg 3 times a day. Will make midodrine PRN. · Metabolic acidosis secondary to acute renal failure. Resolved. Change bicarb drip to normal saline at 75 ML per hour. · Diabetes mellitus. HbA1c 7.5 on 9-26-18. Patient was started on insulin drip for hyperglycemia. Blood glucose better controlled. Insulin drip 0.45 units per hour. We'll change to sliding scale after the surgery. · DVT prophylaxis. Heparin 5000 3 times a day. Lizzeth Carrera, PGY-3, Internal Medicine Residency Resident. [facility]  9/28/2018 7:54 AM  Attending Physician Statement  I have discussed the care of Karen Jane, including pertinent history and exam findings,  with the resident. I have seen and examined the patient and the key elements of all parts of the encounter have been performed by me. I agree with the assessment, plan and orders as documented by the resident with additions . Continue antibiotics and sugar control. DC midodrine  Okay to transfer to floor  Critical care sign off       Total critical care time caring for this patient with life threatening, unstable organ failure, including direct patient contact, management of life support systems, review of data including imaging and labs, discussions with other team members and physicians at least 27   Min so far today, excluding procedures. Treatment plan Discussed with nursing staff in detail , all questions answered .    Electronically signed by Marylee Peto, MD on   9/28/18 at 6:43 PM    Please note that this chart

## 2018-09-29 LAB
ABSOLUTE EOS #: 0.34 K/UL (ref 0–0.4)
ABSOLUTE IMMATURE GRANULOCYTE: 0.68 K/UL (ref 0–0.3)
ABSOLUTE LYMPH #: 1.86 K/UL (ref 1–4.8)
ABSOLUTE MONO #: 1.18 K/UL (ref 0.1–0.8)
ANION GAP SERPL CALCULATED.3IONS-SCNC: 12 MMOL/L (ref 9–17)
BASOPHILS # BLD: 0 % (ref 0–2)
BASOPHILS ABSOLUTE: 0 K/UL (ref 0–0.2)
BUN BLDV-MCNC: 21 MG/DL (ref 8–23)
BUN/CREAT BLD: ABNORMAL (ref 9–20)
CALCIUM SERPL-MCNC: 8.9 MG/DL (ref 8.6–10.4)
CHLORIDE BLD-SCNC: 104 MMOL/L (ref 98–107)
CO2: 21 MMOL/L (ref 20–31)
CREAT SERPL-MCNC: 0.6 MG/DL (ref 0.5–0.9)
DIFFERENTIAL TYPE: ABNORMAL
EOSINOPHILS RELATIVE PERCENT: 2 % (ref 1–4)
GFR AFRICAN AMERICAN: >60 ML/MIN
GFR NON-AFRICAN AMERICAN: >60 ML/MIN
GFR SERPL CREATININE-BSD FRML MDRD: ABNORMAL ML/MIN/{1.73_M2}
GFR SERPL CREATININE-BSD FRML MDRD: ABNORMAL ML/MIN/{1.73_M2}
GLUCOSE BLD-MCNC: 103 MG/DL (ref 70–99)
GLUCOSE BLD-MCNC: 113 MG/DL (ref 65–105)
GLUCOSE BLD-MCNC: 168 MG/DL (ref 65–105)
GLUCOSE BLD-MCNC: 185 MG/DL (ref 65–105)
GLUCOSE BLD-MCNC: 93 MG/DL (ref 65–105)
HCT VFR BLD CALC: 30.9 % (ref 36.3–47.1)
HEMOGLOBIN: 9.2 G/DL (ref 11.9–15.1)
IMMATURE GRANULOCYTES: 4 %
LACTIC ACID, WHOLE BLOOD: 2.9 MMOL/L (ref 0.7–2.1)
LYMPHOCYTES # BLD: 11 % (ref 24–44)
MCH RBC QN AUTO: 27.1 PG (ref 25.2–33.5)
MCHC RBC AUTO-ENTMCNC: 29.8 G/DL (ref 28.4–34.8)
MCV RBC AUTO: 90.9 FL (ref 82.6–102.9)
MONOCYTES # BLD: 7 % (ref 1–7)
MORPHOLOGY: ABNORMAL
NRBC AUTOMATED: 0 PER 100 WBC
PDW BLD-RTO: 14.8 % (ref 11.8–14.4)
PLATELET # BLD: 189 K/UL (ref 138–453)
PLATELET ESTIMATE: ABNORMAL
PMV BLD AUTO: 11.5 FL (ref 8.1–13.5)
POTASSIUM SERPL-SCNC: 3.9 MMOL/L (ref 3.7–5.3)
RBC # BLD: 3.4 M/UL (ref 3.95–5.11)
RBC # BLD: ABNORMAL 10*6/UL
SEG NEUTROPHILS: 76 % (ref 36–66)
SEGMENTED NEUTROPHILS ABSOLUTE COUNT: 12.84 K/UL (ref 1.8–7.7)
SODIUM BLD-SCNC: 137 MMOL/L (ref 135–144)
VANCOMYCIN TROUGH DATE LAST DOSE: NORMAL
VANCOMYCIN TROUGH DOSE AMOUNT: NORMAL
VANCOMYCIN TROUGH TIME LAST DOSE: NORMAL
VANCOMYCIN TROUGH: 15.6 UG/ML (ref 10–20)
WBC # BLD: 16.9 K/UL (ref 3.5–11.3)
WBC # BLD: ABNORMAL 10*3/UL

## 2018-09-29 PROCEDURE — 2580000003 HC RX 258: Performed by: INTERNAL MEDICINE

## 2018-09-29 PROCEDURE — 6370000000 HC RX 637 (ALT 250 FOR IP): Performed by: STUDENT IN AN ORGANIZED HEALTH CARE EDUCATION/TRAINING PROGRAM

## 2018-09-29 PROCEDURE — 36415 COLL VENOUS BLD VENIPUNCTURE: CPT

## 2018-09-29 PROCEDURE — 2580000003 HC RX 258: Performed by: STUDENT IN AN ORGANIZED HEALTH CARE EDUCATION/TRAINING PROGRAM

## 2018-09-29 PROCEDURE — 6370000000 HC RX 637 (ALT 250 FOR IP): Performed by: SURGERY

## 2018-09-29 PROCEDURE — 82947 ASSAY GLUCOSE BLOOD QUANT: CPT

## 2018-09-29 PROCEDURE — 1200000000 HC SEMI PRIVATE

## 2018-09-29 PROCEDURE — 80048 BASIC METABOLIC PNL TOTAL CA: CPT

## 2018-09-29 PROCEDURE — 6370000000 HC RX 637 (ALT 250 FOR IP): Performed by: INTERNAL MEDICINE

## 2018-09-29 PROCEDURE — 6370000000 HC RX 637 (ALT 250 FOR IP): Performed by: HOSPITALIST

## 2018-09-29 PROCEDURE — 6360000002 HC RX W HCPCS: Performed by: HOSPITALIST

## 2018-09-29 PROCEDURE — 6360000002 HC RX W HCPCS: Performed by: INTERNAL MEDICINE

## 2018-09-29 PROCEDURE — 83605 ASSAY OF LACTIC ACID: CPT

## 2018-09-29 PROCEDURE — 85025 COMPLETE CBC W/AUTO DIFF WBC: CPT

## 2018-09-29 PROCEDURE — 6360000002 HC RX W HCPCS: Performed by: STUDENT IN AN ORGANIZED HEALTH CARE EDUCATION/TRAINING PROGRAM

## 2018-09-29 PROCEDURE — 99232 SBSQ HOSP IP/OBS MODERATE 35: CPT | Performed by: INTERNAL MEDICINE

## 2018-09-29 PROCEDURE — 80202 ASSAY OF VANCOMYCIN: CPT

## 2018-09-29 RX ORDER — CEFAZOLIN SODIUM 1 G/50ML
1 INJECTION, SOLUTION INTRAVENOUS EVERY 8 HOURS
Status: DISCONTINUED | OUTPATIENT
Start: 2018-09-29 | End: 2018-10-01 | Stop reason: SDUPTHER

## 2018-09-29 RX ORDER — CEFAZOLIN SODIUM 1 G/50ML
1 INJECTION, SOLUTION INTRAVENOUS EVERY 8 HOURS
Status: DISCONTINUED | OUTPATIENT
Start: 2018-09-29 | End: 2018-10-02 | Stop reason: HOSPADM

## 2018-09-29 RX ORDER — CEFAZOLIN SODIUM 1 G/50ML
2 INJECTION, SOLUTION INTRAVENOUS EVERY 8 HOURS
Status: DISCONTINUED | OUTPATIENT
Start: 2018-09-29 | End: 2018-09-29

## 2018-09-29 RX ADMIN — ENOXAPARIN SODIUM 40 MG: 40 INJECTION SUBCUTANEOUS at 09:12

## 2018-09-29 RX ADMIN — FENTANYL CITRATE 50 MCG: 50 INJECTION INTRAMUSCULAR; INTRAVENOUS at 05:54

## 2018-09-29 RX ADMIN — INSULIN GLARGINE 20 UNITS: 100 INJECTION, SOLUTION SUBCUTANEOUS at 22:20

## 2018-09-29 RX ADMIN — Medication 10 ML: at 09:11

## 2018-09-29 RX ADMIN — CITALOPRAM 40 MG: 20 TABLET, FILM COATED ORAL at 09:10

## 2018-09-29 RX ADMIN — PRAVASTATIN SODIUM 10 MG: 20 TABLET ORAL at 09:12

## 2018-09-29 RX ADMIN — OXYCODONE HYDROCHLORIDE AND ACETAMINOPHEN 1 TABLET: 5; 325 TABLET ORAL at 11:32

## 2018-09-29 RX ADMIN — INSULIN LISPRO 1 UNITS: 100 INJECTION, SOLUTION INTRAVENOUS; SUBCUTANEOUS at 22:19

## 2018-09-29 RX ADMIN — GABAPENTIN 300 MG: 300 CAPSULE ORAL at 22:19

## 2018-09-29 RX ADMIN — PIPERACILLIN AND TAZOBACTAM 3.38 G: 3; .375 INJECTION, POWDER, LYOPHILIZED, FOR SOLUTION INTRAVENOUS; PARENTERAL at 07:50

## 2018-09-29 RX ADMIN — CEFAZOLIN SODIUM 1 G: 1 INJECTION, SOLUTION INTRAVENOUS at 22:40

## 2018-09-29 RX ADMIN — PIPERACILLIN AND TAZOBACTAM 3.38 G: 3; .375 INJECTION, POWDER, LYOPHILIZED, FOR SOLUTION INTRAVENOUS; PARENTERAL at 15:10

## 2018-09-29 RX ADMIN — Medication 10 ML: at 21:00

## 2018-09-29 RX ADMIN — OMEPRAZOLE 20 MG: 20 CAPSULE, DELAYED RELEASE ORAL at 09:12

## 2018-09-29 RX ADMIN — OXYCODONE HYDROCHLORIDE AND ACETAMINOPHEN 1 TABLET: 5; 325 TABLET ORAL at 20:14

## 2018-09-29 RX ADMIN — INSULIN LISPRO 2 UNITS: 100 INJECTION, SOLUTION INTRAVENOUS; SUBCUTANEOUS at 12:35

## 2018-09-29 RX ADMIN — GABAPENTIN 300 MG: 300 CAPSULE ORAL at 09:10

## 2018-09-29 RX ADMIN — ASPIRIN 81 MG: 81 TABLET ORAL at 09:10

## 2018-09-29 RX ADMIN — CEFAZOLIN SODIUM 1 G: 1 INJECTION, SOLUTION INTRAVENOUS at 23:15

## 2018-09-29 RX ADMIN — MIDODRINE HYDROCHLORIDE 5 MG: 5 TABLET ORAL at 09:11

## 2018-09-29 RX ADMIN — FENTANYL CITRATE 25 MCG: 50 INJECTION INTRAMUSCULAR; INTRAVENOUS at 14:54

## 2018-09-29 RX ADMIN — VANCOMYCIN HYDROCHLORIDE 1250 MG: 10 INJECTION, POWDER, LYOPHILIZED, FOR SOLUTION INTRAVENOUS at 05:59

## 2018-09-29 RX ADMIN — DAKIN'S SOLUTION 0.125% (QUARTER STRENGTH): 0.12 SOLUTION at 08:20

## 2018-09-29 RX ADMIN — MONTELUKAST SODIUM 10 MG: 10 TABLET, FILM COATED ORAL at 22:18

## 2018-09-29 ASSESSMENT — PAIN DESCRIPTION - DESCRIPTORS: DESCRIPTORS: ACHING

## 2018-09-29 ASSESSMENT — PAIN SCALES - GENERAL
PAINLEVEL_OUTOF10: 4
PAINLEVEL_OUTOF10: 2
PAINLEVEL_OUTOF10: 5
PAINLEVEL_OUTOF10: 10
PAINLEVEL_OUTOF10: 6
PAINLEVEL_OUTOF10: 6
PAINLEVEL_OUTOF10: 3
PAINLEVEL_OUTOF10: 2
PAINLEVEL_OUTOF10: 8
PAINLEVEL_OUTOF10: 0
PAINLEVEL_OUTOF10: 6
PAINLEVEL_OUTOF10: 6

## 2018-09-29 ASSESSMENT — ENCOUNTER SYMPTOMS
VOMITING: 0
SPUTUM PRODUCTION: 0
NAUSEA: 0
HEMOPTYSIS: 0
RESPIRATORY NEGATIVE: 1
COUGH: 0
BACK PAIN: 0
ALLERGIC/IMMUNOLOGIC NEGATIVE: 1
GASTROINTESTINAL NEGATIVE: 1
DIARRHEA: 0
ORTHOPNEA: 0
HEARTBURN: 0
EYES NEGATIVE: 1
ABDOMINAL PAIN: 0
SHORTNESS OF BREATH: 0
CONSTIPATION: 0

## 2018-09-29 ASSESSMENT — PAIN DESCRIPTION - FREQUENCY: FREQUENCY: CONTINUOUS

## 2018-09-29 ASSESSMENT — PAIN DESCRIPTION - LOCATION: LOCATION: LEG

## 2018-09-29 NOTE — FLOWSHEET NOTE
Patient is a 65 y.o. female who was admitted to the hospital due to \"cellulitis. \" Patient was sleeping and did not wake upon  calling her name.  made visit in response to a nurse referral indicating that patient would like to complete Advance Directives. · Per chart, patient's emergency contact is her brother, Zabrina Echevarria ([phone]). · Patient was recently moved from [facility] to [facility], per nurse. · Patient is listed as Non-Christianity.    ·  left Advance Directive on patient's bedside table for patient's review and informed bedside nurse of its location. Chaplains will visit with patient per her request. Dejon Kidd can be contacted 24/7 via [employer].     Dong Lu       09/28/18 8138   Encounter Summary   Services provided to: Patient not available   Referral/Consult From: Other ;Nurse   Support System Family members   Place of Denominational Non-Christianity   Continue Visiting (9/28/2018, pt sleeping)   Complexity of Encounter Low   Length of Encounter 15 minutes   Spiritual/Religion   Type Spiritual support   Assessment Sleeping   Intervention Provided reading materials/devotional materials;Sustaining presence/ Ministry of presence   Outcome Did not respond   Advance Directives (For Healthcare)   Advance Directives Documents given

## 2018-09-29 NOTE — PROGRESS NOTES
[address] [place] [address]     Department of Internal Medicine - Staff Internal Medicine Service     DAILY PROGRESS NOTE       Patient:  Namrata Edwards  YOB: 1955  MRN: [id number]     Acct: [de-identified]     Admit date: 9/26/2018  Admitting Diagnosis: Septic shock ([facility] [address]    Subjective:   Patient seen and examined at bedside  No acute events overnight  The patient is alert and oriented. Vitals is stable. Denies any complaints like shortness of breath, fever, chills, chest pain, palpitations, changes in urinary or bowel habits. Review of Systems   Constitutional: Negative for chills, fever, malaise/fatigue and weight loss. Respiratory: Negative for cough, hemoptysis, sputum production and shortness of breath. Cardiovascular: Negative for chest pain, palpitations, orthopnea and claudication. Gastrointestinal: Negative for abdominal pain, constipation, diarrhea, heartburn, nausea and vomiting. Genitourinary: Negative for dysuria, flank pain, frequency, hematuria and urgency. Musculoskeletal: Negative for back pain, joint pain, myalgias and neck pain. Skin: Negative for itching and rash. Objective:   BP (!) 134/57   Pulse 74   Temp 98.6 °F (37 °C) (Oral)   Resp 21   Ht 5' 2\" (1.575 m)   Wt 240 lb (108.9 kg)   SpO2 96%   BMI 43.90 kg/m²       General appearance - alert, well appearing, and in no distress  Mental status - alert, oriented to person, place, and time  Eyes - pupils equal and reactive, extraocular eye movements intact  Mouth - mucous membranes moist, pharynx normal without lesions  Chest - Wound on the right side with packing. The redness extending to the back. Painful to touch.   Heart - normal rate, regular rhythm, normal S1, S2, no murmurs, rubs, clicks or gallops  Abdomen - soft, nontender, nondistended, no masses or organomegaly  Neurological - alert, oriented, normal speech, no focal findings or movement disorder noted  Musculoskeletal - no joint Principal Problem (Resolved):    Septic shock (HCC)  Active Problems:    Cellulitis of right lower extremity- resolving    Diabetes mellitus (HCC)    ARLYN (obstructive sleep apnea)    Morbid obesity (HCC)    Lactic acidosis    Cellulitis of right breast    Septicemia (HCC)    SIRS due to infectious process without acute organ dysfunction ([facility] [address]  Resolved Problems:    HARRISON (acute kidney injury) ([address]    1. Continue antibiotics for chest wall abscess status post incision and drainage. We'll continue vancomycin and Zosyn. Will follow cultures. General surgery okay with the current management plan  2. Septic shock resolved. Patient is on midodrine as needed. Continue ibuprofen and fluids. Patient was not agreeable to dressing changes. Counseled the patient regarding dressing change to prevent any reinfection. 3.  Will continue to monitor for any signs of infection. Lactic acid is 2.9. WBC is trending down. We will continue supportive care. 3.  Patient is on 20 units of Lantus with medium dose sliding scale. Glucose under control. We'll continue to monitor.   4.  On Lovenox 40 mg subcu for DVT prophylaxis      Curt Echols MD  PGY-1, Department of Internal Medicine  [address] [place] [address]  9/29/2018 12:31 PM     Attending Physician Statement    Active Hospital Problems    Diagnosis Date Noted    SIRS due to infectious process without acute organ dysfunction ([facility] [address] [A41.9]     Septicemia ([facility] [address] [A41.9]     Cellulitis of right breast [N61.0] 09/26/2018    Lactic acidosis [E87.2]     Morbid obesity ([facility] [address] [E66.01] 08/09/2018    Diabetes mellitus ([facility] [address] [E11.9] 08/09/2018    Cellulitis of right lower extremity- resolving [L03.115] 08/09/2018    ARLYN (obstructive sleep apnea) [G47.33] 08/09/2018       I have discussed the case, including pertinent history and exam findings with the resident and the team.  I have seen and examined the patient and the key elements of the encounter have

## 2018-09-29 NOTE — PROGRESS NOTES
It was accompanied with pain on movement of the right arm. She also noticed fatigue, shortness of breath and foul-smelling urine. The patient also states that she had a right lower extremity cellulitis for which she was admitted with septic shock to [facility] last month and she was discharged with the PICC line and follow up with the ID OP. Debridement of the wound was performed and she was given penicillin Q^6 in infusion pump for 2 weeks with the stop date of 8-29-18. After that she was given ciprofloxacin 500 mg twice a day for 2 weeks. According to the patient, the right lower extremity cellulitis is under control. And has not been bothering her. She denies any fever, chills, nausea, vomiting, dizziness, headache, lightheadedness, abdominal pain, diarrhea, constipation,melena, hematochezia, dysuria or hematuria. She denies any problems eating/drinking and taking care of herself at home. She lives alone in a house and states that home health care has been helping taking care of the right lower extremity wound care. She also admits to not taking her home Lantus 40 U nightly as the patient ran out of medication couple of days ago. She states her blood glucose has been in the 100-150's as she has been cutting out on snacks. She also states that she is non compliant with her CPAP for her ARLYN as it broke 2 years ago and she has not gotten a new machine.     Patient was admitted to the [place] [address] from ED. On current admission she was noted to have lactic acidosis 10.3, HARRISON  with creatinine 1.86(baseline 0.44-0.5), blood glucose of 140 with an anion gap of 20 and beta hydroxybutyrate 0.17 and hemoglobin A1c pending. Patient was noted to be hypotensive with systolic blood pressure as low as in the 70s and a temperature max off 101.6 with worsening an expanding right chest wall cellulitis. Urine studies suggested fractional excretion of sodium of 0.1% indicating a prerenal azotemia.  He was given 5 L IVF

## 2018-09-29 NOTE — PROGRESS NOTES
MCHC 29.8 09/29/2018    RDW 14.8 09/29/2018    LYMPHOPCT Pending 09/29/2018    MONOPCT Pending 09/29/2018    BASOPCT Pending 09/29/2018    MONOSABS Pending 09/29/2018    LYMPHSABS Pending 09/29/2018    EOSABS Pending 09/29/2018    BASOSABS Pending 09/29/2018    DIFFTYPE NOT REPORTED 09/29/2018     CMP:    Lab Results   Component Value Date     09/29/2018    K 3.9 09/29/2018     09/29/2018    CO2 21 09/29/2018    BUN 21 09/29/2018    CREATININE 0.60 09/29/2018    GFRAA >60 09/29/2018    LABGLOM >60 09/29/2018    GLUCOSE 103 09/29/2018    PROT 7.7 09/26/2018    LABALBU 2.8 09/26/2018    CALCIUM 8.9 09/29/2018    BILITOT 1.02 09/26/2018    ALKPHOS 137 09/26/2018     09/26/2018    ALT 32 09/26/2018       Radiology Review:  Xr Chest Standard (2 Vw)    Result Date: 9/26/2018  EXAMINATION: TWO VIEWS OF THE CHEST 9/26/2018 1:30 pm COMPARISON: August 8, 2018 HISTORY: ORDERING SYSTEM PROVIDED HISTORY: R/o sepsis TECHNOLOGIST PROVIDED HISTORY: R/o sepsis FINDINGS: Cardiac silhouette is enlarged, increased in size. No pneumothorax. No pleural effusion. No focal consolidation. Interstitial prominence and pulmonary vascular congestion. Findings as above likely related to congestive heart failure and edema. Xr Abdomen (kub) (single Ap View)    Result Date: 9/27/2018  EXAMINATION: SINGLE SUPINE XRAY VIEW(S) OF THE ABDOMEN 9/27/2018 6:55 am COMPARISON: None. HISTORY: ORDERING SYSTEM PROVIDED HISTORY: abdominal wall cellulitis. elevated lactic acid. TECHNOLOGIST PROVIDED HISTORY: abdominal wall cellulitis. elevated lactic acid. Initial examination FINDINGS: There is a nonspecific bowel gas pattern, without evidence of obstruction. No free air is demonstrated. No urinary tract calculi are seen. No acute osseous abnormality is demonstrated. Nonobstructive bowel gas pattern.      Xr Chest Portable    Result Date: 9/27/2018  EXAMINATION: SINGLE XRAY VIEW OF THE CHEST 9/27/2018 6:09 am COMPARISON: 09/26/2018, 08/08/2018 HISTORY: ORDERING SYSTEM PROVIDED HISTORY: pulm edema TECHNOLOGIST PROVIDED HISTORY: pulm edema FINDINGS: Cardiac silhouette is mildly enlarged. Pulmonary vascular congestion. Persistent interstitial prominence, though mildly improved when compared to the previous examination. No acute osseous abnormality is identified. No pneumothorax is seen. Mildly improved vascular congestion and interstitial edema. ASSESSMENT:  Active Hospital Problems    Diagnosis Date Noted    SIRS due to infectious process without acute organ dysfunction ([address] [A41.9]     Septicemia ([address] [A41.9]     Cellulitis of right breast [N61.0] 09/26/2018    Lactic acidosis [E87.2]     Morbid obesity ([facility] [address] [E66.01] 08/09/2018    Diabetes mellitus ([facility] [address] [E11.9] 08/09/2018    Cellulitis of right lower extremity- resolving [L03.115] 08/09/2018    ARLYN (obstructive sleep apnea) [G47.33] 08/09/2018     65 y.o. female with history of resolved RLE cellulitis presenting with septic shock from R chest wall cellulitis     Post procedure day 2 bedside I&D of R breast/axilla region with packing      Plan:  1. Cont recommendations and management per primary  2. Pain control PRN  3. Pt is refusing medical care and not allowing physicians or nursing change dressings, discussed with pt the importance of monitoring the area and the need for continued evaluation and pt voices understanding that she is putting herself at risk of worsening infection and possible severe sepsis and death    4. BID dressing changes to R axilla with dakins soaked gauze, penrose drain placed to wound 9/28, cont to montior signs of cellulitis and marking the regions of erythema, ok for dressing changes per RN, cellulitis area improving   5. Gen diet, at this time the cellulitis is improving and the previous site of abscess collection has been drained and conts to drain, cont current plan for site    6.  Cont to trend leukocytosis-trending down 16.9 and monitor temperature, cont IV abx per ID: [email or web address], vanco f/u wound cultures  7.  Cont supportive care    Electronically signed by Jeannie Wakefield DO  on 9/29/2018 at 7:03 AM

## 2018-09-30 LAB
ABSOLUTE EOS #: 0.68 K/UL (ref 0–0.4)
ABSOLUTE IMMATURE GRANULOCYTE: 0.57 K/UL (ref 0–0.3)
ABSOLUTE LYMPH #: 1.03 K/UL (ref 1–4.8)
ABSOLUTE MONO #: 0.46 K/UL (ref 0.1–0.8)
ANION GAP SERPL CALCULATED.3IONS-SCNC: 12 MMOL/L (ref 9–17)
BASOPHILS # BLD: 0 % (ref 0–2)
BASOPHILS ABSOLUTE: 0 K/UL (ref 0–0.2)
BUN BLDV-MCNC: 14 MG/DL (ref 8–23)
BUN/CREAT BLD: ABNORMAL (ref 9–20)
CALCIUM SERPL-MCNC: 8.5 MG/DL (ref 8.6–10.4)
CHLORIDE BLD-SCNC: 104 MMOL/L (ref 98–107)
CO2: 24 MMOL/L (ref 20–31)
CREAT SERPL-MCNC: 0.54 MG/DL (ref 0.5–0.9)
DIFFERENTIAL TYPE: ABNORMAL
EOSINOPHILS RELATIVE PERCENT: 6 % (ref 1–4)
GFR AFRICAN AMERICAN: >60 ML/MIN
GFR NON-AFRICAN AMERICAN: >60 ML/MIN
GFR SERPL CREATININE-BSD FRML MDRD: ABNORMAL ML/MIN/{1.73_M2}
GFR SERPL CREATININE-BSD FRML MDRD: ABNORMAL ML/MIN/{1.73_M2}
GLUCOSE BLD-MCNC: 120 MG/DL (ref 65–105)
GLUCOSE BLD-MCNC: 171 MG/DL (ref 65–105)
GLUCOSE BLD-MCNC: 198 MG/DL (ref 65–105)
GLUCOSE BLD-MCNC: 89 MG/DL (ref 70–99)
GLUCOSE BLD-MCNC: 95 MG/DL (ref 65–105)
HCT VFR BLD CALC: 32 % (ref 36.3–47.1)
HEMOGLOBIN: 9.7 G/DL (ref 11.9–15.1)
IMMATURE GRANULOCYTES: 5 %
LYMPHOCYTES # BLD: 9 % (ref 24–44)
MCH RBC QN AUTO: 27.3 PG (ref 25.2–33.5)
MCHC RBC AUTO-ENTMCNC: 30.3 G/DL (ref 28.4–34.8)
MCV RBC AUTO: 90.1 FL (ref 82.6–102.9)
MONOCYTES # BLD: 4 % (ref 1–7)
MORPHOLOGY: ABNORMAL
NRBC AUTOMATED: 0 PER 100 WBC
PDW BLD-RTO: 14.8 % (ref 11.8–14.4)
PLATELET # BLD: 140 K/UL (ref 138–453)
PLATELET ESTIMATE: ABNORMAL
PMV BLD AUTO: 10.9 FL (ref 8.1–13.5)
POTASSIUM SERPL-SCNC: 3.7 MMOL/L (ref 3.7–5.3)
RBC # BLD: 3.55 M/UL (ref 3.95–5.11)
RBC # BLD: ABNORMAL 10*6/UL
SEG NEUTROPHILS: 76 % (ref 36–66)
SEGMENTED NEUTROPHILS ABSOLUTE COUNT: 8.66 K/UL (ref 1.8–7.7)
SODIUM BLD-SCNC: 140 MMOL/L (ref 135–144)
WBC # BLD: 11.4 K/UL (ref 3.5–11.3)
WBC # BLD: ABNORMAL 10*3/UL

## 2018-09-30 PROCEDURE — 2580000003 HC RX 258: Performed by: STUDENT IN AN ORGANIZED HEALTH CARE EDUCATION/TRAINING PROGRAM

## 2018-09-30 PROCEDURE — 6370000000 HC RX 637 (ALT 250 FOR IP): Performed by: INTERNAL MEDICINE

## 2018-09-30 PROCEDURE — 6360000002 HC RX W HCPCS: Performed by: STUDENT IN AN ORGANIZED HEALTH CARE EDUCATION/TRAINING PROGRAM

## 2018-09-30 PROCEDURE — 6360000002 HC RX W HCPCS: Performed by: INTERNAL MEDICINE

## 2018-09-30 PROCEDURE — 6370000000 HC RX 637 (ALT 250 FOR IP): Performed by: STUDENT IN AN ORGANIZED HEALTH CARE EDUCATION/TRAINING PROGRAM

## 2018-09-30 PROCEDURE — 36415 COLL VENOUS BLD VENIPUNCTURE: CPT

## 2018-09-30 PROCEDURE — 6370000000 HC RX 637 (ALT 250 FOR IP): Performed by: SURGERY

## 2018-09-30 PROCEDURE — 87186 SC STD MICRODIL/AGAR DIL: CPT

## 2018-09-30 PROCEDURE — 80048 BASIC METABOLIC PNL TOTAL CA: CPT

## 2018-09-30 PROCEDURE — 99232 SBSQ HOSP IP/OBS MODERATE 35: CPT | Performed by: INTERNAL MEDICINE

## 2018-09-30 PROCEDURE — 82947 ASSAY GLUCOSE BLOOD QUANT: CPT

## 2018-09-30 PROCEDURE — 1200000000 HC SEMI PRIVATE

## 2018-09-30 PROCEDURE — 85025 COMPLETE CBC W/AUTO DIFF WBC: CPT

## 2018-09-30 PROCEDURE — 87070 CULTURE OTHR SPECIMN AEROBIC: CPT

## 2018-09-30 PROCEDURE — 86403 PARTICLE AGGLUT ANTBDY SCRN: CPT

## 2018-09-30 PROCEDURE — 6370000000 HC RX 637 (ALT 250 FOR IP): Performed by: HOSPITALIST

## 2018-09-30 PROCEDURE — 87205 SMEAR GRAM STAIN: CPT

## 2018-09-30 PROCEDURE — 2500000003 HC RX 250 WO HCPCS: Performed by: INTERNAL MEDICINE

## 2018-09-30 RX ORDER — CEFAZOLIN SODIUM 1 G/3ML
2 INJECTION, POWDER, FOR SOLUTION INTRAMUSCULAR; INTRAVENOUS EVERY 8 HOURS
Qty: 84000 MG | Refills: 0 | Status: SHIPPED | OUTPATIENT
Start: 2018-09-30 | End: 2018-10-01

## 2018-09-30 RX ORDER — CEFAZOLIN SODIUM 1 G/3ML
2 INJECTION, POWDER, FOR SOLUTION INTRAMUSCULAR; INTRAVENOUS EVERY 8 HOURS
Qty: 84000 MG | Refills: 0
Start: 2018-09-30 | End: 2018-09-30

## 2018-09-30 RX ORDER — LIDOCAINE HYDROCHLORIDE 10 MG/ML
5 INJECTION, SOLUTION INFILTRATION; PERINEURAL ONCE
Status: COMPLETED | OUTPATIENT
Start: 2018-09-30 | End: 2018-09-30

## 2018-09-30 RX ORDER — MAGNESIUM SULFATE 1 G/100ML
1 INJECTION INTRAVENOUS
Status: DISCONTINUED | OUTPATIENT
Start: 2018-09-30 | End: 2018-09-30

## 2018-09-30 RX ORDER — POTASSIUM CHLORIDE 20 MEQ/1
40 TABLET, EXTENDED RELEASE ORAL ONCE
Status: DISCONTINUED | OUTPATIENT
Start: 2018-09-30 | End: 2018-09-30

## 2018-09-30 RX ADMIN — Medication 10 ML: at 00:15

## 2018-09-30 RX ADMIN — CEFAZOLIN SODIUM 1 G: 1 INJECTION, SOLUTION INTRAVENOUS at 06:18

## 2018-09-30 RX ADMIN — Medication 10 ML: at 22:58

## 2018-09-30 RX ADMIN — FENTANYL CITRATE 25 MCG: 50 INJECTION INTRAMUSCULAR; INTRAVENOUS at 00:14

## 2018-09-30 RX ADMIN — PRAVASTATIN SODIUM 10 MG: 20 TABLET ORAL at 08:30

## 2018-09-30 RX ADMIN — FENTANYL CITRATE 50 MCG: 50 INJECTION INTRAMUSCULAR; INTRAVENOUS at 19:11

## 2018-09-30 RX ADMIN — FENTANYL CITRATE 50 MCG: 50 INJECTION INTRAMUSCULAR; INTRAVENOUS at 10:24

## 2018-09-30 RX ADMIN — INSULIN LISPRO 1 UNITS: 100 INJECTION, SOLUTION INTRAVENOUS; SUBCUTANEOUS at 22:56

## 2018-09-30 RX ADMIN — Medication 10 ML: at 08:31

## 2018-09-30 RX ADMIN — DAKIN'S SOLUTION 0.125% (QUARTER STRENGTH) 473 ML: 0.12 SOLUTION at 08:31

## 2018-09-30 RX ADMIN — CITALOPRAM 40 MG: 20 TABLET, FILM COATED ORAL at 08:31

## 2018-09-30 RX ADMIN — CEFAZOLIN SODIUM 1 G: 1 INJECTION, SOLUTION INTRAVENOUS at 15:09

## 2018-09-30 RX ADMIN — LIDOCAINE HYDROCHLORIDE 5 ML: 10 INJECTION, SOLUTION INFILTRATION; PERINEURAL at 19:45

## 2018-09-30 RX ADMIN — OXYCODONE HYDROCHLORIDE AND ACETAMINOPHEN 1 TABLET: 5; 325 TABLET ORAL at 16:50

## 2018-09-30 RX ADMIN — CEFAZOLIN SODIUM 1 G: 1 INJECTION, SOLUTION INTRAVENOUS at 22:58

## 2018-09-30 RX ADMIN — ENOXAPARIN SODIUM 30 MG: 30 INJECTION SUBCUTANEOUS at 22:58

## 2018-09-30 RX ADMIN — CEFAZOLIN SODIUM 1 G: 1 INJECTION, SOLUTION INTRAVENOUS at 15:08

## 2018-09-30 RX ADMIN — FLUTICASONE PROPIONATE 1 SPRAY: 50 SPRAY, METERED NASAL at 23:00

## 2018-09-30 RX ADMIN — CEFAZOLIN SODIUM 1 G: 1 INJECTION, SOLUTION INTRAVENOUS at 23:40

## 2018-09-30 RX ADMIN — ASPIRIN 81 MG: 81 TABLET ORAL at 08:31

## 2018-09-30 RX ADMIN — INSULIN LISPRO 2 UNITS: 100 INJECTION, SOLUTION INTRAVENOUS; SUBCUTANEOUS at 18:12

## 2018-09-30 RX ADMIN — FLUTICASONE PROPIONATE 1 SPRAY: 50 SPRAY, METERED NASAL at 16:16

## 2018-09-30 RX ADMIN — ENOXAPARIN SODIUM 30 MG: 30 INJECTION SUBCUTANEOUS at 08:32

## 2018-09-30 RX ADMIN — MONTELUKAST SODIUM 10 MG: 10 TABLET, FILM COATED ORAL at 22:58

## 2018-09-30 RX ADMIN — OMEPRAZOLE 20 MG: 20 CAPSULE, DELAYED RELEASE ORAL at 08:31

## 2018-09-30 RX ADMIN — OXYCODONE HYDROCHLORIDE AND ACETAMINOPHEN 1 TABLET: 5; 325 TABLET ORAL at 08:27

## 2018-09-30 RX ADMIN — OXYCODONE HYDROCHLORIDE AND ACETAMINOPHEN 1 TABLET: 5; 325 TABLET ORAL at 22:51

## 2018-09-30 RX ADMIN — INSULIN GLARGINE 20 UNITS: 100 INJECTION, SOLUTION SUBCUTANEOUS at 22:56

## 2018-09-30 RX ADMIN — GABAPENTIN 300 MG: 300 CAPSULE ORAL at 22:58

## 2018-09-30 RX ADMIN — CEFAZOLIN SODIUM 1 G: 1 INJECTION, SOLUTION INTRAVENOUS at 06:52

## 2018-09-30 RX ADMIN — GABAPENTIN 300 MG: 300 CAPSULE ORAL at 08:31

## 2018-09-30 ASSESSMENT — PAIN SCALES - GENERAL
PAINLEVEL_OUTOF10: 8
PAINLEVEL_OUTOF10: 7
PAINLEVEL_OUTOF10: 3
PAINLEVEL_OUTOF10: 6
PAINLEVEL_OUTOF10: 10
PAINLEVEL_OUTOF10: 7
PAINLEVEL_OUTOF10: 10
PAINLEVEL_OUTOF10: 6

## 2018-09-30 ASSESSMENT — ENCOUNTER SYMPTOMS
GASTROINTESTINAL NEGATIVE: 1
ALLERGIC/IMMUNOLOGIC NEGATIVE: 1
EYES NEGATIVE: 1
RESPIRATORY NEGATIVE: 1

## 2018-09-30 NOTE — PROGRESS NOTES
back and the skin started to swell. She denied any discharge from the skin but describes it as discomforting. It was accompanied with pain on movement of the right arm. She also noticed fatigue, shortness of breath and foul-smelling urine. The patient also states that she had a right lower extremity cellulitis for which she was admitted with septic shock to [facility] last month and she was discharged with the PICC line and follow up with the ID OP. Debridement of the wound was performed and she was given penicillin Q^6 in infusion pump for 2 weeks with the stop date of 8-29-18. After that she was given ciprofloxacin 500 mg twice a day for 2 weeks. According to the patient, the right lower extremity cellulitis is under control. And has not been bothering her. She denies any fever, chills, nausea, vomiting, dizziness, headache, lightheadedness, abdominal pain, diarrhea, constipation,melena, hematochezia, dysuria or hematuria. She denies any problems eating/drinking and taking care of herself at home. She lives alone in a house and states that home health care has been helping taking care of the right lower extremity wound care. She also admits to not taking her home Lantus 40 U nightly as the patient ran out of medication couple of days ago. She states her blood glucose has been in the 100-150's as she has been cutting out on snacks. She also states that she is non compliant with her CPAP for her ARLYN as it broke 2 years ago and she has not gotten a new machine.     Patient was admitted to the [place] [address] from ED. On current admission she was noted to have lactic acidosis 10.3, HARRISON  with creatinine 1.86(baseline 0.44-0.5), blood glucose of 140 with an anion gap of 20 and beta hydroxybutyrate 0.17 and hemoglobin A1c pending. Patient was noted to be hypotensive with systolic blood pressure as low as in the 70s and a temperature max off 101.6 with worsening an expanding right chest wall cellulitis.   Urine

## 2018-10-01 LAB
ABSOLUTE EOS #: 0.51 K/UL (ref 0–0.4)
ABSOLUTE IMMATURE GRANULOCYTE: 0.26 K/UL (ref 0–0.3)
ABSOLUTE LYMPH #: 2.3 K/UL (ref 1–4.8)
ABSOLUTE MONO #: 0.51 K/UL (ref 0.1–0.8)
ANION GAP SERPL CALCULATED.3IONS-SCNC: 7 MMOL/L (ref 9–17)
BASOPHILS # BLD: 0 % (ref 0–2)
BASOPHILS ABSOLUTE: 0 K/UL (ref 0–0.2)
BUN BLDV-MCNC: 9 MG/DL (ref 8–23)
BUN/CREAT BLD: ABNORMAL (ref 9–20)
CALCIUM SERPL-MCNC: 8.2 MG/DL (ref 8.6–10.4)
CHLORIDE BLD-SCNC: 107 MMOL/L (ref 98–107)
CO2: 25 MMOL/L (ref 20–31)
CREAT SERPL-MCNC: 0.51 MG/DL (ref 0.5–0.9)
DIFFERENTIAL TYPE: ABNORMAL
EOSINOPHILS RELATIVE PERCENT: 6 % (ref 1–4)
GFR AFRICAN AMERICAN: >60 ML/MIN
GFR NON-AFRICAN AMERICAN: >60 ML/MIN
GFR SERPL CREATININE-BSD FRML MDRD: ABNORMAL ML/MIN/{1.73_M2}
GFR SERPL CREATININE-BSD FRML MDRD: ABNORMAL ML/MIN/{1.73_M2}
GLUCOSE BLD-MCNC: 114 MG/DL (ref 65–105)
GLUCOSE BLD-MCNC: 143 MG/DL (ref 65–105)
GLUCOSE BLD-MCNC: 160 MG/DL (ref 65–105)
GLUCOSE BLD-MCNC: 174 MG/DL (ref 65–105)
GLUCOSE BLD-MCNC: 98 MG/DL (ref 70–99)
HCT VFR BLD CALC: 28.7 % (ref 36.3–47.1)
HEMOGLOBIN: 9 G/DL (ref 11.9–15.1)
IMMATURE GRANULOCYTES: 3 %
LYMPHOCYTES # BLD: 27 % (ref 24–44)
MCH RBC QN AUTO: 27.3 PG (ref 25.2–33.5)
MCHC RBC AUTO-ENTMCNC: 31.4 G/DL (ref 28.4–34.8)
MCV RBC AUTO: 87 FL (ref 82.6–102.9)
MONOCYTES # BLD: 6 % (ref 1–7)
MORPHOLOGY: ABNORMAL
NRBC AUTOMATED: 0 PER 100 WBC
PDW BLD-RTO: 15 % (ref 11.8–14.4)
PLATELET # BLD: 134 K/UL (ref 138–453)
PLATELET ESTIMATE: ABNORMAL
PMV BLD AUTO: 10.7 FL (ref 8.1–13.5)
POTASSIUM SERPL-SCNC: 3.7 MMOL/L (ref 3.7–5.3)
RBC # BLD: 3.3 M/UL (ref 3.95–5.11)
RBC # BLD: ABNORMAL 10*6/UL
SEG NEUTROPHILS: 58 % (ref 36–66)
SEGMENTED NEUTROPHILS ABSOLUTE COUNT: 4.92 K/UL (ref 1.8–7.7)
SODIUM BLD-SCNC: 139 MMOL/L (ref 135–144)
WBC # BLD: 8.5 K/UL (ref 3.5–11.3)
WBC # BLD: ABNORMAL 10*3/UL

## 2018-10-01 PROCEDURE — 97530 THERAPEUTIC ACTIVITIES: CPT

## 2018-10-01 PROCEDURE — 97116 GAIT TRAINING THERAPY: CPT

## 2018-10-01 PROCEDURE — 82947 ASSAY GLUCOSE BLOOD QUANT: CPT

## 2018-10-01 PROCEDURE — 2580000003 HC RX 258: Performed by: INTERNAL MEDICINE

## 2018-10-01 PROCEDURE — 80048 BASIC METABOLIC PNL TOTAL CA: CPT

## 2018-10-01 PROCEDURE — 6370000000 HC RX 637 (ALT 250 FOR IP): Performed by: STUDENT IN AN ORGANIZED HEALTH CARE EDUCATION/TRAINING PROGRAM

## 2018-10-01 PROCEDURE — 6370000000 HC RX 637 (ALT 250 FOR IP): Performed by: INTERNAL MEDICINE

## 2018-10-01 PROCEDURE — 2580000003 HC RX 258: Performed by: STUDENT IN AN ORGANIZED HEALTH CARE EDUCATION/TRAINING PROGRAM

## 2018-10-01 PROCEDURE — 97110 THERAPEUTIC EXERCISES: CPT

## 2018-10-01 PROCEDURE — 36415 COLL VENOUS BLD VENIPUNCTURE: CPT

## 2018-10-01 PROCEDURE — 6360000002 HC RX W HCPCS: Performed by: STUDENT IN AN ORGANIZED HEALTH CARE EDUCATION/TRAINING PROGRAM

## 2018-10-01 PROCEDURE — C1751 CATH, INF, PER/CENT/MIDLINE: HCPCS

## 2018-10-01 PROCEDURE — 99232 SBSQ HOSP IP/OBS MODERATE 35: CPT | Performed by: INTERNAL MEDICINE

## 2018-10-01 PROCEDURE — 6370000000 HC RX 637 (ALT 250 FOR IP): Performed by: SURGERY

## 2018-10-01 PROCEDURE — 85025 COMPLETE CBC W/AUTO DIFF WBC: CPT

## 2018-10-01 PROCEDURE — 6370000000 HC RX 637 (ALT 250 FOR IP): Performed by: HOSPITALIST

## 2018-10-01 PROCEDURE — 1200000000 HC SEMI PRIVATE

## 2018-10-01 PROCEDURE — 36569 INSJ PICC 5 YR+ W/O IMAGING: CPT

## 2018-10-01 PROCEDURE — 6360000002 HC RX W HCPCS: Performed by: INTERNAL MEDICINE

## 2018-10-01 RX ORDER — INSULIN GLARGINE 100 [IU]/ML
20 INJECTION, SOLUTION SUBCUTANEOUS NIGHTLY
Qty: 1 VIAL | Refills: 3 | Status: ON HOLD | OUTPATIENT
Start: 2018-10-01 | End: 2019-05-25

## 2018-10-01 RX ORDER — OXYCODONE HYDROCHLORIDE AND ACETAMINOPHEN 5; 325 MG/1; MG/1
1 TABLET ORAL EVERY 6 HOURS PRN
Qty: 10 TABLET | Refills: 0 | Status: SHIPPED | OUTPATIENT
Start: 2018-10-01 | End: 2018-10-01

## 2018-10-01 RX ORDER — ACETAMINOPHEN 325 MG/1
650 TABLET ORAL EVERY 4 HOURS PRN
Qty: 30 TABLET | Refills: 0 | Status: ON HOLD | OUTPATIENT
Start: 2018-10-01 | End: 2019-05-25

## 2018-10-01 RX ORDER — OXYCODONE HYDROCHLORIDE AND ACETAMINOPHEN 5; 325 MG/1; MG/1
1 TABLET ORAL EVERY 6 HOURS PRN
Qty: 10 TABLET | Refills: 0 | Status: SHIPPED | OUTPATIENT
Start: 2018-10-01 | End: 2018-10-08

## 2018-10-01 RX ORDER — CEFAZOLIN SODIUM 1 G/3ML
2 INJECTION, POWDER, FOR SOLUTION INTRAMUSCULAR; INTRAVENOUS EVERY 8 HOURS
Qty: 84000 MG | Refills: 0 | Status: SHIPPED | OUTPATIENT
Start: 2018-10-01 | End: 2018-10-01

## 2018-10-01 RX ORDER — SODIUM CHLORIDE 0.9 % (FLUSH) 0.9 %
10 SYRINGE (ML) INJECTION EVERY 8 HOURS
Status: DISCONTINUED | OUTPATIENT
Start: 2018-10-01 | End: 2018-10-02 | Stop reason: HOSPADM

## 2018-10-01 RX ORDER — CEFAZOLIN SODIUM 1 G/3ML
2 INJECTION, POWDER, FOR SOLUTION INTRAMUSCULAR; INTRAVENOUS EVERY 8 HOURS
Qty: 84000 MG | Refills: 0 | Status: SHIPPED | OUTPATIENT
Start: 2018-10-01 | End: 2018-10-15

## 2018-10-01 RX ADMIN — ENOXAPARIN SODIUM 30 MG: 30 INJECTION SUBCUTANEOUS at 21:32

## 2018-10-01 RX ADMIN — GABAPENTIN 300 MG: 300 CAPSULE ORAL at 21:33

## 2018-10-01 RX ADMIN — ACETIC ACID 250 ML: 250 IRRIGANT IRRIGATION at 15:52

## 2018-10-01 RX ADMIN — Medication 10 ML: at 21:34

## 2018-10-01 RX ADMIN — CEFAZOLIN SODIUM 1 G: 1 INJECTION, SOLUTION INTRAVENOUS at 22:46

## 2018-10-01 RX ADMIN — INSULIN GLARGINE 20 UNITS: 100 INJECTION, SOLUTION SUBCUTANEOUS at 21:40

## 2018-10-01 RX ADMIN — Medication 10 ML: at 11:59

## 2018-10-01 RX ADMIN — CEFAZOLIN SODIUM 1 G: 1 INJECTION, SOLUTION INTRAVENOUS at 08:55

## 2018-10-01 RX ADMIN — CITALOPRAM 40 MG: 20 TABLET, FILM COATED ORAL at 08:53

## 2018-10-01 RX ADMIN — INSULIN LISPRO 2 UNITS: 100 INJECTION, SOLUTION INTRAVENOUS; SUBCUTANEOUS at 11:57

## 2018-10-01 RX ADMIN — OXYCODONE HYDROCHLORIDE AND ACETAMINOPHEN 1 TABLET: 5; 325 TABLET ORAL at 13:33

## 2018-10-01 RX ADMIN — FLUTICASONE PROPIONATE 1 SPRAY: 50 SPRAY, METERED NASAL at 08:58

## 2018-10-01 RX ADMIN — Medication 10 ML: at 08:56

## 2018-10-01 RX ADMIN — CEFAZOLIN SODIUM 1 G: 1 INJECTION, SOLUTION INTRAVENOUS at 15:48

## 2018-10-01 RX ADMIN — OMEPRAZOLE 20 MG: 20 CAPSULE, DELAYED RELEASE ORAL at 08:53

## 2018-10-01 RX ADMIN — OXYCODONE HYDROCHLORIDE AND ACETAMINOPHEN 1 TABLET: 5; 325 TABLET ORAL at 06:48

## 2018-10-01 RX ADMIN — FENTANYL CITRATE 50 MCG: 50 INJECTION INTRAMUSCULAR; INTRAVENOUS at 15:11

## 2018-10-01 RX ADMIN — DAKIN'S SOLUTION 0.125% (QUARTER STRENGTH) 473 ML: 0.12 SOLUTION at 08:57

## 2018-10-01 RX ADMIN — ENOXAPARIN SODIUM 30 MG: 30 INJECTION SUBCUTANEOUS at 08:53

## 2018-10-01 RX ADMIN — DAKIN'S SOLUTION 0.125% (QUARTER STRENGTH): 0.12 SOLUTION at 15:52

## 2018-10-01 RX ADMIN — GABAPENTIN 300 MG: 300 CAPSULE ORAL at 08:53

## 2018-10-01 RX ADMIN — INSULIN LISPRO 1 UNITS: 100 INJECTION, SOLUTION INTRAVENOUS; SUBCUTANEOUS at 21:39

## 2018-10-01 RX ADMIN — ASPIRIN 81 MG: 81 TABLET ORAL at 08:54

## 2018-10-01 RX ADMIN — MONTELUKAST SODIUM 10 MG: 10 TABLET, FILM COATED ORAL at 21:33

## 2018-10-01 RX ADMIN — OXYCODONE HYDROCHLORIDE AND ACETAMINOPHEN 1 TABLET: 5; 325 TABLET ORAL at 21:31

## 2018-10-01 RX ADMIN — PRAVASTATIN SODIUM 10 MG: 20 TABLET ORAL at 08:56

## 2018-10-01 RX ADMIN — Medication 10 ML: at 21:33

## 2018-10-01 ASSESSMENT — ENCOUNTER SYMPTOMS
HEMOPTYSIS: 0
NAUSEA: 0
VOMITING: 0
COUGH: 0

## 2018-10-01 ASSESSMENT — PAIN SCALES - GENERAL
PAINLEVEL_OUTOF10: 8
PAINLEVEL_OUTOF10: 8
PAINLEVEL_OUTOF10: 0
PAINLEVEL_OUTOF10: 8

## 2018-10-01 NOTE — PROGRESS NOTES
[address] [place] [address]     Department of Internal Medicine - Staff Internal Medicine Service     DAILY PROGRESS NOTE       Patient:  Rob Foss  YOB: 1955  MRN: [id number]     Acct: [de-identified]     Admit date: 9/26/2018  Admitting Diagnosis: Septic shock ([facility] [address]    Subjective:   Patient seen and examined at bedside  No acute events overnight  Midline placed for cefazolin outpatient  Still has pain over right breast  Wound vac draining      Review of Systems   Constitutional: Negative for chills, fever, malaise/fatigue and weight loss. Respiratory: Negative for cough, hemoptysis, sputum production and shortness of breath. Cardiovascular: Negative for chest pain, palpitations, orthopnea and claudication. Gastrointestinal: Negative for abdominal pain, constipation, diarrhea, heartburn, nausea and vomiting. Genitourinary: Negative for dysuria, flank pain, frequency, hematuria and urgency. Musculoskeletal: Negative for back pain, joint pain, myalgias and neck pain. Skin: Negative for itching and rash. Objective:   /60   Pulse 83   Temp 98.4 °F (36.9 °C) (Oral)   Resp 18   Ht 5' 2\" (1.575 m)   Wt 247 lb 8 oz (112.3 kg)   SpO2 96%   BMI 45.27 kg/m²       General appearance - alert, well appearing, and in no distress  Mental status - alert, oriented to person, place, and time  Eyes - pupils equal and reactive, extraocular eye movements intact  Mouth - mucous membranes moist, pharynx normal without lesions  Chest - Wound on the right side with packing. The redness extending to the back. Painful to touch.  Wound vac draining serosanginous fluid  Heart - normal rate, regular rhythm, normal S1, S2, no murmurs, rubs, clicks or gallops  Abdomen - soft, nontender, nondistended, no masses or organomegaly  Neurological - alert, oriented, normal speech, no focal findings or movement disorder noted  Musculoskeletal - no joint tenderness, deformity or swelling  Extremities ID before d/c  2. Septic shock resolved. Patient is on midodrine as needed. Continue ibuprofen and fluids. Patient is agreeable to dressing changes  3. Will continue to monitor for any signs of infection. Lactic acid is 2.9. WBC is trending down. Today WBC is 8.5  We will continue supportive care. 3.  Patient is on 20 units of Lantus with medium dose sliding scale. Glucose under control. We'll continue to monitor. 4.  On Lovenox 40 mg subcu for DVT prophylaxis  5. Okay to discharge pending ID    Leandro Mendez MD  PGY-1, Department of Internal Medicine  [place], [place]  10/1/2018 4:07 PM      Attending Physician Statement  I have discussed the care of Perlita Santizo, including pertinent history and exam findings with the resident. I have reviewed the key elements of all parts of the encounter with the resident. I have seen and examined the patient with the resident and the key elements of all parts of the encounter have been performed by me.         Дмитрий Willis MD  Attending and Faculty Internal Medicine  [facility]  Internal Medicine [address] [place] [address], S..   10/1/18

## 2018-10-01 NOTE — PROGRESS NOTES
Intramuscular Once    sodium chloride flush  10 mL Intravenous 2 times per day    aspirin  81 mg Oral Daily    citalopram  40 mg Oral Daily    montelukast  10 mg Oral Nightly    omeprazole  20 mg Oral Daily    pravastatin  10 mg Oral Daily    sodium chloride flush  10 mL Intravenous 2 times per day     Thank you for allowing us to participate in the care of this patient. Please call with questions. [facility]  Hazel Grimes MD  Perfect [employer] messaging  OFFICE: [phone]  CELL:     [phone]    Electronically signed by Hazel Grimes MD on 10/1/2018 at 4:10 PM    This note is created with the assistance of a speech recognition program.  While intending to generate a document that actually reflects the content of the visit, the document can still have some errors including those of syntax and sound a like substitutions which may escape proof reading. It such instances, actual meaning can be extrapolated by contextual diversion.

## 2018-10-02 VITALS
HEART RATE: 75 BPM | BODY MASS INDEX: 45.54 KG/M2 | DIASTOLIC BLOOD PRESSURE: 51 MMHG | SYSTOLIC BLOOD PRESSURE: 123 MMHG | HEIGHT: 62 IN | WEIGHT: 247.5 LBS | RESPIRATION RATE: 15 BRPM | OXYGEN SATURATION: 99 % | TEMPERATURE: 98.4 F

## 2018-10-02 LAB
CULTURE: ABNORMAL
CULTURE: NORMAL
DIRECT EXAM: ABNORMAL
DIRECT EXAM: NORMAL
DIRECT EXAM: NORMAL
GLUCOSE BLD-MCNC: 126 MG/DL (ref 65–105)
GLUCOSE BLD-MCNC: 84 MG/DL (ref 65–105)
Lab: ABNORMAL
Lab: NORMAL
ORGANISM: ABNORMAL
SPECIMEN DESCRIPTION: ABNORMAL
SPECIMEN DESCRIPTION: NORMAL
STATUS: ABNORMAL
STATUS: NORMAL

## 2018-10-02 PROCEDURE — 6370000000 HC RX 637 (ALT 250 FOR IP): Performed by: STUDENT IN AN ORGANIZED HEALTH CARE EDUCATION/TRAINING PROGRAM

## 2018-10-02 PROCEDURE — 6360000002 HC RX W HCPCS: Performed by: STUDENT IN AN ORGANIZED HEALTH CARE EDUCATION/TRAINING PROGRAM

## 2018-10-02 PROCEDURE — 6370000000 HC RX 637 (ALT 250 FOR IP): Performed by: INTERNAL MEDICINE

## 2018-10-02 PROCEDURE — G0008 ADMIN INFLUENZA VIRUS VAC: HCPCS | Performed by: INTERNAL MEDICINE

## 2018-10-02 PROCEDURE — 97535 SELF CARE MNGMENT TRAINING: CPT

## 2018-10-02 PROCEDURE — 6360000002 HC RX W HCPCS: Performed by: INTERNAL MEDICINE

## 2018-10-02 PROCEDURE — 99232 SBSQ HOSP IP/OBS MODERATE 35: CPT | Performed by: INTERNAL MEDICINE

## 2018-10-02 PROCEDURE — 99239 HOSP IP/OBS DSCHRG MGMT >30: CPT | Performed by: INTERNAL MEDICINE

## 2018-10-02 PROCEDURE — 90686 IIV4 VACC NO PRSV 0.5 ML IM: CPT | Performed by: INTERNAL MEDICINE

## 2018-10-02 PROCEDURE — 2580000003 HC RX 258: Performed by: STUDENT IN AN ORGANIZED HEALTH CARE EDUCATION/TRAINING PROGRAM

## 2018-10-02 PROCEDURE — 82947 ASSAY GLUCOSE BLOOD QUANT: CPT

## 2018-10-02 PROCEDURE — 2580000003 HC RX 258: Performed by: INTERNAL MEDICINE

## 2018-10-02 RX ORDER — CEPHALEXIN 500 MG/1
500 CAPSULE ORAL 4 TIMES DAILY
Qty: 56 CAPSULE | Refills: 0
Start: 2018-10-02 | End: 2018-10-16

## 2018-10-02 RX ADMIN — CEFAZOLIN SODIUM 1 G: 1 INJECTION, SOLUTION INTRAVENOUS at 13:17

## 2018-10-02 RX ADMIN — Medication 10 ML: at 12:36

## 2018-10-02 RX ADMIN — OXYCODONE HYDROCHLORIDE AND ACETAMINOPHEN 1 TABLET: 5; 325 TABLET ORAL at 10:11

## 2018-10-02 RX ADMIN — Medication 10 ML: at 09:30

## 2018-10-02 RX ADMIN — CITALOPRAM 40 MG: 20 TABLET, FILM COATED ORAL at 10:10

## 2018-10-02 RX ADMIN — PRAVASTATIN SODIUM 10 MG: 20 TABLET ORAL at 10:10

## 2018-10-02 RX ADMIN — OMEPRAZOLE 20 MG: 20 CAPSULE, DELAYED RELEASE ORAL at 10:10

## 2018-10-02 RX ADMIN — ASPIRIN 81 MG: 81 TABLET ORAL at 10:10

## 2018-10-02 RX ADMIN — GABAPENTIN 300 MG: 300 CAPSULE ORAL at 10:10

## 2018-10-02 RX ADMIN — ENOXAPARIN SODIUM 30 MG: 30 INJECTION SUBCUTANEOUS at 10:12

## 2018-10-02 RX ADMIN — OXYCODONE HYDROCHLORIDE AND ACETAMINOPHEN 1 TABLET: 5; 325 TABLET ORAL at 04:01

## 2018-10-02 RX ADMIN — CEFAZOLIN SODIUM 1 G: 1 INJECTION, SOLUTION INTRAVENOUS at 06:49

## 2018-10-02 RX ADMIN — INFLUENZA A VIRUS A/MICHIGAN/45/2015 X-275 (H1N1) ANTIGEN (FORMALDEHYDE INACTIVATED), INFLUENZA A VIRUS A/SINGAPORE/INFIMH-16-0019/2016 IVR-186 (H3N2) ANTIGEN (FORMALDEHYDE INACTIVATED), INFLUENZA B VIRUS B/PHUKET/3073/2013 ANTIGEN (FORMALDEHYDE INACTIVATED), AND INFLUENZA B VIRUS B/MARYLAND/15/2016 BX-69A ANTIGEN (FORMALDEHYDE INACTIVATED) 0.5 ML: 15; 15; 15; 15 INJECTION, SUSPENSION INTRAMUSCULAR at 10:12

## 2018-10-02 ASSESSMENT — PAIN DESCRIPTION - PROGRESSION: CLINICAL_PROGRESSION: RAPIDLY IMPROVING

## 2018-10-02 ASSESSMENT — ENCOUNTER SYMPTOMS
COLOR CHANGE: 1
CHEST TIGHTNESS: 0
DIARRHEA: 0

## 2018-10-02 ASSESSMENT — PAIN SCALES - GENERAL
PAINLEVEL_OUTOF10: 8
PAINLEVEL_OUTOF10: 8

## 2018-10-02 NOTE — CARE COORDINATION
Transition planning toby yin, recieved call from dinora with mmo agreeable with snf called toby yin admissions left vm to go ahead with submitting precert  63:62 received call from ghanshyam with toby have approval to go to snf ps dr jacobsen.  Transport [facility] @2:00   1:44 faxed dc paperwork

## 2018-10-02 NOTE — PROGRESS NOTES
Occupational Therapy  Facility/Department: [facility]  Daily Treatment Note  NAME: Javier Salgado  : 1955  MRN: [id number]    Date of Service: 10/2/2018    Discharge Recommendations:  Home with nursing aide, Home with Home health OT       Patient Diagnosis(es): The primary encounter diagnosis was Septicemia ([facility] [address] Diagnoses of Cellulitis and abscess of trunk, Leukocytosis, unspecified type, Lactate blood increased, and Cellulitis of right breast were also pertinent to this visit. has a past medical history of Anemia; Anxiety disorder; Arthritis; Asthma; Blood transfusion reaction; COPD (chronic obstructive pulmonary disease) ([facility] [address] Depression; Diabetes mellitus ([facility] GERD (gastroesophageal reflux disease); Hammer toe of right foot; Hernia, abdominal; Hyperlipidemia; Hypertension; Restless leg syndrome; Seasonal allergies; Sleep apnea; and Spondylosis. has a past surgical history that includes Appendectomy; Rotator cuff repair (Right); and Colonoscopy. Restrictions  Restrictions/Precautions  Restrictions/Precautions: Fall Risk  Required Braces or Orthoses?: No  Position Activity Restriction  Other position/activity restrictions: up as tolerated  Subjective   General  Patient assessed for rehabilitation services?: Yes  Family / Caregiver Present: No  Pain Assessment  Patient Currently in Pain: Denies  Clinical Progression: Rapidly improving  Pain Intervention(s): Distraction; Ambulation/Increased activity  Response to Pain Intervention: Patient Satisfied  BP Location: Left upper arm  Level of Consciousness: Alert  Patient Currently in Pain: Denies  Oxygen Therapy  O2 Device: None (Room air)   Orientation  Orientation  Overall Orientation Status: Within Functional Limits  Objective    ADL  Grooming: Stand by assistance;Setup (standing at sink to complete hand hygiene following toileting)  UE Bathing: Setup;Minimal assistance (writer assist w/back, seated in chair at sink)  LE Bathing: Stand by well  Safety Devices  Safety Devices in place: Yes  Type of devices: Nurse notified; Left in bed;Gait belt;Call light within reach; All fall risk precautions in place          Plan   Plan  Times per week: 4-5x  Current Treatment Recommendations: Balance Training, Endurance Training, Functional Mobility Training, Patient/Caregiver Education & Training, Equipment Evaluation, Education, & procurement, Safety Education & Training, Self-Care / ADL  Goals  Short term goals  Time Frame for Short term goals: By discharge pt will. ..   Short term goal 1: demo independent functional transfers/mobility with mod I  Short term goal 2: demo UB ADL with set up and SBA  Short term goal 3: demo LB ADL with set up and min A  Short term goal 4: demo 10 minutes standing tolerance to engage in functional tasks given supervision  Short term goal 5: demo 30 minutes of activity tolerance to increase participation in ADLs/IADLs       Therapy Time   Individual Concurrent Group Co-treatment   Time In  0859         Time Out  901 cloud[employer], [place]/L

## 2018-10-05 NOTE — DISCHARGE SUMMARY
[address] [place] [address]     Department of Internal Medicine - Staff Internal Medicine Service    INPATIENT DISCHARGE SUMMARY      PATIENT IDENTIFICATION:  NAME:  Syeda Suárez   :   1955  MRN:    [id number]     Acct:    [de-identified]   Admit Date:  2018  Discharge date:  10/2/2018  3:04 PM   Attending Provider: No att. providers found                                     REASON FOR HOSPITALIZATION:   Syeda Suárez is a 65 y.o. female  presented with complaits of a painful red area on her right chest wall. DIAGNOSES:  Primary:   Sepsis ([address] [A41.9]    Secondary: Active Hospital Problems    Diagnosis Date Noted    Cellulitis and abscess of trunk [L03.319, L02.219]     SIRS due to infectious process without acute organ dysfunction ([address] [A41.9]     Septicemia ([address] [A41.9]     Cellulitis of right breast [N61.0] 2018    Lactic acidosis [E87.2]     Morbid obesity ([address] [E66.01] 2018    Diabetes mellitus ([facility] [address] [E11.9] 2018    Cellulitis of right lower extremity- resolving [L03.115] 2018    ARLYN (obstructive sleep apnea) [G47.33] 2018       TREATMENT:  Brief Inpatient Course:   Patient is 62F w/ PMH PMH DM2, RLE cellulitis, HTN, HLD, asthma, ARLYN, OA presented with painful area of redness on chest wall. Patient reported 2 d ago there was a small red spot over right chest wall which grew in size and worsened with pain when lifting arm as well. She was not compliant with her DM meds and was being seen by wound care for RLE cellulitis. On admission, patient was noted to have lactic acidosis 10.3, HARRISON w/ Cr 1.86, hypotensive with SBP 70, and Tmax 101.6. Patient given 5L NS without improvement of blood pressure but decrease of lactic acidosis. Vanc and Zosyn started and pt transferred to ICU on pressor support. In ICU pt given more fluid boluses and BP improved. I and D done and culture sent for sensitivities.  Insulin gtt started for glucose control then

## 2018-10-08 PROBLEM — K21.9 GASTROESOPHAGEAL REFLUX DISEASE: Status: ACTIVE | Noted: 2018-09-06

## 2018-10-08 PROBLEM — D50.9 IRON DEFICIENCY ANEMIA: Status: ACTIVE | Noted: 2018-10-08

## 2018-10-08 PROBLEM — M51.26 DISPLACEMENT OF LUMBAR INTERVERTEBRAL DISC WITHOUT MYELOPATHY: Status: ACTIVE | Noted: 2018-10-08

## 2018-10-08 PROBLEM — E03.9 HYPOTHYROID: Status: ACTIVE | Noted: 2018-10-08

## 2018-10-08 PROBLEM — E78.00 PURE HYPERCHOLESTEROLEMIA: Status: ACTIVE | Noted: 2018-10-08

## 2018-10-08 PROBLEM — E11.65 HYPERGLYCEMIA DUE TO TYPE 2 DIABETES MELLITUS (HCC): Status: ACTIVE | Noted: 2018-10-08

## 2018-10-08 PROBLEM — D50.9 ANEMIA, IRON DEFICIENCY: Status: ACTIVE | Noted: 2018-10-08

## 2018-10-08 PROBLEM — D69.6 THROMBOCYTOPENIA (HCC): Status: ACTIVE | Noted: 2018-10-08

## 2018-10-08 PROBLEM — J45.909 ASTHMA: Status: ACTIVE | Noted: 2018-09-06

## 2018-10-08 PROBLEM — K76.9 DISORDER OF LIVER: Status: ACTIVE | Noted: 2018-09-06

## 2018-10-08 PROBLEM — J30.9 ATOPIC RHINITIS: Status: ACTIVE | Noted: 2018-09-06

## 2018-10-08 PROBLEM — M19.019 LOCALIZED, PRIMARY OSTEOARTHRITIS OF SHOULDER REGION: Status: ACTIVE | Noted: 2018-10-08

## 2018-10-08 PROBLEM — M79.7 FIBROMYALGIA: Status: ACTIVE | Noted: 2018-10-08

## 2018-10-08 PROBLEM — E78.5 HYPERLIPIDEMIA: Status: ACTIVE | Noted: 2018-09-06

## 2018-10-08 PROBLEM — N63.10 MASS OF RIGHT BREAST: Status: ACTIVE | Noted: 2018-10-08

## 2018-10-08 PROBLEM — M75.120 FULL THICKNESS ROTATOR CUFF TEAR: Status: ACTIVE | Noted: 2018-10-08

## 2018-10-08 PROBLEM — N39.0 ACUTE URINARY TRACT INFECTION: Status: ACTIVE | Noted: 2018-09-06

## 2018-10-08 PROBLEM — G43.909 MIGRAINE: Status: ACTIVE | Noted: 2018-09-06

## 2018-10-08 PROBLEM — D64.9 ANEMIA: Status: ACTIVE | Noted: 2018-09-06

## 2018-10-08 PROBLEM — E11.8 DISORDER ASSOCIATED WITH TYPE 2 DIABETES MELLITUS (HCC): Status: ACTIVE | Noted: 2018-10-08

## 2018-10-15 ENCOUNTER — OFFICE VISIT (OUTPATIENT)
Dept: INFECTIOUS DISEASES | Age: 63
End: 2018-10-15
Payer: MEDICARE

## 2018-10-15 VITALS
HEIGHT: 62 IN | RESPIRATION RATE: 14 BRPM | WEIGHT: 230 LBS | BODY MASS INDEX: 42.33 KG/M2 | DIASTOLIC BLOOD PRESSURE: 63 MMHG | HEART RATE: 72 BPM | OXYGEN SATURATION: 95 % | SYSTOLIC BLOOD PRESSURE: 128 MMHG

## 2018-10-15 DIAGNOSIS — L03.115 CELLULITIS AND ABSCESS OF RIGHT LEG: ICD-10-CM

## 2018-10-15 DIAGNOSIS — L02.213 CHEST WALL ABSCESS: Primary | ICD-10-CM

## 2018-10-15 DIAGNOSIS — L02.415 CELLULITIS AND ABSCESS OF RIGHT LEG: ICD-10-CM

## 2018-10-15 DIAGNOSIS — N61.0 CELLULITIS OF RIGHT BREAST: ICD-10-CM

## 2018-10-15 PROCEDURE — 99214 OFFICE O/P EST MOD 30 MIN: CPT | Performed by: INTERNAL MEDICINE

## 2018-10-15 RX ORDER — CEFAZOLIN SODIUM 1 G/3ML
2 INJECTION, POWDER, FOR SOLUTION INTRAMUSCULAR; INTRAVENOUS EVERY 8 HOURS
Qty: 84000 MG | Refills: 0 | Status: SHIPPED | OUTPATIENT
Start: 2018-10-15 | End: 2018-10-29

## 2018-10-15 ASSESSMENT — ENCOUNTER SYMPTOMS
RESPIRATORY NEGATIVE: 1
EYES NEGATIVE: 1
ALLERGIC/IMMUNOLOGIC NEGATIVE: 1
GASTROINTESTINAL NEGATIVE: 1
COLOR CHANGE: 1

## 2018-10-15 NOTE — PROGRESS NOTES
Social History Narrative    No narrative on file       Family History:     Family History   Problem Relation Age of Onset    Heart Disease Mother     Pacemaker Mother     Hypertension Mother     Diabetes Father     Breast Cancer Neg Hx         Allergies:   Nsaids; Sulfa antibiotics; and Tape [adhesive tape]     Review of Systems:   Review of Systems   Constitutional: Negative. HENT: Negative. Eyes: Negative. Respiratory: Negative. Cardiovascular: Negative. Gastrointestinal: Negative. Endocrine: Negative. Genitourinary: Negative. Musculoskeletal: Negative. Skin: Positive for color change and wound. Right breast continues to be pain, right chest wall with 2 Penrose and minimal drainage, but the large induration. Probably 4 x 4 inches   Allergic/Immunologic: Negative. Neurological: Negative. Hematological: Negative. Psychiatric/Behavioral: Negative. Physical Examination :   Blood pressure 128/63, pulse 72, resp. rate 14, height 5' 2\" (1.575 m), weight 230 lb (104.3 kg), SpO2 95 %. Physical Exam   Constitutional: She is oriented to person, place, and time. She appears well-developed and well-nourished. No distress. HENT:   Head: Normocephalic and atraumatic. Mouth/Throat: No oropharyngeal exudate. Eyes: Conjunctivae and EOM are normal. No scleral icterus. Neck: Neck supple. No tracheal deviation present. No thyromegaly present. Cardiovascular: Regular rhythm and normal heart sounds. No murmur heard. Pulmonary/Chest: Effort normal. No respiratory distress. She has no wheezes. Abdominal: Soft. She exhibits no distension. Musculoskeletal: Normal range of motion. She exhibits no edema. Neurological: She is oriented to person, place, and time. No cranial nerve deficit. Coordination normal.   Skin: Skin is warm and dry. No erythema. Psychiatric: She has a normal mood and affect.  Her behavior is normal.         Medical Decision Making:   I

## 2018-10-17 ENCOUNTER — OFFICE VISIT (OUTPATIENT)
Dept: SURGERY | Age: 63
End: 2018-10-17
Payer: MEDICARE

## 2018-10-17 ENCOUNTER — TELEPHONE (OUTPATIENT)
Dept: INFECTIOUS DISEASES | Age: 63
End: 2018-10-17

## 2018-10-17 VITALS
HEART RATE: 69 BPM | HEIGHT: 62 IN | DIASTOLIC BLOOD PRESSURE: 59 MMHG | TEMPERATURE: 98.9 F | BODY MASS INDEX: 41.77 KG/M2 | SYSTOLIC BLOOD PRESSURE: 130 MMHG | WEIGHT: 227 LBS

## 2018-10-17 DIAGNOSIS — Z48.89 POSTOPERATIVE VISIT: Primary | ICD-10-CM

## 2018-10-17 DIAGNOSIS — L02.91 ABSCESS: Primary | ICD-10-CM

## 2018-10-17 PROCEDURE — 99212 OFFICE O/P EST SF 10 MIN: CPT | Performed by: SURGERY

## 2018-10-17 RX ORDER — CEPHALEXIN 500 MG/1
500 CAPSULE ORAL 4 TIMES DAILY
Qty: 84 CAPSULE | Refills: 0 | Status: SHIPPED | OUTPATIENT
Start: 2018-10-17 | End: 2018-11-07

## 2018-10-17 RX ORDER — CEPHALEXIN 250 MG/1
500 CAPSULE ORAL 4 TIMES DAILY
Qty: 168 CAPSULE | Refills: 0 | OUTPATIENT
Start: 2018-10-17 | End: 2018-10-17

## 2018-10-17 NOTE — PROGRESS NOTES
injection Infuse 2,000 mg intravenously every 8 hours for 14 days 95958 mg 0    diclofenac sodium (VOLTAREN) 1 % GEL apply 2-4 grams to bilateral shoulders and left knee      metoprolol succinate (TOPROL XL) 25 MG extended release tablet TAKE ONE TABLET BY MOUTH DAILY      pravastatin (PRAVACHOL) 10 MG tablet TAKE ONE TABLET BY MOUTH DAILY      omeprazole 20 MG EC tablet TAKE 1 CAPSULE DAILY      loratadine-pseudoephedrine (LORATADINE-D 24HR)  MG per extended release tablet TAKE ONE TABLET BY MOUTH DAILY      lisinopril (PRINIVIL;ZESTRIL) 20 MG tablet TAKE 1 TABLET DAILY (NO FURTHER REFILLS, NEED LABS/APPT, CONTACT OFFICE FOR INSTRUCTIONS)      Insulin Syringe-Needle U-100 27G X 1/2\" 0.5 ML MISC 20 units      Handicap Placard MISC -      Fe Bisgly-Vit C-Vit B12-FA (GENTLE IRON) 28-60-0.008-0.4 MG CAPS 1 capsule      acetaminophen (TYLENOL) 325 MG tablet Take 2 tablets by mouth every 4 hours as needed for Pain or Fever 30 tablet 0    insulin glargine (LANTUS) 100 UNIT/ML injection vial Inject 20 Units into the skin nightly 1 vial 3    acetic acid 0.25 % irrigation Clean and do wet to dry t the right inner leg wounds, bid 1000 mL 0    diclofenac sodium 1 % GEL Apply 2 g topically 2 times daily      docusate sodium (COLACE) 100 MG capsule Take 100 mg by mouth 2 times daily      aspirin 81 MG EC tablet Take 81 mg by mouth daily.  citalopram (CELEXA) 40 MG tablet Take 40 mg by mouth daily.  fluticasone (FLONASE) 50 MCG/ACT nasal spray 1 spray by Nasal route 2 times daily as needed for Rhinitis.  metFORMIN ER (GLUCOPHAGE-XR) 500 MG XR tablet Take 1,000 mg by mouth 2 times daily (with meals).  gabapentin (NEURONTIN) 300 MG capsule Take 300 mg by mouth 2 times daily.  omeprazole (PRILOSEC) 20 MG capsule Take 20 mg by mouth daily.  montelukast (SINGULAIR) 10 MG tablet Take 10 mg by mouth nightly. No current facility-administered medications for this visit. wall. Appears nonerythematous , with mild induration and continued gapping of incision site    IMAGING:      LABS:  @LABRCNT*@      ASSESSMENT:  This is a 65 y.o. female presenting for 2 week follow-up from hospital discharge s/p surgical debridement and placement of penrose drain for cellulitis and abscess with severe sepsis and septic shock. Incision site appears clean, dry, nonerythematous, with continued mild induration. She was seen by ID on 10/15/18 and will be continuing Ancef IV via midline for another 2 weeks and will f/u with ID on 10/31/18. PLAN:  1. Continue Ancef per ID for 2 weeks  2. Continue penrose drain for now. 3. Follow-up with [facility] clinic in 2 weeks for reevaluation  4. May shower with soap and water. Allow water to run over incision site. Do not soak. We will order the following:  No orders of the defined types were placed in this encounter. she is instructed to follow up in 2 weeks. Discussed plan with patient and all questions answered. Paulette Terry  10/17/2018   Attending Physician Statement  I have discussed the case with Dr Flavio Rojas, including pertinent history and exam findings with the resident. I have seen and examined the patient and the key elements of the encounter have been performed by me. I agree with the assessment, plan and orders as documented by the resident.       Electronically signed by Keyana Holland DO  on 10/17/2018 at 12:12 PM

## 2018-10-17 NOTE — PATIENT INSTRUCTIONS
Thank you letting us take care of you today. We hope that all your questions were addressed. If a question was overlooked or something else comes to mind after you return home, please call our office at [phone]. If you need to cancel or change an appointment, surgery or procedure, please contact the office at [phone].

## 2018-11-07 ENCOUNTER — OFFICE VISIT (OUTPATIENT)
Dept: SURGERY | Age: 63
End: 2018-11-07
Payer: MEDICARE

## 2018-11-07 ENCOUNTER — HOSPITAL ENCOUNTER (OUTPATIENT)
Age: 63
Setting detail: SPECIMEN
Discharge: HOME | End: 2018-11-07
Payer: MEDICARE

## 2018-11-07 VITALS
DIASTOLIC BLOOD PRESSURE: 70 MMHG | SYSTOLIC BLOOD PRESSURE: 124 MMHG | HEIGHT: 62 IN | TEMPERATURE: 98.9 F | WEIGHT: 206 LBS | HEART RATE: 70 BPM | BODY MASS INDEX: 37.91 KG/M2

## 2018-11-07 DIAGNOSIS — Z51.89 WOUND CHECK, ABSCESS: Primary | ICD-10-CM

## 2018-11-07 DIAGNOSIS — L03.115 CELLULITIS AND ABSCESS OF RIGHT LEG: ICD-10-CM

## 2018-11-07 DIAGNOSIS — N61.0 CELLULITIS OF RIGHT BREAST: ICD-10-CM

## 2018-11-07 DIAGNOSIS — L02.213 CHEST WALL ABSCESS: ICD-10-CM

## 2018-11-07 DIAGNOSIS — L02.415 CELLULITIS AND ABSCESS OF RIGHT LEG: ICD-10-CM

## 2018-11-07 LAB
ABSOLUTE EOS #: 0.43 K/UL (ref 0–0.44)
ABSOLUTE IMMATURE GRANULOCYTE: <0.03 K/UL (ref 0–0.3)
ABSOLUTE LYMPH #: 2.14 K/UL (ref 1.1–3.7)
ABSOLUTE MONO #: 0.68 K/UL (ref 0.1–1.2)
BASOPHILS # BLD: 1 % (ref 0–2)
BASOPHILS ABSOLUTE: 0.06 K/UL (ref 0–0.2)
CREAT SERPL-MCNC: 0.63 MG/DL (ref 0.5–0.9)
DIFFERENTIAL TYPE: ABNORMAL
EOSINOPHILS RELATIVE PERCENT: 7 % (ref 1–4)
GFR AFRICAN AMERICAN: >60 ML/MIN
GFR NON-AFRICAN AMERICAN: >60 ML/MIN
GFR SERPL CREATININE-BSD FRML MDRD: NORMAL ML/MIN/{1.73_M2}
GFR SERPL CREATININE-BSD FRML MDRD: NORMAL ML/MIN/{1.73_M2}
HCT VFR BLD CALC: 37 % (ref 36.3–47.1)
HEMOGLOBIN: 11.3 G/DL (ref 11.9–15.1)
IMMATURE GRANULOCYTES: 0 %
LYMPHOCYTES # BLD: 33 % (ref 24–43)
MCH RBC QN AUTO: 27.2 PG (ref 25.2–33.5)
MCHC RBC AUTO-ENTMCNC: 30.5 G/DL (ref 28.4–34.8)
MCV RBC AUTO: 88.9 FL (ref 82.6–102.9)
MONOCYTES # BLD: 11 % (ref 3–12)
NRBC AUTOMATED: 0 PER 100 WBC
PDW BLD-RTO: 15.9 % (ref 11.8–14.4)
PLATELET # BLD: 115 K/UL (ref 138–453)
PLATELET ESTIMATE: ABNORMAL
PMV BLD AUTO: 11.2 FL (ref 8.1–13.5)
RBC # BLD: 4.16 M/UL (ref 3.95–5.11)
RBC # BLD: ABNORMAL 10*6/UL
SEG NEUTROPHILS: 48 % (ref 36–65)
SEGMENTED NEUTROPHILS ABSOLUTE COUNT: 3.18 K/UL (ref 1.5–8.1)
WBC # BLD: 6.5 K/UL (ref 3.5–11.3)
WBC # BLD: ABNORMAL 10*3/UL

## 2018-11-07 PROCEDURE — 99024 POSTOP FOLLOW-UP VISIT: CPT | Performed by: STUDENT IN AN ORGANIZED HEALTH CARE EDUCATION/TRAINING PROGRAM

## 2018-11-07 NOTE — PATIENT INSTRUCTIONS
Thank you letting us take care of you today. We hope that all your questions were addressed. If a question was overlooked or something else comes to mind after you return home, please call our office at [phone]. If you need to cancel or change an appointment, surgery or procedure, please contact the office at [phone].

## 2019-05-22 ENCOUNTER — APPOINTMENT (OUTPATIENT)
Dept: CT IMAGING | Age: 64
DRG: 988 | End: 2019-05-22
Payer: MEDICARE

## 2019-05-22 ENCOUNTER — HOSPITAL ENCOUNTER (INPATIENT)
Age: 64
LOS: 7 days | Discharge: LEFT BEFORE DISCHARGE | DRG: 988 | End: 2019-05-29
Attending: EMERGENCY MEDICINE | Admitting: INTERNAL MEDICINE
Payer: MEDICARE

## 2019-05-22 ENCOUNTER — APPOINTMENT (OUTPATIENT)
Dept: GENERAL RADIOLOGY | Age: 64
DRG: 988 | End: 2019-05-22
Payer: MEDICARE

## 2019-05-22 DIAGNOSIS — R41.82 ALTERED MENTAL STATUS, UNSPECIFIED ALTERED MENTAL STATUS TYPE: Primary | ICD-10-CM

## 2019-05-22 LAB
ALBUMIN SERPL-MCNC: 3.7 G/DL (ref 3.5–5.2)
ALBUMIN/GLOBULIN RATIO: ABNORMAL (ref 1–2.5)
ALP BLD-CCNC: 90 U/L (ref 35–104)
ALT SERPL-CCNC: 40 U/L (ref 5–33)
AMMONIA: 135 UMOL/L (ref 11–51)
AMPHETAMINE SCREEN URINE: NEGATIVE
AMYLASE: 546 U/L (ref 28–100)
ANION GAP SERPL CALCULATED.3IONS-SCNC: 15 MMOL/L (ref 9–17)
AST SERPL-CCNC: 67 U/L
BARBITURATE SCREEN URINE: NEGATIVE
BENZODIAZEPINE SCREEN, URINE: NEGATIVE
BILIRUB SERPL-MCNC: 1.07 MG/DL (ref 0.3–1.2)
BILIRUBIN URINE: NEGATIVE
BUN BLDV-MCNC: 35 MG/DL (ref 8–23)
BUN/CREAT BLD: 44 (ref 9–20)
BUPRENORPHINE URINE: NORMAL
CALCIUM SERPL-MCNC: 9.1 MG/DL (ref 8.6–10.4)
CANNABINOID SCREEN URINE: NEGATIVE
CHLORIDE BLD-SCNC: 114 MMOL/L (ref 98–107)
CO2: 21 MMOL/L (ref 20–31)
COCAINE METABOLITE, URINE: NEGATIVE
COLOR: YELLOW
COMMENT UA: ABNORMAL
CREAT SERPL-MCNC: 0.8 MG/DL (ref 0.5–0.9)
DATE, STOOL #1: NORMAL
DATE, STOOL #2: NORMAL
DATE, STOOL #3: NORMAL
GFR AFRICAN AMERICAN: >60 ML/MIN
GFR NON-AFRICAN AMERICAN: >60 ML/MIN
GFR SERPL CREATININE-BSD FRML MDRD: ABNORMAL ML/MIN/{1.73_M2}
GFR SERPL CREATININE-BSD FRML MDRD: ABNORMAL ML/MIN/{1.73_M2}
GLUCOSE BLD-MCNC: 175 MG/DL (ref 70–99)
GLUCOSE URINE: NEGATIVE
HCT VFR BLD CALC: 28.5 % (ref 36.3–47.1)
HEMOCCULT SP1 STL QL: NEGATIVE
HEMOCCULT SP2 STL QL: NORMAL
HEMOCCULT SP3 STL QL: NORMAL
HEMOGLOBIN: 8.9 G/DL (ref 11.9–15.1)
KETONES, URINE: ABNORMAL
LACTIC ACID: 3.4 MMOL/L (ref 0.5–2.2)
LEUKOCYTE ESTERASE, URINE: NEGATIVE
LIPASE: 19 U/L (ref 13–60)
MCH RBC QN AUTO: 27.8 PG (ref 25.2–33.5)
MCHC RBC AUTO-ENTMCNC: 31.2 G/DL (ref 28.4–34.8)
MCV RBC AUTO: 89.1 FL (ref 82.6–102.9)
MDMA URINE: NORMAL
METHADONE SCREEN, URINE: NEGATIVE
METHAMPHETAMINE, URINE: NORMAL
MYOGLOBIN: 516 NG/ML (ref 25–58)
NITRITE, URINE: NEGATIVE
NRBC AUTOMATED: 0 PER 100 WBC
OPIATES, URINE: NEGATIVE
OXYCODONE SCREEN URINE: NEGATIVE
PDW BLD-RTO: 15.1 % (ref 11.8–14.4)
PH UA: 6 (ref 5–8)
PHENCYCLIDINE, URINE: NEGATIVE
PLATELET # BLD: 146 K/UL (ref 138–453)
PMV BLD AUTO: 11 FL (ref 8.1–13.5)
POTASSIUM SERPL-SCNC: 3.9 MMOL/L (ref 3.7–5.3)
PROPOXYPHENE, URINE: NORMAL
PROTEIN UA: NEGATIVE
RBC # BLD: 3.2 M/UL (ref 3.95–5.11)
SODIUM BLD-SCNC: 150 MMOL/L (ref 135–144)
SPECIFIC GRAVITY UA: 1.02 (ref 1–1.03)
TEST INFORMATION: NORMAL
TIME, STOOL #1: 1450
TIME, STOOL #2: NORMAL
TIME, STOOL #3: NORMAL
TOTAL CK: 553 U/L (ref 26–192)
TOTAL PROTEIN: 6.7 G/DL (ref 6.4–8.3)
TRICYCLIC ANTIDEPRESSANTS, UR: NORMAL
TROPONIN INTERP: ABNORMAL
TROPONIN T: ABNORMAL NG/ML
TROPONIN, HIGH SENSITIVITY: 64 NG/L (ref 0–14)
TURBIDITY: CLEAR
URINE HGB: NEGATIVE
UROBILINOGEN, URINE: NORMAL
WBC # BLD: 10.7 K/UL (ref 3.5–11.3)

## 2019-05-22 PROCEDURE — 99223 1ST HOSP IP/OBS HIGH 75: CPT | Performed by: INTERNAL MEDICINE

## 2019-05-22 PROCEDURE — 2500000003 HC RX 250 WO HCPCS: Performed by: INTERNAL MEDICINE

## 2019-05-22 PROCEDURE — 2580000003 HC RX 258: Performed by: NURSE PRACTITIONER

## 2019-05-22 PROCEDURE — 71045 X-RAY EXAM CHEST 1 VIEW: CPT

## 2019-05-22 PROCEDURE — 82140 ASSAY OF AMMONIA: CPT

## 2019-05-22 PROCEDURE — 93005 ELECTROCARDIOGRAM TRACING: CPT | Performed by: NURSE PRACTITIONER

## 2019-05-22 PROCEDURE — 87086 URINE CULTURE/COLONY COUNT: CPT

## 2019-05-22 PROCEDURE — 83605 ASSAY OF LACTIC ACID: CPT

## 2019-05-22 PROCEDURE — 87040 BLOOD CULTURE FOR BACTERIA: CPT

## 2019-05-22 PROCEDURE — 85027 COMPLETE CBC AUTOMATED: CPT

## 2019-05-22 PROCEDURE — 2000000000 HC ICU R&B

## 2019-05-22 PROCEDURE — 2580000003 HC RX 258: Performed by: INTERNAL MEDICINE

## 2019-05-22 PROCEDURE — 74177 CT ABD & PELVIS W/CONTRAST: CPT

## 2019-05-22 PROCEDURE — 6360000004 HC RX CONTRAST MEDICATION: Performed by: NURSE PRACTITIONER

## 2019-05-22 PROCEDURE — 83874 ASSAY OF MYOGLOBIN: CPT

## 2019-05-22 PROCEDURE — 70450 CT HEAD/BRAIN W/O DYE: CPT

## 2019-05-22 PROCEDURE — 80307 DRUG TEST PRSMV CHEM ANLYZR: CPT

## 2019-05-22 PROCEDURE — 51702 INSERT TEMP BLADDER CATH: CPT

## 2019-05-22 PROCEDURE — G0328 FECAL BLOOD SCRN IMMUNOASSAY: HCPCS

## 2019-05-22 PROCEDURE — 81003 URINALYSIS AUTO W/O SCOPE: CPT

## 2019-05-22 PROCEDURE — 84484 ASSAY OF TROPONIN QUANT: CPT

## 2019-05-22 PROCEDURE — 82550 ASSAY OF CK (CPK): CPT

## 2019-05-22 PROCEDURE — 80053 COMPREHEN METABOLIC PANEL: CPT

## 2019-05-22 PROCEDURE — 83690 ASSAY OF LIPASE: CPT

## 2019-05-22 PROCEDURE — 36415 COLL VENOUS BLD VENIPUNCTURE: CPT

## 2019-05-22 PROCEDURE — 6370000000 HC RX 637 (ALT 250 FOR IP): Performed by: INTERNAL MEDICINE

## 2019-05-22 PROCEDURE — 82150 ASSAY OF AMYLASE: CPT

## 2019-05-22 PROCEDURE — 99285 EMERGENCY DEPT VISIT HI MDM: CPT

## 2019-05-22 PROCEDURE — 6360000002 HC RX W HCPCS: Performed by: INTERNAL MEDICINE

## 2019-05-22 RX ORDER — 0.9 % SODIUM CHLORIDE 0.9 %
80 INTRAVENOUS SOLUTION INTRAVENOUS ONCE
Status: COMPLETED | OUTPATIENT
Start: 2019-05-22 | End: 2019-05-22

## 2019-05-22 RX ORDER — OXYCODONE HYDROCHLORIDE AND ACETAMINOPHEN 5; 325 MG/1; MG/1
1 TABLET ORAL 3 TIMES DAILY PRN
COMMUNITY

## 2019-05-22 RX ORDER — BACLOFEN 10 MG/1
10 TABLET ORAL NIGHTLY
Status: ON HOLD | COMMUNITY
End: 2019-05-23

## 2019-05-22 RX ORDER — 0.9 % SODIUM CHLORIDE 0.9 %
30 INTRAVENOUS SOLUTION INTRAVENOUS ONCE
Status: COMPLETED | OUTPATIENT
Start: 2019-05-22 | End: 2019-05-22

## 2019-05-22 RX ORDER — SODIUM CHLORIDE 0.9 % (FLUSH) 0.9 %
10 SYRINGE (ML) INJECTION PRN
Status: DISCONTINUED | OUTPATIENT
Start: 2019-05-22 | End: 2019-05-29 | Stop reason: HOSPADM

## 2019-05-22 RX ORDER — SODIUM CHLORIDE 0.9 % (FLUSH) 0.9 %
10 SYRINGE (ML) INJECTION PRN
Status: DISCONTINUED | OUTPATIENT
Start: 2019-05-22 | End: 2019-05-22 | Stop reason: SDUPTHER

## 2019-05-22 RX ORDER — SODIUM CHLORIDE 0.9 % (FLUSH) 0.9 %
10 SYRINGE (ML) INJECTION EVERY 12 HOURS SCHEDULED
Status: DISCONTINUED | OUTPATIENT
Start: 2019-05-22 | End: 2019-05-29 | Stop reason: HOSPADM

## 2019-05-22 RX ORDER — LACTULOSE 10 G/15ML
30 SOLUTION ORAL DAILY
Status: DISCONTINUED | OUTPATIENT
Start: 2019-05-22 | End: 2019-05-23

## 2019-05-22 RX ADMIN — CEFTRIAXONE 1 G: 1 INJECTION, POWDER, FOR SOLUTION INTRAMUSCULAR; INTRAVENOUS at 20:51

## 2019-05-22 RX ADMIN — IOPAMIDOL 75 ML: 755 INJECTION, SOLUTION INTRAVENOUS at 15:38

## 2019-05-22 RX ADMIN — MICONAZOLE NITRATE: 20.6 POWDER TOPICAL at 21:05

## 2019-05-22 RX ADMIN — LACTULOSE 30 G: 20 SOLUTION ORAL at 19:44

## 2019-05-22 RX ADMIN — SODIUM CHLORIDE 80 ML: 9 INJECTION, SOLUTION INTRAVENOUS at 15:43

## 2019-05-22 RX ADMIN — SODIUM CHLORIDE 1850 ML: 9 INJECTION, SOLUTION INTRAVENOUS at 13:41

## 2019-05-22 RX ADMIN — SODIUM CHLORIDE: 234 INJECTION INTRAMUSCULAR; INTRAVENOUS; SUBCUTANEOUS at 18:39

## 2019-05-22 RX ADMIN — Medication 10 ML: at 15:43

## 2019-05-22 NOTE — ED NOTES
continues to be confused and calling out and repeating Oh God, yea. Over and over again. Does not follow commands. Moving all extremities and turns slef to side.      Pierre Carl RN  05/22/19 [id number]

## 2019-05-22 NOTE — ED PROVIDER NOTES
[facility] ICU  eMERGENCY dEPARTMENT eNCOUnter      Pt Name: Damaris Howell  MRN: [id number]  [place] 1955  Date of evaluation: 5/22/2019  Provider: Laci Mills       Chief Complaint   Patient presents with    Altered Mental Status         HISTORY OF PRESENT ILLNESS  (Location/Symptom, Timing/Onset, Context/Setting, Quality, Duration, Modifying Factors, Severity.)   Damaris Howell is a 59 y.o. female who presents to the emergency department   via EMS. She was found at home by her brother with altered mental status. He states he saw her on Sunday night and she was talking appropriately. He saw her on Monday and she was sitting on the couch she was not talking to him. He states that she has \"episodes\" where she stops talking and simply lays on the couch. He states this occurs over 2 days and that she suddenly wakes up and becomes active again. Today he went to check on her and found her in the same position on the couch that he left her in on Monday. She would not verbally respond to him other than to yell out \"hey\" and \"Yeah\". He states the house smelled heavily of urine and she apparently had gotten up and use the bedside commode at some point in time but also urinated on the couch as well. He also states that she has been taking gabapentin for her fibromyalgia over the gabapentin had made her \"goofy\" per her brother and then gabapentin was decreased from 300 mg 3 times a day to 300 mg daily. This occurred about a month ago. He said that recently she was having some right shoulder pain and that her fibromyalgia was \"flaring\" and she thought about increasing her Neurontin dosing. He is unsure if she returned to her 3 times a day dose of gabapentin. She is also on Percocet at home and he is unsure if she has been taking that also. Upon arrival the patient is randomly yelling out. She is unable to follow commands. She does not establish eye contact.       Nursing Notes were reviewed. ALLERGIES     Nsaids; Sulfa antibiotics; and Tape [adhesive tape]    CURRENT MEDICATIONS       Current Discharge Medication List      CONTINUE these medications which have NOT CHANGED    Details   oxyCODONE-acetaminophen (PERCOCET) 5-325 MG per tablet Take 1 tablet by mouth every 4 hours as needed for Pain. baclofen (LIORESAL) 10 MG tablet Take 10 mg by mouth nightly      !! diclofenac sodium (VOLTAREN) 1 % GEL apply 2-4 grams to bilateral shoulders and left knee      metoprolol succinate (TOPROL XL) 25 MG extended release tablet TAKE ONE TABLET BY MOUTH DAILY      pravastatin (PRAVACHOL) 10 MG tablet TAKE ONE TABLET BY MOUTH DAILY      omeprazole 20 MG EC tablet TAKE 1 CAPSULE DAILY      loratadine-pseudoephedrine (LORATADINE-D 24HR)  MG per extended release tablet TAKE ONE TABLET BY MOUTH DAILY      lisinopril (PRINIVIL;ZESTRIL) 20 MG tablet TAKE 1 TABLET DAILY (NO FURTHER REFILLS, NEED LABS/APPT, CONTACT OFFICE FOR INSTRUCTIONS)      Insulin Syringe-Needle U-100 27G X 1/2\" 0.5 ML MISC 20 units      Handicap Placard MISC -      Fe Bisgly-Vit C-Vit B12-FA (GENTLE IRON) 28-60-0.008-0.4 MG CAPS 1 capsule      acetaminophen (TYLENOL) 325 MG tablet Take 2 tablets by mouth every 4 hours as needed for Pain or Fever  Qty: 30 tablet, Refills: 0      insulin glargine (LANTUS) 100 UNIT/ML injection vial Inject 20 Units into the skin nightly  Qty: 1 vial, Refills: 3      acetic acid 0.25 % irrigation Clean and do wet to dry t the right inner leg wounds, bid  Qty: 1000 mL, Refills: 0      !! diclofenac sodium 1 % GEL Apply 2 g topically 2 times daily      docusate sodium (COLACE) 100 MG capsule Take 100 mg by mouth 2 times daily      aspirin 81 MG EC tablet Take 81 mg by mouth daily. citalopram (CELEXA) 40 MG tablet Take 40 mg by mouth daily. fluticasone (FLONASE) 50 MCG/ACT nasal spray 1 spray by Nasal route 2 times daily as needed for Rhinitis.       metFORMIN ER (GLUCOPHAGE-XR) 500 MG XR tablet Take 1,000 mg by mouth 2 times daily (with meals). gabapentin (NEURONTIN) 300 MG capsule Take 300 mg by mouth 2 times daily. omeprazole (PRILOSEC) 20 MG capsule Take 20 mg by mouth daily. montelukast (SINGULAIR) 10 MG tablet Take 10 mg by mouth nightly. !! - Potential duplicate medications found. Please discuss with provider.           PAST MEDICAL HISTORY         Diagnosis Date    Acute urinary tract infection 9/6/2018    Anemia     h/o anemia,transfused 2/2014    Anemia, iron deficiency 10/8/2018    Anxiety disorder     can get SOB with an anxiety attack    Arthritis     Asthma     Atopic rhinitis 9/6/2018    Bandemia     Blood transfusion reaction     x 2 unit prbc's 2/2014    Cellulitis and abscess of trunk     Cellulitis of right breast 9/26/2018    Cellulitis of right lower extremity- resolving 8/9/2018    COPD (chronic obstructive pulmonary disease) ([address]     patient denies COPD, only has asthma    CRP elevated     Depression     Diabetes mellitus ([address]     Disorder associated with type 2 diabetes mellitus ([address] 10/8/2018    Disorder of liver 9/6/2018    Displacement of lumbar intervertebral disc without myelopathy 10/8/2018    Essential hypertension 8/9/2018    Fibromyalgia 10/8/2018    Full thickness rotator cuff tear 10/8/2018    Gastroesophageal reflux disease 9/6/2018    GERD (gastroesophageal reflux disease)     Hammer toe of right foot     Hernia, abdominal 1998    Hyperglycemia due to type 2 diabetes mellitus ([address] 10/8/2018    Hyperlipidemia     Hypertension     Hypothyroid 10/8/2018    Iron deficiency anemia 10/8/2018    Lactic acidosis     Localized, primary osteoarthritis of shoulder region 10/8/2018    Mass of right breast 10/8/2018    Migraine 9/6/2018    Morbid obesity ([address] 8/9/2018    ARLYN (obstructive sleep apnea) 8/9/2018    Osteoarthritis 9/26/2018    Pure hypercholesterolemia 10/8/2018    Restless leg syndrome  Seasonal allergies     [facility]icemia [facility]     SIRS due to infectious process without acute organ dysfunction (HCC)     Sleep apnea     doesn't use CPAP, sleeps in a recliner    Spondylosis     lower back    Thrombocytopenia ([facility] 10/8/2018       SURGICAL HISTORY           Procedure Laterality Date    APPENDECTOMY      COLONOSCOPY      ROTATOR CUFF REPAIR Right          FAMILY HISTORY           Problem Relation Age of Onset    Heart Disease Mother     Pacemaker Mother     Hypertension Mother     Diabetes Father     Breast Cancer Neg Hx      Family Status   Relation Name Status    Mother  Alive    Father  Alive    Neg Hx  (Not Specified)        SOCIAL HISTORY      reports that she has never smoked. She has never used smokeless tobacco. She reports that she does not drink alcohol or use drugs. REVIEW OF SYSTEMS    (2-9 systems for level 4, 10 or more for level 5)      Review of Systems   Unable to perform ROS: Mental status change         Except as noted above the remainder of the review of systems was reviewed and negative. PHYSICAL EXAM    (up to 7 for level 4, 8 or more for level 5)     ED Triage Vitals   BP Temp Temp Source Pulse Resp SpO2 Height Weight   05/22/19 1322 05/22/19 1335 05/22/19 1335 05/22/19 1322 05/22/19 1323 05/22/19 1322 05/22/19 1323 05/22/19 1323   (!) 164/65 99.9 °F (37.7 °C) Rectal 100 20 100 % 5' 7\" (1.702 m) 220 lb (99.8 kg)       Physical Exam   Constitutional: She appears well-developed and well-nourished. Patient is unable to establish eye contact or follow commands. Her eyes are open and she is repetitively saying \"yeah\"   HENT:   Head: Normocephalic and atraumatic. Nose: Nose normal.   Dried cracked lips are noted. Angular  chelitis is noted. Neck: Normal range of motion. Neck supple. Cardiovascular: Normal rate, regular rhythm, normal heart sounds and intact distal pulses.    Pulmonary/Chest: Effort normal and breath sounds normal. No respiratory distress. She has no wheezes. Breast exam reveals no palpable masses. No nipple drainage. Abdominal: Soft. Bowel sounds are normal.   Musculoskeletal:   No areas of edema, erythema or tenderness other than candida rash in the inguinal area both legs. No distal swelling. Neurological:   Does not establish eye contact. Unable to follow commands. Spontaneously moves about on the stretcher. Moving all extremities. Skin: Skin is warm and dry. Rash (Candida rash noted below both breasts and in both inguinal canals.) noted. Nursing note and vitals reviewed. DIAGNOSTIC RESULTS     EKG: All EKG's are interpreted by the Emergency Department Physician who either signs or Co-signs this chart in the absence of a cardiologist.         RADIOLOGY:   Non-plain film images such as CT, Ultrasound and MRI are read by the radiologist. Plain radiographic images are visualized and preliminarily interpreted by the emergency physician with the below findings:        Interpretation per the Radiologist below, if available at the time of this note:    CT ABDOMEN PELVIS W IV CONTRAST Additional Contrast? None   Final Result   1. No definite acute process in the abdomen or pelvis. 2.  Cirrhotic morphology of the liver with borderline enlarged spleen and   several retroperitoneal varices noted. No ascites. 3. Calcified gallbladder wall or peripherally calcified gallstones filling   the lumen of the gallbladder. No discrete mass identified. 4.  Mixed lytic and sclerotic lesions in the inferior left L3 vertebral body   and left iliac bone, concerning for metastases. Additional evaluation with   bone scan and/or MRI is recommended. 5.  Skin thickening at the visualized inferior right breast, which is   nonspecific. This could be related to a cellulitis, but possibility of an   inflammatory malignancy in the breast is not excluded.   Correlation with   physical exam as well as breast imaging is 19 1650 19 1703   BP: 134/60 (!) 141/75 (!) 133/58 136/60   Pulse: 97 95 98 95   Resp:  14   Temp:       TempSrc:       SpO2: 100% 98% 100% 100%   Weight:       Height:           Medical Decision Makin-year-old female with altered mental status. She is continued to be altered. Review of serum studies reveal an elevated ammonia, amylase, troponin lactic acid sodium level. She was treated with fluids for the elevated lactic acid. She remained hemodynamically stable but had very little improvemen  She'll be admitted to the hospitalt in status change. She continued to move about spontaneously and yell out spontaneously but was unable to follow directions. Blood pressure improved slightly during her ED stay. should be admitted to the hospital for further diagnosis of her altered mental status. CT did reveal lytic lesions of L3 and left iliac bone. It was also noted there was a inflammatory process of the right inferior breast.,  Inflammatory breast cancer with possible metastasis is of concern. CONSULTS:  IP CONSULT TO HOSPITALIST  IP CONSULT TO CARDIOLOGY  IP CONSULT TO GI  IP CONSULT TO NEPHROLOGY    PROCEDURES:  None    FINAL IMPRESSION      1.  Altered mental status, unspecified altered mental status type          DISPOSITION/PLAN   DISPOSITION Decision To Admit 2019 04:42:08 PM      PATIENT REFERRED TO:   [address] [place] [address]  [phone]            DISCHARGE MEDICATIONS:     Current Discharge Medication List              (Please note that portions of this note were completed with a voice recognition program.  Efforts were made to edit the dictations but occasionally words are mis-transcribed.)    ZACARIAS Lou CNP  Certified Nurse Practitioner         ZACARIAS Lou CNP  [phone]

## 2019-05-22 NOTE — ED NOTES
To Acute unit per cart with cardiac monitor. Neuro status unchanged.      Davian Light RN  05/22/19 [id number]

## 2019-05-22 NOTE — CONSULTS
INITIAL CONSULTATION     HISTORY OF PRESENT ILLNESS: Idania Zepeda is a 59 y.o. female admitted to the hospital on 5/22/2019 for confusion. She was brought in by family that she was found to be confused. Apparently she hasn't been doing well for the past couple of days. She has cirrhosis. No reports of bleeding. She was found to have sodium of 150. Elevated amylase.     PAST MEDICAL HISTORY:     Past Medical History:   Diagnosis Date    Acute urinary tract infection 9/6/2018    Anemia     h/o anemia,transfused 2/2014    Anemia, iron deficiency 10/8/2018    Anxiety disorder     can get SOB with an anxiety attack    Arthritis     Asthma     Atopic rhinitis 9/6/2018    Bandemia     Blood transfusion reaction     x 2 unit prbc's 2/2014    Cellulitis and abscess of trunk     Cellulitis of right breast 9/26/2018    Cellulitis of right lower extremity- resolving 8/9/2018    COPD (chronic obstructive pulmonary disease) ([address]     patient denies COPD, only has asthma    CRP elevated     Depression     Diabetes mellitus ([address]     Disorder associated with type 2 diabetes mellitus ([address] 10/8/2018    Disorder of liver 9/6/2018    Displacement of lumbar intervertebral disc without myelopathy 10/8/2018    Essential hypertension 8/9/2018    Fibromyalgia 10/8/2018    Full thickness rotator cuff tear 10/8/2018    Gastroesophageal reflux disease 9/6/2018    GERD (gastroesophageal reflux disease)     Hammer toe of right foot     Hernia, abdominal 1998    Hyperglycemia due to type 2 diabetes mellitus ([address] 10/8/2018    Hyperlipidemia     Hypertension     Hypothyroid 10/8/2018    Iron deficiency anemia 10/8/2018    Lactic acidosis     Localized, primary osteoarthritis of shoulder region 10/8/2018    Mass of right breast 10/8/2018    Migraine 9/6/2018    Morbid obesity ([address] 8/9/2018    ARLYN (obstructive sleep apnea) 8/9/2018    Osteoarthritis 9/26/2018    Pure hypercholesterolemia 10/8/2018    Restless leg syndrome     Seasonal allergies     [facility]icemia ([facility]     SIRS due to infectious process without acute organ dysfunction (HCC)     Sleep apnea     doesn't use CPAP, sleeps in a recliner    Spondylosis     lower back    Thrombocytopenia ([facility] 10/8/2018        Past Surgical History:   Procedure Laterality Date    APPENDECTOMY      COLONOSCOPY      ROTATOR CUFF REPAIR Right           CURRENT MEDICATIONS:       Current Facility-Administered Medications:     sodium chloride flush 0.9 % injection 10 mL, 10 mL, Intravenous, 2 times per day, Bernie Miramontes MD    sodium chloride flush 0.9 % injection 10 mL, 10 mL, Intravenous, PRN, Bernie Miramontes MD    sodium chloride 0.33 % 1,000 mL infusion, , Intravenous, Continuous, Osmar Palomo MD    miconazole (MICOTIN) 2 % powder, , Topical, BID, Bernie Miramontes MD       ALLERGIES:      Allergies   Allergen Reactions    Nsaids Other (See Comments)    Sulfa Antibiotics Other (See Comments) and Hives     Other reaction(s): Unknown  Patient unsure of the reaction    Tape Carmen Crate Tape] Rash     Other reaction(s): Unknown          FAMILY HISTORY: The patient's family history was reviewed. SOCIAL HISTORY:   Social History     Socioeconomic History    Marital status:       Spouse name: Not on file    Number of children: Not on file    Years of education: Not on file    Highest education level: Not on file   Occupational History    Not on file   Social Needs    Financial resource strain: Not on file    Food insecurity:     Worry: Not on file     Inability: Not on file    Transportation needs:     Medical: Not on file     Non-medical: Not on file   Tobacco Use    Smoking status: Never Smoker    Smokeless tobacco: Never Used   Substance and Sexual Activity    Alcohol use: No    Drug use: No    Sexual activity: Not on file   Lifestyle    Physical activity:     Days per week: Not on file     Minutes per session: Not on file  Stress: Not on file   Relationships    Social connections:     Talks on phone: Not on file     Gets together: Not on file     Attends Anabaptist service: Not on file     Active member of club or organization: Not on file     Attends meetings of clubs or organizations: Not on file     Relationship status: Not on file    Intimate partner violence:     Fear of current or ex partner: Not on file     Emotionally abused: Not on file     Physically abused: Not on file     Forced sexual activity: Not on file   Other Topics Concern    Not on file   Social History Narrative    Not on file         REVIEW OF SYSTEMS: A 12-point review of systems was obtained and pertinent positives andnegatives were enumerated above in the history of present illness. All other reviewed systems / symptoms were negative. Review of Systems      PHYSICAL EXAMINATION: Vital signs reviewed per the nursing documentation. /60   Pulse 95   Temp 99.9 °F (37.7 °C) (Rectal)   Resp 14   Ht 5' 7\" (1.702 m)   Wt 220 lb (99.8 kg)   SpO2 100%   BMI 34.46 kg/m²    [unfilled]   Body mass index is 34.46 kg/m². General:  Awake. Confused. Psych: . Normal affect. Mentation normal   HEENT: PERRLA. Clear conjunctivae and sclerae. Moist oral mucosae, no lesions orulcers. The neck is supple, without lymphadenopathy or jugular venous distension. No masses. Normal thyroid. Cardiovascular: S1 S2 RRR no rubs or murmurs. Pulmonary: clear BL. No accessory muscle usage. Abdominal Exam: Soft, NT ND, no hepato or spleno megaly, +BS, no ascites. Obese  No groin masses or lymphadenopathy. Extremities: No edema. Skin: Warm skin. No skin rash. No spider nevi palmar erythema naildystrophy. Joint: No joint swelling or deformity. Neurological: intact sensory. DTR+.  No asterixis     LABORATORY DATA: Reviewed   Lab Results   Component Value Date    WBC 10.7 05/22/2019    HGB 8.9 (L) 05/22/2019    HCT 28.5 (L) 05/22/2019    MCV 89.1 05/22/2019     05/22/2019     (H) 05/22/2019    K 3.9 05/22/2019     (H) 05/22/2019    CO2 21 05/22/2019    BUN 35 (H) 05/22/2019    CREATININE 0.80 05/22/2019    LABALBU 3.7 05/22/2019    BILITOT 1.07 05/22/2019    ALKPHOS 90 05/22/2019    AST 67 (H) 05/22/2019    ALT 40 (H) 05/22/2019    INR 1.3 09/26/2018      Lab Results   Component Value Date    RBC 3.20 (L) 05/22/2019    HGB 8.9 (L) 05/22/2019    MCV 89.1 05/22/2019    MCH 27.8 05/22/2019    MCHC 31.2 05/22/2019    RDW 15.1 (H) 05/22/2019    MPV 11.0 05/22/2019    BASOPCT 1 11/07/2018    LYMPHSABS 2.14 11/07/2018    MONOSABS 0.68 11/07/2018    NEUTROABS 3.18 11/07/2018    EOSABS 0.43 11/07/2018    BASOSABS 0.06 11/07/2018          DIAGNOSTIC TESTING:   Ct Head Wo Contrast    Result Date: 5/22/2019  EXAMINATION: CT OF THE HEAD WITHOUT CONTRAST  5/22/2019 1:31 pm TECHNIQUE: CT of the head was performed without the administration of intravenous contrast. Dose modulation, iterative reconstruction, and/or weight based adjustment of the mA/kV was utilized to reduce the radiation dose to as low as reasonably achievable. COMPARISON: None. HISTORY: ORDERING SYSTEM PROVIDED HISTORY: ENCEPHALOPATHY TECHNOLOGIST PROVIDED HISTORY: FINDINGS: BRAIN/VENTRICLES: There is no acute intracranial hemorrhage, mass effect or midline shift. No abnormal extra-axial fluid collection. The gray-white differentiation is maintained without evidence of an acute infarct. There is no evidence of hydrocephalus. ORBITS: The visualized portion of the orbits demonstrate no acute abnormality. SINUSES: The visualized paranasal sinuses and mastoid air cells demonstrate no acute abnormality. SOFT TISSUES/SKULL:  No acute abnormality of the visualized skull or soft tissues. No acute intracranial abnormality.      Ct Abdomen Pelvis W Iv Contrast Additional Contrast? None    Result Date: 5/22/2019  EXAMINATION: CT OF THE ABDOMEN AND PELVIS WITH CONTRAST 5/22/2019 3:34 pm TECHNIQUE: CT of the abdomen and pelvis was performed with the administration of intravenous contrast. Multiplanar reformatted images are provided for review. Dose modulation, iterative reconstruction, and/or weight based adjustment of the mA/kV was utilized to reduce the radiation dose to as low as reasonably achievable. COMPARISON: None. HISTORY: ORDERING SYSTEM PROVIDED HISTORY: elevated amylase, change in mental status TECHNOLOGIST PROVIDED HISTORY: FINDINGS: Lower Chest: There is skin thickening at the visualized inferior right breast.  The visualized lung bases are clear. Organs: The liver is small in size with a diffusely nodular contour, suggesting hepatic cirrhosis. No focal hepatic lesions are identified. There is peripheral calcification in the gallbladder, possibly gallbladder wall calcifications versus peripherally calcified gallstones filling the lumen. No pericholecystic inflammatory change. The gallbladder appears to be contracted. No intrahepatic or extrahepatic biliary dilatation. The spleen measures at the upper limits of normal, measuring 13.3 cm in AP dimension. There is no pancreatic ductal dilatation or peripancreatic inflammatory change. There is a small left adrenal nodule, measuring 1.2 cm, which is indeterminate. The right adrenal gland is unremarkable. There is symmetric enhancement of the kidneys. No hydronephrosis or perinephric inflammation. GI/Bowel: There is moderate fecal material in the rectum. No rectal wall thickening. Moderate fecal material is scattered throughout the colon. No focal pericolonic inflammation. No abnormal bowel distention. No free air or ascites. The appendix is surgically absent. Pelvis: Best catheter is in place in the urinary bladder. There is gas in the urinary bladder, likely due to recent intervention. No pelvic lymphadenopathy. The uterus and adnexal structures are grossly unremarkable.  Peritoneum/Retroperitoneum: The abdominal aorta is normal in caliber. There are several perisplenic and retroperitoneal venous varices. No evidence of retroperitoneal or mesenteric lymphadenopathy. Bones/Soft Tissues: There is a mixed lytic and sclerotic lesion in the inferior left L3 vertebral body. There is no significant vertebral body height loss. The L3-L4 disc space is only mildly narrowed. There is mild grade 1 anterolisthesis at L4-L5 and L5-S1, likely secondary to facet arthropathy. Irregular sclerotic focus is noted in the posterior left iliac bone as well. No acute osseous abnormality identified. 1.  No definite acute process in the abdomen or pelvis. 2.  Cirrhotic morphology of the liver with borderline enlarged spleen and several retroperitoneal varices noted. No ascites. 3. Calcified gallbladder wall or peripherally calcified gallstones filling the lumen of the gallbladder. No discrete mass identified. 4.  Mixed lytic and sclerotic lesions in the inferior left L3 vertebral body and left iliac bone, concerning for metastases. Additional evaluation with bone scan and/or MRI is recommended. 5.  Skin thickening at the visualized inferior right breast, which is nonspecific. This could be related to a cellulitis, but possibility of an inflammatory malignancy in the breast is not excluded. Correlation with physical exam as well as breast imaging is recommended. 6.  Indeterminate left adrenal nodule, most likely an adenoma. Follow-up with adrenal washout CT recommended in 1 year. 7.  Moderate stool burden. Xr Chest Portable    Result Date: 5/22/2019  EXAMINATION: ONE XRAY VIEW OF THE CHEST 5/22/2019 2:02 pm COMPARISON: Chest radiograph 09/27/2018 HISTORY: ORDERING SYSTEM PROVIDED HISTORY: sepsis TECHNOLOGIST PROVIDED HISTORY: sepsis Ordering Physician Provided Reason for Exam: SEPSIS PORT AP SUPINE Acuity: Unknown Type of Exam: Unknown FINDINGS: Clear lungs. No findings of pneumothorax or pleural effusion.   Normal mediastinal, hilar, and cardiac contours. Atherosclerotic calcification in the aorta. No acute fracture. No acute cardiopulmonary process. IMPRESSION: Ms. Germain Fabry is a 59 y.o. female with encephalopathy. Partly this is due to cirrhosis in addition to hyponatremia. Recommend correcting hyponatremia. We will start on lactulose and rifaximin me in. We will check for any infection. Avoid sedatives and pain medications. Thank you for allowing me to participate in the care of Ms. Germain Fabry. For any further questions please do not hesitate to contact me.        Marsha Wadsworth MD

## 2019-05-22 NOTE — ED NOTES
To CT per cart with monitior.    Coral Clarke RN  05/22/19 6514       Coral Clarke RN  05/22/19 4414

## 2019-05-22 NOTE — ED NOTES
ASSESSMENT:   Presented to ED per EMS from her home with alterede mental status. Nancy Mendoza states he spoke with her on phone on 5/19 and told him she didn't feel well. But otherwise they talked and she seemed fine. He went to her home on 5/20 and was on the couch and not resopnding well. Kept saying \"Oh GOD. OH GOD,  And YEA. YEA\". Not making sense. Or talking him him. He went home and saw her again yest and was the same. Told her if no better by today he was going to call 911. He went back to her home today and was the same as last 2 days. Called 911. shyla found her confused and not responding approp. Saying oh god and yea, yea. Eyes open. On admission to ED is the same. Foul odor of stool and urine. Patient incontinant of both. buttocks red but not open or bleeding. Color good, warm and  Dry. Had compression socks in place.      Davian Light RN  05/22/19 4340

## 2019-05-23 ENCOUNTER — APPOINTMENT (OUTPATIENT)
Dept: INTERVENTIONAL RADIOLOGY/VASCULAR | Age: 64
DRG: 988 | End: 2019-05-23
Payer: MEDICARE

## 2019-05-23 PROBLEM — G93.40 ENCEPHALOPATHY: Status: ACTIVE | Noted: 2019-05-23

## 2019-05-23 PROBLEM — R79.89 ELEVATED LFTS: Status: ACTIVE | Noted: 2019-05-23

## 2019-05-23 PROBLEM — E87.0 HYPERNATREMIA: Status: ACTIVE | Noted: 2019-05-23

## 2019-05-23 PROBLEM — K74.60 CIRRHOSIS (HCC): Status: ACTIVE | Noted: 2019-05-23

## 2019-05-23 LAB
ALBUMIN SERPL-MCNC: 3.1 G/DL (ref 3.5–5.2)
ALBUMIN/GLOBULIN RATIO: ABNORMAL (ref 1–2.5)
ALP BLD-CCNC: 81 U/L (ref 35–104)
ALT SERPL-CCNC: 39 U/L (ref 5–33)
AMMONIA: 146 UMOL/L (ref 11–51)
AMMONIA: 95 UMOL/L (ref 11–51)
AMYLASE: 561 U/L (ref 28–100)
ANION GAP SERPL CALCULATED.3IONS-SCNC: 10 MMOL/L (ref 9–17)
ANION GAP SERPL CALCULATED.3IONS-SCNC: 12 MMOL/L (ref 9–17)
AST SERPL-CCNC: 67 U/L
BILIRUB SERPL-MCNC: 0.83 MG/DL (ref 0.3–1.2)
BILIRUBIN DIRECT: 0.23 MG/DL
BILIRUBIN, INDIRECT: 0.6 MG/DL (ref 0–1)
BUN BLDV-MCNC: 31 MG/DL (ref 8–23)
C-REACTIVE PROTEIN: 12 MG/L (ref 0–5)
CALCIUM SERPL-MCNC: 8.6 MG/DL (ref 8.6–10.4)
CHLORIDE BLD-SCNC: 112 MMOL/L (ref 98–107)
CHLORIDE BLD-SCNC: 116 MMOL/L (ref 98–107)
CO2: 20 MMOL/L (ref 20–31)
CO2: 23 MMOL/L (ref 20–31)
CREAT SERPL-MCNC: 0.63 MG/DL (ref 0.5–0.9)
CULTURE: NORMAL
EKG ATRIAL RATE: 98 BPM
EKG P AXIS: 70 DEGREES
EKG P-R INTERVAL: 174 MS
EKG Q-T INTERVAL: 392 MS
EKG QRS DURATION: 98 MS
EKG QTC CALCULATION (BAZETT): 500 MS
EKG R AXIS: -16 DEGREES
EKG T AXIS: 78 DEGREES
EKG VENTRICULAR RATE: 98 BPM
GFR AFRICAN AMERICAN: >60 ML/MIN
GFR NON-AFRICAN AMERICAN: >60 ML/MIN
GFR SERPL CREATININE-BSD FRML MDRD: ABNORMAL ML/MIN/{1.73_M2}
GFR SERPL CREATININE-BSD FRML MDRD: ABNORMAL ML/MIN/{1.73_M2}
GLUCOSE BLD-MCNC: 125 MG/DL (ref 65–105)
GLUCOSE BLD-MCNC: 137 MG/DL (ref 65–105)
GLUCOSE BLD-MCNC: 170 MG/DL (ref 70–99)
GLUCOSE BLD-MCNC: 171 MG/DL (ref 65–105)
HCT VFR BLD CALC: 24.8 % (ref 36.3–47.1)
HEMOGLOBIN: 7.5 G/DL (ref 11.9–15.1)
LACTIC ACID: 1.5 MMOL/L (ref 0.5–2.2)
LV EF: 60 %
LVEF MODALITY: NORMAL
Lab: NORMAL
MAGNESIUM: 1.8 MG/DL (ref 1.6–2.6)
MCH RBC QN AUTO: 27.7 PG (ref 25.2–33.5)
MCHC RBC AUTO-ENTMCNC: 30.2 G/DL (ref 28.4–34.8)
MCV RBC AUTO: 91.5 FL (ref 82.6–102.9)
MYOGLOBIN: 399 NG/ML (ref 25–58)
MYOGLOBIN: 455 NG/ML (ref 25–58)
MYOGLOBIN: 486 NG/ML (ref 25–58)
NRBC AUTOMATED: 0 PER 100 WBC
PDW BLD-RTO: 15.4 % (ref 11.8–14.4)
PLATELET # BLD: 106 K/UL (ref 138–453)
PMV BLD AUTO: 10.9 FL (ref 8.1–13.5)
POTASSIUM SERPL-SCNC: 3.1 MMOL/L (ref 3.7–5.3)
POTASSIUM SERPL-SCNC: 3.2 MMOL/L (ref 3.7–5.3)
RBC # BLD: 2.71 M/UL (ref 3.95–5.11)
SEDIMENTATION RATE, ERYTHROCYTE: 14 MM (ref 0–20)
SODIUM BLD-SCNC: 145 MMOL/L (ref 135–144)
SODIUM BLD-SCNC: 148 MMOL/L (ref 135–144)
SODIUM BLD-SCNC: 150 MMOL/L (ref 135–144)
SPECIMEN DESCRIPTION: NORMAL
THYROXINE, FREE: 1.14 NG/DL (ref 0.93–1.7)
TOTAL PROTEIN: 5.7 G/DL (ref 6.4–8.3)
TROPONIN INTERP: ABNORMAL
TROPONIN T: ABNORMAL NG/ML
TROPONIN, HIGH SENSITIVITY: 72 NG/L (ref 0–14)
TROPONIN, HIGH SENSITIVITY: 74 NG/L (ref 0–14)
TROPONIN, HIGH SENSITIVITY: 74 NG/L (ref 0–14)
TSH SERPL DL<=0.05 MIU/L-ACNC: 2.58 MIU/L (ref 0.3–5)
TSH SERPL DL<=0.05 MIU/L-ACNC: 5.9 MIU/L (ref 0.3–5)
WBC # BLD: 6.9 K/UL (ref 3.5–11.3)

## 2019-05-23 PROCEDURE — 86140 C-REACTIVE PROTEIN: CPT

## 2019-05-23 PROCEDURE — 2580000003 HC RX 258: Performed by: INTERNAL MEDICINE

## 2019-05-23 PROCEDURE — 6360000002 HC RX W HCPCS: Performed by: INTERNAL MEDICINE

## 2019-05-23 PROCEDURE — 2000000000 HC ICU R&B

## 2019-05-23 PROCEDURE — 85651 RBC SED RATE NONAUTOMATED: CPT

## 2019-05-23 PROCEDURE — 83735 ASSAY OF MAGNESIUM: CPT

## 2019-05-23 PROCEDURE — 83874 ASSAY OF MYOGLOBIN: CPT

## 2019-05-23 PROCEDURE — C1751 CATH, INF, PER/CENT/MIDLINE: HCPCS

## 2019-05-23 PROCEDURE — 85027 COMPLETE CBC AUTOMATED: CPT

## 2019-05-23 PROCEDURE — APPNB30 APP NON BILLABLE TIME 0-30 MINS: Performed by: NURSE PRACTITIONER

## 2019-05-23 PROCEDURE — 80053 COMPREHEN METABOLIC PANEL: CPT

## 2019-05-23 PROCEDURE — 36415 COLL VENOUS BLD VENIPUNCTURE: CPT

## 2019-05-23 PROCEDURE — 84295 ASSAY OF SERUM SODIUM: CPT

## 2019-05-23 PROCEDURE — 83605 ASSAY OF LACTIC ACID: CPT

## 2019-05-23 PROCEDURE — 6370000000 HC RX 637 (ALT 250 FOR IP): Performed by: NURSE PRACTITIONER

## 2019-05-23 PROCEDURE — 80051 ELECTROLYTE PANEL: CPT

## 2019-05-23 PROCEDURE — 82150 ASSAY OF AMYLASE: CPT

## 2019-05-23 PROCEDURE — 82248 BILIRUBIN DIRECT: CPT

## 2019-05-23 PROCEDURE — 76937 US GUIDE VASCULAR ACCESS: CPT

## 2019-05-23 PROCEDURE — 82140 ASSAY OF AMMONIA: CPT

## 2019-05-23 PROCEDURE — 83036 HEMOGLOBIN GLYCOSYLATED A1C: CPT

## 2019-05-23 PROCEDURE — 82947 ASSAY GLUCOSE BLOOD QUANT: CPT

## 2019-05-23 PROCEDURE — 99233 SBSQ HOSP IP/OBS HIGH 50: CPT | Performed by: INTERNAL MEDICINE

## 2019-05-23 PROCEDURE — 6370000000 HC RX 637 (ALT 250 FOR IP): Performed by: INTERNAL MEDICINE

## 2019-05-23 PROCEDURE — 84484 ASSAY OF TROPONIN QUANT: CPT

## 2019-05-23 PROCEDURE — 84439 ASSAY OF FREE THYROXINE: CPT

## 2019-05-23 PROCEDURE — 36410 VNPNXR 3YR/> PHY/QHP DX/THER: CPT

## 2019-05-23 PROCEDURE — 93306 TTE W/DOPPLER COMPLETE: CPT

## 2019-05-23 PROCEDURE — 84443 ASSAY THYROID STIM HORMONE: CPT

## 2019-05-23 RX ORDER — DEXTROSE MONOHYDRATE 50 MG/ML
INJECTION, SOLUTION INTRAVENOUS CONTINUOUS
Status: DISCONTINUED | OUTPATIENT
Start: 2019-05-23 | End: 2019-05-23

## 2019-05-23 RX ORDER — DEXTROSE MONOHYDRATE 25 G/50ML
12.5 INJECTION, SOLUTION INTRAVENOUS PRN
Status: DISCONTINUED | OUTPATIENT
Start: 2019-05-23 | End: 2019-05-29 | Stop reason: HOSPADM

## 2019-05-23 RX ORDER — DEXTROSE MONOHYDRATE 50 MG/ML
100 INJECTION, SOLUTION INTRAVENOUS PRN
Status: DISCONTINUED | OUTPATIENT
Start: 2019-05-23 | End: 2019-05-29 | Stop reason: HOSPADM

## 2019-05-23 RX ORDER — SODIUM CHLORIDE 0.9 % (FLUSH) 0.9 %
10 SYRINGE (ML) INJECTION PRN
Status: DISCONTINUED | OUTPATIENT
Start: 2019-05-23 | End: 2019-05-29 | Stop reason: HOSPADM

## 2019-05-23 RX ORDER — LORATADINE AND PSEUDOEPHEDRINE 10; 240 MG/1; MG/1
1 TABLET, EXTENDED RELEASE ORAL DAILY
COMMUNITY

## 2019-05-23 RX ORDER — LISINOPRIL 20 MG/1
20 TABLET ORAL DAILY
COMMUNITY

## 2019-05-23 RX ORDER — POTASSIUM CHLORIDE 7.45 MG/ML
40 INJECTION INTRAVENOUS ONCE
Status: COMPLETED | OUTPATIENT
Start: 2019-05-23 | End: 2019-05-23

## 2019-05-23 RX ORDER — PRAVASTATIN SODIUM 10 MG
10 TABLET ORAL DAILY
COMMUNITY
End: 2019-05-29 | Stop reason: HOSPADM

## 2019-05-23 RX ORDER — METOPROLOL SUCCINATE 25 MG/1
25 TABLET, EXTENDED RELEASE ORAL DAILY
COMMUNITY

## 2019-05-23 RX ORDER — NICOTINE POLACRILEX 4 MG
15 LOZENGE BUCCAL PRN
Status: DISCONTINUED | OUTPATIENT
Start: 2019-05-23 | End: 2019-05-29 | Stop reason: HOSPADM

## 2019-05-23 RX ORDER — METOPROLOL TARTRATE 5 MG/5ML
5 INJECTION INTRAVENOUS EVERY 6 HOURS PRN
Status: DISCONTINUED | OUTPATIENT
Start: 2019-05-23 | End: 2019-05-29 | Stop reason: HOSPADM

## 2019-05-23 RX ADMIN — CEFTRIAXONE 1 G: 1 INJECTION, POWDER, FOR SOLUTION INTRAMUSCULAR; INTRAVENOUS at 20:25

## 2019-05-23 RX ADMIN — RIFAXIMIN 550 MG: 550 TABLET ORAL at 10:25

## 2019-05-23 RX ADMIN — POTASSIUM CHLORIDE 40 MEQ: 7.46 INJECTION, SOLUTION INTRAVENOUS at 19:31

## 2019-05-23 RX ADMIN — Medication 1000 ML: at 20:53

## 2019-05-23 RX ADMIN — INSULIN LISPRO 1 UNITS: 100 INJECTION, SOLUTION INTRAVENOUS; SUBCUTANEOUS at 12:09

## 2019-05-23 RX ADMIN — LACTULOSE 30 G: 20 SOLUTION ORAL at 10:25

## 2019-05-23 RX ADMIN — POTASSIUM CHLORIDE: 2 INJECTION, SOLUTION, CONCENTRATE INTRAVENOUS at 18:02

## 2019-05-23 RX ADMIN — POTASSIUM CHLORIDE: 2 INJECTION, SOLUTION, CONCENTRATE INTRAVENOUS at 09:19

## 2019-05-23 RX ADMIN — DEXTROSE MONOHYDRATE: 50 INJECTION, SOLUTION INTRAVENOUS at 02:11

## 2019-05-23 RX ADMIN — Medication 1000 ML: at 15:37

## 2019-05-23 RX ADMIN — MICONAZOLE NITRATE: 20.6 POWDER TOPICAL at 10:26

## 2019-05-23 RX ADMIN — Medication 1000 ML: at 18:02

## 2019-05-23 RX ADMIN — MICONAZOLE NITRATE: 20.6 POWDER TOPICAL at 21:18

## 2019-05-23 NOTE — PLAN OF CARE
Problem: Risk for Impaired Skin Integrity  Goal: Tissue integrity - skin and mucous membranes  Description  Structural intactness and normal physiological function of skin and  mucous membranes. 5/23/2019 0421 by Joshua Rodriguez RN  Outcome: Ongoing  Note:   Skin assessment ongoing. Pressure ulcer preventions being practiced - see charting for details. Patient turned every two hours.

## 2019-05-23 NOTE — CONSULTS
[id number] [facility] [address]                [address] [place] [address]                                  CONSULTATION    PATIENT NAME: Bin Hinojosa                     :        1955  MED REC NO:   [id number]                             ROOM:       6436  ACCOUNT NO:   [de-identified]                           ADMIT DATE: 2019  PROVIDER:     Johnathan Frank    CONSULT DATE:  2019    CARDIOLOGY CONSULTATION    REASON FOR CONSULTATION:  Elevated troponin. The patient is nonverbal and only says Royanne Red. No further  conversation is possible with the patient. Most of the history was  obtained from the chart. CHIEF COMPLAINT:  Altered mental status. HISTORY OF PRESENT ILLNESS:  The patient is a 59-year-old female who was  brought in to [facility] Emergency Room with mental status  changes. The patient is unable to provide any history. The patient was  brought by EMS Services. The patient was apparently seen, found by her  brother to be poorly responsive prior to her presentation. ALLERGIES:  SULFA ANTIBIOTICS, TAPE and NONSTEROIDAL AGENTS. PAST MEDICAL HISTORY:  Positive for anemia, urinary tract infections,  COPD, depression, diabetes mellitus, morbid obesity, dyslipidemia,  thrombocytopenia, sleep apnea, cellulitis. FAMILY HISTORY:  Reviewed and the patient is unable to provide family  history. Based on the chart records, there is no history suggestive of  sudden cardiac death or significant premature coronary artery disease. SOCIAL HISTORY:  Negative for current smoking or drinking excess  alcohol. REVIEW OF SYSTEMS:  Attempt was made for 15-point system review. Review  of systems is not obtainable in view of mental status changes. PHYSICAL EXAMINATION:  GENERAL:  Currently demonstrates the patient comfortable at bedrest.   Denies any symptoms of chest pain but unable to verbalize other than  saying yeah.   VITAL SIGNS:  Currently on examination, her blood pressure is 140/65,  respiratory rate of 20, pulse rate of 95, weight is 220 pounds. CONSTITUTIONAL:  The patient appears well-developed and well-nourished. HEENT:  Cranium is atraumatic, normocephalic. Sclera is anicteric. Oral mucosa is moist.  NECK:  There is no jugular venous distention. Normal carotid upstrokes. No audible bruits. SKIN:  Warm and dry. HEART:  Examination of the heart reveals S1, S2. No S3 or S4.  LUNGS:  Clear with bronchovesicular breath sounds bilaterally. ABDOMEN:  Obese. Bowel sounds are present. EXTREMITIES:  Demonstrated no edema. Symmetric distal pulses are noted  which are equal in volume bilaterally. LABORATORY DATA:  High sensitivity troponin is 64. BUN is 35,  creatinine 0.80. Sodium 150, potassium 3.9, chloride 114, amylase 546,  ammonia 135. Lactic acid level is 3.4. IMPRESSION:  1. Elevated troponin. 2.  Probable sepsis syndrome with elevated lactate. 3.  Anemia. 4.  Mental status changes. 5.  COPD. 6.  Sleep apnea syndrome. 7.  Dyslipidemia. 8.  Hypertension. 9.  Morbid obesity. PLAN:  I will obtain an echocardiogram to evaluate current LV function,  valvulopathy and guide further therapy. Given her general status at  this point, the patient is not a candidate for any invasive cardiac  procedures and evaluation for elevated amylase and elevated ammonia are  underway. Thank you for allowing us to participate in the care of the patient.         Lashonda Chavez    D: 05/22/2019 20:42:13       T: 05/22/2019 20:49:11     [id number]  Job#: [id number]     Doc#: [id number]    CC:

## 2019-05-23 NOTE — CONSULTS
Consults       Department of Internal Medicine  Nephrology Yari Salcido MD   Consult Note      SUBJECTIVE: This is a 59 y.o. female with a significant past medical history of  Depression, Fibromyalgia [on gabapentin], hypertension hyperlipidemia and COPD, who was brought in by her brother after 4 days of progressive decline in mental status. Laboratory studies at presentation revealed serum sodium 150 mmol/L but serum creatinine was normal.  Patient is still pleasantly confused and unable to give an interview.     Nsaids; Sulfa antibiotics; and Tape [adhesive tape]    Past Medical History:   Diagnosis Date    Acute urinary tract infection 9/6/2018    Anemia     h/o anemia,transfused 2/2014    Anemia, iron deficiency 10/8/2018    Anxiety disorder     can get SOB with an anxiety attack    Arthritis     Asthma     Atopic rhinitis 9/6/2018    Bandemia     Blood transfusion reaction     x 2 unit prbc's 2/2014    Cellulitis and abscess of trunk     Cellulitis of right breast 9/26/2018    Cellulitis of right lower extremity- resolving 8/9/2018    COPD (chronic obstructive pulmonary disease) (HCC)     patient denies COPD, only has asthma    CRP elevated     Depression     Diabetes mellitus ([address]     Disorder associated with type 2 diabetes mellitus ([address] 10/8/2018    Disorder of liver 9/6/2018    Displacement of lumbar intervertebral disc without myelopathy 10/8/2018    Essential hypertension 8/9/2018    Fibromyalgia 10/8/2018    Full thickness rotator cuff tear 10/8/2018    Gastroesophageal reflux disease 9/6/2018    GERD (gastroesophageal reflux disease)     Hammer toe of right foot     Hernia, abdominal 1998    Hyperglycemia due to type 2 diabetes mellitus ([address] 10/8/2018    Hyperlipidemia     Hypertension     Hypothyroid 10/8/2018    Iron deficiency anemia 10/8/2018    Lactic acidosis     Localized, primary osteoarthritis of shoulder region 10/8/2018    Mass of right breast 10/8/2018    Migraine 9/6/2018    Morbid obesity ([facility] 8/9/2018    ARLYN (obstructive sleep apnea) 8/9/2018    Osteoarthritis 9/26/2018    Pure hypercholesterolemia 10/8/2018    Restless leg syndrome     Seasonal allergies     Septicemia (HCC)     SIRS due to infectious process without acute organ dysfunction (HCC)     Sleep apnea     doesn't use CPAP, sleeps in a recliner    Spondylosis     lower back    Thrombocytopenia ([facility] 10/8/2018       Scheduled Meds:   sodium chloride flush  10 mL Intravenous 2 times per day    miconazole   Topical BID    lactulose  30 g Oral Daily    rifaximin  550 mg Oral BID    cefTRIAXone (ROCEPHIN) IV  1 g Intravenous Q24H     Continuous Infusions:   dextrose 125 mL/hr at 05/23/19 0211     PRN Meds:.metoprolol, sodium chloride flush    Family History   Problem Relation Age of Onset    Heart Disease Mother     Pacemaker Mother     Hypertension Mother     Diabetes Father     Breast Cancer Neg Hx         Social History     Socioeconomic History    Marital status:       Spouse name: None    Number of children: None    Years of education: None    Highest education level: None   Occupational History    None   Social Needs    Financial resource strain: None    Food insecurity:     Worry: None     Inability: None    Transportation needs:     Medical: None     Non-medical: None   Tobacco Use    Smoking status: Never Smoker    Smokeless tobacco: Never Used   Substance and Sexual Activity    Alcohol use: No    Drug use: No    Sexual activity: None   Lifestyle    Physical activity:     Days per week: None     Minutes per session: None    Stress: None   Relationships    Social connections:     Talks on phone: None     Gets together: None     Attends [employer] service: None     Active member of club or organization: None     Attends meetings of clubs or organizations: None     Relationship status: None    Intimate partner violence:     Fear of current or ex partner: None     Emotionally abused: None     Physically abused: None     Forced sexual activity: None   Other Topics Concern    None   Social History Narrative    None       Review of systems: Unobtainable. Physical Exam:    VITALS:  /71   Pulse 87   Temp 98.8 °F (37.1 °C) (Axillary)   Resp 19   Ht 5' 7\" (1.702 m)   Wt 220 lb (99.8 kg)   SpO2 100%   BMI 34.46 kg/m²   24HR INTAKE/OUTPUT:    Intake/Output Summary (Last 24 hours) at 5/23/2019 1230  Last data filed at 5/23/2019 0600  Gross per 24 hour   Intake 3249 ml   Output 1225 ml   Net 2024 ml       Constitutional: delirious and distracted    Skin: Skin color, texture, turgor normal. No rashes or lesions    Head: Normocephalic, without obvious abnormality, atraumatic     Cardiovascular/Edema: regular rate and rhythm, S1, S2 normal, no murmur, click, rub or gallop    Respiratory: Lungs: clear to auscultation bilaterally    Abdomen: soft, non-tender; bowel sounds normal; no masses,  no organomegaly    Back: symmetric, no curvature. ROM normal. No CVA tenderness.     Extremities: extremities normal, atraumatic, no cyanosis or edema    Neuro:  Grossly normal      CBC:   Recent Labs     05/22/19  1337 05/23/19  0554   WBC 10.7 6.9   HGB 8.9* 7.5*    106*     BMP:    Recent Labs     05/22/19  1337 05/23/19  0035 05/23/19  0554   * 150* 148*   K 3.9  --  3.2*   *  --  116*   CO2 21  --  20   BUN 35*  --  31*   CREATININE 0.80  --  0.63   GLUCOSE 175*  --  170*       Lab Results   Component Value Date    NITRU NEGATIVE 05/22/2019    COLORU YELLOW 05/22/2019    PHUR 6.0 05/22/2019    WBCUA 5 TO 10 09/26/2018    RBCUA 5 TO 10 09/26/2018    MUCUS NOT REPORTED 09/26/2018    TRICHOMONAS NOT REPORTED 09/26/2018    YEAST NOT REPORTED 09/26/2018    BACTERIA MANY 09/26/2018    SPECGRAV 1.025 05/22/2019    LEUKOCYTESUR NEGATIVE 05/22/2019    UROBILINOGEN Normal 05/22/2019    BILIRUBINUR NEGATIVE 05/22/2019    GLUCOSEU NEGATIVE 05/22/2019 NOAHUA TRACE 05/22/2019    AMORPHOUS NOT REPORTED 09/26/2018     Urine Sodium:     Lab Results   Component Value Date    TORREY <20 09/26/2018     Urine Creatinine:     Lab Results   Component Value Date    LABCREA 180.8 09/26/2018     Urine Eosinophils:  No components found for: VASHTIEOS      IMPRESSION/RECOMMENDATIONS:      1. Hypernatremia consistent with dehydration. Estimated free water deficit is 3.6 L. This is likely due to impaired ability to drink water due to altered mental status. Plan: IV fluid D5 water at 125 mL/h. Check serum sodium every 6 hours. Random urine electrolytes. 2.  Hypokalemia - due to poor intake. Add potassium chloride 20 mEq/L to IV fluid.       Nancy Bedolla MD FACP  Attending Nephrologist  5/23/2019 7:58 AM

## 2019-05-23 NOTE — PROGRESS NOTES
inferior left L3 vertebral body. There is no significant vertebral body height loss. The L3-L4 disc space is only mildly narrowed. There is mild grade 1 anterolisthesis at L4-L5 and L5-S1, likely secondary to facet arthropathy. Irregular sclerotic focus is noted in the posterior left iliac bone as well. No acute osseous abnormality identified.      1. No definite acute process in the abdomen or pelvis. 2.  Cirrhotic morphology of the liver with borderline enlarged spleen and several retroperitoneal varices noted. No ascites. 3. Calcified gallbladder wall or peripherally calcified gallstones filling the lumen of the gallbladder. No discrete mass identified. 4.  Mixed lytic and sclerotic lesions in the inferior left L3 vertebral body and left iliac bone, concerning for metastases. Additional evaluation with bone scan and/or MRI is recommended. 5.  Skin thickening at the visualized inferior right breast, which is nonspecific. This could be related to a cellulitis, but possibility of an inflammatory malignancy in the breast is not excluded. Correlation with physical exam as well as breast imaging is recommended. 6.  Indeterminate left adrenal nodule, most likely an adenoma. Follow-up with adrenal washout CT recommended in 1 year. 7.  Moderate stool burden.      Xr Chest Portable     Result Date: 5/22/2019  EXAMINATION: ONE XRAY VIEW OF THE CHEST 5/22/2019 2:02 pm COMPARISON: Chest radiograph 09/27/2018 HISTORY: ORDERING SYSTEM PROVIDED HISTORY: sepsis TECHNOLOGIST PROVIDED HISTORY: sepsis Ordering Physician Provided Reason for Exam: SEPSIS PORT AP SUPINE Acuity: Unknown Type of Exam: Unknown FINDINGS: Clear lungs. No findings of pneumothorax or pleural effusion. Normal mediastinal, hilar, and cardiac contours. Atherosclerotic calcification in the aorta. No acute fracture.      No acute cardiopulmonary process. ASSESSMENT/PLAN:  1.  Encephalopathy, due to cirrhosis and hypernatremia  -will

## 2019-05-23 NOTE — FLOWSHEET NOTE
Patient sleeping in bed; brother (Mariano) seated at bedside. Brother states patient's daughter lives in [facility]; states patient's son \"doesn't come around anymore\". Brother states he is the [place] one\" to care for the patient. Brother states patient has been ill for a few days but that it became \"so bad yesterday I had to call 911\". Brother states patient \"talks about God all the time\"; requests prayer. Writer provides listening presence, supportive conversation, prayer, and encouragement. Brother expresses gratitude for visit. Spiritual Care will follow as needed.      05/23/19 1319   Encounter Summary   Services provided to: Patient and family together   Referral/Consult From: [address] [place] [address] Family members   Place of Scientologist None   Continue Visiting   (5/23/19)   Complexity of Encounter Moderate   Length of Encounter 15 minutes   Spiritual Assessment Completed Yes   Routine   Type Initial   Spiritual/Advent   Type Spiritual support   Assessment Approachable;Coping   Intervention Active listening;Explored feelings, thoughts, concerns;Prayer   Outcome Expressed gratitude;Engaged in conversation;Expressed feelings/needs/concerns

## 2019-05-23 NOTE — PROGRESS NOTES
Subjective:     Follow-up hepatic encephalopathy  Still confused more awake though follows commands  ROS  No fever or chills no GI/ complaints at present no TIA no bleeding trocar to pulmonary complaints, no headache no sore throat no skin lesions except the right breast swelling and mild redness of polyuria no polydipsia no hypoglycemia  physical exam  General Appearance: in no acute distress and disoriented  Skin: warm and dry, no rash or erythema  Head: normocephalic and atraumatic  Eyes: pupils equal, round, and reactive to light, conjunctivae normal and sclera anicteric    Neck: neck supple and non tender without mass   Pulmonary/Chest: clear to auscultation bilaterally- no wheezes, rales or rhonchi, normal air movement, no respiratory distress  Cardiovascular: normal rate, regular rhythm, normal S1 and S2, no gallops, intact distal pulses and no carotid bruits  Abdomen: soft, non-tender, non-distended, normal bowel sounds, no masses or organomegaly  Extremities: no edema and good  pulses no Homans sign    Neurologic: Awake disoriented ×2 no focal deficit no asterixis    BP (!) 119/48   Pulse 76   Temp 99.5 °F (37.5 °C) (Axillary)   Resp 19   Ht 5' 7\" (1.702 m)   Wt 220 lb (99.8 kg)   SpO2 99%   BMI 34.46 kg/m²     CBC:   Lab Results   Component Value Date    WBC 6.9 05/23/2019    RBC 2.71 05/23/2019    HGB 7.5 05/23/2019    HCT 24.8 05/23/2019    MCV 91.5 05/23/2019    MCH 27.7 05/23/2019    MCHC 30.2 05/23/2019    RDW 15.4 05/23/2019     05/23/2019    MPV 10.9 05/23/2019     CMP:    Lab Results   Component Value Date     05/23/2019    K 3.1 05/23/2019     05/23/2019    CO2 23 05/23/2019    BUN 31 05/23/2019    CREATININE 0.63 05/23/2019    GFRAA >60 05/23/2019    LABGLOM >60 05/23/2019    GLUCOSE 170 05/23/2019    PROT 5.7 05/23/2019    LABALBU 3.1 05/23/2019    CALCIUM 8.6 05/23/2019    BILITOT 0.83 05/23/2019    ALKPHOS 81 05/23/2019    AST 67 05/23/2019    ALT 39 05/23/2019 Assessment:  Patient Active Problem List   Diagnosis    Cellulitis of right lower extremity- resolving    Diabetes mellitus ([address]    ARLYN (obstructive sleep apnea)    Morbid obesity (HCC)    Essential hypertension    Lactic acidosis    Bandemia    CRP elevated    Depression    Osteoarthritis    Cellulitis of right breast    Septicemia ([address]    SIRS due to infectious process without acute organ dysfunction (HCC)    Cellulitis and abscess of trunk    Acute urinary tract infection    Anemia    Anemia, iron deficiency    Asthma    Atopic rhinitis    Disorder associated with type 2 diabetes mellitus (HCC)    Disorder of liver    Displacement of lumbar intervertebral disc without myelopathy    Fibromyalgia    Full thickness rotator cuff tear    Gastroesophageal reflux disease    Hyperglycemia due to type 2 diabetes mellitus ([address]    Hyperlipidemia    Hypothyroid    Iron deficiency anemia    Localized, primary osteoarthritis of shoulder region    Mass of right breast    Migraine    Pure hypercholesterolemia    Thrombocytopenia (HCC)    Altered mental status    Encephalopathy    Cirrhosis ([address]    Elevated LFTs    Hypernatremia    Elevated amylase     Hepatic encephalopathy  Right breast cellulitis  Lytic lesions on bone  Type 2 diabetes  Liver cirrhosis  Dehydration  Hypernatremia  Hypokalemia  Plan:    Meds labs reviewed electrolytes managed by nephrology will check blood sugars before meals and at bedtime Accu-Cheks with insulin coverage as needed check hemoglobin A1c continue with IV antibiotics we will ask general surgery see      Maximiliano Mcclain MD  7:52 PM

## 2019-05-23 NOTE — H&P
History and Physical/ admit note      CHIEF COMPLAINT:  Altered mental state    History of Present Illness: pt came to er per ems , called by her brother, confused  Altered mental state no fever no chills pt confused cant give any history        Past Medical History:   Diagnosis Date    Acute urinary tract infection 9/6/2018    Anemia     h/o anemia,transfused 2/2014    Anemia, iron deficiency 10/8/2018    Anxiety disorder     can get SOB with an anxiety attack    Arthritis     Asthma     Atopic rhinitis 9/6/2018    Bandemia     Blood transfusion reaction     x 2 unit prbc's 2/2014    Cellulitis and abscess of trunk     Cellulitis of right breast 9/26/2018    Cellulitis of right lower extremity- resolving 8/9/2018    COPD (chronic obstructive pulmonary disease) ([address]     patient denies COPD, only has asthma    CRP elevated     Depression     Diabetes mellitus ([address]     Disorder associated with type 2 diabetes mellitus ([address] 10/8/2018    Disorder of liver 9/6/2018    Displacement of lumbar intervertebral disc without myelopathy 10/8/2018    Essential hypertension 8/9/2018    Fibromyalgia 10/8/2018    Full thickness rotator cuff tear 10/8/2018    Gastroesophageal reflux disease 9/6/2018    GERD (gastroesophageal reflux disease)     Hammer toe of right foot     Hernia, abdominal 1998    Hyperglycemia due to type 2 diabetes mellitus ([address] 10/8/2018    Hyperlipidemia     Hypertension     Hypothyroid 10/8/2018    Iron deficiency anemia 10/8/2018    Lactic acidosis     Localized, primary osteoarthritis of shoulder region 10/8/2018    Mass of right breast 10/8/2018    Migraine 9/6/2018    Morbid obesity ([address] 8/9/2018    ARLYN (obstructive sleep apnea) 8/9/2018    Osteoarthritis 9/26/2018    Pure hypercholesterolemia 10/8/2018    Restless leg syndrome     Seasonal allergies     Septicemia ([address]     SIRS due to infectious process without acute organ dysfunction ([address]     Sleep apnea doesn't use CPAP, sleeps in a recliner    Spondylosis     lower back    Thrombocytopenia ([facility] 10/8/2018         Past Surgical History:   Procedure Laterality Date    APPENDECTOMY      COLONOSCOPY      ROTATOR CUFF REPAIR Right        Medications Prior to Admission:    Medications Prior to Admission: oxyCODONE-acetaminophen (PERCOCET) 5-325 MG per tablet, Take 1 tablet by mouth every 4 hours as needed for Pain. baclofen (LIORESAL) 10 MG tablet, Take 10 mg by mouth nightly  diclofenac sodium (VOLTAREN) 1 % GEL, apply 2-4 grams to bilateral shoulders and left knee  metoprolol succinate (TOPROL XL) 25 MG extended release tablet, TAKE ONE TABLET BY MOUTH DAILY  pravastatin (PRAVACHOL) 10 MG tablet, TAKE ONE TABLET BY MOUTH DAILY  omeprazole 20 MG EC tablet, TAKE 1 CAPSULE DAILY  loratadine-pseudoephedrine (LORATADINE-D 24HR)  MG per extended release tablet, TAKE ONE TABLET BY MOUTH DAILY  lisinopril (PRINIVIL;ZESTRIL) 20 MG tablet, TAKE 1 TABLET DAILY (NO FURTHER REFILLS, NEED LABS/APPT, CONTACT OFFICE FOR INSTRUCTIONS)  Insulin Syringe-Needle U-100 27G X 1/2\" 0.5 ML MISC, 20 units  Handicap Placard MISC, -  Fe Bisgly-Vit C-Vit B12-FA (GENTLE IRON) 28-60-0.008-0.4 MG CAPS, 1 capsule  acetaminophen (TYLENOL) 325 MG tablet, Take 2 tablets by mouth every 4 hours as needed for Pain or Fever  insulin glargine (LANTUS) 100 UNIT/ML injection vial, Inject 20 Units into the skin nightly  acetic acid 0.25 % irrigation, Clean and do wet to dry t the right inner leg wounds, bid  diclofenac sodium 1 % GEL, Apply 2 g topically 2 times daily  docusate sodium (COLACE) 100 MG capsule, Take 100 mg by mouth 2 times daily  aspirin 81 MG EC tablet, Take 81 mg by mouth daily. citalopram (CELEXA) 40 MG tablet, Take 40 mg by mouth daily. fluticasone (FLONASE) 50 MCG/ACT nasal spray, 1 spray by Nasal route 2 times daily as needed for Rhinitis.   metFORMIN ER (GLUCOPHAGE-XR) 500 MG XR tablet, Take 1,000 mg by mouth 2 times daily (with meals). gabapentin (NEURONTIN) 300 MG capsule, Take 300 mg by mouth 2 times daily. omeprazole (PRILOSEC) 20 MG capsule, Take 20 mg by mouth daily. montelukast (SINGULAIR) 10 MG tablet, Take 10 mg by mouth nightly. Allergies:    Nsaids; Sulfa antibiotics; and Tape [adhesive tape]    Social History:    reports that she has never smoked. She has never used smokeless tobacco. She reports that she does not drink alcohol or use drugs. Family History:   family history includes Diabetes in her father; Heart Disease in her mother; Hypertension in her mother; Pacemaker in her mother.     REVIEW OF SYSTEMS:  Pt confused cant give any history  PHYSICAL EXAM:  General Appearance: disoriented  Skin: warm and dry, no rash or erythema  Head: normocephalic and atraumatic  Eyes: pupils equal, round, and reactive to light, conjunctivae normal and sclera anicteric     Neck: neck supple and non tender without mass   Pulmonary/Chest: clear to auscultation bilaterally- no wheezes, rales or rhonchi, normal air movement, no respiratory distress  Cardiovascular: normal rate, regular rhythm, normal S1 and S2, no gallops, intact distal pulses and no carotid bruits  Abdomen: soft, non-tender, non-distended, normal bowel sounds, no masses or organomegaly  Breast: rt breast swollen red  Extremities: no edema and good pulses   Neurologic: awake does not follow command  encephalopathic  Vitals:  /85   Pulse 94   Temp 98.7 °F (37.1 °C) (Oral)   Resp 21   Ht 5' 7\" (1.702 m)   Wt 220 lb (99.8 kg)   SpO2 94%   BMI 34.46 kg/m²         LABS:  CBC:   Lab Results   Component Value Date    WBC 10.7 05/22/2019    RBC 3.20 05/22/2019    HGB 8.9 05/22/2019    HCT 28.5 05/22/2019    MCV 89.1 05/22/2019    MCH 27.8 05/22/2019    MCHC 31.2 05/22/2019    RDW 15.1 05/22/2019     05/22/2019    MPV 11.0 05/22/2019     CMP:    Lab Results   Component Value Date     05/22/2019    K 3.9 05/22/2019     05/22/2019    CO2 21 05/22/2019    BUN 35 05/22/2019    CREATININE 0.80 05/22/2019    GFRAA >60 05/22/2019    LABGLOM >60 05/22/2019    GLUCOSE 175 05/22/2019    PROT 6.7 05/22/2019    LABALBU 3.7 05/22/2019    CALCIUM 9.1 05/22/2019    BILITOT 1.07 05/22/2019    ALKPHOS 90 05/22/2019    AST 67 05/22/2019    ALT 40 05/22/2019         ASSESSMENT:    Hepatic encephalopathy  Liver cirrhosis    hypernatremia    dehydration   Rt breast cellulitis   hyperglycemia  Obesity bmi 30 to 34.9  Patient Active Problem List   Diagnosis    Cellulitis of right lower extremity- resolving    Diabetes mellitus ([address]    ARLYN (obstructive sleep apnea)    Morbid obesity ([address]    Essential hypertension    Lactic acidosis    Bandemia    CRP elevated    Depression    Osteoarthritis    Cellulitis of right breast    Septicemia ([address]    SIRS due to infectious process without acute organ dysfunction (HCC)    Cellulitis and abscess of trunk    Acute urinary tract infection    Anemia    Anemia, iron deficiency    Asthma    Atopic rhinitis    Disorder associated with type 2 diabetes mellitus (HCC)    Disorder of liver    Displacement of lumbar intervertebral disc without myelopathy    Fibromyalgia    Full thickness rotator cuff tear    Gastroesophageal reflux disease    Hyperglycemia due to type 2 diabetes mellitus ([address]    Hyperlipidemia    Hypothyroid    Iron deficiency anemia    Localized, primary osteoarthritis of shoulder region    Mass of right breast    Migraine    Pure hypercholesterolemia    Thrombocytopenia (HCC)    Altered mental status       PLAN:    Admit to icu cultures  Troponin tsh  ppi lactulose  Gi critical care, cardiologyconsult, iv fluids iv antibiotics epc cuffs see orders   Serial h/h repeat ammonia in am            Yordy Soliz MD  9:38 PM  5/22/2019

## 2019-05-23 NOTE — CARE COORDINATION
Case Management Initial Discharge Plan  Daniel French,         Readmission Risk              Risk of Unplanned Readmission:        18             Met with:pt's brother Mariano to discuss discharge plans. Information verified: address, contacts, phone number, , insurance Yes  PCP: Allan Perez  Date of last visit:  unknown    Insurance Provider: [employer]    Discharge Planning  Current Residence:  apt  Living Arrangements:  Alone   Home has 1 stories/0 stairs to climb  Support Systems:  Family Members  Current Services PTA:   none Agency:  none  Patient able to perform ADL's:Independent  DME in home:  Walker, cane, glucometer, shower chair, BSC  DME used to aid ambulation prior to admission:   walker  DME used during admission:  TBD on bedrest today    Potential Assistance Needed:  N/A    Pharmacy: [facility] [employer] Medications:  No  Does patient want to participate in local refill/ meds to beds program?  No    Patient agreeable to home care: TBD  [place] of choice provided:  n/a      Type of Home Care Services:  None  Patient expects to be discharged to:  home    Prior SNF/Rehab Placement and Facility: [facility]  Agreeable to SNF/Rehab: TBD  [place] of choice provided: n/a   Evaluation: no    Expected Discharge date:  19  Follow Up Appointment: Best Day/ Time: Monday AM    Transportation provider: self or brother  Transportation arrangements needed for discharge: TBD    Discharge Plan: Met with pt's brother Mariano at nurses station in ICU. Pt admitted with AMS, liver cirrhosis, high ammonia level. Pt still with confusion, not responding appropriately. Per brother, pt lives alone in an apt and has been independent at home. States pt uses walker in her apt and has BSC next to her recliner chair which she sleeps in. Pt is an active current . Mariano checks on pt on a regular basis. Concerned she took extra dose of gabapentin.   States pt's dose was recently decreased due to change in mental status with TID dose. Mariano states pt manages her own medications and sets up her weekly medication box. Pt has no current services in her home. Discussed dc planning and potential need for SNF vs HC. Pt has been to [facility] last October and discharged home with [facility] and IV ATB which she learned to administer herself. Await PT/OT eval.  Pt is currently on bedrest.   Continue to follow pt's progress.               Electronically signed by Pravin Krishnan RN on 5/23/19 at 3:22 PM

## 2019-05-23 NOTE — PROGRESS NOTES
Physical Therapy  DATE: 2019    NAME: Darlin Brar  MRN: [id number]   : 1955    Patient not seen this date for Physical Therapy due to:  [] Blood transfusion in progress  [x] Cancel by RN: strict bed rest, elevated troponins, elevated ammonia, not appropriate yet  [] Hemodialysis  []  Refusal by Patient   [] Spine Precautions   [] Strict Bedrest  [] Surgery  [] Testing      [] Other        [] PT being discontinued at this time. Patient independent. No further needs. [] PT being discontinued at this time as the patient has been transferred to hospice care. No further needs.     Adriana Eason, PT

## 2019-05-23 NOTE — PROGRESS NOTES
Section of Cardiology  Progress Note      Date:  5/23/2019  Patient: Dimitrios Moya  Admission:  5/22/2019  1:21 PM  Admit DX: Altered mental status [R41.82]  Age:  59 y. o., 1955     LOS: 1 day     Reason for evaluation:   HS TROPONIN ELEVATION. HTN  SLEEP APNEA SYNDROME. SUBJECTIVE:     The patient was seen and examined. Notes and labs reviewed. There were not complications over night. PT IS AWAKE , BUT NOT ORIENTED , DOES NOT CONVERSE. OBJECTIVE:      EXAM:   Vitals:    VITALS:  BP (!) 134/39   Pulse 88   Temp 98.8 °F (37.1 °C) (Axillary)   Resp 21   Ht 5' 7\" (1.702 m)   Wt 220 lb (99.8 kg)   SpO2 98%   BMI 34.46 kg/m²   24HR INTAKE/OUTPUT:      Intake/Output Summary (Last 24 hours) at 5/23/2019 0831  Last data filed at 5/23/2019 0600  Gross per 24 hour   Intake 3249 ml   Output 1225 ml   Net 2024 ml       CONSTITUTIONAL:  awake, alert, cooperative, no apparent distress, and appears stated age. HEENT: Normal jugular venous pulsations, no carotid bruits. Head is atraumatic, normocephalic. Eyes and oral mucosa are normal.  LUNGS: Good respiratory effort On auscultation: clear to auscultation bilaterally  CARDIOVASCULAR:  Normal apical impulse, regular rate and rhythm, normal S1 and S2, no S3 or S4, and no murmur or rub noted. dorsalis pedis and bilateralpresent 2+  ABDOMEN: Soft, nontender, nondistended. Bowel sounds present. No masses or tenderness. No bruit. SKIN: Warm and dry. EXTREMITIES:TRACE EDEMA. Current Inpatient Medications:   sodium chloride flush  10 mL Intravenous 2 times per day    miconazole   Topical BID    lactulose  30 g Oral Daily    rifaximin  550 mg Oral BID    cefTRIAXone (ROCEPHIN) IV  1 g Intravenous Q24H       IV Infusions (if any):   IV infusion builder         Diagnostics:   Telemetry:SINUS RHYTHM.   Labs:   CBC:  Recent Labs     05/22/19  1337 05/23/19  0554   WBC 10.7 6.9   HGB 8.9* 7.5*   HCT 28.5* 24.8*    106*     Magnesium:No results for deficiency anemia    Localized, primary osteoarthritis of shoulder region    Mass of right breast    Migraine    Pure hypercholesterolemia    Thrombocytopenia (HCC)    Altered mental status       PLAN:  1. ECHOCARDIOGRAM TO EVALUATE FOR LV FUNCTION, AND GUIDE THERAPY. Discussed with nursing.     Edelmira Orlando MD

## 2019-05-23 NOTE — CONSULTS
Inpatient consult to Critical Care  Consult performed by: Nelson Henao MD  Consult ordered by: Kerri Mcgrath MD         Pulmonary Medicine and [address] George MD      Patient - Celia Albarran   MRN -  [id number]   David # - [de-identified]   - 1955      Date of Admission -  2019  1:21 PM  Date of evaluation -  2019  Room - [address] [place] [address]   Aleah Mena MD Primary Care Physician - Chris Demarco     Reason for Consult    Change in mental status    Assessment   · Change in mental status, most likely metabolic/toxic,? Liver cirrhosis related  · ARLYN/obesity  · Anemia  · Lytic lesion of ileum and L3 vertebrae  · Liver cirrhosis  · DM  · History of asthma/anxiety disorder/  · HTN/HDL/hypothyroidism/depression    Recommendations   · Continue IV Rocephin  · Singulair  · Albuterol and Ipratropium Q 4 hours and prn  · Hold Neurontin/Percocet  · Accu-Chek before meals and at bedtime and sliding scale coverage  · X-ray chest in am  · Labs: CBC and BMP in am  · BiPAP, while asleep and when necessary  · 2 liters/min via nasal cannula  · DVT prophylaxis with SQ heparin  · Will check with IR for possible biopsy of L3 or iliac crest lesion  · Will follow with you    HPI     Celia Albarran is 59 y.o.,  female, admitted because of change in mental status. Patient's brother noted change in mental status for last 2-3 days. Patient is not able to give me a reliable history. Most of ancillary yes and no and seems not appropriate to the question. She is not in any apparent distress. She denies chest pain, cough, shortness of breath, abdominal pain though?     PMHx   Past Medical History      Diagnosis Date    Acute urinary tract infection 2018    Anemia     h/o anemia,transfused 2014    Anemia, iron deficiency 10/8/2018    Anxiety disorder     can get SOB with an anxiety attack    Arthritis     Asthma     Atopic rhinitis 2018    Bandemia  Blood transfusion reaction     x 2 unit prbc's 2/2014    Cellulitis and abscess of trunk     Cellulitis of right breast 9/26/2018    Cellulitis of right lower extremity- resolving 8/9/2018    COPD (chronic obstructive pulmonary disease) ([address]     patient denies COPD, only has asthma    CRP elevated     Depression     Diabetes mellitus ([address]     Disorder associated with type 2 diabetes mellitus ([address] 10/8/2018    Disorder of liver 9/6/2018    Displacement of lumbar intervertebral disc without myelopathy 10/8/2018    Essential hypertension 8/9/2018    Fibromyalgia 10/8/2018    Full thickness rotator cuff tear 10/8/2018    Gastroesophageal reflux disease 9/6/2018    GERD (gastroesophageal reflux disease)     Hammer toe of right foot     Hernia, abdominal 1998    Hyperglycemia due to type 2 diabetes mellitus ([address] 10/8/2018    Hyperlipidemia     Hypertension     Hypothyroid 10/8/2018    Iron deficiency anemia 10/8/2018    Lactic acidosis     Localized, primary osteoarthritis of shoulder region 10/8/2018    Mass of right breast 10/8/2018    Migraine 9/6/2018    Morbid obesity ([address] 8/9/2018    ARLYN (obstructive sleep apnea) 8/9/2018    Osteoarthritis 9/26/2018    Pure hypercholesterolemia 10/8/2018    Restless leg syndrome     Seasonal allergies     Septicemia ([address]     SIRS due to infectious process without acute organ dysfunction (HCC)     Sleep apnea     doesn't use CPAP, sleeps in a recliner    Spondylosis     lower back    Thrombocytopenia ([address] 10/8/2018      Past Surgical History        Procedure Laterality Date    APPENDECTOMY      COLONOSCOPY      ROTATOR CUFF REPAIR Right        Meds    Current Medications    sodium chloride flush  10 mL Intravenous 2 times per day    miconazole   Topical BID    lactulose  30 g Oral Daily    rifaximin  550 mg Oral BID    cefTRIAXone (ROCEPHIN) IV  1 g Intravenous Q24H     metoprolol, sodium chloride flush  IV Drips/Infusions   IV infusion builder 125 mL/hr at 05/23/19 0919     Home Medications  Medications Prior to Admission: oxyCODONE-acetaminophen (PERCOCET) 5-325 MG per tablet, Take 1 tablet by mouth every 4 hours as needed for Pain. baclofen (LIORESAL) 10 MG tablet, Take 10 mg by mouth nightly  diclofenac sodium (VOLTAREN) 1 % GEL, apply 2-4 grams to bilateral shoulders and left knee  metoprolol succinate (TOPROL XL) 25 MG extended release tablet, TAKE ONE TABLET BY MOUTH DAILY  pravastatin (PRAVACHOL) 10 MG tablet, TAKE ONE TABLET BY MOUTH DAILY  omeprazole 20 MG EC tablet, TAKE 1 CAPSULE DAILY  loratadine-pseudoephedrine (LORATADINE-D 24HR)  MG per extended release tablet, TAKE ONE TABLET BY MOUTH DAILY  lisinopril (PRINIVIL;ZESTRIL) 20 MG tablet, TAKE 1 TABLET DAILY (NO FURTHER REFILLS, NEED LABS/APPT, CONTACT OFFICE FOR INSTRUCTIONS)  Insulin Syringe-Needle U-100 27G X 1/2\" 0.5 ML MISC, 20 units  Handicap Placard MISC, -  Fe Bisgly-Vit C-Vit B12-FA (GENTLE IRON) 28-60-0.008-0.4 MG CAPS, 1 capsule  acetaminophen (TYLENOL) 325 MG tablet, Take 2 tablets by mouth every 4 hours as needed for Pain or Fever  insulin glargine (LANTUS) 100 UNIT/ML injection vial, Inject 20 Units into the skin nightly  acetic acid 0.25 % irrigation, Clean and do wet to dry t the right inner leg wounds, bid  diclofenac sodium 1 % GEL, Apply 2 g topically 2 times daily  docusate sodium (COLACE) 100 MG capsule, Take 100 mg by mouth 2 times daily  aspirin 81 MG EC tablet, Take 81 mg by mouth daily. citalopram (CELEXA) 40 MG tablet, Take 40 mg by mouth daily. fluticasone (FLONASE) 50 MCG/ACT nasal spray, 1 spray by Nasal route 2 times daily as needed for Rhinitis. metFORMIN ER (GLUCOPHAGE-XR) 500 MG XR tablet, Take 1,000 mg by mouth 2 times daily (with meals). gabapentin (NEURONTIN) 300 MG capsule, Take 300 mg by mouth 2 times daily. omeprazole (PRILOSEC) 20 MG capsule, Take 20 mg by mouth daily.   montelukast (SINGULAIR) 10 MG tablet, Take 10 mg by 05/23/2019    HCT 24.8 05/23/2019     05/23/2019    MCV 91.5 05/23/2019    MCH 27.7 05/23/2019    MCHC 30.2 05/23/2019    RDW 15.4 05/23/2019    LYMPHOPCT 33 11/07/2018    MONOPCT 11 11/07/2018    BASOPCT 1 11/07/2018    MONOSABS 0.68 11/07/2018    LYMPHSABS 2.14 11/07/2018    EOSABS 0.43 11/07/2018    BASOSABS 0.06 11/07/2018    DIFFTYPE NOT REPORTED 11/07/2018     BMP   Lab Results   Component Value Date     05/23/2019    K 3.2 05/23/2019     05/23/2019    CO2 20 05/23/2019    BUN 31 05/23/2019    CREATININE 0.63 05/23/2019    GLUCOSE 170 05/23/2019    CALCIUM 8.6 05/23/2019    MG 1.8 08/16/2018     LFTS  Lab Results   Component Value Date    ALKPHOS 81 05/23/2019    ALT 39 05/23/2019    AST 67 05/23/2019    PROT 5.7 05/23/2019    BILITOT 0.83 05/23/2019    BILIDIR 0.23 05/23/2019    IBILI 0.60 05/23/2019    LABALBU 3.1 05/23/2019     PTT  Lab Results   Component Value Date    APTT 32.2 (H) 09/26/2018     INR   Lab Results   Component Value Date    INR 1.3 09/26/2018    PROTIME 13.4 (H) 09/26/2018       Radiology    CT Scans      (See actual reports for details)    \"Thank you for asking us to see this patient\"    Case discussed with nurse. Questions and concerns addressed.     Electronically signed by     Mitch Humphries MD on 5/23/2019 at 9:56 AM  Pulmonary Critical Care and Sleep Medicine,  [facility]  Cell: [phone]  Office: [phone]

## 2019-05-23 NOTE — PLAN OF CARE
Problem: Falls - Risk of:  Goal: Will remain free from falls  Description  Will remain free from falls  Outcome: Ongoing  Note:   Patient is a fall risk during this admission. Fall risk assessment was performed. Patient is absent of falls. Bed is in the lowest position. Wheels on the bed are locked. Call light and bed side table are within reach. Clutter is removed. Patient was educated to call out when needing assistance or wanting to get out of bed. Patient offered toileting assistance during rounding. Hourly rounds have been performed. Problem: Risk for Impaired Skin Integrity  Goal: Tissue integrity - skin and mucous membranes  Description  Structural intactness and normal physiological function of skin and  mucous membranes. Outcome: Ongoing  Note:   Skin assessment ongoing. Pressure ulcer preventions being practiced - see charting for details. Patient turned every two hours.

## 2019-05-23 NOTE — PROGRESS NOTES
Transitions of Care Pharmacy Service   Medication Review    The patient's list of current home medications has been reviewed. Pt is unable to be interviewed at this time, but I have reviewed her PCP med list and contacted [facility] for refill history. Source(s) of information: PCP med list (Dr. Rosetta Avalos), [employer]    Other Notes Singulair is a current med per PCP but not on file at [facility]           Please feel free to call me with any questions about this encounter. Thank you. Mee Gautam [address] [place] [address]   Transitions  Care Pharmacy Service  Phone:  [phone]  Fax: [phone]      Electronically signed by Mee Lotus [address] [place] [address] on 5/23/2019 at 7:00 PM         Medications Prior to Admission:   lisinopril (PRINIVIL;ZESTRIL) 20 MG tablet, Take 20 mg by mouth daily  loratadine-pseudoephedrine (CLARITIN-D 24 HOUR)  MG per extended release tablet, Take 1 tablet by mouth daily  metoprolol succinate (TOPROL XL) 25 MG extended release tablet, Take 25 mg by mouth daily  pravastatin (PRAVACHOL) 10 MG tablet, Take 10 mg by mouth daily  oxyCODONE-acetaminophen (PERCOCET) 5-325 MG per tablet, Take 1 tablet by mouth 3 times daily as needed for Pain. diclofenac sodium (VOLTAREN) 1 % GEL, apply 2-4 grams to bilateral shoulders and left knee  Fe Bisgly-Vit C-Vit B12-FA (GENTLE IRON) 28-60-0.008-0.4 MG CAPS, 1 capsule daily  acetaminophen (TYLENOL) 325 MG tablet, Take 2 tablets by mouth every 4 hours as needed for Pain or Fever  insulin glargine (LANTUS) 100 UNIT/ML injection vial, Inject 20 Units into the skin nightly  docusate sodium (COLACE) 100 MG capsule, Take 100 mg by mouth 2 times daily  aspirin 81 MG EC tablet, Take 81 mg by mouth daily. citalopram (CELEXA) 40 MG tablet, Take 40 mg by mouth daily. fluticasone (FLONASE) 50 MCG/ACT nasal spray, 1 spray by Nasal route 2 times daily as needed for Rhinitis.   metFORMIN ER (GLUCOPHAGE-XR) 500 MG XR tablet, Take 500 mg by mouth 2 times daily (with meals)

## 2019-05-24 ENCOUNTER — APPOINTMENT (OUTPATIENT)
Dept: CT IMAGING | Age: 64
DRG: 988 | End: 2019-05-24
Payer: MEDICARE

## 2019-05-24 LAB
ABSOLUTE EOS #: 0.21 K/UL (ref 0–0.44)
ABSOLUTE IMMATURE GRANULOCYTE: 0.03 K/UL (ref 0–0.3)
ABSOLUTE LYMPH #: 0.83 K/UL (ref 1.1–3.7)
ABSOLUTE MONO #: 0.58 K/UL (ref 0.1–1.2)
AFP: 2.7 UG/L
ALBUMIN SERPL-MCNC: 3.1 G/DL (ref 3.5–5.2)
ALBUMIN SERPL-MCNC: 3.5 G/DL (ref 3.5–5.2)
ALBUMIN/GLOBULIN RATIO: ABNORMAL (ref 1–2.5)
ALBUMIN/GLOBULIN RATIO: ABNORMAL (ref 1–2.5)
ALP BLD-CCNC: 133 U/L (ref 35–104)
ALP BLD-CCNC: 99 U/L (ref 35–104)
ALPHA-1 ANTITRYPSIN: 148 MG/DL (ref 90–200)
ALT SERPL-CCNC: 46 U/L (ref 5–33)
ALT SERPL-CCNC: 57 U/L (ref 5–33)
AMYLASE: 763 U/L (ref 28–100)
ANION GAP SERPL CALCULATED.3IONS-SCNC: 10 MMOL/L (ref 9–17)
ANION GAP SERPL CALCULATED.3IONS-SCNC: 10 MMOL/L (ref 9–17)
ANION GAP SERPL CALCULATED.3IONS-SCNC: 11 MMOL/L (ref 9–17)
ANION GAP SERPL CALCULATED.3IONS-SCNC: 13 MMOL/L (ref 9–17)
AST SERPL-CCNC: 75 U/L
AST SERPL-CCNC: 87 U/L
BASOPHILS # BLD: 1 % (ref 0–2)
BASOPHILS ABSOLUTE: 0.03 K/UL (ref 0–0.2)
BILIRUB SERPL-MCNC: 0.68 MG/DL (ref 0.3–1.2)
BILIRUB SERPL-MCNC: 1.13 MG/DL (ref 0.3–1.2)
BILIRUBIN DIRECT: 0.2 MG/DL
BILIRUBIN DIRECT: 0.33 MG/DL
BILIRUBIN, INDIRECT: 0.48 MG/DL (ref 0–1)
BILIRUBIN, INDIRECT: 0.8 MG/DL (ref 0–1)
BUN BLDV-MCNC: 21 MG/DL (ref 8–23)
C-REACTIVE PROTEIN: 11.3 MG/L (ref 0–5)
CALCIUM SERPL-MCNC: 8.4 MG/DL (ref 8.6–10.4)
CERULOPLASMIN: 14 MG/DL (ref 16–45)
CHLORIDE BLD-SCNC: 110 MMOL/L (ref 98–107)
CHLORIDE BLD-SCNC: 111 MMOL/L (ref 98–107)
CHLORIDE BLD-SCNC: 112 MMOL/L (ref 98–107)
CHLORIDE BLD-SCNC: 112 MMOL/L (ref 98–107)
CO2: 19 MMOL/L (ref 20–31)
CO2: 21 MMOL/L (ref 20–31)
CREAT SERPL-MCNC: 0.6 MG/DL (ref 0.5–0.9)
DIFFERENTIAL TYPE: ABNORMAL
EOSINOPHILS RELATIVE PERCENT: 4 % (ref 1–4)
ESTIMATED AVERAGE GLUCOSE: 111 MG/DL
FERRITIN: 27 UG/L (ref 13–150)
FOLATE: >20 NG/ML
FREE KAPPA/LAMBDA RATIO: 1.27 (ref 0.26–1.65)
GFR AFRICAN AMERICAN: >60 ML/MIN
GFR NON-AFRICAN AMERICAN: >60 ML/MIN
GFR SERPL CREATININE-BSD FRML MDRD: ABNORMAL ML/MIN/{1.73_M2}
GFR SERPL CREATININE-BSD FRML MDRD: ABNORMAL ML/MIN/{1.73_M2}
GLOBULIN: ABNORMAL G/DL (ref 1.5–3.8)
GLUCOSE BLD-MCNC: 112 MG/DL (ref 65–105)
GLUCOSE BLD-MCNC: 145 MG/DL (ref 65–105)
GLUCOSE BLD-MCNC: 146 MG/DL (ref 65–105)
GLUCOSE BLD-MCNC: 148 MG/DL (ref 65–105)
GLUCOSE BLD-MCNC: 161 MG/DL (ref 70–99)
HAV IGM SER IA-ACNC: NONREACTIVE
HBA1C MFR BLD: 5.5 % (ref 4–6)
HCT VFR BLD CALC: 24.3 % (ref 36.3–47.1)
HEMOGLOBIN: 7.4 G/DL (ref 11.9–15.1)
HEPATITIS B CORE IGM ANTIBODY: NONREACTIVE
HEPATITIS B SURFACE ANTIGEN: NONREACTIVE
HEPATITIS C ANTIBODY: NONREACTIVE
IMMATURE GRANULOCYTES: 1 %
INR BLD: 1.5
IRON SATURATION: 13 % (ref 20–55)
IRON: 39 UG/DL (ref 37–145)
KAPPA FREE LIGHT CHAINS QNT: 2.72 MG/DL (ref 0.37–1.94)
LAMBDA FREE LIGHT CHAINS QNT: 2.14 MG/DL (ref 0.57–2.63)
LIPASE: 33 U/L (ref 13–60)
LYMPHOCYTES # BLD: 15 % (ref 24–43)
MAGNESIUM: 1.7 MG/DL (ref 1.6–2.6)
MAGNESIUM: 1.8 MG/DL (ref 1.6–2.6)
MCH RBC QN AUTO: 27.3 PG (ref 25.2–33.5)
MCHC RBC AUTO-ENTMCNC: 30.5 G/DL (ref 28.4–34.8)
MCV RBC AUTO: 89.7 FL (ref 82.6–102.9)
MONOCYTES # BLD: 11 % (ref 3–12)
NRBC AUTOMATED: 0 PER 100 WBC
PDW BLD-RTO: 15 % (ref 11.8–14.4)
PLATELET # BLD: 104 K/UL (ref 138–453)
PLATELET ESTIMATE: ABNORMAL
PMV BLD AUTO: 10.9 FL (ref 8.1–13.5)
POTASSIUM SERPL-SCNC: 3.2 MMOL/L (ref 3.7–5.3)
POTASSIUM SERPL-SCNC: 3.3 MMOL/L (ref 3.7–5.3)
POTASSIUM SERPL-SCNC: 3.5 MMOL/L (ref 3.7–5.3)
POTASSIUM SERPL-SCNC: 3.6 MMOL/L (ref 3.7–5.3)
PROTHROMBIN TIME: 15.4 SEC (ref 9.7–11.6)
RBC # BLD: 2.71 M/UL (ref 3.95–5.11)
RBC # BLD: ABNORMAL 10*6/UL
SEDIMENTATION RATE, ERYTHROCYTE: 12 MM (ref 0–20)
SEG NEUTROPHILS: 69 % (ref 36–65)
SEGMENTED NEUTROPHILS ABSOLUTE COUNT: 3.73 K/UL (ref 1.5–8.1)
SODIUM BLD-SCNC: 142 MMOL/L (ref 135–144)
SODIUM BLD-SCNC: 142 MMOL/L (ref 135–144)
SODIUM BLD-SCNC: 143 MMOL/L (ref 135–144)
SODIUM BLD-SCNC: 144 MMOL/L (ref 135–144)
TOTAL IRON BINDING CAPACITY: 310 UG/DL (ref 250–450)
TOTAL PROTEIN: 5.5 G/DL (ref 6.4–8.3)
TOTAL PROTEIN: 6.3 G/DL (ref 6.4–8.3)
UNSATURATED IRON BINDING CAPACITY: 271 UG/DL (ref 112–347)
VITAMIN B-12: >2000 PG/ML (ref 232–1245)
WBC # BLD: 5.4 K/UL (ref 3.5–11.3)
WBC # BLD: ABNORMAL 10*3/UL

## 2019-05-24 PROCEDURE — 82150 ASSAY OF AMYLASE: CPT

## 2019-05-24 PROCEDURE — 82390 ASSAY OF CERULOPLASMIN: CPT

## 2019-05-24 PROCEDURE — 6370000000 HC RX 637 (ALT 250 FOR IP): Performed by: INTERNAL MEDICINE

## 2019-05-24 PROCEDURE — 6360000002 HC RX W HCPCS: Performed by: INTERNAL MEDICINE

## 2019-05-24 PROCEDURE — 80053 COMPREHEN METABOLIC PANEL: CPT

## 2019-05-24 PROCEDURE — 2580000003 HC RX 258: Performed by: INTERNAL MEDICINE

## 2019-05-24 PROCEDURE — 88307 TISSUE EXAM BY PATHOLOGIST: CPT

## 2019-05-24 PROCEDURE — 97530 THERAPEUTIC ACTIVITIES: CPT

## 2019-05-24 PROCEDURE — 36415 COLL VENOUS BLD VENIPUNCTURE: CPT

## 2019-05-24 PROCEDURE — 86140 C-REACTIVE PROTEIN: CPT

## 2019-05-24 PROCEDURE — 82103 ALPHA-1-ANTITRYPSIN TOTAL: CPT

## 2019-05-24 PROCEDURE — 82728 ASSAY OF FERRITIN: CPT

## 2019-05-24 PROCEDURE — 82746 ASSAY OF FOLIC ACID SERUM: CPT

## 2019-05-24 PROCEDURE — 83690 ASSAY OF LIPASE: CPT

## 2019-05-24 PROCEDURE — 82607 VITAMIN B-12: CPT

## 2019-05-24 PROCEDURE — APPNB30 APP NON BILLABLE TIME 0-30 MINS: Performed by: NURSE PRACTITIONER

## 2019-05-24 PROCEDURE — 83516 IMMUNOASSAY NONANTIBODY: CPT

## 2019-05-24 PROCEDURE — 83735 ASSAY OF MAGNESIUM: CPT

## 2019-05-24 PROCEDURE — 86334 IMMUNOFIX E-PHORESIS SERUM: CPT

## 2019-05-24 PROCEDURE — 84155 ASSAY OF PROTEIN SERUM: CPT

## 2019-05-24 PROCEDURE — 85610 PROTHROMBIN TIME: CPT

## 2019-05-24 PROCEDURE — 84165 PROTEIN E-PHORESIS SERUM: CPT

## 2019-05-24 PROCEDURE — 86645 CMV ANTIBODY IGM: CPT

## 2019-05-24 PROCEDURE — 88311 DECALCIFY TISSUE: CPT

## 2019-05-24 PROCEDURE — 82248 BILIRUBIN DIRECT: CPT

## 2019-05-24 PROCEDURE — 88342 IMHCHEM/IMCYTCHM 1ST ANTB: CPT

## 2019-05-24 PROCEDURE — 85025 COMPLETE CBC W/AUTO DIFF WBC: CPT

## 2019-05-24 PROCEDURE — 86255 FLUORESCENT ANTIBODY SCREEN: CPT

## 2019-05-24 PROCEDURE — 83550 IRON BINDING TEST: CPT

## 2019-05-24 PROCEDURE — 97535 SELF CARE MNGMENT TRAINING: CPT

## 2019-05-24 PROCEDURE — 20225 BONE BIOPSY TROCAR/NDL DEEP: CPT

## 2019-05-24 PROCEDURE — 88341 IMHCHEM/IMCYTCHM EA ADD ANTB: CPT

## 2019-05-24 PROCEDURE — 82105 ALPHA-FETOPROTEIN SERUM: CPT

## 2019-05-24 PROCEDURE — 86663 EPSTEIN-BARR ANTIBODY: CPT

## 2019-05-24 PROCEDURE — 83540 ASSAY OF IRON: CPT

## 2019-05-24 PROCEDURE — 6360000002 HC RX W HCPCS: Performed by: RADIOLOGY

## 2019-05-24 PROCEDURE — 6370000000 HC RX 637 (ALT 250 FOR IP): Performed by: NURSE PRACTITIONER

## 2019-05-24 PROCEDURE — 80074 ACUTE HEPATITIS PANEL: CPT

## 2019-05-24 PROCEDURE — 97167 OT EVAL HIGH COMPLEX 60 MIN: CPT

## 2019-05-24 PROCEDURE — C1830 POWER BONE MARROW BX NEEDLE: HCPCS

## 2019-05-24 PROCEDURE — 86038 ANTINUCLEAR ANTIBODIES: CPT

## 2019-05-24 PROCEDURE — 80051 ELECTROLYTE PANEL: CPT

## 2019-05-24 PROCEDURE — 0QB33ZX EXCISION OF LEFT PELVIC BONE, PERCUTANEOUS APPROACH, DIAGNOSTIC: ICD-10-PCS | Performed by: RADIOLOGY

## 2019-05-24 PROCEDURE — 2000000000 HC ICU R&B

## 2019-05-24 PROCEDURE — 86664 EPSTEIN-BARR NUCLEAR ANTIGEN: CPT

## 2019-05-24 PROCEDURE — 99232 SBSQ HOSP IP/OBS MODERATE 35: CPT | Performed by: INTERNAL MEDICINE

## 2019-05-24 PROCEDURE — 86256 FLUORESCENT ANTIBODY TITER: CPT

## 2019-05-24 PROCEDURE — 80076 HEPATIC FUNCTION PANEL: CPT

## 2019-05-24 PROCEDURE — 85651 RBC SED RATE NONAUTOMATED: CPT

## 2019-05-24 PROCEDURE — 86665 EPSTEIN-BARR CAPSID VCA: CPT

## 2019-05-24 PROCEDURE — 97163 PT EVAL HIGH COMPLEX 45 MIN: CPT

## 2019-05-24 PROCEDURE — 82947 ASSAY GLUCOSE BLOOD QUANT: CPT

## 2019-05-24 PROCEDURE — 83883 ASSAY NEPHELOMETRY NOT SPEC: CPT

## 2019-05-24 RX ORDER — FENTANYL CITRATE 50 UG/ML
INJECTION, SOLUTION INTRAMUSCULAR; INTRAVENOUS
Status: COMPLETED | OUTPATIENT
Start: 2019-05-24 | End: 2019-05-24

## 2019-05-24 RX ORDER — POTASSIUM CHLORIDE 29.8 MG/ML
20 INJECTION INTRAVENOUS
Status: COMPLETED | OUTPATIENT
Start: 2019-05-24 | End: 2019-05-24

## 2019-05-24 RX ORDER — LEVOTHYROXINE SODIUM 0.03 MG/1
25 TABLET ORAL DAILY
Status: DISCONTINUED | OUTPATIENT
Start: 2019-05-24 | End: 2019-05-29 | Stop reason: HOSPADM

## 2019-05-24 RX ORDER — LACTULOSE 10 G/15ML
20 SOLUTION ORAL 2 TIMES DAILY
Status: DISCONTINUED | OUTPATIENT
Start: 2019-05-25 | End: 2019-05-25

## 2019-05-24 RX ORDER — MIDAZOLAM HYDROCHLORIDE 1 MG/ML
INJECTION INTRAMUSCULAR; INTRAVENOUS
Status: COMPLETED | OUTPATIENT
Start: 2019-05-24 | End: 2019-05-24

## 2019-05-24 RX ADMIN — INSULIN LISPRO 1 UNITS: 100 INJECTION, SOLUTION INTRAVENOUS; SUBCUTANEOUS at 21:09

## 2019-05-24 RX ADMIN — MIDAZOLAM 1 MG: 1 INJECTION INTRAMUSCULAR; INTRAVENOUS at 11:21

## 2019-05-24 RX ADMIN — CEFTRIAXONE 1 G: 1 INJECTION, POWDER, FOR SOLUTION INTRAMUSCULAR; INTRAVENOUS at 21:06

## 2019-05-24 RX ADMIN — POTASSIUM CHLORIDE 20 MEQ: 400 INJECTION, SOLUTION INTRAVENOUS at 06:37

## 2019-05-24 RX ADMIN — POTASSIUM CHLORIDE: 2 INJECTION, SOLUTION, CONCENTRATE INTRAVENOUS at 03:10

## 2019-05-24 RX ADMIN — Medication 1000 ML: at 08:32

## 2019-05-24 RX ADMIN — INSULIN LISPRO 1 UNITS: 100 INJECTION, SOLUTION INTRAVENOUS; SUBCUTANEOUS at 08:00

## 2019-05-24 RX ADMIN — RIFAXIMIN 550 MG: 550 TABLET ORAL at 21:06

## 2019-05-24 RX ADMIN — MICONAZOLE NITRATE: 20.6 POWDER TOPICAL at 08:33

## 2019-05-24 RX ADMIN — POTASSIUM CHLORIDE: 2 INJECTION, SOLUTION, CONCENTRATE INTRAVENOUS at 12:32

## 2019-05-24 RX ADMIN — INSULIN LISPRO 1 UNITS: 100 INJECTION, SOLUTION INTRAVENOUS; SUBCUTANEOUS at 17:26

## 2019-05-24 RX ADMIN — Medication 50 MCG: at 11:29

## 2019-05-24 RX ADMIN — Medication 10 ML: at 08:32

## 2019-05-24 RX ADMIN — Medication 50 MCG: at 11:19

## 2019-05-24 RX ADMIN — MICONAZOLE NITRATE: 20.6 POWDER TOPICAL at 21:08

## 2019-05-24 RX ADMIN — Medication 10 ML: at 21:07

## 2019-05-24 RX ADMIN — POTASSIUM CHLORIDE 20 MEQ: 400 INJECTION, SOLUTION INTRAVENOUS at 08:03

## 2019-05-24 RX ADMIN — POTASSIUM CHLORIDE: 2 INJECTION, SOLUTION, CONCENTRATE INTRAVENOUS at 21:24

## 2019-05-24 NOTE — PROGRESS NOTES
Department of Internal Medicine  Nephrology Omar Padilla MD    Progress Note    Interval history: Patient was seen and examined today and remains confused. She has right breast cellulitis and lytic bone lesions. She is on lactulose enema for treatment of hepatic encephalopathy. She is nonoliguric. History of presenting illness: This is a 59 y.o. female with a significant past medical history of  Depression, Fibromyalgia [on gabapentin], hypertension hyperlipidemia and COPD, who was brought in by her brother after 4 days of progressive decline in mental status. Laboratory studies at presentation revealed serum sodium 150 mmol/L but serum creatinine was normal.  Patient is still pleasantly confused and unable to give an interview. Physical Exam:    VITALS:  BP (!) 136/59   Pulse 95   Temp 98.6 °F (37 °C) (Oral)   Resp 29   Ht 5' 7\" (1.702 m)   Wt 220 lb (99.8 kg)   SpO2 99%   BMI 34.46 kg/m²   24HR INTAKE/OUTPUT:    Intake/Output Summary (Last 24 hours) at 5/24/2019 1159  Last data filed at 5/23/2019 1829  Gross per 24 hour   Intake 1541 ml   Output 525 ml   Net 1016 ml       Constitutional:  Awake but confused; not in acute respiratory distress. Cardiovascular/Edema: S1, S2 with no pericardial rub. Respiratory:  Clinically clear. Gastrointestinal:  Generally soft. CBC:   Recent Labs     05/22/19  1337 05/23/19  0554 05/24/19  0423   WBC 10.7 6.9 5.4   HGB 8.9* 7.5* 7.4*    106* 104*     BMP:    Recent Labs     05/22/19  1337  05/23/19  0554 05/23/19  1700 05/24/19  0018 05/24/19  0423   *   < > 148* 145* 142 143   K 3.9  --  3.2* 3.1* 3.3* 3.2*   *  --  116* 112* 111* 112*   CO2 21  --  20 23 21 21   BUN 35*  --  31*  --   --  21   CREATININE 0.80  --  0.63  --   --  0.60   GLUCOSE 175*  --  170*  --   --  161*    < > = values in this interval not displayed.        Lab Results   Component Value Date    NITRU NEGATIVE 05/22/2019    COLORU YELLOW 05/22/2019    JOSE ARMANDO 6.0 05/22/2019    WBCUA 5 TO 10 09/26/2018    RBCUA 5 TO 10 09/26/2018    MUCUS NOT REPORTED 09/26/2018    TRICHOMONAS NOT REPORTED 09/26/2018    YEAST NOT REPORTED 09/26/2018    BACTERIA MANY 09/26/2018    SPECGRAV 1.025 05/22/2019    LEUKOCYTESUR NEGATIVE 05/22/2019    UROBILINOGEN Normal 05/22/2019    BILIRUBINUR NEGATIVE 05/22/2019    GLUCOSEU NEGATIVE 05/22/2019    KETUA TRACE 05/22/2019    AMORPHOUS NOT REPORTED 09/26/2018     Urine Sodium:     Lab Results   Component Value Date    TORREY <20 09/26/2018     Urine Creatinine:     Lab Results   Component Value Date    LABCREA 180.8 09/26/2018     Urine Eosinophils:  No components found for: UEOS      IMPRESSION/RECOMMENDATIONS:      1. Hypernatremia - clinically improving with serum sodium down to 143 mmol/L. Continue hypotonic hydration. 2.  Hypokalemia - secondary to lactulose induced diarrhea and poor oral intake. We will replace with IV potassium chloride 40 mEq. 3.  Lytic bone lesion - Check serum and urine protein electrophoresis. 4.  Normocytic anemia - hemoglobin has trended down from 8.9 g/dL on admission to 7.4 g/dL today. No obvious bleeding noted. Check iron stores.     ISABELL CernaCP  Attending Nephrologist  5/24/2019 6:18 AM

## 2019-05-24 NOTE — PROGRESS NOTES
0310    dextrose         Diagnostics:   Telemetry:SINUS RHYTHM    Labs:   CBC:  Recent Labs     05/23/19  0554 05/24/19  0423   WBC 6.9 5.4   HGB 7.5* 7.4*   HCT 24.8* 24.3*   * 104*     Magnesium:  Recent Labs     05/23/19  1700 05/24/19  0018   MG 1.8 1.7     BMP:  Recent Labs     05/23/19  0554  05/24/19  0018 05/24/19  0423   *   < > 142 143   K 3.2*   < > 3.3* 3.2*   CALCIUM 8.6  --   --  8.4*   CO2 20   < > 21 21   BUN 31*  --   --  21   CREATININE 0.63  --   --  0.60   LABGLOM >60  --   --  >60   GLUCOSE 170*  --   --  161*    < > = values in this interval not displayed. BNP:No results for input(s): BNP in the last 72 hours. PT/INR:  Recent Labs     05/24/19  0803   PROTIME 15.4*   INR 1.5     APTT:No results for input(s): APTT in the last 72 hours. CARDIAC ENZYMES:  Recent Labs     05/22/19  1337 05/23/19  0035 05/23/19  0257 05/23/19  0554   CKTOTAL 553*  --   --   --    TROPONINT NOT REPORTED NOT REPORTED NOT REPORTED NOT REPORTED     FASTING LIPID PANEL:No results found for: HDL, LDLDIRECT, LDLCALC, TRIG  LIVER PROFILE:  Recent Labs     05/23/19  0554 05/24/19  0423   AST 67* 75*   ALT 39* 46*   LABALBU 3.1* 3.1*   ALKPHOS 81 99   BILITOT 0.83 0.68   BILIDIR 0.23 0.20   IBILI 0.60 0.48   PROT 5.7* 5.5*   ALBUMIN NOT REPORTED NOT REPORTED        ASSESSMENT:  1. ELEVATED TROPONIN, DOES NOT APPEAR TO TB DUE TO ACUTE MI.  2. HYPERNATREMIA.     3.HEPATIC ENCEPHALOPATHY  Patient Active Problem List   Diagnosis    Cellulitis of right lower extremity- resolving    Diabetes mellitus ([address]    ARLYN (obstructive sleep apnea)    Morbid obesity (HCC)    Essential hypertension    Lactic acidosis    Bandemia    CRP elevated    Depression    Osteoarthritis    Cellulitis of right breast    Septicemia ([address]    SIRS due to infectious process without acute organ dysfunction (HCC)    Cellulitis and abscess of trunk    Acute urinary tract infection    Anemia    Anemia, iron deficiency    Asthma    Atopic rhinitis    Disorder associated with type 2 diabetes mellitus (HCC)    Disorder of liver    Displacement of lumbar intervertebral disc without myelopathy    Fibromyalgia    Full thickness rotator cuff tear    Gastroesophageal reflux disease    Hyperglycemia due to type 2 diabetes mellitus (HCC)    Hyperlipidemia    Hypothyroid    Iron deficiency anemia    Localized, primary osteoarthritis of shoulder region    Mass of right breast    Migraine    Pure hypercholesterolemia    Thrombocytopenia (HCC)    Altered mental status    Encephalopathy    Cirrhosis ([facility]    Elevated LFTs    Hypernatremia    Elevated amylase       PLAN:  1. Continue current medications. 2. PT IS MAINTAINING SINUS RHYTHM. 3. ECHO SHOWS PRESERVED LVEF. 4. NO FURTHER CARDIAC WORK UP INDICATED AT THIS TIME. 5. PLEASE CALL IF WE CAN BE OF FURTHER ASSISTANCE. Discussed with patient and nursing.     Tracy Stoll MD

## 2019-05-24 NOTE — FLOWSHEET NOTE
Return call received from Pat Mendoza NP w/[facility] Updated and informed that patient is alert/oriented and is requesting to eat, writer inquired about continuation of  lactulose enemas, instructed to hold enemas and orders to follow

## 2019-05-24 NOTE — PROGRESS NOTES
Subjective:     Follow-up hepatic encephalopathy  Patient doing fairly well alert oriented ×3 no asterixis  ROS  No fever no chills no GI/ complaints no cardio pulmonary complaints at present, no TIA no bleeding, no headache no sore throat no skin lesions, no polyuria no polydipsia no hypoglycemia  physical exam  General Appearance:   Skin: warm and dry, no rash or erythema  Head: normocephalic and atraumatic  Eyes: pupils equal, round, and reactive to light, sclera anicteric and positive pallor  Neck: neck supple and non tender without mass   Pulmonary/Chest: clear to auscultation bilaterally- no wheezes, rales or rhonchi, normal air movement, no respiratory distress  Cardiovascular: normal rate, regular rhythm, normal S1 and S2, no gallops, intact distal pulses and no carotid bruits  Abdomen: soft, non-tender, non-distended, normal bowel sounds, no masses or organomegaly  Extremities: Good pulses no Wesley sign trace edema   Neurologic: Alert and oriented ×3 with no focal deficit    BP (!) 148/63   Pulse 84   Temp (P) 98.3 °F (36.8 °C) (Oral)   Resp 20   Ht 5' 7\" (1.702 m)   Wt 220 lb (99.8 kg)   SpO2 100%   BMI 34.46 kg/m²     CBC:   Lab Results   Component Value Date    WBC 5.4 05/24/2019    RBC 2.71 05/24/2019    HGB 7.4 05/24/2019    HCT 24.3 05/24/2019    MCV 89.7 05/24/2019    MCH 27.3 05/24/2019    MCHC 30.5 05/24/2019    RDW 15.0 05/24/2019     05/24/2019    MPV 10.9 05/24/2019     CMP:    Lab Results   Component Value Date     05/24/2019    K 3.6 05/24/2019     05/24/2019    CO2 21 05/24/2019    BUN 21 05/24/2019    CREATININE 0.60 05/24/2019    GFRAA >60 05/24/2019    LABGLOM >60 05/24/2019    GLUCOSE 161 05/24/2019    PROT 6.3 05/24/2019    LABALBU 3.5 05/24/2019    CALCIUM 8.4 05/24/2019    BILITOT 1.13 05/24/2019    ALKPHOS 133 05/24/2019    AST 87 05/24/2019    ALT 57 05/24/2019        Assessment:  Patient Active Problem List   Diagnosis    Cellulitis of right lower

## 2019-05-24 NOTE — PROGRESS NOTES
Pulmonary Critical Care Progress Note  Monet Fernandez MD     Patient seen for the follow up of altered mental status      Subjective:  She denies chest pain. Mild occasional cough, mostly dry. She denies any shortness of breath. Her mentation is vastly improved since yesterday. She is requesting to have something to eat and drink. Examination:  Vitals: BP (!) 136/59   Pulse 95   Temp 98.6 °F (37 °C) (Oral)   Resp 29   Ht 5' 7\" (1.702 m)   Wt 220 lb (99.8 kg)   SpO2 99%   BMI 34.46 kg/m²   General appearance:  alert and cooperative with exam  Neck: No JVD  Lungs: clear to auscultation bilaterally  Heart: regular rate and rhythm, S1, S2 normal, no gallop  Abdomen: Soft, non tender, + BS  Extremities: no cyanosis or clubbing. No significant edema    LABs:  CBC:   Recent Labs     05/23/19  0554 05/24/19  0423   WBC 6.9 5.4   HGB 7.5* 7.4*   HCT 24.8* 24.3*   * 104*     BMP:   Recent Labs     05/23/19  0554  05/24/19  0423 05/24/19  1223   *   < > 143 144   K 3.2*   < > 3.2* 3.5*   CO2 20   < > 21 19*   BUN 31*  --  21  --    CREATININE 0.63  --  0.60  --    LABGLOM >60  --  >60  --    GLUCOSE 170*  --  161*  --     < > = values in this interval not displayed. PT/INR:   Recent Labs     05/24/19  0803   PROTIME 15.4*   INR 1.5     LIVER PROFILE:  Recent Labs     05/23/19  0554 05/24/19  0423   AST 67* 75*   ALT 39* 46*   LABALBU 3.1* 3.1*     ABG:  Lab Results   Component Value Date    FIO2 RA 09/27/2018       Lab Results   Component Value Date    FIO2 RA 09/27/2018     Impression:  · Change in mental status, most likely metabolic/toxic,?   Liver cirrhosis related  · ARLYN/obesity  · Anemia  · Lytic lesion of ileum and L3 vertebrae, S/p biopsy in IR 5/24  · Liver cirrhosis  · DM  · History of asthma/anxiety disorder/  · HTN/HDL/hypothyroidism/depression    Recommendations:  · Continue IV Rocephin  · Singulair  · Albuterol and Ipratropium Q 4 hours and prn  · Hold Neurontin/Percocet  · Accu-Check before meals and at bedtime and sliding scale coverage  · Labs: CBC and BMP in am  · BiPAP, while asleep and when necessary  · 2 liters/min via nasal cannula  · DVT prophylaxis with EPCs  · Will follow with you      Fernanda Muñoz MD, [facility]  Pulmonary Critical Care and Sleep Medicine,  [facility]  Cell: [phone]  Office: [phone]

## 2019-05-24 NOTE — FLOWSHEET NOTE
Fecal management sys noted out with balloon fully inflated, some formed stool noted on bed w/large amt of liquid stool patient cleaned, fecal system cleansed and re-inserted per protocol, patient tolerated well

## 2019-05-24 NOTE — PROGRESS NOTES
Physical Therapy    Facility/Department: [facility]  Initial Assessment    NAME: Brian Cantor  : 1955  MRN: [id number]    Date of Service: 2019    Discharge Recommendations:  Subacute/Skilled Nursing Facility        Assessment   Body structures, Functions, Activity limitations: Decreased functional mobility ; Decreased ADL status; Decreased ROM; Decreased strength;Decreased safe awareness;Decreased cognition;Decreased endurance;Decreased balance;Decreased coordination  Assessment: Recommend SNF at discharge due to patient requiring 2 assist for transfers. Patient with bilateral DF contractures. Prognosis: Good  Decision Making: High Complexity  Patient Education: Patient educated to PT POC, fall prevention, and ankle pumps. REQUIRES PT FOLLOW UP: Yes  Activity Tolerance  Activity Tolerance: Patient Tolerated treatment well       Patient Diagnosis(es): The encounter diagnosis was Altered mental status, unspecified altered mental status type.      has a past medical history of Acute urinary tract infection, Anemia, Anemia, iron deficiency, Anxiety disorder, Arthritis, Asthma, Atopic rhinitis, Bandemia, Blood transfusion reaction, Cellulitis and abscess of trunk, Cellulitis of right breast, Cellulitis of right lower extremity- resolving, COPD (chronic obstructive pulmonary disease) ([address] CRP elevated, Depression, Diabetes mellitus ([address] Disorder associated with type 2 diabetes mellitus ([address] Disorder of liver, Displacement of lumbar intervertebral disc without myelopathy, Essential hypertension, Fibromyalgia, Full thickness rotator cuff tear, Gastroesophageal reflux disease, GERD (gastroesophageal reflux disease), Hammer toe of right foot, Hernia, abdominal, Hyperglycemia due to type 2 diabetes mellitus ([address] Hyperlipidemia, Hypertension, Hypothyroid, Iron deficiency anemia, Lactic acidosis, Localized, primary osteoarthritis of shoulder region, Mass of right breast, Migraine, Morbid obesity ([address] ARLYN No falls  Cognition        Objective          AROM RLE (degrees)  RLE General AROM: Hip and knee WFL, ankle lack 15 degrees from neutral  AROM LLE (degrees)  LLE General AROM: Hip and knee WFL, ankle lack 10 degrees from neutral  AROM RUE (degrees)  RUE General AROM: See OT assessment  AROM LUE (degrees)  LUE General AROM: See OT assessment  Strength RLE  Comment: 4-/5  Strength LLE  Comment: 4-/5  Strength RUE  Comment: See OT assessment  Strength LUE  Comment: See OT assessment  Tone RLE  RLE Tone: Normotonic  Tone LLE  LLE Tone: Normotonic  Motor Control  Gross Motor?: (decreased motor planning)  Sensation  Overall Sensation Status: WNL  Bed mobility  Rolling to Left: Modified independent  Rolling to Right: Modified independent  Supine to Sit: Contact guard assistance  Transfers  Sit to Stand: Moderate Assistance;2 Person Assistance  Stand to sit: Moderate Assistance;2 Person Assistance  Bed to Chair: Moderate assistance;2 Person Assistance(Viviane ochoa)  Comment: Upon initial standing patient flexed forward at hips and unable to get heels down to ground, with cueing patient able to stand upright. Ambulation  Ambulation?: No     Balance  Sitting - Static: Fair;+  Sitting - Dynamic: Fair;+  Standing - Static: Poor  Standing - Dynamic: Poor        Plan   Plan  Times per week: 1-2 x/d, 5-7 days a wk  Current Treatment Recommendations: Strengthening, Balance Training, Functional Mobility Training, Transfer Training, ROM, Endurance Training, Gait Training, Neuromuscular Re-education, Home Exercise Program, Safety Education & Training, Patient/Caregiver Education & Training  Safety Devices  Type of devices:  All fall risk precautions in place, Call light within reach, Chair alarm in place, Gait belt, Left in chair, Nurse notified    G-Code       OutComes Score  Gabriel Balance Score: 3                                               AM-PAC Score  AM-PAC Inpatient Mobility Raw Score : 10  AM-PAC Inpatient T-Scale Score : 32.29  Mobility Inpatient CMS 0-100% Score: 76.75  Mobility Inpatient CMS G-Code Modifier : CL          Goals  Short term goals  Time Frame for Short term goals: 12 visits  Short term goal 1: Patient will be min assist with transfers. Short term goal 2: Patient will amb 40 feet with RW and min assist.  Short term goal 3: Patient will be indep with bed mobility. Short term goal 4: Patient will be indep with HEP. Short term goal 5: Patient will have Bilateral DF to neutral.  Patient Goals   Patient goals : improve walking     Co-treatment with OT warranted secondary to decreased safety and independence requiring 2 skilled therapy professionals to address individual discipline's goals. PT addressing pre gait trunk strengthening, weight shifting prior to transfers, transfer training and postural control in sitting.   Therapy Time   Individual Concurrent Group Co-treatment   Time In 1410         Time Out 1450         Minutes 40         Timed Code Treatment Minutes: [facility], PT

## 2019-05-24 NOTE — FLOWSHEET NOTE
Patient alert/oriented, up to chair w/assist from therapy, patient requesting to eat/drink, message sent to Dr Jessenia Tinoco via [employer] re: continuation of Lactulose enema and p.o.  Intake, awaiting reply

## 2019-05-24 NOTE — PROGRESS NOTES
05/23/19  0554  05/24/19  0018 05/24/19  0423 05/24/19  1223   *   < > 148*   < > 142 143 144   K 3.9  --  3.2*   < > 3.3* 3.2* 3.5*   *  --  116*   < > 111* 112* 112*   CO2 21  --  20   < > 21 21 19*   BUN 35*  --  31*  --   --  21  --    CREATININE 0.80  --  0.63  --   --  0.60  --    GLUCOSE 175*  --  170*  --   --  161*  --    CALCIUM 9.1  --  8.6  --   --  8.4*  --     < > = values in this interval not displayed. LFTS  Recent Labs     05/22/19  1337 05/23/19  0554 05/24/19  0423   ALKPHOS 90 81 99   ALT 40* 39* 46*   AST 67* 67* 75*   PROT 6.7 5.7* 5.5*   BILITOT 1.07 0.83 0.68   BILIDIR  --  0.23 0.20   LABALBU 3.7 3.1* 3.1*       AMYLASE/LIPASE/AMMONIA  Recent Labs     05/22/19  1337 05/23/19  0554 05/23/19  2128 05/24/19  1521   AMYLASE 546* 561*  --  763*   LIPASE 19  --   --   --    AMMONIA 135* 146* 95*  --        PT/INR  Recent Labs     05/24/19  0803   PROTIME 15.4*   INR 1.5     Ct Head Wo Contrast     Result Date: 5/22/2019  EXAMINATION: CT OF THE HEAD WITHOUT CONTRAST  5/22/2019 1:31 pm TECHNIQUE: CT of the head was performed without the administration of intravenous contrast. Dose modulation, iterative reconstruction, and/or weight based adjustment of the mA/kV was utilized to reduce the radiation dose to as low as reasonably achievable. COMPARISON: None. HISTORY: ORDERING SYSTEM PROVIDED HISTORY: ENCEPHALOPATHY TECHNOLOGIST PROVIDED HISTORY: FINDINGS: BRAIN/VENTRICLES: There is no acute intracranial hemorrhage, mass effect or midline shift.  No abnormal extra-axial fluid collection.  The gray-white differentiation is maintained without evidence of an acute infarct.  There is no evidence of hydrocephalus. ORBITS: The visualized portion of the orbits demonstrate no acute abnormality. SINUSES: The visualized paranasal sinuses and mastoid air cells demonstrate no acute abnormality.  SOFT TISSUES/SKULL:  No acute abnormality of the visualized skull or soft tissues.      No acute intracranial abnormality.      Ct Abdomen Pelvis W Iv Contrast Additional Contrast? None     Result Date: 5/22/2019  EXAMINATION: CT OF THE ABDOMEN AND PELVIS WITH CONTRAST 5/22/2019 3:34 pm TECHNIQUE: CT of the abdomen and pelvis was performed with the administration of intravenous contrast. Multiplanar reformatted images are provided for review. Dose modulation, iterative reconstruction, and/or weight based adjustment of the mA/kV was utilized to reduce the radiation dose to as low as reasonably achievable. COMPARISON: None. HISTORY: ORDERING SYSTEM PROVIDED HISTORY: elevated amylase, change in mental status TECHNOLOGIST PROVIDED HISTORY: FINDINGS: Lower Chest: There is skin thickening at the visualized inferior right breast.  The visualized lung bases are clear. Organs: The liver is small in size with a diffusely nodular contour, suggesting hepatic cirrhosis.  No focal hepatic lesions are identified. There is peripheral calcification in the gallbladder, possibly gallbladder wall calcifications versus peripherally calcified gallstones filling the lumen.  No pericholecystic inflammatory change.  The gallbladder appears to be contracted.  No intrahepatic or extrahepatic biliary dilatation.  The spleen measures at the upper limits of normal, measuring 13.3 cm in AP dimension.  There is no pancreatic ductal dilatation or peripancreatic inflammatory change. Wayna Marck is a small left adrenal nodule, measuring 1.2 cm, which is indeterminate.  The right adrenal gland is unremarkable. Wayna Marck is symmetric enhancement of the kidneys.  No hydronephrosis or perinephric inflammation. GI/Bowel: There is moderate fecal material in the rectum.  No rectal wall thickening.  Moderate fecal material is scattered throughout the colon.  No focal pericolonic inflammation.  No abnormal bowel distention.  No free air or ascites.  The appendix is surgically absent.  Pelvis: Best catheter is in place in the urinary bladder. Wayna Marck is gas in the urinary bladder, likely due to recent intervention.  No pelvic lymphadenopathy.  The uterus and adnexal structures are grossly unremarkable. Peritoneum/Retroperitoneum: The abdominal aorta is normal in caliber.  There are several perisplenic and retroperitoneal venous varices.  No evidence of retroperitoneal or mesenteric lymphadenopathy. Bones/Soft Tissues: There is a mixed lytic and sclerotic lesion in the inferior left L3 vertebral body.  There is no significant vertebral body height loss.  The L3-L4 disc space is only mildly narrowed.  There is mild grade 1 anterolisthesis at L4-L5 and L5-S1, likely secondary to facet arthropathy.  Irregular sclerotic focus is noted in the posterior left iliac bone as well.  No acute osseous abnormality identified.      1.  No definite acute process in the abdomen or pelvis. 2.  Cirrhotic morphology of the liver with borderline enlarged spleen and several retroperitoneal varices noted.  No ascites. 3. Calcified gallbladder wall or peripherally calcified gallstones filling the lumen of the gallbladder.  No discrete mass identified. 4.  Mixed lytic and sclerotic lesions in the inferior left L3 vertebral body and left iliac bone, concerning for metastases.  Additional evaluation with bone scan and/or MRI is recommended. 5.  Skin thickening at the visualized inferior right breast, which is nonspecific.  This could be related to a cellulitis, but possibility of an inflammatory malignancy in the breast is not excluded.  Correlation with physical exam as well as breast imaging is recommended. 6.  Indeterminate left adrenal nodule, most likely an adenoma.  Follow-up with adrenal washout CT recommended in 1 year.  7.  Moderate stool burden.      Xr Chest Portable     Result Date: 5/22/2019  EXAMINATION: ONE XRAY VIEW OF THE CHEST 5/22/2019 2:02 pm COMPARISON: Chest radiograph 09/27/2018 HISTORY: ORDERING SYSTEM PROVIDED HISTORY: sepsis TECHNOLOGIST PROVIDED HISTORY: sepsis Ordering Physician Provided Reason for Exam: SEPSIS PORT AP SUPINE Acuity: Unknown Type of Exam: Unknown FINDINGS: Clear lungs.  No findings of pneumothorax or pleural effusion.  Normal mediastinal, hilar, and cardiac contours.  Atherosclerotic calcification in the aorta.  No acute fracture.      No acute cardiopulmonary process.          ASSESSMENT/PLAN:  1. Encephalopathy, improved  -will stop the lactulose for now due to excessive diarrhea, her mentation is normal, continue to watch closely tonight,  and will restart oral dose bid tomorrow  -order written to remove rectal tube   -continue the xifaxan  -clear liquid diet    2. Cirrhosis per CT scan ?etiology, hx etoh use in the past  -liver US in am  -autoimmune and acute liver panel workup ordered  -trend the lft and INR    3. Elevated amylase, remote hx of pancreatitis in her 30's, completely asymptomatic today, will discuss with Dr Deepti Newman    4.  Hypernatremia; managed by nephrology, improving        This plan was formulated in collaboration with Dr. Deepti Newman    Electronically signed by: ZACARIAS Marie CNP on 5/24/2019 at 5:01 PM

## 2019-05-24 NOTE — FLOWSHEET NOTE
Patient returned to room 1107, resting quietly with eyes closed, band-aid to lt buttock, C/D/I,  Patient voicing no complaints

## 2019-05-24 NOTE — FLOWSHEET NOTE
Patient not in room; no family present. Writer prays for patient; leaves note of support on tray table. Spiritual Care will follow as needed.      05/24/19 1221   Encounter Summary   Services provided to: Patient not available   Referral/Consult From: Rounding   Continue Visiting   (5/24/19 not in room)   Complexity of Encounter Low   Length of Encounter 15 minutes   Routine   Type Follow up

## 2019-05-24 NOTE — PROGRESS NOTES
Occupational Therapy   Occupational Therapy Initial Assessment  Date: 2019   Patient Name: Jacinda Carrillo  MRN: [id number]     : 1955    Date of Service: 2019    Discharge Recommendations:  [address] RN reports patient is medically stable for therapy treatment this date. Chart reviewed prior to treatment and patient is agreeable for therapy. All lines intact and patient positioned comfortably at end of treatment. All patient needs addressed prior to ending therapy session. Assessment   Performance deficits / Impairments: Decreased functional mobility ; Decreased ADL status; Decreased strength;Decreased safe awareness;Decreased cognition;Decreased balance  Prognosis: Good  Decision Making: High Complexity  Patient Education: OT POC, discharge recommendations, safety/pacing, and benefits of being up in chair  REQUIRES OT FOLLOW UP: Yes  Activity Tolerance  Activity Tolerance: Patient Tolerated treatment well;Patient limited by fatigue  Safety Devices  Safety Devices in place: Yes  Type of devices: Call light within reach;Nurse notified;Gait belt;Patient at risk for falls; Left in chair;Chair alarm in place           Patient Diagnosis(es): The encounter diagnosis was Altered mental status, unspecified altered mental status type.      has a past medical history of Acute urinary tract infection, Anemia, Anemia, iron deficiency, Anxiety disorder, Arthritis, Asthma, Atopic rhinitis, Bandemia, Blood transfusion reaction, Cellulitis and abscess of trunk, Cellulitis of right breast, Cellulitis of right lower extremity- resolving, COPD (chronic obstructive pulmonary disease) ([address] CRP elevated, Depression, Diabetes mellitus ([address] Disorder associated with type 2 diabetes mellitus ([address] Disorder of liver, Displacement of lumbar intervertebral disc without myelopathy, Essential hypertension, Fibromyalgia, Full thickness rotator cuff tear, Gastroesophageal reflux disease, GERD (gastroesophageal reflux disease), Hammer toe of right foot, Hernia, abdominal, Hyperglycemia due to type 2 diabetes mellitus ([facility] Hyperlipidemia, Hypertension, Hypothyroid, Iron deficiency anemia, Lactic acidosis, Localized, primary osteoarthritis of shoulder region, Mass of right breast, Migraine, Morbid obesity ([facility] ARLYN (obstructive sleep apnea), Osteoarthritis, Pure hypercholesterolemia, Restless leg syndrome, Seasonal allergies, Septicemia ([facility] SIRS due to infectious process without acute organ dysfunction [facility]), Sleep apnea, Spondylosis, and Thrombocytopenia ([facility] has a past surgical history that includes Appendectomy; Rotator cuff repair (Right); and Colonoscopy. Restrictions  Restrictions/Precautions  Restrictions/Precautions: General Precautions, Fall Risk  Position Activity Restriction  Other position/activity restrictions: jackson, fecal management system, IV, telemetry    Subjective   General  Patient assessed for rehabilitation services?: Yes  Family / Caregiver Present: No     Social/Functional History  Social/Functional History  Lives With: Alone  Type of Home: Apartment  Home Layout: One level  Home Access: Elevator  Bathroom Shower/Tub: Tub/Shower unit  Bathroom Toilet: Handicap height  Bathroom Equipment: 3-in-1 commode, Grab bars in shower, Shower chair(Uses BSC at night)  Home Equipment: (Sleeps in recliner, rollator)  ADL Assistance: Independent  Homemaking Assistance: Independent  Ambulation Assistance: Independent(RW)  Transfer Assistance: Independent  Active : Yes  Mode of Transportation: Car  Occupation: Unemployed  Additional Comments: No falls       Objective   Vision: Impaired  Vision Exceptions: Wears glasses at all times  Hearing: Within functional limits    Orientation  Overall Orientation Status: Within Functional Limits  Observation/Palpation  Posture: Fair  Observation: pt very lethargic initially; difficult to wake up.  Once awake, pt pleasant and making jokes. Does demo altered cognition, but able to converse and work with therapy  Balance  Sitting Balance: Stand by assistance  Standing Balance: Moderate assistance(x2; pt with heavy anterior lean d/t not being able to put heels down on floor. Pt needing max cuing as well to attempt bringing head and chest up as pt is leaning trunk excessively forward. Pt initially standing at walker,then needed Rebecca Never )  Functional Mobility  Functional Mobility Comments: unable to safely attempt mob with walker d/t heavy anterior lean at walker (mod-max A x 2); Rebecca Never used to get pt to chair. ADL  Feeding: NPO  Grooming: Minimal assistance; Moderate assistance  UE Bathing: Moderate assistance  LE Bathing: Maximum assistance  UE Dressing: Moderate assistance  LE Dressing: Maximum assistance  Toileting: Maximum assistance  Tone RUE  RUE Tone: Normotonic  Tone LUE  LUE Tone: Normotonic  Coordination  Movements Are Fluid And Coordinated: Yes     Bed mobility  Supine to Sit: Contact guard assistance  Transfers  Sit to stand: Moderate assistance;2 Person assistance  Stand to sit: 2 Person assistance; Moderate assistance     Cognition  Overall Cognitive Status: Exceptions  Following Commands:  Follows one step commands with repetition  Attention Span: Attends with cues to redirect  Safety Judgement: Decreased awareness of need for assistance;Decreased awareness of need for safety  Problem Solving: Assistance required to implement solutions;Assistance required to generate solutions;Assistance required to correct errors made;Decreased awareness of errors  Insights: Decreased awareness of deficits  Initiation: Requires cues for some  Sequencing: Requires cues for some  Perception  Overall Perceptual Status: WFL     Sensation  Overall Sensation Status: WNL        LUE AROM (degrees)  LUE AROM : WFL  RUE AROM (degrees)  RUE AROM : WFL  LUE Strength  Gross LUE Strength: WFL  LUE Strength Comment: MIKE OSPINA's 4-/5  RUE Strength  Gross RUE Strength: WFL                   Plan   Plan  Times per week: 4-5x/week, 1-2x/day  Current Treatment Recommendations: Strengthening, Balance Training, Functional Mobility Training, Endurance Training, Patient/Caregiver Education & Training, Self-Care / ADL, Safety Education & Training    G-Code     OutComes Score                                                  AM-PAC Score             Goals  Short term goals  Time Frame for Short term goals: by discharge, pt will  Short term goal 1: demo CGA with ADL transfers with good safety  Short term goal 2: demo CGA with functional mob for ADL completion with good safety/pacing  Short term goal 3: demo CGA with toileting routine with good safety  Short term goal 4: demo SBA with UB ADLs and min A LB ADLs with DME/AE as needed  Short term goal 5: demo and verb good understanding of fall prevention techs, EC/WS techs, and possible equip needs for home  Patient Goals   Patient goals : to go home       Therapy Time   Individual Concurrent Group Co-treatment   Time In 1428         Time Out 1508         Minutes 40         Timed Code Treatment Minutes: 30 Minutes     Patient would benefit from SNF for continued occupational therapy to increase independence with  ADL of bathing, dressing, toileting and grooming. Writer recommending SNF placement for for activity tolerance and strength which will increase independence with ADL's coordinated with bed mobility and chair transfers. Continued skilled OT services to address decreased safety awareness with ADL and IADL tasks and for education and increased independence with DME and AE for fall prevention and ec/ws techniques prior to d/c home. Co-treatment with PT warranted secondary to decreased safety and independence requiring 2 skilled therapy professionals to address individual discipline's goals.  OT addressing preparation for ADL transfer, sitting/activity tolerance, functional reaching, environmental safety/scanning, fall

## 2019-05-25 ENCOUNTER — APPOINTMENT (OUTPATIENT)
Dept: ULTRASOUND IMAGING | Age: 64
DRG: 988 | End: 2019-05-25
Payer: MEDICARE

## 2019-05-25 LAB
ABSOLUTE EOS #: 0.33 K/UL (ref 0–0.44)
ABSOLUTE IMMATURE GRANULOCYTE: 0.02 K/UL (ref 0–0.3)
ABSOLUTE LYMPH #: 1.5 K/UL (ref 1.1–3.7)
ABSOLUTE MONO #: 0.94 K/UL (ref 0.1–1.2)
ALBUMIN SERPL-MCNC: 3 G/DL (ref 3.5–5.2)
ALBUMIN/GLOBULIN RATIO: ABNORMAL (ref 1–2.5)
ALP BLD-CCNC: 117 U/L (ref 35–104)
ALT SERPL-CCNC: 49 U/L (ref 5–33)
AMMONIA: 56 UMOL/L (ref 11–51)
AMYLASE: 710 U/L (ref 28–100)
ANION GAP SERPL CALCULATED.3IONS-SCNC: 11 MMOL/L (ref 9–17)
AST SERPL-CCNC: 69 U/L
BASOPHILS # BLD: 0 % (ref 0–2)
BASOPHILS ABSOLUTE: <0.03 K/UL (ref 0–0.2)
BILIRUB SERPL-MCNC: 0.77 MG/DL (ref 0.3–1.2)
BILIRUBIN DIRECT: 0.25 MG/DL
BILIRUBIN, INDIRECT: 0.52 MG/DL (ref 0–1)
BUN BLDV-MCNC: 15 MG/DL (ref 8–23)
CALCIUM SERPL-MCNC: 8.1 MG/DL (ref 8.6–10.4)
CHLORIDE BLD-SCNC: 108 MMOL/L (ref 98–107)
CHLORIDE BLD-SCNC: 109 MMOL/L (ref 98–107)
CHLORIDE BLD-SCNC: 111 MMOL/L (ref 98–107)
CO2: 20 MMOL/L (ref 20–31)
CO2: 20 MMOL/L (ref 20–31)
CO2: 21 MMOL/L (ref 20–31)
CREAT SERPL-MCNC: 0.59 MG/DL (ref 0.5–0.9)
DATE, STOOL #1: ABNORMAL
DATE, STOOL #2: ABNORMAL
DATE, STOOL #3: ABNORMAL
DIFFERENTIAL TYPE: ABNORMAL
EOSINOPHILS RELATIVE PERCENT: 6 % (ref 1–4)
GFR AFRICAN AMERICAN: >60 ML/MIN
GFR NON-AFRICAN AMERICAN: >60 ML/MIN
GFR SERPL CREATININE-BSD FRML MDRD: ABNORMAL ML/MIN/{1.73_M2}
GFR SERPL CREATININE-BSD FRML MDRD: ABNORMAL ML/MIN/{1.73_M2}
GLUCOSE BLD-MCNC: 121 MG/DL (ref 65–105)
GLUCOSE BLD-MCNC: 141 MG/DL (ref 65–105)
GLUCOSE BLD-MCNC: 143 MG/DL (ref 65–105)
GLUCOSE BLD-MCNC: 148 MG/DL (ref 70–99)
GLUCOSE BLD-MCNC: 149 MG/DL (ref 65–105)
HCT VFR BLD CALC: 24.1 % (ref 36.3–47.1)
HEMOCCULT SP1 STL QL: POSITIVE
HEMOCCULT SP2 STL QL: ABNORMAL
HEMOCCULT SP3 STL QL: ABNORMAL
HEMOGLOBIN: 7.3 G/DL (ref 11.9–15.1)
IMMATURE GRANULOCYTES: 0 %
LYMPHOCYTES # BLD: 28 % (ref 24–43)
MAGNESIUM: 1.6 MG/DL (ref 1.6–2.6)
MCH RBC QN AUTO: 27 PG (ref 25.2–33.5)
MCHC RBC AUTO-ENTMCNC: 30.3 G/DL (ref 28.4–34.8)
MCV RBC AUTO: 89.3 FL (ref 82.6–102.9)
MONOCYTES # BLD: 18 % (ref 3–12)
NRBC AUTOMATED: 0 PER 100 WBC
PDW BLD-RTO: 14.7 % (ref 11.8–14.4)
PLATELET # BLD: 95 K/UL (ref 138–453)
PLATELET ESTIMATE: ABNORMAL
PMV BLD AUTO: 11 FL (ref 8.1–13.5)
POTASSIUM SERPL-SCNC: 3.3 MMOL/L (ref 3.7–5.3)
POTASSIUM SERPL-SCNC: 3.4 MMOL/L (ref 3.7–5.3)
POTASSIUM SERPL-SCNC: 3.7 MMOL/L (ref 3.7–5.3)
RBC # BLD: 2.7 M/UL (ref 3.95–5.11)
RBC # BLD: ABNORMAL 10*6/UL
SEG NEUTROPHILS: 47 % (ref 36–65)
SEGMENTED NEUTROPHILS ABSOLUTE COUNT: 2.5 K/UL (ref 1.5–8.1)
SODIUM BLD-SCNC: 139 MMOL/L (ref 135–144)
SODIUM BLD-SCNC: 141 MMOL/L (ref 135–144)
SODIUM BLD-SCNC: 142 MMOL/L (ref 135–144)
TIME, STOOL #1: 1130
TIME, STOOL #2: ABNORMAL
TIME, STOOL #3: ABNORMAL
TOTAL PROTEIN: 5.2 G/DL (ref 6.4–8.3)
WBC # BLD: 5.3 K/UL (ref 3.5–11.3)
WBC # BLD: ABNORMAL 10*3/UL

## 2019-05-25 PROCEDURE — 6370000000 HC RX 637 (ALT 250 FOR IP): Performed by: NURSE PRACTITIONER

## 2019-05-25 PROCEDURE — 82150 ASSAY OF AMYLASE: CPT

## 2019-05-25 PROCEDURE — 2500000003 HC RX 250 WO HCPCS: Performed by: INTERNAL MEDICINE

## 2019-05-25 PROCEDURE — 2580000003 HC RX 258: Performed by: INTERNAL MEDICINE

## 2019-05-25 PROCEDURE — 82947 ASSAY GLUCOSE BLOOD QUANT: CPT

## 2019-05-25 PROCEDURE — 85025 COMPLETE CBC W/AUTO DIFF WBC: CPT

## 2019-05-25 PROCEDURE — 82140 ASSAY OF AMMONIA: CPT

## 2019-05-25 PROCEDURE — 6370000000 HC RX 637 (ALT 250 FOR IP): Performed by: INTERNAL MEDICINE

## 2019-05-25 PROCEDURE — 51702 INSERT TEMP BLADDER CATH: CPT

## 2019-05-25 PROCEDURE — 80053 COMPREHEN METABOLIC PANEL: CPT

## 2019-05-25 PROCEDURE — 6360000002 HC RX W HCPCS: Performed by: INTERNAL MEDICINE

## 2019-05-25 PROCEDURE — 82248 BILIRUBIN DIRECT: CPT

## 2019-05-25 PROCEDURE — 2060000000 HC ICU INTERMEDIATE R&B

## 2019-05-25 PROCEDURE — 84156 ASSAY OF PROTEIN URINE: CPT

## 2019-05-25 PROCEDURE — 99233 SBSQ HOSP IP/OBS HIGH 50: CPT | Performed by: INTERNAL MEDICINE

## 2019-05-25 PROCEDURE — 82272 OCCULT BLD FECES 1-3 TESTS: CPT

## 2019-05-25 PROCEDURE — 80051 ELECTROLYTE PANEL: CPT

## 2019-05-25 PROCEDURE — 86335 IMMUNFIX E-PHORSIS/URINE/CSF: CPT

## 2019-05-25 PROCEDURE — 83735 ASSAY OF MAGNESIUM: CPT

## 2019-05-25 PROCEDURE — 76705 ECHO EXAM OF ABDOMEN: CPT

## 2019-05-25 PROCEDURE — 36415 COLL VENOUS BLD VENIPUNCTURE: CPT

## 2019-05-25 RX ORDER — DEXTROSE, SODIUM CHLORIDE, AND POTASSIUM CHLORIDE 5; .9; .15 G/100ML; G/100ML; G/100ML
INJECTION INTRAVENOUS CONTINUOUS
Status: DISCONTINUED | OUTPATIENT
Start: 2019-05-25 | End: 2019-05-26

## 2019-05-25 RX ORDER — MAGNESIUM SULFATE 1 G/100ML
1 INJECTION INTRAVENOUS
Status: DISCONTINUED | OUTPATIENT
Start: 2019-05-25 | End: 2019-05-25

## 2019-05-25 RX ORDER — POTASSIUM CHLORIDE 7.45 MG/ML
10 INJECTION INTRAVENOUS
Status: DISCONTINUED | OUTPATIENT
Start: 2019-05-25 | End: 2019-05-25

## 2019-05-25 RX ORDER — BACLOFEN 10 MG/1
10 TABLET ORAL NIGHTLY
COMMUNITY
End: 2019-05-29 | Stop reason: HOSPADM

## 2019-05-25 RX ORDER — MAGNESIUM SULFATE 1 G/100ML
1 INJECTION INTRAVENOUS ONCE
Status: COMPLETED | OUTPATIENT
Start: 2019-05-25 | End: 2019-05-25

## 2019-05-25 RX ORDER — POTASSIUM CHLORIDE 7.45 MG/ML
10 INJECTION INTRAVENOUS
Status: COMPLETED | OUTPATIENT
Start: 2019-05-25 | End: 2019-05-25

## 2019-05-25 RX ADMIN — MICONAZOLE NITRATE: 20.6 POWDER TOPICAL at 08:51

## 2019-05-25 RX ADMIN — MAGNESIUM SULFATE HEPTAHYDRATE 1 G: 1 INJECTION, SOLUTION INTRAVENOUS at 07:45

## 2019-05-25 RX ADMIN — RIFAXIMIN 550 MG: 550 TABLET ORAL at 08:45

## 2019-05-25 RX ADMIN — POTASSIUM CHLORIDE 10 MEQ: 7.46 INJECTION, SOLUTION INTRAVENOUS at 08:44

## 2019-05-25 RX ADMIN — POTASSIUM CHLORIDE: 2 INJECTION, SOLUTION, CONCENTRATE INTRAVENOUS at 06:18

## 2019-05-25 RX ADMIN — POTASSIUM CHLORIDE 10 MEQ: 7.46 INJECTION, SOLUTION INTRAVENOUS at 10:54

## 2019-05-25 RX ADMIN — POTASSIUM CHLORIDE 10 MEQ: 7.46 INJECTION, SOLUTION INTRAVENOUS at 06:12

## 2019-05-25 RX ADMIN — Medication 10 ML: at 08:46

## 2019-05-25 RX ADMIN — POTASSIUM CHLORIDE 10 MEQ: 7.46 INJECTION, SOLUTION INTRAVENOUS at 10:02

## 2019-05-25 RX ADMIN — CEFTRIAXONE 1 G: 1 INJECTION, POWDER, FOR SOLUTION INTRAMUSCULAR; INTRAVENOUS at 20:57

## 2019-05-25 RX ADMIN — INSULIN LISPRO 1 UNITS: 100 INJECTION, SOLUTION INTRAVENOUS; SUBCUTANEOUS at 17:26

## 2019-05-25 RX ADMIN — LACTULOSE 20 G: 20 SOLUTION ORAL at 08:45

## 2019-05-25 RX ADMIN — INSULIN LISPRO 1 UNITS: 100 INJECTION, SOLUTION INTRAVENOUS; SUBCUTANEOUS at 11:58

## 2019-05-25 RX ADMIN — POTASSIUM CHLORIDE, DEXTROSE MONOHYDRATE AND SODIUM CHLORIDE: 150; 5; 900 INJECTION, SOLUTION INTRAVENOUS at 11:17

## 2019-05-25 RX ADMIN — MICONAZOLE NITRATE: 20.6 POWDER TOPICAL at 20:02

## 2019-05-25 RX ADMIN — RIFAXIMIN 550 MG: 550 TABLET ORAL at 20:23

## 2019-05-25 RX ADMIN — IRON SUCROSE 100 MG: 20 INJECTION, SOLUTION INTRAVENOUS at 11:15

## 2019-05-25 RX ADMIN — LEVOTHYROXINE SODIUM 25 MCG: 25 TABLET ORAL at 06:11

## 2019-05-25 ASSESSMENT — PAIN DESCRIPTION - PROGRESSION
CLINICAL_PROGRESSION: NOT CHANGED

## 2019-05-25 ASSESSMENT — PAIN DESCRIPTION - PAIN TYPE: TYPE: ACUTE PAIN

## 2019-05-25 ASSESSMENT — PAIN DESCRIPTION - ONSET: ONSET: ON-GOING

## 2019-05-25 ASSESSMENT — PAIN SCALES - GENERAL: PAINLEVEL_OUTOF10: 2

## 2019-05-25 ASSESSMENT — PAIN - FUNCTIONAL ASSESSMENT: PAIN_FUNCTIONAL_ASSESSMENT: ACTIVITIES ARE NOT PREVENTED

## 2019-05-25 ASSESSMENT — PAIN DESCRIPTION - LOCATION: LOCATION: BACK

## 2019-05-25 ASSESSMENT — PAIN DESCRIPTION - FREQUENCY: FREQUENCY: INTERMITTENT

## 2019-05-25 ASSESSMENT — PAIN DESCRIPTION - ORIENTATION: ORIENTATION: LEFT

## 2019-05-25 NOTE — PROGRESS NOTES
Pulmonary Critical Care Progress Note       Patient seen for the follow up of altered mental status      Subjective:  She has been on oxygen nasal cannula. She has no significant shortness of breath. She has decreased diarrhea and has a rectal tube in place. She denies abdominal pain. She denies chest pain. Mild occasional cough, mostly dry . She had CT-guided biopsy of left iliac bone  Examination:  Vitals: BP (!) 127/94   Pulse 89   Temp 98.1 °F (36.7 °C) (Oral)   Resp 24   Ht 5' 7\" (1.702 m)   Wt 209 lb (94.8 kg)   SpO2 100%   BMI 32.73 kg/m²   General appearance:  alert and cooperative with exam  Neck: No JVD  Lungs: clear to auscultation bilaterally no crackles or wheezing  Heart: regular rate and rhythm, S1, S2 normal, no gallop  Abdomen: Soft, non tender, + BS  Extremities: no cyanosis or clubbing. No significant edema    LABs:  CBC:   Recent Labs     05/24/19  0423 05/25/19 0428   WBC 5.4 5.3   HGB 7.4* 7.3*   HCT 24.3* 24.1*   * 95*     BMP:   Recent Labs     05/24/19  0423  05/25/19  0003 05/25/19  0428      < > 141 139   K 3.2*   < > 3.4* 3.3*   CO2 21   < > 21 20   BUN 21  --   --  15   CREATININE 0.60  --   --  0.59   LABGLOM >60  --   --  >60   GLUCOSE 161*  --   --  148*    < > = values in this interval not displayed. PT/INR:   Recent Labs     05/24/19  0803   PROTIME 15.4*   INR 1.5     LIVER PROFILE:  Recent Labs     05/24/19  1731 05/25/19  0428   AST 87* 69*   ALT 57* 49*   LABALBU 3.5 3.0*     ABG:  Lab Results   Component Value Date    FIO2 RA 09/27/2018       Lab Results   Component Value Date    FIO2 RA 09/27/2018     Radiology 5/25  LIVER ULTRASOUND  1. Gallbladder filled with gallstones. 2. Common bile duct was not visualized. 3. Findings in the liver likely related to diffuse hepatocellular disease. Please correlate with LFTs.        Impression:    · ARLYN/obesity  · History of asthma  · Anemia  · Lytic lesion of ileum and L3 vertebrae, S/p biopsy in IR 5/24  · Liver cirrhosis/Encephalopathy  · DM  · HTN/HDL/hypothyroidism/depression/anxiety disorder    Recommendations:  · Oxygen by nasal cannula   · BiPAP for sleep and as needed  · DuoNeb by nebulizer every 4 hours as needed   · Continue IV Rocephin  · Singulair  · Biopsy results  · Insulin sliding scale  · Discontinue rectal tube  · Discussed with RN  · Physical therapy  · DVT prophylaxis with EPCs  · Okay for stepdown      Selin Beltre MD, [facility]  Pulmonary Critical Care and Sleep Medicine,  [facility] [place]: [phone]

## 2019-05-25 NOTE — PROGRESS NOTES
Subjective:     Follow-up hepatic encephalopathy  Patient doing fairly well much more alert awake oriented ×3 no asterixis  ROS  No fever no chills no GI/ complaints, no polyuria no polydipsia no hypoglycemia, no cardiopulmonary complaints, no TIA no bleeding no headache no sore throat no skin lesions  physical exam  General Appearance: alert and oriented to person, place and time and in no acute distress  Skin: warm and dry, no rash or erythema  Head: normocephalic and atraumatic  Eyes: pupils equal, round, and reactive to light, conjunctivae normal and sclera anicteric    Neck: neck supple and non tender without mass   Pulmonary/Chest: clear to auscultation bilaterally- no wheezes, rales or rhonchi, normal air movement, no respiratory distress  Cardiovascular: normal rate, regular rhythm, normal S1 and S2, no gallops, intact distal pulses and no carotid bruits  Abdomen: soft, non-tender, non-distended, normal bowel sounds, no masses or organomegaly positive ascites  Extremities: no edema and good  pulses no Wesley sign  Neurologic: Alert oriented ×3 with no focal deficit    BP (!) 130/49   Pulse 83   Temp 98.5 °F (36.9 °C) (Oral)   Resp 25   Ht 5' 7\" (1.702 m)   Wt 209 lb (94.8 kg)   SpO2 100%   BMI 32.73 kg/m²     CBC:   Lab Results   Component Value Date    WBC 5.3 05/25/2019    RBC 2.70 05/25/2019    HGB 7.3 05/25/2019    HCT 24.1 05/25/2019    MCV 89.3 05/25/2019    MCH 27.0 05/25/2019    MCHC 30.3 05/25/2019    RDW 14.7 05/25/2019    PLT 95 05/25/2019    MPV 11.0 05/25/2019     CMP:    Lab Results   Component Value Date     05/25/2019    K 3.3 05/25/2019     05/25/2019    CO2 20 05/25/2019    BUN 15 05/25/2019    CREATININE 0.59 05/25/2019    GFRAA >60 05/25/2019    LABGLOM >60 05/25/2019    GLUCOSE 148 05/25/2019    PROT 5.2 05/25/2019    LABALBU 3.0 05/25/2019    CALCIUM 8.1 05/25/2019    BILITOT 0.77 05/25/2019    ALKPHOS 117 05/25/2019    AST 69 05/25/2019    ALT 49 05/25/2019 Assessment:  Patient Active Problem List   Diagnosis    Cellulitis of right lower extremity- resolving    Diabetes mellitus ([address]    ARLYN (obstructive sleep apnea)    Morbid obesity (HCC)    Essential hypertension    Lactic acidosis    Bandemia    CRP elevated    Depression    Osteoarthritis    Cellulitis of right breast    Septicemia ([address]    SIRS due to infectious process without acute organ dysfunction (HCC)    Cellulitis and abscess of trunk    Acute urinary tract infection    Anemia    Anemia, iron deficiency    Asthma    Atopic rhinitis    Disorder associated with type 2 diabetes mellitus (HCC)    Disorder of liver    Displacement of lumbar intervertebral disc without myelopathy    Fibromyalgia    Full thickness rotator cuff tear    Gastroesophageal reflux disease    Hyperglycemia due to type 2 diabetes mellitus ([address]    Hyperlipidemia    Hypothyroid    Iron deficiency anemia    Localized, primary osteoarthritis of shoulder region    Mass of right breast    Migraine    Pure hypercholesterolemia    Thrombocytopenia (HCC)    Altered mental status    Encephalopathy    Cirrhosis ([address]    Elevated LFTs    Hypernatremia    Elevated amylase     Hepatic encephalopathy resolving  Lytic lesion pelvic bones await biopsy  Type 2 diabetes insulin treated  Hypertension essential  Liver cirrhosis with ascites  Prerenal as a teen anemia  Hypernatremia  Hypokalemia  Anemia  Thrombocytopenia  Possible restless leg syndrome will check iron stores  Hypothyroid  Plan:    Meds labs reviewed continue with before meals and at bedtime Accu-Cheks K supplements insulin coverage as needed will have physical therapy occupational therapy see the patient to okay to transfer to [facility]      Armand White MD  2:00 PM

## 2019-05-25 NOTE — PROGRESS NOTES
GI Progress notes    5/25/2019   5:24 PM    Name:  Damaris Howell  MRN:    [id number]     Acct:     [de-identified]   Room:  [address] [place] [address] Day: 3     Admit Date: 5/22/2019  1:21 PM  PCP: Vilma Biggs    Subjective:     C/C:   Chief Complaint   Patient presents with    Altered Mental Status       Interval History: Status: improved. Doing much better today  Has frequent BMs  No bleeding  She is anemic and has OB Positive  Ammonia is down  Has some abdominal discomfort  Imaging were reviewed  Hep Screen is negative  Had Colonoscopy done a while ago she says  Not sure if she had EGD in the past  No known hx of Liver issues or hx of heavy ETOH    ROS:  Constitutional: negative for chills, fevers and sweats    Gastrointestinal: mild  abdominal pain, constipation, diarrhea, nausea and vomiting      Medications: Allergies:    Allergies   Allergen Reactions    Nsaids Other (See Comments)    Sulfa Antibiotics Other (See Comments) and Hives     Other reaction(s): Unknown  Patient unsure of the reaction    Tape [Adhesive Tape] Rash     Other reaction(s): Unknown       Current Meds:   iron sucrose (VENOFER) 100 mg in sodium chloride 0.9 % 100 mL IVPB Q24H   dextrose 5 % and 0.9 % NaCl with KCl 20 mEq infusion Continuous   levothyroxine (SYNTHROID) tablet 25 mcg Daily   lactulose (CHRONULAC) 10 GM/15ML solution 20 g BID   metoprolol (LOPRESSOR) injection 5 mg Q6H PRN   insulin lispro (HUMALOG) injection vial 0-6 Units TID WC   insulin lispro (HUMALOG) injection vial 0-3 Units Nightly   sodium chloride flush 0.9 % injection 10 mL PRN   glucose (GLUTOSE) 40 % oral gel 15 g PRN   dextrose 50 % solution 12.5 g PRN   glucagon (rDNA) injection 1 mg PRN   dextrose 5 % solution PRN   sodium chloride flush 0.9 % injection 10 mL 2 times per day   sodium chloride flush 0.9 % injection 10 mL PRN   miconazole (MICOTIN) 2 % powder BID   rifaximin (XIFAXAN) tablet 550 mg BID   cefTRIAXone (ROCEPHIN) 1 g IVPB in 50 mL D5W minibag Q24H Data:     Code Status:  Full Code    Family History   Problem Relation Age of Onset    Heart Disease Mother     Pacemaker Mother     Hypertension Mother     Diabetes Father     Breast Cancer Neg Hx        Social History     Socioeconomic History    Marital status:      Spouse name: Not on file    Number of children: Not on file    Years of education: Not on file    Highest education level: Not on file   Occupational History    Not on file   Social Needs    Financial resource strain: Not on file    Food insecurity:     Worry: Not on file     Inability: Not on file    Transportation needs:     Medical: Not on file     Non-medical: Not on file   Tobacco Use    Smoking status: Never Smoker    Smokeless tobacco: Never Used   Substance and Sexual Activity    Alcohol use: No    Drug use: No    Sexual activity: Not on file   Lifestyle    Physical activity:     Days per week: Not on file     Minutes per session: Not on file    Stress: Not on file   Relationships    Social connections:     Talks on phone: Not on file     Gets together: Not on file     Attends [employer] service: Not on file     Active member of club or organization: Not on file     Attends meetings of clubs or organizations: Not on file     Relationship status: Not on file    Intimate partner violence:     Fear of current or ex partner: Not on file     Emotionally abused: Not on file     Physically abused: Not on file     Forced sexual activity: Not on file   Other Topics Concern    Not on file   Social History Narrative    Not on file       Vitals:  /75   Pulse 86   Temp 97.6 °F (36.4 °C) (Oral)   Resp 22   Ht 5' 7\" (1.702 m)   Wt 209 lb (94.8 kg)   SpO2 100%   BMI 32.73 kg/m²   Temp (24hrs), Av.1 °F (36.7 °C), Min:97.6 °F (36.4 °C), Max:98.5 °F (36.9 °C)    Recent Labs     19  0800 19  1145 19  1630   POCGLU 148* 121* 143* 149*       I/O (24Hr):     Intake/Output Summary (Last 24 hours) at 5/25/2019 1724  Last data filed at 5/25/2019 1652  Gross per 24 hour   Intake 1550 ml   Output 3550 ml   Net -2000 ml       Labs:      CBC: Lab Results   Component Value Date    WBC 5.3 05/25/2019    RBC 2.70 05/25/2019    HGB 7.3 05/25/2019    HCT 24.1 05/25/2019    MCV 89.3 05/25/2019    MCH 27.0 05/25/2019    MCHC 30.3 05/25/2019    RDW 14.7 05/25/2019    PLT 95 05/25/2019    MPV 11.0 05/25/2019     CBC with Differential:  Lab Results   Component Value Date    WBC 5.3 05/25/2019    RBC 2.70 05/25/2019    HGB 7.3 05/25/2019    HCT 24.1 05/25/2019    PLT 95 05/25/2019    MCV 89.3 05/25/2019    MCH 27.0 05/25/2019    MCHC 30.3 05/25/2019    RDW 14.7 05/25/2019    LYMPHOPCT 28 05/25/2019    MONOPCT 18 05/25/2019    BASOPCT 0 05/25/2019    MONOSABS 0.94 05/25/2019    LYMPHSABS 1.50 05/25/2019    EOSABS 0.33 05/25/2019    BASOSABS <0.03 05/25/2019    DIFFTYPE NOT REPORTED 05/25/2019     Hemoglobin/Hematocrit:  Lab Results   Component Value Date    HGB 7.3 05/25/2019    HCT 24.1 05/25/2019     CMP:  Lab Results   Component Value Date     05/25/2019    K 3.7 05/25/2019     05/25/2019    CO2 20 05/25/2019    BUN 15 05/25/2019    CREATININE 0.59 05/25/2019    GFRAA >60 05/25/2019    LABGLOM >60 05/25/2019    GLUCOSE 148 05/25/2019    PROT 5.2 05/25/2019    LABALBU 3.0 05/25/2019    CALCIUM 8.1 05/25/2019    BILITOT 0.77 05/25/2019    ALKPHOS 117 05/25/2019    AST 69 05/25/2019    ALT 49 05/25/2019     BMP:  Lab Results   Component Value Date     05/25/2019    K 3.7 05/25/2019     05/25/2019    CO2 20 05/25/2019    BUN 15 05/25/2019    LABALBU 3.0 05/25/2019    CREATININE 0.59 05/25/2019    CALCIUM 8.1 05/25/2019    GFRAA >60 05/25/2019    LABGLOM >60 05/25/2019    GLUCOSE 148 05/25/2019     PT/INR:    Lab Results   Component Value Date    PROTIME 15.4 05/24/2019    INR 1.5 05/24/2019     PTT:    Lab Results   Component Value Date    APTT 32.2 09/26/2018   [APTT}    Physical Examination:        General appearance: alert, cooperative and no distress  Mental Status: oriented to person, place and time and anxious affect    Abdomen: soft, mild tender, nondistended, bowel sounds present     Assessment:        Primary Problem  <principal problem not specified>     Active Hospital Problems    Diagnosis Date Noted    Encephalopathy [G93.40] 05/23/2019    Cirrhosis ([address] [K74.60] 05/23/2019    Elevated LFTs [R94.5] 05/23/2019    Hypernatremia [E87.0] 05/23/2019    Elevated amylase [R74.8]     Altered mental status [R41.82] 05/22/2019     Past Medical History:   Diagnosis Date    Acute urinary tract infection 9/6/2018    Anemia     h/o anemia,transfused 2/2014    Anemia, iron deficiency 10/8/2018    Anxiety disorder     can get SOB with an anxiety attack    Arthritis     Asthma     Atopic rhinitis 9/6/2018    Bandemia     Blood transfusion reaction     x 2 unit prbc's 2/2014    Cellulitis and abscess of trunk     Cellulitis of right breast 9/26/2018    Cellulitis of right lower extremity- resolving 8/9/2018    COPD (chronic obstructive pulmonary disease) ([address]     patient denies COPD, only has asthma    CRP elevated     Depression     Diabetes mellitus ([address]     Disorder associated with type 2 diabetes mellitus ([address] 10/8/2018    Disorder of liver 9/6/2018    Displacement of lumbar intervertebral disc without myelopathy 10/8/2018    Essential hypertension 8/9/2018    Fibromyalgia 10/8/2018    Full thickness rotator cuff tear 10/8/2018    Gastroesophageal reflux disease 9/6/2018    GERD (gastroesophageal reflux disease)     Hammer toe of right foot     Hernia, abdominal 1998    Hyperglycemia due to type 2 diabetes mellitus ([address] 10/8/2018    Hyperlipidemia     Hypertension     Hypothyroid 10/8/2018    Iron deficiency anemia 10/8/2018    Lactic acidosis     Localized, primary osteoarthritis of shoulder region 10/8/2018    Mass of right breast 10/8/2018    Migraine 9/6/2018    Morbid obesity ([facility] 8/9/2018    ARLYN (obstructive sleep apnea) 8/9/2018    Osteoarthritis 9/26/2018    Pure hypercholesterolemia 10/8/2018    Restless leg syndrome     Seasonal allergies     Septicemia ([facility]     SIRS due to infectious process without acute organ dysfunction (HCC)     Sleep apnea     doesn't use CPAP, sleeps in a recliner    Spondylosis     lower back    Thrombocytopenia ([facility] 10/8/2018        Plan:        1. Cont current supportive care  2. OK for soft diet  3. Titrate lactulose to 4-5 Bms/ day  4. F/u HGB  5. She will need GI w/u  6. PPI  7. Her questions were answered  8.  OK for step down from GI point     Explained to the patient and d/W Nursing Staff in ICU  Will F/U with you  Please call or Page for any issues or change in status  Thanks    Electronically signed by Man Echevarria MD on 5/25/2019 at 5:24 PM

## 2019-05-25 NOTE — PLAN OF CARE
No acute events so far this shift. Vitals and assessments as charted. Patient has anxiety about the next steps after he is discharged. Writer talked with patient and recommended taking one day at a time. Safety precautions remain in place. Will continue to monitor and notify if any change in this patient's condition.

## 2019-05-25 NOTE — PROGRESS NOTES
Transitions of Care Pharmacy Service   Medication Review    The patient's list of current home medications has been reviewed and updated. Source(s) of information: [facility]    Please feel free to call with any questions about this encounter. Thank you. Isidra Salgado, [facility]  Transitions of Care Pharmacy Service  Phone:  [phone]  Fax: [phone]      Prior to Admission medications    Medication Sig Start Date End Date Taking? Authorizing Provider   baclofen (LIORESAL) 10 MG tablet Take 10 mg by mouth nightly   Yes Historical Provider, MD   lisinopril (PRINIVIL;ZESTRIL) 20 MG tablet Take 20 mg by mouth daily    Historical Provider, MD   loratadine-pseudoephedrine (CLARITIN-D 24 HOUR)  MG per extended release tablet Take 1 tablet by mouth daily    Historical Provider, MD   metoprolol succinate (TOPROL XL) 25 MG extended release tablet Take 25 mg by mouth daily    Historical Provider, MD   pravastatin (PRAVACHOL) 10 MG tablet Take 10 mg by mouth daily    Historical Provider, MD   oxyCODONE-acetaminophen (PERCOCET) 5-325 MG per tablet Take 1 tablet by mouth 3 times daily as needed for Pain. Historical Provider, MD                                                   aspirin 81 MG EC tablet Take 81 mg by mouth daily. Historical Provider, MD   citalopram (CELEXA) 40 MG tablet Take 40 mg by mouth daily. Historical Provider, MD   fluticasone (FLONASE) 50 MCG/ACT nasal spray 1 spray by Nasal route 2 times daily as needed for Rhinitis. Historical Provider, MD   metFORMIN ER (GLUCOPHAGE-XR) 500 MG XR tablet Take 1,000 mg by mouth 2 times daily (with meals) Indications: Take two 500mg tablets BID with meals Take two 500mg tablets BID with meals    Historical Provider, MD   gabapentin (NEURONTIN) 300 MG capsule Take 300 mg by mouth 3 times daily. Historical Provider, MD   omeprazole (PRILOSEC) 20 MG capsule Take 20 mg by mouth daily.     Historical Provider, MD

## 2019-05-25 NOTE — FLOWSHEET NOTE
Patient appears anxious, states she doesn't want to be in the hospital and wants to go. States she is not used to sitting around. Shares about the incident that brought her in the hospital.  States her brother brought her into the hospital, was unconscious. States she lives alone. Woke up not knowing where she was or what had occurred. Patient shares about her family children , parents. States her brother helps her quite a lot   Shared about the death of the  in 1996 and how that effected her.  shared in presence, listening prayers. Follow up as needed. 05/25/19 1145   Encounter Summary   Services provided to: Patient   Referral/Consult From: [facility] Family members   Continue Visiting   (5-25-19)   Complexity of Encounter Moderate   Length of Encounter 30 minutes   Spiritual Assessment Completed Yes   Routine   Type Follow up   Spiritual/Denominational   Type Spiritual support   Assessment Anxious   Intervention Active listening;Explored feelings, thoughts, concerns;Prayer;Sustaining presence/ Ministry of presence; Discussed belief system/Caodaism practices/prema;Discussed illness/injury and it's impact   Outcome Expressed gratitude;Receptive;Engaged in conversation;Expressed feelings/needs/concerns; Shared life review

## 2019-05-26 LAB
ABSOLUTE EOS #: 0.37 K/UL (ref 0–0.44)
ABSOLUTE IMMATURE GRANULOCYTE: 0.04 K/UL (ref 0–0.3)
ABSOLUTE LYMPH #: 1.5 K/UL (ref 1.1–3.7)
ABSOLUTE MONO #: 0.93 K/UL (ref 0.1–1.2)
ALBUMIN SERPL-MCNC: 3.2 G/DL (ref 3.5–5.2)
ANION GAP SERPL CALCULATED.3IONS-SCNC: 11 MMOL/L (ref 9–17)
ANION GAP SERPL CALCULATED.3IONS-SCNC: 11 MMOL/L (ref 9–17)
BASOPHILS # BLD: 1 % (ref 0–2)
BASOPHILS ABSOLUTE: 0.03 K/UL (ref 0–0.2)
BUN BLDV-MCNC: 11 MG/DL (ref 8–23)
BUN/CREAT BLD: 19 (ref 9–20)
CALCIUM SERPL-MCNC: 8.3 MG/DL (ref 8.6–10.4)
CHLORIDE BLD-SCNC: 107 MMOL/L (ref 98–107)
CHLORIDE BLD-SCNC: 109 MMOL/L (ref 98–107)
CO2: 21 MMOL/L (ref 20–31)
CO2: 22 MMOL/L (ref 20–31)
CREAT SERPL-MCNC: 0.59 MG/DL (ref 0.5–0.9)
DIFFERENTIAL TYPE: ABNORMAL
EOSINOPHILS RELATIVE PERCENT: 6 % (ref 1–4)
GFR AFRICAN AMERICAN: >60 ML/MIN
GFR NON-AFRICAN AMERICAN: >60 ML/MIN
GFR SERPL CREATININE-BSD FRML MDRD: ABNORMAL ML/MIN/{1.73_M2}
GFR SERPL CREATININE-BSD FRML MDRD: ABNORMAL ML/MIN/{1.73_M2}
GLUCOSE BLD-MCNC: 116 MG/DL (ref 65–105)
GLUCOSE BLD-MCNC: 121 MG/DL (ref 65–105)
GLUCOSE BLD-MCNC: 124 MG/DL (ref 65–105)
GLUCOSE BLD-MCNC: 128 MG/DL (ref 65–105)
GLUCOSE BLD-MCNC: 135 MG/DL (ref 70–99)
HCT VFR BLD CALC: 24.8 % (ref 36.3–47.1)
HEMOGLOBIN: 7.7 G/DL (ref 11.9–15.1)
IMMATURE GRANULOCYTES: 1 %
LYMPHOCYTES # BLD: 26 % (ref 24–43)
MCH RBC QN AUTO: 27.2 PG (ref 25.2–33.5)
MCHC RBC AUTO-ENTMCNC: 31 G/DL (ref 28.4–34.8)
MCV RBC AUTO: 87.6 FL (ref 82.6–102.9)
MONOCYTES # BLD: 16 % (ref 3–12)
NRBC AUTOMATED: 0 PER 100 WBC
PDW BLD-RTO: 14.6 % (ref 11.8–14.4)
PHOSPHORUS: 2.6 MG/DL (ref 2.6–4.5)
PLATELET # BLD: 106 K/UL (ref 138–453)
PLATELET ESTIMATE: ABNORMAL
PMV BLD AUTO: 10.9 FL (ref 8.1–13.5)
POTASSIUM SERPL-SCNC: 3.6 MMOL/L (ref 3.7–5.3)
POTASSIUM SERPL-SCNC: 3.9 MMOL/L (ref 3.7–5.3)
RBC # BLD: 2.83 M/UL (ref 3.95–5.11)
RBC # BLD: ABNORMAL 10*6/UL
SEG NEUTROPHILS: 51 % (ref 36–65)
SEGMENTED NEUTROPHILS ABSOLUTE COUNT: 3.01 K/UL (ref 1.5–8.1)
SODIUM BLD-SCNC: 140 MMOL/L (ref 135–144)
SODIUM BLD-SCNC: 141 MMOL/L (ref 135–144)
WBC # BLD: 5.9 K/UL (ref 3.5–11.3)
WBC # BLD: ABNORMAL 10*3/UL

## 2019-05-26 PROCEDURE — 80051 ELECTROLYTE PANEL: CPT

## 2019-05-26 PROCEDURE — 82947 ASSAY GLUCOSE BLOOD QUANT: CPT

## 2019-05-26 PROCEDURE — 85025 COMPLETE CBC W/AUTO DIFF WBC: CPT

## 2019-05-26 PROCEDURE — 2060000000 HC ICU INTERMEDIATE R&B

## 2019-05-26 PROCEDURE — 6360000002 HC RX W HCPCS: Performed by: INTERNAL MEDICINE

## 2019-05-26 PROCEDURE — 2580000003 HC RX 258: Performed by: INTERNAL MEDICINE

## 2019-05-26 PROCEDURE — 99233 SBSQ HOSP IP/OBS HIGH 50: CPT | Performed by: INTERNAL MEDICINE

## 2019-05-26 PROCEDURE — 80069 RENAL FUNCTION PANEL: CPT

## 2019-05-26 PROCEDURE — 36415 COLL VENOUS BLD VENIPUNCTURE: CPT

## 2019-05-26 PROCEDURE — 6370000000 HC RX 637 (ALT 250 FOR IP): Performed by: INTERNAL MEDICINE

## 2019-05-26 RX ORDER — ROPINIROLE 0.25 MG/1
0.5 TABLET, FILM COATED ORAL NIGHTLY
Status: DISCONTINUED | OUTPATIENT
Start: 2019-05-26 | End: 2019-05-26

## 2019-05-26 RX ORDER — PRAMIPEXOLE DIHYDROCHLORIDE 0.25 MG/1
0.25 TABLET ORAL NIGHTLY
Status: DISCONTINUED | OUTPATIENT
Start: 2019-05-26 | End: 2019-05-27

## 2019-05-26 RX ADMIN — POTASSIUM CHLORIDE: 2 INJECTION, SOLUTION, CONCENTRATE INTRAVENOUS at 06:08

## 2019-05-26 RX ADMIN — RIFAXIMIN 550 MG: 550 TABLET ORAL at 08:43

## 2019-05-26 RX ADMIN — MICONAZOLE NITRATE: 20.6 POWDER TOPICAL at 09:50

## 2019-05-26 RX ADMIN — LEVOTHYROXINE SODIUM 25 MCG: 25 TABLET ORAL at 06:08

## 2019-05-26 RX ADMIN — IRON SUCROSE 100 MG: 20 INJECTION, SOLUTION INTRAVENOUS at 11:06

## 2019-05-26 RX ADMIN — Medication 10 ML: at 21:08

## 2019-05-26 RX ADMIN — RIFAXIMIN 550 MG: 550 TABLET ORAL at 20:58

## 2019-05-26 RX ADMIN — PRAMIPEXOLE DIHYDROCHLORIDE 0.25 MG: 0.25 TABLET ORAL at 20:58

## 2019-05-26 RX ADMIN — POTASSIUM CHLORIDE: 2 INJECTION, SOLUTION, CONCENTRATE INTRAVENOUS at 20:37

## 2019-05-26 RX ADMIN — MICONAZOLE NITRATE: 20.6 POWDER TOPICAL at 21:05

## 2019-05-26 RX ADMIN — CEFTRIAXONE 1 G: 1 INJECTION, POWDER, FOR SOLUTION INTRAMUSCULAR; INTRAVENOUS at 18:20

## 2019-05-26 ASSESSMENT — PAIN DESCRIPTION - PROGRESSION

## 2019-05-26 NOTE — PROGRESS NOTES
Subjective:     Follow-up hepatic encephalopathy   doing fairly well alert oriented ×3 no asterixis no confusion  ROS  No fever no chills no GI/ complaints no cardiopulmonary complaints at present on TIA no bleeding no headache no sore throat no skin lesions no polyuria no polydipsia no hypoglycemia  physical exam  General Appearance: alert and oriented to person, place and time and in no acute distress  Skin: warm and dry, no rash or erythema  Head: normocephalic and atraumatic  Eyes: pupils equal, round, and reactive to light and pallor  Neck: neck supple and non tender without mass   Pulmonary/Chest: clear to auscultation bilaterally- no wheezes, rales or rhonchi, normal air movement, no respiratory distress  Cardiovascular: normal rate, regular rhythm, normal S1 and S2, no gallops, intact distal pulses and no carotid bruits  Abdomen: soft, non-tender, non-distended, normal bowel sounds, no masses or organomegaly  Extremities: Trace edema good pulses negative Homans sign  Neurologic: Alert oriented ×3 with no focal deficit    BP (!) 153/65   Pulse 82   Temp 99 °F (37.2 °C) (Oral)   Resp 18   Ht 5' 7\" (1.702 m)   Wt 209 lb (94.8 kg)   SpO2 97%   BMI 32.73 kg/m²     CBC:   Lab Results   Component Value Date    WBC 5.9 05/26/2019    RBC 2.83 05/26/2019    HGB 7.7 05/26/2019    HCT 24.8 05/26/2019    MCV 87.6 05/26/2019    MCH 27.2 05/26/2019    MCHC 31.0 05/26/2019    RDW 14.6 05/26/2019     05/26/2019    MPV 10.9 05/26/2019     CMP:    Lab Results   Component Value Date     05/26/2019    K 3.6 05/26/2019     05/26/2019    CO2 21 05/26/2019    BUN 11 05/26/2019    CREATININE 0.59 05/26/2019    GFRAA >60 05/26/2019    LABGLOM >60 05/26/2019    GLUCOSE 135 05/26/2019    PROT 5.2 05/25/2019    LABALBU 3.2 05/26/2019    CALCIUM 8.3 05/26/2019    BILITOT 0.77 05/25/2019    ALKPHOS 117 05/25/2019    AST 69 05/25/2019    ALT 49 05/25/2019        Assessment:  Patient Active Problem List Diagnosis    Cellulitis of right lower extremity- resolving    Diabetes mellitus ([address]    ARLYN (obstructive sleep apnea)    Morbid obesity (HCC)    Essential hypertension    Lactic acidosis    Bandemia    CRP elevated    Depression    Osteoarthritis    Cellulitis of right breast    Septicemia ([address]    SIRS due to infectious process without acute organ dysfunction (HCC)    Cellulitis and abscess of trunk    Acute urinary tract infection    Anemia    Anemia, iron deficiency    Asthma    Atopic rhinitis    Disorder associated with type 2 diabetes mellitus (HCC)    Disorder of liver    Displacement of lumbar intervertebral disc without myelopathy    Fibromyalgia    Full thickness rotator cuff tear    Gastroesophageal reflux disease    Hyperglycemia due to type 2 diabetes mellitus ([address]    Hyperlipidemia    Hypothyroid    Iron deficiency anemia    Localized, primary osteoarthritis of shoulder region    Mass of right breast    Migraine    Pure hypercholesterolemia    Thrombocytopenia (HCC)    Altered mental status    Encephalopathy    Cirrhosis ([address]    Elevated LFTs    Hypernatremia    Elevated amylase   Hepatic encephalopathy resolving  Liver cirrhosis with ascites  Lytic lesion on the bones await biopsy  Type 2 diabetes insulin treated fairly controlled  Hypertension essential  Prerenal azotemia  Hypokalemia  Hypernatremia  Anemia with thrombocytopenia  Hypothyroid continue with thyroid replacement  Plan:    meds labs reviewed the continue with the lactulose continue K supplement as indicated await final GI he reports the office for his GI workup and await pathology report physical therapy occupational therapy see orders      Nati Hurtado MD  1:41 PM

## 2019-05-26 NOTE — PROGRESS NOTES
Pulmonary Critical Care Progress Note       Patient seen for the follow up of altered mental status      Subjective:  She has been on oxygen nasal cannula. She has no significant shortness of breath. She has decreased diarrhea . She denies abdominal pain. She denies chest pain. Mild occasional cough, mostly dry . She has seen in the office before and has been off her CPAP for a while. She reports RLS symptoms     Examination:  Vitals: BP (!) 140/57   Pulse 91   Temp 98.6 °F (37 °C) (Oral)   Resp 16   Ht 5' 7\" (1.702 m)   Wt 209 lb (94.8 kg)   SpO2 100%   BMI 32.73 kg/m²   General appearance:  alert and cooperative with exam  Neck: No JVD  Lungs: clear to auscultation bilaterally no crackles or wheezing  Heart: regular rate and rhythm, S1, S2 normal, no gallop  Abdomen: Soft, non tender, + BS  Extremities: no cyanosis or clubbing. No significant edema    LABs:  CBC:   Recent Labs     05/25/19  0428 05/26/19  0403   WBC 5.3 5.9   HGB 7.3* 7.7*   HCT 24.1* 24.8*   PLT 95* 106*     BMP:   Recent Labs     05/25/19  0428 05/25/19  1608 05/26/19  0403    142 141   K 3.3* 3.7 3.6*   CO2 20 20 21   BUN 15  --  11   CREATININE 0.59  --  0.59   LABGLOM >60  --  >60   GLUCOSE 148*  --  135*     PT/INR:   Recent Labs     05/24/19  0803   PROTIME 15.4*   INR 1.5     LIVER PROFILE:  Recent Labs     05/24/19  1731 05/25/19  0428 05/26/19  0403   AST 87* 69*  --    ALT 57* 49*  --    LABALBU 3.5 3.0* 3.2*     ABG:  Lab Results   Component Value Date    FIO2 RA 09/27/2018       Lab Results   Component Value Date    FIO2 RA 09/27/2018     Radiology 5/25  LIVER ULTRASOUND  1. Gallbladder filled with gallstones. 2. Common bile duct was not visualized. 3. Findings in the liver likely related to diffuse hepatocellular disease. Please correlate with LFTs.        Impression:    · ARLYN/obesity  · History of asthma  · RLS  · Anemia  · Lytic lesion of ileum and L3 vertebrae, S/p biopsy in IR 5/24  · Liver cirrhosis/Encephalopathy  · DM  · HTN/HDL/hypothyroidism/depression/anxiety disorder    Recommendations:  · Oxygen by nasal cannula   · BiPAP for sleep and as needed  · DuoNeb by nebulizer every 4 hours as needed   · Continue IV Rocephin  · Singulair  · Check Biopsy results  · Insulin sliding scale  · IV iron  · Start Mirapex  0.25 mg po QHS   · Physical therapy  · DVT prophylaxis with EPCs  · discharge sumi Goel MD, [facility]  Pulmonary Critical Care and Sleep Medicine,  [facility]3] AT [place]: [phone]

## 2019-05-26 NOTE — PROGRESS NOTES
GI Progress notes    5/26/2019   2:55 PM    Name:  Abner Weaver  MRN:    [id number]     Acct:     [de-identified]   Room:  [address] [place] [address] Day: 4     Admit Date: 5/22/2019  1:21 PM  PCP: Arjun Monroe    Subjective:     C/C:   Chief Complaint   Patient presents with    Altered Mental Status       Interval History: Status: improved. Seen with multiple family members  Doing better  Out of ICU  Has no overt bleeding  HGB is 7.7  Ammonia is down  On lactulose  No signs of toxicity     ROS:  Constitutional: negative for chills, fevers and sweats    Gastrointestinal: mild abdominal pain, constipation, diarrhea, nausea and vomiting      Medications: Allergies:    Allergies   Allergen Reactions    Nsaids Other (See Comments)    Sulfa Antibiotics Other (See Comments) and Hives     Other reaction(s): Unknown  Patient unsure of the reaction    Tape [Adhesive Tape] Rash     Other reaction(s): Unknown       Current Meds:   potassium chloride 40 mEq in sodium chloride 0.45 % 1,000 mL infusion Continuous   pramipexole (MIRAPEX) tablet 0.25 mg Nightly   iron sucrose (VENOFER) 100 mg in sodium chloride 0.9 % 100 mL IVPB Q24H   levothyroxine (SYNTHROID) tablet 25 mcg Daily   metoprolol (LOPRESSOR) injection 5 mg Q6H PRN   insulin lispro (HUMALOG) injection vial 0-6 Units TID WC   insulin lispro (HUMALOG) injection vial 0-3 Units Nightly   sodium chloride flush 0.9 % injection 10 mL PRN   glucose (GLUTOSE) 40 % oral gel 15 g PRN   dextrose 50 % solution 12.5 g PRN   glucagon (rDNA) injection 1 mg PRN   dextrose 5 % solution PRN   sodium chloride flush 0.9 % injection 10 mL 2 times per day   sodium chloride flush 0.9 % injection 10 mL PRN   miconazole (MICOTIN) 2 % powder BID   rifaximin (XIFAXAN) tablet 550 mg BID   cefTRIAXone (ROCEPHIN) 1 g IVPB in 50 mL D5W minibag Q24H       Data:     Code Status:  Full Code    Family History   Problem Relation Age of Onset    Heart Disease Mother     Pacemaker Mother     Hypertension Mother     Diabetes Father     Breast Cancer Neg Hx        Social History     Socioeconomic History    Marital status:      Spouse name: Not on file    Number of children: Not on file    Years of education: Not on file    Highest education level: Not on file   Occupational History    Not on file   Social Needs    Financial resource strain: Not on file    Food insecurity:     Worry: Not on file     Inability: Not on file    Transportation needs:     Medical: Not on file     Non-medical: Not on file   Tobacco Use    Smoking status: Never Smoker    Smokeless tobacco: Never Used   Substance and Sexual Activity    Alcohol use: No    Drug use: No    Sexual activity: Not on file   Lifestyle    Physical activity:     Days per week: Not on file     Minutes per session: Not on file    Stress: Not on file   Relationships    Social connections:     Talks on phone: Not on file     Gets together: Not on file     Attends [employer] service: Not on file     Active member of club or organization: Not on file     Attends meetings of clubs or organizations: Not on file     Relationship status: Not on file    Intimate partner violence:     Fear of current or ex partner: Not on file     Emotionally abused: Not on file     Physically abused: Not on file     Forced sexual activity: Not on file   Other Topics Concern    Not on file   Social History Narrative    Not on file       Vitals:  BP (!) 153/65   Pulse 82   Temp 99 °F (37.2 °C) (Oral)   Resp 18   Ht 5' 7\" (1.702 m)   Wt 209 lb (94.8 kg)   SpO2 97%   BMI 32.73 kg/m²   Temp (24hrs), Av.7 °F (37.1 °C), Min:97.6 °F (36.4 °C), Max:99.3 °F (37.4 °C)    Recent Labs     19  1630 19  2022 19  0726 19  1213   POCGLU 149* 141* 116* 124*       I/O (24Hr):     Intake/Output Summary (Last 24 hours) at 2019 1455  Last data filed at 2019 2151  Gross per 24 hour   Intake 3216 ml   Output 850 ml   Net 2366 ml       Labs:      CBC: Lab Results   Component Value Date    WBC 5.9 05/26/2019    RBC 2.83 05/26/2019    HGB 7.7 05/26/2019    HCT 24.8 05/26/2019    MCV 87.6 05/26/2019    MCH 27.2 05/26/2019    MCHC 31.0 05/26/2019    RDW 14.6 05/26/2019     05/26/2019    MPV 10.9 05/26/2019     CBC with Differential:  Lab Results   Component Value Date    WBC 5.9 05/26/2019    RBC 2.83 05/26/2019    HGB 7.7 05/26/2019    HCT 24.8 05/26/2019     05/26/2019    MCV 87.6 05/26/2019    MCH 27.2 05/26/2019    MCHC 31.0 05/26/2019    RDW 14.6 05/26/2019    LYMPHOPCT 26 05/26/2019    MONOPCT 16 05/26/2019    BASOPCT 1 05/26/2019    MONOSABS 0.93 05/26/2019    LYMPHSABS 1.50 05/26/2019    EOSABS 0.37 05/26/2019    BASOSABS 0.03 05/26/2019    DIFFTYPE NOT REPORTED 05/26/2019     Hemoglobin/Hematocrit:  Lab Results   Component Value Date    HGB 7.7 05/26/2019    HCT 24.8 05/26/2019     CMP:  Lab Results   Component Value Date     05/26/2019    K 3.9 05/26/2019     05/26/2019    CO2 22 05/26/2019    BUN 11 05/26/2019    CREATININE 0.59 05/26/2019    GFRAA >60 05/26/2019    LABGLOM >60 05/26/2019    GLUCOSE 135 05/26/2019    PROT 5.2 05/25/2019    LABALBU 3.2 05/26/2019    CALCIUM 8.3 05/26/2019    BILITOT 0.77 05/25/2019    ALKPHOS 117 05/25/2019    AST 69 05/25/2019    ALT 49 05/25/2019     BMP:  Lab Results   Component Value Date     05/26/2019    K 3.9 05/26/2019     05/26/2019    CO2 22 05/26/2019    BUN 11 05/26/2019    LABALBU 3.2 05/26/2019    CREATININE 0.59 05/26/2019    CALCIUM 8.3 05/26/2019    GFRAA >60 05/26/2019    LABGLOM >60 05/26/2019    GLUCOSE 135 05/26/2019     PT/INR:    Lab Results   Component Value Date    PROTIME 15.4 05/24/2019    INR 1.5 05/24/2019     PTT:    Lab Results   Component Value Date    APTT 32.2 09/26/2018   [APTT}    Physical Examination:        General appearance: alert, cooperative and no distress  Mental Status: oriented to person, place and time and anxious affect    Abdomen: soft, nontender, nondistended, bowel sounds present     Assessment:        Primary Problem  <principal problem not specified>     Active Hospital Problems    Diagnosis Date Noted    Encephalopathy [G93.40] 05/23/2019    Cirrhosis ([address] [K74.60] 05/23/2019    Elevated LFTs [R94.5] 05/23/2019    Hypernatremia [E87.0] 05/23/2019    Elevated amylase [R74.8]     Altered mental status [R41.82] 05/22/2019     Past Medical History:   Diagnosis Date    Acute urinary tract infection 9/6/2018    Anemia     h/o anemia,transfused 2/2014    Anemia, iron deficiency 10/8/2018    Anxiety disorder     can get SOB with an anxiety attack    Arthritis     Asthma     Atopic rhinitis 9/6/2018    Bandemia     Blood transfusion reaction     x 2 unit prbc's 2/2014    Cellulitis and abscess of trunk     Cellulitis of right breast 9/26/2018    Cellulitis of right lower extremity- resolving 8/9/2018    COPD (chronic obstructive pulmonary disease) ([address]     patient denies COPD, only has asthma    CRP elevated     Depression     Diabetes mellitus ([address]     Disorder associated with type 2 diabetes mellitus ([address] 10/8/2018    Disorder of liver 9/6/2018    Displacement of lumbar intervertebral disc without myelopathy 10/8/2018    Essential hypertension 8/9/2018    Fibromyalgia 10/8/2018    Full thickness rotator cuff tear 10/8/2018    Gastroesophageal reflux disease 9/6/2018    GERD (gastroesophageal reflux disease)     Hammer toe of right foot     Hernia, abdominal 1998    Hyperglycemia due to type 2 diabetes mellitus ([address] 10/8/2018    Hyperlipidemia     Hypertension     Hypothyroid 10/8/2018    Iron deficiency anemia 10/8/2018    Lactic acidosis     Localized, primary osteoarthritis of shoulder region 10/8/2018    Mass of right breast 10/8/2018    Migraine 9/6/2018    Morbid obesity ([address] 8/9/2018    ARLYN (obstructive sleep apnea) 8/9/2018    Osteoarthritis 9/26/2018    Pure hypercholesterolemia 10/8/2018    Restless leg syndrome     Seasonal allergies     Septicemia ([address]     SIRS due to infectious process without acute organ dysfunction (HCC)     Sleep apnea     doesn't use CPAP, sleeps in a recliner    Spondylosis     lower back    Thrombocytopenia ([address] 10/8/2018        Plan:        1. Improving now  2. Has no overt bleeding  3. She will need GI w/u in vs out   4. F/u ammonia and HGB  5.  May need liver biopsy  In future  6. DW his family memebers    Explained to the patient and d/W Nursing Staff  Will F/U with you  Please call or Page for any issues or change in status  Thanks    Electronically signed by Charles De Leon MD on 5/26/2019 at 2:55 PM

## 2019-05-26 NOTE — PROGRESS NOTES
09/26/2018    RBCUA 5 TO 10 09/26/2018    MUCUS NOT REPORTED 09/26/2018    TRICHOMONAS NOT REPORTED 09/26/2018    YEAST NOT REPORTED 09/26/2018    BACTERIA MANY 09/26/2018    SPECGRAV 1.025 05/22/2019    LEUKOCYTESUR NEGATIVE 05/22/2019    UROBILINOGEN Normal 05/22/2019    BILIRUBINUR NEGATIVE 05/22/2019    GLUCOSEU NEGATIVE 05/22/2019    KETUA TRACE 05/22/2019    AMORPHOUS NOT REPORTED 09/26/2018     Urine Sodium:     Lab Results   Component Value Date    TORREY <20 09/26/2018     Urine Creatinine:     Lab Results   Component Value Date    LABCREA 180.8 09/26/2018     Urine Eosinophils:  No components found for: UEOS      IMPRESSION/RECOMMENDATIONS:      1. Hypernatremia - We will change IV fluid to 0.45 normal saline with 40 mEq of potassium chloride per liter at 75 mL/h. Serum sodium has improved to 141 mmol/L.    2.  Hypokalemia - secondary to lactulose induced diarrhea and poor oral intake. Continue sliding-scale potassium replacement. 3.  Lytic bone lesion - Await bone biopsy as well as protein electrophoresis. 4.  Normocytic anemia - Iron studies are consistent with iron deficiency anemia [ferritin 27; iron saturation 13%]. Stool occult blood is positive. Continue IV iron loading dose. 5.  Systemic hypertension - fair control.       ISABELL EnglandCP  Attending Nephrologist  5/26/2019 5:40 AM

## 2019-05-27 ENCOUNTER — APPOINTMENT (OUTPATIENT)
Dept: GENERAL RADIOLOGY | Age: 64
DRG: 988 | End: 2019-05-27
Payer: MEDICARE

## 2019-05-27 ENCOUNTER — APPOINTMENT (OUTPATIENT)
Dept: CT IMAGING | Age: 64
DRG: 988 | End: 2019-05-27
Payer: MEDICARE

## 2019-05-27 LAB
ALBUMIN SERPL-MCNC: 3 G/DL (ref 3.5–5.2)
AMYLASE: 287 U/L (ref 28–100)
ANION GAP SERPL CALCULATED.3IONS-SCNC: 10 MMOL/L (ref 9–17)
ANION GAP SERPL CALCULATED.3IONS-SCNC: 11 MMOL/L (ref 9–17)
BUN BLDV-MCNC: 12 MG/DL (ref 8–23)
BUN/CREAT BLD: 23 (ref 9–20)
CALCIUM SERPL-MCNC: 8.5 MG/DL (ref 8.6–10.4)
CHLORIDE BLD-SCNC: 105 MMOL/L (ref 98–107)
CHLORIDE BLD-SCNC: 107 MMOL/L (ref 98–107)
CO2: 21 MMOL/L (ref 20–31)
CO2: 22 MMOL/L (ref 20–31)
CREAT SERPL-MCNC: 0.52 MG/DL (ref 0.5–0.9)
GFR AFRICAN AMERICAN: >60 ML/MIN
GFR NON-AFRICAN AMERICAN: >60 ML/MIN
GFR SERPL CREATININE-BSD FRML MDRD: ABNORMAL ML/MIN/{1.73_M2}
GFR SERPL CREATININE-BSD FRML MDRD: ABNORMAL ML/MIN/{1.73_M2}
GLUCOSE BLD-MCNC: 110 MG/DL (ref 65–105)
GLUCOSE BLD-MCNC: 117 MG/DL (ref 65–105)
GLUCOSE BLD-MCNC: 124 MG/DL (ref 65–105)
GLUCOSE BLD-MCNC: 127 MG/DL (ref 65–105)
GLUCOSE BLD-MCNC: 129 MG/DL (ref 70–99)
LIPASE: 39 U/L (ref 13–60)
PHOSPHORUS: 3 MG/DL (ref 2.6–4.5)
POTASSIUM SERPL-SCNC: 4.1 MMOL/L (ref 3.7–5.3)
POTASSIUM SERPL-SCNC: 4.3 MMOL/L (ref 3.7–5.3)
SODIUM BLD-SCNC: 138 MMOL/L (ref 135–144)
SODIUM BLD-SCNC: 138 MMOL/L (ref 135–144)

## 2019-05-27 PROCEDURE — 36415 COLL VENOUS BLD VENIPUNCTURE: CPT

## 2019-05-27 PROCEDURE — 83690 ASSAY OF LIPASE: CPT

## 2019-05-27 PROCEDURE — 99233 SBSQ HOSP IP/OBS HIGH 50: CPT | Performed by: INTERNAL MEDICINE

## 2019-05-27 PROCEDURE — 6360000002 HC RX W HCPCS: Performed by: INTERNAL MEDICINE

## 2019-05-27 PROCEDURE — 74018 RADEX ABDOMEN 1 VIEW: CPT

## 2019-05-27 PROCEDURE — 6370000000 HC RX 637 (ALT 250 FOR IP): Performed by: INTERNAL MEDICINE

## 2019-05-27 PROCEDURE — 80051 ELECTROLYTE PANEL: CPT

## 2019-05-27 PROCEDURE — 74176 CT ABD & PELVIS W/O CONTRAST: CPT

## 2019-05-27 PROCEDURE — 2580000003 HC RX 258: Performed by: INTERNAL MEDICINE

## 2019-05-27 PROCEDURE — 82150 ASSAY OF AMYLASE: CPT

## 2019-05-27 PROCEDURE — 2060000000 HC ICU INTERMEDIATE R&B

## 2019-05-27 PROCEDURE — 82947 ASSAY GLUCOSE BLOOD QUANT: CPT

## 2019-05-27 PROCEDURE — 80069 RENAL FUNCTION PANEL: CPT

## 2019-05-27 PROCEDURE — C9113 INJ PANTOPRAZOLE SODIUM, VIA: HCPCS | Performed by: INTERNAL MEDICINE

## 2019-05-27 RX ORDER — 0.9 % SODIUM CHLORIDE 0.9 %
10 VIAL (ML) INJECTION 2 TIMES DAILY
Status: DISCONTINUED | OUTPATIENT
Start: 2019-05-27 | End: 2019-05-29 | Stop reason: HOSPADM

## 2019-05-27 RX ORDER — PRAMIPEXOLE DIHYDROCHLORIDE 0.25 MG/1
0.12 TABLET ORAL NIGHTLY
Status: DISCONTINUED | OUTPATIENT
Start: 2019-05-27 | End: 2019-05-29 | Stop reason: HOSPADM

## 2019-05-27 RX ORDER — ONDANSETRON 4 MG/1
4 TABLET, ORALLY DISINTEGRATING ORAL EVERY 6 HOURS PRN
Status: DISCONTINUED | OUTPATIENT
Start: 2019-05-27 | End: 2019-05-29 | Stop reason: HOSPADM

## 2019-05-27 RX ORDER — ONDANSETRON 2 MG/ML
4 INJECTION INTRAMUSCULAR; INTRAVENOUS EVERY 6 HOURS PRN
Status: DISCONTINUED | OUTPATIENT
Start: 2019-05-27 | End: 2019-05-27 | Stop reason: ALTCHOICE

## 2019-05-27 RX ORDER — ONDANSETRON 2 MG/ML
4 INJECTION INTRAMUSCULAR; INTRAVENOUS EVERY 6 HOURS PRN
Status: DISCONTINUED | OUTPATIENT
Start: 2019-05-27 | End: 2019-05-29 | Stop reason: HOSPADM

## 2019-05-27 RX ORDER — PANTOPRAZOLE SODIUM 40 MG/10ML
40 INJECTION, POWDER, LYOPHILIZED, FOR SOLUTION INTRAVENOUS 2 TIMES DAILY
Status: DISCONTINUED | OUTPATIENT
Start: 2019-05-27 | End: 2019-05-29 | Stop reason: HOSPADM

## 2019-05-27 RX ORDER — ACETAMINOPHEN 325 MG/1
650 TABLET ORAL EVERY 4 HOURS PRN
Status: DISCONTINUED | OUTPATIENT
Start: 2019-05-27 | End: 2019-05-29 | Stop reason: HOSPADM

## 2019-05-27 RX ADMIN — RIFAXIMIN 550 MG: 550 TABLET ORAL at 09:59

## 2019-05-27 RX ADMIN — LEVOTHYROXINE SODIUM 25 MCG: 25 TABLET ORAL at 06:11

## 2019-05-27 RX ADMIN — MICONAZOLE NITRATE: 20.6 POWDER TOPICAL at 20:06

## 2019-05-27 RX ADMIN — ONDANSETRON 4 MG: 2 INJECTION INTRAMUSCULAR; INTRAVENOUS at 09:56

## 2019-05-27 RX ADMIN — Medication 10 ML: at 16:17

## 2019-05-27 RX ADMIN — RIFAXIMIN 550 MG: 550 TABLET ORAL at 20:05

## 2019-05-27 RX ADMIN — MICONAZOLE NITRATE: 20.6 POWDER TOPICAL at 09:59

## 2019-05-27 RX ADMIN — CEFTRIAXONE 1 G: 1 INJECTION, POWDER, FOR SOLUTION INTRAMUSCULAR; INTRAVENOUS at 18:06

## 2019-05-27 RX ADMIN — IRON SUCROSE 100 MG: 20 INJECTION, SOLUTION INTRAVENOUS at 10:47

## 2019-05-27 RX ADMIN — PANTOPRAZOLE SODIUM 40 MG: 40 INJECTION, POWDER, FOR SOLUTION INTRAVENOUS at 16:17

## 2019-05-27 NOTE — PROGRESS NOTES
Pt c/o nausea/diarrhea and lower abdominal cramping this morning. Stated she feels sluggish. VSS, afebrile. incont of loose stool, dark brown, in brief. Skin care given. MD notified and orders received. Cont to monitor.

## 2019-05-27 NOTE — PROGRESS NOTES
Subjective:    HEPATIC encephalopathy  Doing fairly well alert oriented ×3 no asterixis no confusion  ROS  Fever no chills no  complaints no cardiopulmonary complaints at present, has been complaining of lower abdominal pain right lower right lower quadrant and left lower quadrant about a lasting half an hour earlier this morning denies any nausea vomiting no change in bowel habits except diarrhea has been off of the lactulose, no blood in the stools or melena, no TIA no bleeding no headache no sore throat no skin lesions no polyuria no polydipsia no hypoglycemia  physical exam  General Appearance: alert and oriented to person, place and time and in no acute distress  Skin: warm and dry, no rash or erythema  Head: normocephalic and atraumatic  Eyes: pupils equal, round, and reactive to light, conjunctivae normal and sclera anicteric     Neck: neck supple and non tender without mass   Pulmonary/Chest: clear to auscultation bilaterally- no wheezes, rales or rhonchi, normal air movement, no respiratory distress  Cardiovascular: normal rate, regular rhythm, normal S1 and S2, no gallops, intact distal pulses and no carotid bruits  Abdomen: soft, right lower quadrant and left lower quadrant tenderness no rebound, non-distended, normal bowel sounds, no masses or organomegaly  Extremities: no edema and pulses no Wesley sign  Neurologic: Artery oriented ×3 with no focal deficits    BP (!) 136/52   Pulse 81   Temp 95.6 °F (35.3 °C) (Oral)   Resp 18   Ht 5' 7\" (1.702 m)   Wt 196 lb 9.6 oz (89.2 kg)   SpO2 96%   BMI 30.79 kg/m²     CBC:   Lab Results   Component Value Date    WBC 5.9 05/26/2019    RBC 2.83 05/26/2019    HGB 7.7 05/26/2019    HCT 24.8 05/26/2019    MCV 87.6 05/26/2019    MCH 27.2 05/26/2019    MCHC 31.0 05/26/2019    RDW 14.6 05/26/2019     05/26/2019    MPV 10.9 05/26/2019     CMP:    Lab Results   Component Value Date     05/27/2019    K 4.3 05/27/2019     05/27/2019    CO2 22 05/27/2019    BUN 12 05/27/2019    CREATININE 0.52 05/27/2019    GFRAA >60 05/27/2019    LABGLOM >60 05/27/2019    GLUCOSE 129 05/27/2019    PROT 5.2 05/25/2019    LABALBU 3.0 05/27/2019    CALCIUM 8.5 05/27/2019    BILITOT 0.77 05/25/2019    ALKPHOS 117 05/25/2019    AST 69 05/25/2019    ALT 49 05/25/2019        Assessment:  Patient Active Problem List   Diagnosis    Cellulitis of right lower extremity- resolving    Diabetes mellitus ([address]    ARLYN (obstructive sleep apnea)    Morbid obesity (HCC)    Essential hypertension    Lactic acidosis    Bandemia    CRP elevated    Depression    Osteoarthritis    Cellulitis of right breast    Septicemia ([address]    SIRS due to infectious process without acute organ dysfunction (HCC)    Cellulitis and abscess of trunk    Acute urinary tract infection    Anemia    Anemia, iron deficiency    Asthma    Atopic rhinitis    Disorder associated with type 2 diabetes mellitus (HCC)    Disorder of liver    Displacement of lumbar intervertebral disc without myelopathy    Fibromyalgia    Full thickness rotator cuff tear    Gastroesophageal reflux disease    Hyperglycemia due to type 2 diabetes mellitus ([address]    Hyperlipidemia    Hypothyroid    Iron deficiency anemia    Localized, primary osteoarthritis of shoulder region    Mass of right breast    Migraine    Pure hypercholesterolemia    Thrombocytopenia (HCC)    Altered mental status    Encephalopathy    Cirrhosis ([address]    Elevated LFTs    Hypernatremia    Elevated amylase     hepatic encephalopathy resolving  Liver cirrhosis with ascites  Lytic lesion on the bones awaiting biopsy report  Type 2 diabetes insulin treated fairly controlled  Hypertension essential  Prerenal azotemia resolved  Hypokalemia resolved  Hypernatremia resolved  Anemia with thrombocytopenia  Hypothyroid continue thyroid replacement  Right lower quadrant and left lower quadrant abdominal pain with tenderness , will get ct abdomen and pelvis  Plan:    Meds labs reviewed stat CT scan abdomen and pelvis without contrast continue with present treatment GI seeing patient see orders      Maximiliano Mcclain MD  2:22 PM

## 2019-05-27 NOTE — PROGRESS NOTES
Pulmonary Critical Care Progress Note       Patient seen for the follow up of altered mental status      Subjective:  She has been off oxygen nasal cannula. She had significant abdominal cramps with diarrhea this morning. She has been off lactulose for the last 2 days. She feels tired. She has no significant shortness of breath. She denies chest pain. Mild occasional cough, mostly dry . She has seen in the office before and has been off her CPAP for a while. She reports improved RLS symptoms on Mirapex but she had significant sleepiness on it overnight    Examination:  Vitals: BP (!) 137/56   Pulse 83   Temp 97.4 °F (36.3 °C) (Axillary)   Resp 20   Ht 5' 7\" (1.702 m)   Wt 196 lb 9.6 oz (89.2 kg)   SpO2 96%   BMI 30.79 kg/m²   General appearance:  alert and cooperative with exam  Neck: No JVD  Lungs: clear to auscultation bilaterally no crackles or wheezing  Heart: regular rate and rhythm, S1, S2 normal, no gallop  Abdomen: Soft, non tender, + BS  Extremities: no cyanosis or clubbing. No significant edema    LABs:  CBC:   Recent Labs     05/25/19  0428 05/26/19  0403   WBC 5.3 5.9   HGB 7.3* 7.7*   HCT 24.1* 24.8*   PLT 95* 106*     BMP:   Recent Labs     05/26/19  0403 05/26/19  1344 05/27/19  0623    140 138   K 3.6* 3.9 4.3   CO2 21 22 22   BUN 11  --  12   CREATININE 0.59  --  0.52   LABGLOM >60  --  >60   GLUCOSE 135*  --  129*     PT/INR:   No results for input(s): PROTIME, INR in the last 72 hours. LIVER PROFILE:  Recent Labs     05/24/19  1731 05/25/19  0428 05/26/19  0403 05/27/19  0623   AST 87* 69*  --   --    ALT 57* 49*  --   --    LABALBU 3.5 3.0* 3.2* 3.0*     ABG:  Lab Results   Component Value Date    FIO2 RA 09/27/2018       Lab Results   Component Value Date    FIO2 RA 09/27/2018     Radiology 5/25  LIVER ULTRASOUND  1. Gallbladder filled with gallstones. 2. Common bile duct was not visualized. 3. Findings in the liver likely related to diffuse hepatocellular disease. Please correlate with LFTs.        Impression:    · ARLYN/obesity  · History of asthma  · RLS  · Anemia  · Lytic lesion of ileum and L3 vertebrae, S/p biopsy in IR 5/24  · Liver cirrhosis/Encephalopathy  · DM  · HTN/HDL/hypothyroidism/depression/anxiety disorder    Recommendations:    · Off Oxygen by nasal cannula   · incentive spirometry every hour WA   · BiPAP for sleep and as needed  · DuoNeb by nebulizer every 4 hours as needed   · Consider stopping IV Rocephin  · Singulair  · Check Biopsy results  · Insulin sliding scale  · IV fluids  · GI on consult  · IV iron  · make Mirapex  0.125 mg po QHS   · Physical therapy  · DVT prophylaxis with EPCs  · Discussed with RN      Rc Higuera MD, [facility]  Pulmonary Critical Care and Sleep Medicine,  [facility] AT [place]: [phone]

## 2019-05-27 NOTE — PROGRESS NOTES
(MICOTIN) 2 % powder BID   rifaximin (XIFAXAN) tablet 550 mg BID   cefTRIAXone (ROCEPHIN) 1 g IVPB in 50 mL D5W minibag Q24H       Data:     Code Status:  Full Code    Family History   Problem Relation Age of Onset    Heart Disease Mother    [employer] Pacemaker Mother     Hypertension Mother     Diabetes Father     Breast Cancer Neg Hx        Social History     Socioeconomic History    Marital status:       Spouse name: Not on file    Number of children: Not on file    Years of education: Not on file    Highest education level: Not on file   Occupational History    Not on file   Social Needs    Financial resource strain: Not on file    Food insecurity:     Worry: Not on file     Inability: Not on file    Transportation needs:     Medical: Not on file     Non-medical: Not on file   Tobacco Use    Smoking status: Never Smoker    Smokeless tobacco: Never Used   Substance and Sexual Activity    Alcohol use: No    Drug use: No    Sexual activity: Not on file   Lifestyle    Physical activity:     Days per week: Not on file     Minutes per session: Not on file    Stress: Not on file   Relationships    Social connections:     Talks on phone: Not on file     Gets together: Not on file     Attends Holiness service: Not on file     Active member of club or organization: Not on file     Attends meetings of clubs or organizations: Not on file     Relationship status: Not on file    Intimate partner violence:     Fear of current or ex partner: Not on file     Emotionally abused: Not on file     Physically abused: Not on file     Forced sexual activity: Not on file   Other Topics Concern    Not on file   Social History Narrative    Not on file       Vitals:  BP (!) 137/56   Pulse 83   Temp 97.4 °F (36.3 °C) (Axillary)   Resp 20   Ht 5' 7\" (1.702 m)   Wt 196 lb 9.6 oz (89.2 kg)   SpO2 96%   BMI 30.79 kg/m²   Temp (24hrs), Av.7 °F (36.5 °C), Min:95.6 °F (35.3 °C), Max:99.9 °F (37.7 °C)    Recent Labs 05/26/19  1646 05/26/19  2216 05/27/19  0751 05/27/19  1101   POCGLU 121* 128* 117* 127*       I/O (24Hr):   No intake or output data in the 24 hours ending 05/27/19 1520    Labs:      CBC: Lab Results   Component Value Date    WBC 5.9 05/26/2019    RBC 2.83 05/26/2019    HGB 7.7 05/26/2019    HCT 24.8 05/26/2019    MCV 87.6 05/26/2019    MCH 27.2 05/26/2019    MCHC 31.0 05/26/2019    RDW 14.6 05/26/2019     05/26/2019    MPV 10.9 05/26/2019     CBC with Differential:  Lab Results   Component Value Date    WBC 5.9 05/26/2019    RBC 2.83 05/26/2019    HGB 7.7 05/26/2019    HCT 24.8 05/26/2019     05/26/2019    MCV 87.6 05/26/2019    MCH 27.2 05/26/2019    MCHC 31.0 05/26/2019    RDW 14.6 05/26/2019    LYMPHOPCT 26 05/26/2019    MONOPCT 16 05/26/2019    BASOPCT 1 05/26/2019    MONOSABS 0.93 05/26/2019    LYMPHSABS 1.50 05/26/2019    EOSABS 0.37 05/26/2019    BASOSABS 0.03 05/26/2019    DIFFTYPE NOT REPORTED 05/26/2019     Hemoglobin/Hematocrit:  Lab Results   Component Value Date    HGB 7.7 05/26/2019    HCT 24.8 05/26/2019     CMP:  Lab Results   Component Value Date     05/27/2019    K 4.3 05/27/2019     05/27/2019    CO2 22 05/27/2019    BUN 12 05/27/2019    CREATININE 0.52 05/27/2019    GFRAA >60 05/27/2019    LABGLOM >60 05/27/2019    GLUCOSE 129 05/27/2019    PROT 5.2 05/25/2019    LABALBU 3.0 05/27/2019    CALCIUM 8.5 05/27/2019    BILITOT 0.77 05/25/2019    ALKPHOS 117 05/25/2019    AST 69 05/25/2019    ALT 49 05/25/2019     BMP:  Lab Results   Component Value Date     05/27/2019    K 4.3 05/27/2019     05/27/2019    CO2 22 05/27/2019    BUN 12 05/27/2019    LABALBU 3.0 05/27/2019    CREATININE 0.52 05/27/2019    CALCIUM 8.5 05/27/2019    GFRAA >60 05/27/2019    LABGLOM >60 05/27/2019    GLUCOSE 129 05/27/2019     PT/INR:    Lab Results   Component Value Date    PROTIME 15.4 05/24/2019    INR 1.5 05/24/2019     PTT:    Lab Results   Component Value Date    APTT 32.2 09/26/2018   [APTT}    Physical Examination:        General appearance: alert, cooperative and no distress  Mental Status: oriented to person, place and time and anxious affect    Abdomen: soft, mild tender, nondistended, bowel sounds present     Assessment:        Primary Problem  <principal problem not specified>     Active Hospital Problems    Diagnosis Date Noted    Encephalopathy [G93.40] 05/23/2019    Cirrhosis ([address] [K74.60] 05/23/2019    Elevated LFTs [R94.5] 05/23/2019    Hypernatremia [E87.0] 05/23/2019    Elevated amylase [R74.8]     Altered mental status [R41.82] 05/22/2019     Past Medical History:   Diagnosis Date    Acute urinary tract infection 9/6/2018    Anemia     h/o anemia,transfused 2/2014    Anemia, iron deficiency 10/8/2018    Anxiety disorder     can get SOB with an anxiety attack    Arthritis     Asthma     Atopic rhinitis 9/6/2018    Bandemia     Blood transfusion reaction     x 2 unit prbc's 2/2014    Cellulitis and abscess of trunk     Cellulitis of right breast 9/26/2018    Cellulitis of right lower extremity- resolving 8/9/2018    COPD (chronic obstructive pulmonary disease) ([address]     patient denies COPD, only has asthma    CRP elevated     Depression     Diabetes mellitus ([address]     Disorder associated with type 2 diabetes mellitus ([address] 10/8/2018    Disorder of liver 9/6/2018    Displacement of lumbar intervertebral disc without myelopathy 10/8/2018    Essential hypertension 8/9/2018    Fibromyalgia 10/8/2018    Full thickness rotator cuff tear 10/8/2018    Gastroesophageal reflux disease 9/6/2018    GERD (gastroesophageal reflux disease)     Hammer toe of right foot     Hernia, abdominal 1998    Hyperglycemia due to type 2 diabetes mellitus ([address] 10/8/2018    Hyperlipidemia     Hypertension     Hypothyroid 10/8/2018    Iron deficiency anemia 10/8/2018    Lactic acidosis     Localized, primary osteoarthritis of shoulder region 10/8/2018    Mass of right breast 10/8/2018    Migraine 9/6/2018    Morbid obesity ([facility] 8/9/2018    ARLYN (obstructive sleep apnea) 8/9/2018    Osteoarthritis 9/26/2018    Pure hypercholesterolemia 10/8/2018    Restless leg syndrome     Seasonal allergies     Septicemia ([facility]     SIRS due to infectious process without acute organ dysfunction (HCC)     Sleep apnea     doesn't use CPAP, sleeps in a recliner    Spondylosis     lower back    Thrombocytopenia ([facility] 10/8/2018        Plan:        1. Continue PPI  2. Check CT scan of the abdomen and pelvis  3. Check stool for C. Difficile  4. Follow-up  5.  Discussed with primary care doctor Dr. Darren Sweet to the patient and d/W Nursing Staff  Will F/U with you  Please call or Page for any issues or change in status  Thanks    Electronically signed by Monica Raza MD on 5/27/2019 at 3:20 PM

## 2019-05-27 NOTE — PROGRESS NOTES
Department of Internal Medicine  Nephrology [facility], [place]    Progress Note    Interval history:   Patient seen and examined today , c/o Nausea and vomiting  She is nonoliguric and does not have shortness of breath. SNa and K improved and controlled. BP controlled. Good uop    History of presenting illness: This is a 59 y.o. female with a significant past medical history of Depression, Fibromyalgia [on gabapentin], hypertension hyperlipidemia and COPD, who was brought in by her brother after 4 days of progressive decline in mental status. Laboratory studies at presentation revealed serum sodium 150 mmol/L but serum creatinine was normal.  Patient is still pleasantly confused and unable to give an interview. Physical Exam:    VITALS:  BP (!) 125/50   Pulse 80   Temp 97.6 °F (36.4 °C) (Oral)   Resp 18   Ht 5' 7\" (1.702 m)   Wt 196 lb 9.6 oz (89.2 kg)   SpO2 99%   BMI 30.79 kg/m²   24HR INTAKE/OUTPUT:    No intake or output data in the 24 hours ending 05/27/19 1007    Constitutional:  Awake and not in acute respiratory distress. Cardiovascular/Edema: S1, S2 with no pericardial rub. Respiratory:  Clinically clear. Gastrointestinal:  Generally soft. CBC:   Recent Labs     05/25/19  0428 05/26/19  0403   WBC 5.3 5.9   HGB 7.3* 7.7*   PLT 95* 106*     BMP:    Recent Labs     05/25/19  0428  05/26/19  0403 05/26/19  1344 05/27/19  0623      < > 141 140 138   K 3.3*   < > 3.6* 3.9 4.3   *   < > 109* 107 105   CO2 20   < > 21 22 22   BUN 15  --  11  --  12   CREATININE 0.59  --  0.59  --  0.52   GLUCOSE 148*  --  135*  --  129*    < > = values in this interval not displayed.        Lab Results   Component Value Date    NITRU NEGATIVE 05/22/2019    COLORU YELLOW 05/22/2019    PHUR 6.0 05/22/2019    WBCUA 5 TO 10 09/26/2018    RBCUA 5 TO 10 09/26/2018    MUCUS NOT REPORTED 09/26/2018    TRICHOMONAS NOT REPORTED 09/26/2018    YEAST NOT REPORTED 09/26/2018    BACTERIA MANY 09/26/2018    SPECGRAV 1.025 05/22/2019    LEUKOCYTESUR NEGATIVE 05/22/2019    UROBILINOGEN Normal 05/22/2019    BILIRUBINUR NEGATIVE 05/22/2019    GLUCOSEU NEGATIVE 05/22/2019    KETUA TRACE 05/22/2019    AMORPHOUS NOT REPORTED 09/26/2018     Urine Sodium:     Lab Results   Component Value Date    TORREY <20 09/26/2018     Urine Creatinine:     Lab Results   Component Value Date    LABCREA 180.8 09/26/2018     Urine Eosinophils:  No components found for: UEOS      IMPRESSION/RECOMMENDATIONS:      1. Hypernatremia - improved, continue 0.45%Saline + 40 meq kcl    2. Hypokalemia - secondary to lactulose induced diarrhea and poor oral intake. Continue sliding-scale potassium replacement. 3.  Lytic bone lesion - Await bone biopsy as well as protein electrophoresis. 4.  Normocytic anemia - Iron studies are consistent with iron deficiency anemia [ferritin 27; iron saturation 13%]. Stool occult blood is positive. Continue IV iron loading dose. 5.  Systemic hypertension - fair control. Plan:  Dec IVF 0.45% saline + 40 meq kcl to 50 ml/hr. Electrolytes q 12 hours. Change IVF to normal saline if SNa is less than 135. Monitor K levels as well and adjust K in drip accordingly.       [facility], MD  Attending Nephrologist  5/27/2019 10:07 AM

## 2019-05-27 NOTE — PLAN OF CARE
Problem: Falls - Risk of:  Goal: Will remain free from falls  5/27/2019 0119 by Darvin Adair RN  Outcome: Ongoing     Problem: Falls - Risk of:  Goal: Absence of physical injury  Outcome: Ongoing     Problem: Risk for Impaired Skin Integrity  Goal: Tissue integrity - skin and mucous membranes  5/27/2019 0119 by Darvin Adair RN  Outcome: Ongoing     Problem: IP BALANCE  Goal: LTG - Patient will maintain balance to allow for safe/functional mobility  Outcome: Ongoing

## 2019-05-28 LAB
ABSOLUTE EOS #: 0.33 K/UL (ref 0–0.44)
ABSOLUTE IMMATURE GRANULOCYTE: 0.08 K/UL (ref 0–0.3)
ABSOLUTE LYMPH #: 1.89 K/UL (ref 1.1–3.7)
ABSOLUTE MONO #: 0.94 K/UL (ref 0.1–1.2)
ALBUMIN SERPL-MCNC: 2.7 G/DL (ref 3.5–5.2)
ALBUMIN/GLOBULIN RATIO: ABNORMAL (ref 1–2.5)
ALP BLD-CCNC: 119 U/L (ref 35–104)
ALT SERPL-CCNC: 37 U/L (ref 5–33)
AMMONIA: 44 UMOL/L (ref 11–51)
AMYLASE: 86 U/L (ref 28–100)
ANION GAP SERPL CALCULATED.3IONS-SCNC: 12 MMOL/L (ref 9–17)
ANION GAP SERPL CALCULATED.3IONS-SCNC: 12 MMOL/L (ref 9–17)
ANTI-NUCLEAR ANTIBODY (ANA): NEGATIVE
AST SERPL-CCNC: 40 U/L
BASOPHILS # BLD: 0 % (ref 0–2)
BASOPHILS ABSOLUTE: 0.03 K/UL (ref 0–0.2)
BILIRUB SERPL-MCNC: 0.76 MG/DL (ref 0.3–1.2)
BUN BLDV-MCNC: 11 MG/DL (ref 8–23)
BUN/CREAT BLD: 18 (ref 9–20)
CALCIUM SERPL-MCNC: 8.6 MG/DL (ref 8.6–10.4)
CHLORIDE BLD-SCNC: 101 MMOL/L (ref 98–107)
CHLORIDE BLD-SCNC: 105 MMOL/L (ref 98–107)
CMV IGM: 0.1
CO2: 19 MMOL/L (ref 20–31)
CO2: 22 MMOL/L (ref 20–31)
CREAT SERPL-MCNC: 0.6 MG/DL (ref 0.5–0.9)
CULTURE: NORMAL
CULTURE: NORMAL
DIFFERENTIAL TYPE: ABNORMAL
EOSINOPHILS RELATIVE PERCENT: 5 % (ref 1–4)
GFR AFRICAN AMERICAN: >60 ML/MIN
GFR NON-AFRICAN AMERICAN: >60 ML/MIN
GFR SERPL CREATININE-BSD FRML MDRD: ABNORMAL ML/MIN/{1.73_M2}
GFR SERPL CREATININE-BSD FRML MDRD: ABNORMAL ML/MIN/{1.73_M2}
GLUCOSE BLD-MCNC: 106 MG/DL (ref 65–105)
GLUCOSE BLD-MCNC: 107 MG/DL (ref 65–105)
GLUCOSE BLD-MCNC: 107 MG/DL (ref 65–105)
GLUCOSE BLD-MCNC: 109 MG/DL (ref 70–99)
GLUCOSE BLD-MCNC: 112 MG/DL (ref 65–105)
HCT VFR BLD CALC: 26.8 % (ref 36.3–47.1)
HEMOGLOBIN: 8.1 G/DL (ref 11.9–15.1)
IMMATURE GRANULOCYTES: 1 %
LIPASE: 33 U/L (ref 13–60)
LYMPHOCYTES # BLD: 28 % (ref 24–43)
Lab: NORMAL
Lab: NORMAL
MCH RBC QN AUTO: 27.5 PG (ref 25.2–33.5)
MCHC RBC AUTO-ENTMCNC: 30.2 G/DL (ref 28.4–34.8)
MCV RBC AUTO: 90.8 FL (ref 82.6–102.9)
MONOCYTES # BLD: 14 % (ref 3–12)
NRBC AUTOMATED: 0 PER 100 WBC
PDW BLD-RTO: 15.1 % (ref 11.8–14.4)
PLATELET # BLD: 110 K/UL (ref 138–453)
PLATELET ESTIMATE: ABNORMAL
PMV BLD AUTO: 10.7 FL (ref 8.1–13.5)
POTASSIUM SERPL-SCNC: 3.7 MMOL/L (ref 3.7–5.3)
POTASSIUM SERPL-SCNC: 4.3 MMOL/L (ref 3.7–5.3)
RBC # BLD: 2.95 M/UL (ref 3.95–5.11)
RBC # BLD: ABNORMAL 10*6/UL
SEG NEUTROPHILS: 52 % (ref 36–65)
SEGMENTED NEUTROPHILS ABSOLUTE COUNT: 3.51 K/UL (ref 1.5–8.1)
SMOOTH MUSCLE ANTIBODY: NORMAL
SODIUM BLD-SCNC: 135 MMOL/L (ref 135–144)
SODIUM BLD-SCNC: 136 MMOL/L (ref 135–144)
SPECIMEN DESCRIPTION: NORMAL
SPECIMEN DESCRIPTION: NORMAL
TOTAL PROTEIN: 5.3 G/DL (ref 6.4–8.3)
WBC # BLD: 6.8 K/UL (ref 3.5–11.3)
WBC # BLD: ABNORMAL 10*3/UL

## 2019-05-28 PROCEDURE — 80053 COMPREHEN METABOLIC PANEL: CPT

## 2019-05-28 PROCEDURE — 97535 SELF CARE MNGMENT TRAINING: CPT | Performed by: NURSE PRACTITIONER

## 2019-05-28 PROCEDURE — 83690 ASSAY OF LIPASE: CPT

## 2019-05-28 PROCEDURE — 6360000002 HC RX W HCPCS: Performed by: INTERNAL MEDICINE

## 2019-05-28 PROCEDURE — 80051 ELECTROLYTE PANEL: CPT

## 2019-05-28 PROCEDURE — 2580000003 HC RX 258: Performed by: INTERNAL MEDICINE

## 2019-05-28 PROCEDURE — 6370000000 HC RX 637 (ALT 250 FOR IP): Performed by: INTERNAL MEDICINE

## 2019-05-28 PROCEDURE — 97110 THERAPEUTIC EXERCISES: CPT

## 2019-05-28 PROCEDURE — 82150 ASSAY OF AMYLASE: CPT

## 2019-05-28 PROCEDURE — 85025 COMPLETE CBC W/AUTO DIFF WBC: CPT

## 2019-05-28 PROCEDURE — 2060000000 HC ICU INTERMEDIATE R&B

## 2019-05-28 PROCEDURE — 99232 SBSQ HOSP IP/OBS MODERATE 35: CPT | Performed by: INTERNAL MEDICINE

## 2019-05-28 PROCEDURE — 97530 THERAPEUTIC ACTIVITIES: CPT

## 2019-05-28 PROCEDURE — C9113 INJ PANTOPRAZOLE SODIUM, VIA: HCPCS | Performed by: INTERNAL MEDICINE

## 2019-05-28 PROCEDURE — 82140 ASSAY OF AMMONIA: CPT

## 2019-05-28 PROCEDURE — 36415 COLL VENOUS BLD VENIPUNCTURE: CPT

## 2019-05-28 PROCEDURE — 82947 ASSAY GLUCOSE BLOOD QUANT: CPT

## 2019-05-28 RX ADMIN — RIFAXIMIN 550 MG: 550 TABLET ORAL at 08:42

## 2019-05-28 RX ADMIN — Medication 10 ML: at 08:37

## 2019-05-28 RX ADMIN — MICONAZOLE NITRATE: 20.6 POWDER TOPICAL at 20:43

## 2019-05-28 RX ADMIN — Medication 10 ML: at 08:42

## 2019-05-28 RX ADMIN — CEFTRIAXONE 1 G: 1 INJECTION, POWDER, FOR SOLUTION INTRAMUSCULAR; INTRAVENOUS at 18:12

## 2019-05-28 RX ADMIN — Medication 10 ML: at 20:59

## 2019-05-28 RX ADMIN — RIFAXIMIN 550 MG: 550 TABLET ORAL at 20:43

## 2019-05-28 RX ADMIN — POTASSIUM CHLORIDE: 2 INJECTION, SOLUTION, CONCENTRATE INTRAVENOUS at 09:01

## 2019-05-28 RX ADMIN — PANTOPRAZOLE SODIUM 40 MG: 40 INJECTION, POWDER, FOR SOLUTION INTRAVENOUS at 08:42

## 2019-05-28 RX ADMIN — PANTOPRAZOLE SODIUM 40 MG: 40 INJECTION, POWDER, FOR SOLUTION INTRAVENOUS at 20:43

## 2019-05-28 RX ADMIN — MICONAZOLE NITRATE: 20.6 POWDER TOPICAL at 09:03

## 2019-05-28 RX ADMIN — Medication 10 ML: at 21:00

## 2019-05-28 RX ADMIN — IRON SUCROSE 100 MG: 20 INJECTION, SOLUTION INTRAVENOUS at 12:15

## 2019-05-28 RX ADMIN — LEVOTHYROXINE SODIUM 25 MCG: 25 TABLET ORAL at 06:10

## 2019-05-28 RX ADMIN — PRAMIPEXOLE DIHYDROCHLORIDE 0.12 MG: 0.25 TABLET ORAL at 20:56

## 2019-05-28 ASSESSMENT — PAIN SCALES - GENERAL
PAINLEVEL_OUTOF10: 0

## 2019-05-28 NOTE — PLAN OF CARE
Pt denies pain. Continues on IV fluids and clear diet tolerated with advance to full liquids. Participating in physical therapy but believes weaker than previously. No further nausea or diarrhea today. EGD planned for tomorrow   VSS.

## 2019-05-28 NOTE — CARE COORDINATION
Discharge planning    Chart reviewed and appreciate prior CM notes. NEPHRO hypernatremia, hypokalemia, lytic bone lesions. Will need bone bx. Continue with IVF    PULM  History of asthma, off 02 by NC. duoneb as needed. GI encephalopathy, cirrhosis. Elevated LFTs. Continue with PPI, get ct of abd and pelvis. PT and OT both rec snf on 5/24. Await a re evaluation. Per SS note on 5/24 patient declined snf but open to home care. Thought she had OL but per prior notes in [facility] last home care agency [facility]  From snf and has had OL in past will clarify with patient which agency she prefers.

## 2019-05-28 NOTE — PROGRESS NOTES
Department of Internal Medicine  Nephrology [facility], [place]    Progress Note    Interval history:   Patient seen and examined today , no Nausea and vomiting  She is nonoliguric and does not have shortness of breath. SNa and K improved and controlled. BP controlled. Good uop    History of presenting illness: This is a 59 y.o. female with a significant past medical history of Depression, Fibromyalgia [on gabapentin], hypertension hyperlipidemia and COPD, who was brought in by her brother after 4 days of progressive decline in mental status. Laboratory studies at presentation revealed serum sodium 150 mmol/L but serum creatinine was normal.  Patient is still pleasantly confused and unable to give an interview. Physical Exam:    VITALS:  BP (!) 130/48   Pulse 83   Temp 98.4 °F (36.9 °C) (Oral)   Resp 16   Ht 5' 7\" (1.702 m)   Wt 194 lb 4.8 oz (88.1 kg)   SpO2 98%   BMI 30.43 kg/m²   24HR INTAKE/OUTPUT:      Intake/Output Summary (Last 24 hours) at 5/28/2019 3777  Last data filed at 5/27/2019 1806  Gross per 24 hour   Intake 622 ml   Output --   Net 622 ml       Constitutional:  Awake and not in acute respiratory distress. Cardiovascular/Edema: S1, S2 with no pericardial rub. Respiratory:  Clinically clear. Gastrointestinal:  Generally soft.       CBC:   Recent Labs     05/26/19  0403   WBC 5.9   HGB 7.7*   *     BMP:    Recent Labs     05/26/19  0403 05/26/19  1344 05/27/19  0623 05/27/19 2028    140 138 138   K 3.6* 3.9 4.3 4.1   * 107 105 107   CO2 21 22 22 21   BUN 11  --  12  --    CREATININE 0.59  --  0.52  --    GLUCOSE 135*  --  129*  --        Lab Results   Component Value Date    NITRU NEGATIVE 05/22/2019    COLORU YELLOW 05/22/2019    PHUR 6.0 05/22/2019    WBCUA 5 TO 10 09/26/2018    RBCUA 5 TO 10 09/26/2018    MUCUS NOT REPORTED 09/26/2018    TRICHOMONAS NOT REPORTED 09/26/2018    YEAST NOT REPORTED 09/26/2018    BACTERIA MANY 09/26/2018    SPECGRAV 1.025 05/22/2019    LEUKOCYTESUR NEGATIVE 05/22/2019    UROBILINOGEN Normal 05/22/2019    BILIRUBINUR NEGATIVE 05/22/2019    GLUCOSEU NEGATIVE 05/22/2019    KETUA TRACE 05/22/2019    AMORPHOUS NOT REPORTED 09/26/2018     Urine Sodium:     Lab Results   Component Value Date    TORREY <20 09/26/2018     Urine Creatinine:     Lab Results   Component Value Date    LABCREA 180.8 09/26/2018     Urine Eosinophils:  No components found for: UEOS      IMPRESSION/RECOMMENDATIONS:      1. Hypernatremia - improved, continue 0.45%Saline + 40 meq kcl    2. Hypokalemia - secondary to diarrhea and poor oral intake. Continue sliding-scale potassium replacement. 3.  Lytic bone lesion - Await bone biopsy as well as protein electrophoresis. 4.  Normocytic anemia - Iron studies are consistent with iron deficiency anemia [ferritin 27; iron saturation 13%]. Stool occult blood is positive. Continue IV iron loading dose. 5.  Systemic hypertension - fair control. Plan:  Continue  IVF 0.45% saline + 40 meq kcl to 50 ml/hr. Electrolytes q 12 hours. Change IVF to normal saline if SNa is less than 135. Monitor K levels as well and adjust K in drip accordingly. Dc kcl from IVF if K is >4.7  Once patient is allowed to have regular diet and patient tolerates it, we can stop IVF.       Shalini Li MD  Attending Nephrologist  5/28/2019 6:13 AM

## 2019-05-28 NOTE — PLAN OF CARE
Patient is fall risk per fall scale. Falling star on door. Fall sticker on armband. Hourly rounding performed. Personal belongings and call light within reach. Bed in low position.

## 2019-05-28 NOTE — PROGRESS NOTES
sodium chloride flush 0.9 % injection 10 mL PRN   miconazole (MICOTIN) 2 % powder BID   rifaximin (XIFAXAN) tablet 550 mg BID   cefTRIAXone (ROCEPHIN) 1 g IVPB in 50 mL D5W minibag Q24H       Data:     Code Status:  Full Code    Family History   Problem Relation Age of Onset    Heart Disease Mother     Pacemaker Mother     Hypertension Mother     Diabetes Father     Breast Cancer Neg Hx        Social History     Socioeconomic History    Marital status:       Spouse name: Not on file    Number of children: Not on file    Years of education: Not on file    Highest education level: Not on file   Occupational History    Not on file   Social Needs    Financial resource strain: Not on file    Food insecurity:     Worry: Not on file     Inability: Not on file    Transportation needs:     Medical: Not on file     Non-medical: Not on file   Tobacco Use    Smoking status: Never Smoker    Smokeless tobacco: Never Used   Substance and Sexual Activity    Alcohol use: No    Drug use: No    Sexual activity: Not on file   Lifestyle    Physical activity:     Days per week: Not on file     Minutes per session: Not on file    Stress: Not on file   Relationships    Social connections:     Talks on phone: Not on file     Gets together: Not on file     Attends [employer] service: Not on file     Active member of club or organization: Not on file     Attends meetings of clubs or organizations: Not on file     Relationship status: Not on file    Intimate partner violence:     Fear of current or ex partner: Not on file     Emotionally abused: Not on file     Physically abused: Not on file     Forced sexual activity: Not on file   Other Topics Concern    Not on file   Social History Narrative    Not on file       Vitals:  BP (!) 141/39   Pulse 86   Temp 99.1 °F (37.3 °C) (Oral)   Resp 16   Ht 5' 7\" (1.702 m)   Wt 194 lb 4.8 oz (88.1 kg)   SpO2 99%   BMI 30.43 kg/m²   Temp (24hrs), Av.5 °F (36.9 °C), Min:97.4 °F (36.3 °C), Max:99.1 °F (37.3 °C)    Recent Labs     05/27/19  1658 05/27/19 2001 05/28/19  0729 05/28/19  1038   POCGLU 124* 110* 107* 107*       I/O (24Hr):     Intake/Output Summary (Last 24 hours) at 5/28/2019 1323  Last data filed at 5/28/2019 5803  Gross per 24 hour   Intake 1222 ml   Output --   Net 1222 ml       Labs:      CBC: Lab Results   Component Value Date    WBC 6.8 05/28/2019    RBC 2.95 05/28/2019    HGB 8.1 05/28/2019    HCT 26.8 05/28/2019    MCV 90.8 05/28/2019    MCH 27.5 05/28/2019    MCHC 30.2 05/28/2019    RDW 15.1 05/28/2019     05/28/2019    MPV 10.7 05/28/2019     CBC with Differential:  Lab Results   Component Value Date    WBC 6.8 05/28/2019    RBC 2.95 05/28/2019    HGB 8.1 05/28/2019    HCT 26.8 05/28/2019     05/28/2019    MCV 90.8 05/28/2019    MCH 27.5 05/28/2019    MCHC 30.2 05/28/2019    RDW 15.1 05/28/2019    LYMPHOPCT 28 05/28/2019    MONOPCT 14 05/28/2019    BASOPCT 0 05/28/2019    MONOSABS 0.94 05/28/2019    LYMPHSABS 1.89 05/28/2019    EOSABS 0.33 05/28/2019    BASOSABS 0.03 05/28/2019    DIFFTYPE NOT REPORTED 05/28/2019     Hemoglobin/Hematocrit:  Lab Results   Component Value Date    HGB 8.1 05/28/2019    HCT 26.8 05/28/2019     CMP:  Lab Results   Component Value Date     05/28/2019    K 4.3 05/28/2019     05/28/2019    CO2 19 05/28/2019    BUN 11 05/28/2019    CREATININE 0.60 05/28/2019    GFRAA >60 05/28/2019    LABGLOM >60 05/28/2019    GLUCOSE 109 05/28/2019    PROT 5.3 05/28/2019    LABALBU 2.7 05/28/2019    CALCIUM 8.6 05/28/2019    BILITOT 0.76 05/28/2019    ALKPHOS 119 05/28/2019    AST 40 05/28/2019    ALT 37 05/28/2019     BMP:  Lab Results   Component Value Date     05/28/2019    K 4.3 05/28/2019     05/28/2019    CO2 19 05/28/2019    BUN 11 05/28/2019    LABALBU 2.7 05/28/2019    CREATININE 0.60 05/28/2019    CALCIUM 8.6 05/28/2019    GFRAA >60 05/28/2019    LABGLOM >60 05/28/2019    GLUCOSE 109 05/28/2019 PT/INR:    Lab Results   Component Value Date    PROTIME 15.4 05/24/2019    INR 1.5 05/24/2019     PTT:    Lab Results   Component Value Date    APTT 32.2 09/26/2018   [APTT}    Physical Examination:        General appearance: alert, cooperative and no distress  Mental Status: oriented to person, place and time and anxious affect    Abdomen: soft, mild tender, nondistended, bowel sounds present   Skin: no gross lesions, rashes, or induration    Assessment:        Primary Problem  <principal problem not specified>     Active Hospital Problems    Diagnosis Date Noted    Encephalopathy [G93.40] 05/23/2019    Cirrhosis ([address] [K74.60] 05/23/2019    Elevated LFTs [R94.5] 05/23/2019    Hypernatremia [E87.0] 05/23/2019    Elevated amylase [R74.8]     Altered mental status [R41.82] 05/22/2019     Past Medical History:   Diagnosis Date    Acute urinary tract infection 9/6/2018    Anemia     h/o anemia,transfused 2/2014    Anemia, iron deficiency 10/8/2018    Anxiety disorder     can get SOB with an anxiety attack    Arthritis     Asthma     Atopic rhinitis 9/6/2018    Bandemia     Blood transfusion reaction     x 2 unit prbc's 2/2014    Cellulitis and abscess of trunk     Cellulitis of right breast 9/26/2018    Cellulitis of right lower extremity- resolving 8/9/2018    COPD (chronic obstructive pulmonary disease) ([facility]     patient denies COPD, only has asthma    CRP elevated     Depression     Diabetes mellitus ([address]     Disorder associated with type 2 diabetes mellitus ([facility] 10/8/2018    Disorder of liver 9/6/2018    Displacement of lumbar intervertebral disc without myelopathy 10/8/2018    Essential hypertension 8/9/2018    Fibromyalgia 10/8/2018    Full thickness rotator cuff tear 10/8/2018    Gastroesophageal reflux disease 9/6/2018    GERD (gastroesophageal reflux disease)     Hammer toe of right foot     Hernia, abdominal 1998    Hyperglycemia due to type 2 diabetes mellitus ([address] 10/8/2018    Hyperlipidemia     Hypertension     Hypothyroid 10/8/2018    Iron deficiency anemia 10/8/2018    Lactic acidosis     Localized, primary osteoarthritis of shoulder region 10/8/2018    Mass of right breast 10/8/2018    Migraine 9/6/2018    Morbid obesity ([facility] 8/9/2018    ARLYN (obstructive sleep apnea) 8/9/2018    Osteoarthritis 9/26/2018    Pure hypercholesterolemia 10/8/2018    Restless leg syndrome     Seasonal allergies     Septicemia ([facility]     SIRS due to infectious process without acute organ dysfunction (HCC)     Sleep apnea     doesn't use CPAP, sleeps in a recliner    Spondylosis     lower back    Thrombocytopenia ([address] 10/8/2018        Plan:        1. Amlyase and Lipase tomorrow  2. LFTS tomorrow  3. NPO after midnight  4. EGD tomorrow  The Endoscopic procedure was explained to the patient in detail  The prep and NPO were explained  All the Risks, Benefits, and Alternatives were explained  Risk of Bleeding, Perforation and Cardio Respiratory risks were explained  her questions were answered  The patient has verbalized understanding and agreement to this plan. Continue PPI  Follow-up    Explained to the patient and d/W Nursing Staff  Will F/U with you  Please call or Page for any issues or change in status  Thanks    Patient was physically assessed with Dr. Silvia Estrada, agreed with plan of care.     Agree with above plans    Electronically signed by ZACARIAS Hodges NP on 5/28/2019 at 1:23 PM

## 2019-05-28 NOTE — CONSULTS
General Surgery:  Consult Note        PATIENT NAME: Ross Langford   YOB: 1955    ADMISSION DATE: 5/22/2019  1:21 PM     Admitting Provider: Dr. Shaista Frank Physician: Dr. Zoila Leiva: 5/28/2019    Chief Complaint:  Nausea  Consult Regarding:  R breast cellulitis    HISTORY OF PRESENT ILLNESS:  The patient is a 59 y.o. female  who was admitted on 5/22/19 for AMS found to have encephalopathy possibly due to cirrhosis. gen surg  Consulted for concerns of right breast cellulitis. Patient had a long course ICU stay at scene fees last September for lower leg cellulitis and ultimately developed a right breast abscess which was incised and drained at bedside. Patient is follow up as outpatient in surgery Clinic and has had resolution of abscess/cellulitis region. Patient does admit to size difference is right compared to left, with right being larger but denies any acute complaints or discomfort at this time. Patient states that her nausea has improved, denies any abdominal pain, denies any fever chills, or SOB. Patient was hopeful she was going home soon. No other acute complaints.     Past Medical History:        Diagnosis Date    Acute urinary tract infection 9/6/2018    Anemia     h/o anemia,transfused 2/2014    Anemia, iron deficiency 10/8/2018    Anxiety disorder     can get SOB with an anxiety attack    Arthritis     Asthma     Atopic rhinitis 9/6/2018    Bandemia     Blood transfusion reaction     x 2 unit prbc's 2/2014    Cellulitis and abscess of trunk     Cellulitis of right breast 9/26/2018    Cellulitis of right lower extremity- resolving 8/9/2018    COPD (chronic obstructive pulmonary disease) ([address]     patient denies COPD, only has asthma    CRP elevated     Depression     Diabetes mellitus ([address]     Disorder associated with type 2 diabetes mellitus ([address] 10/8/2018    Disorder of liver 9/6/2018    Displacement of lumbar intervertebral disc without myelopathy 10/8/2018    Essential hypertension 8/9/2018    Fibromyalgia 10/8/2018    Full thickness rotator cuff tear 10/8/2018    Gastroesophageal reflux disease 9/6/2018    GERD (gastroesophageal reflux disease)     Hammer toe of right foot     Hernia, abdominal 1998    Hyperglycemia due to type 2 diabetes mellitus ([address] 10/8/2018    Hyperlipidemia     Hypertension     Hypothyroid 10/8/2018    Iron deficiency anemia 10/8/2018    Lactic acidosis     Localized, primary osteoarthritis of shoulder region 10/8/2018    Mass of right breast 10/8/2018    Migraine 9/6/2018    Morbid obesity ([address] 8/9/2018    ARLYN (obstructive sleep apnea) 8/9/2018    Osteoarthritis 9/26/2018    Pure hypercholesterolemia 10/8/2018    Restless leg syndrome     Seasonal allergies     Septicemia ([address]     SIRS due to infectious process without acute organ dysfunction ([address]     Sleep apnea     doesn't use CPAP, sleeps in a recliner    Spondylosis     lower back    Thrombocytopenia ([address] 10/8/2018       Past Surgical History:        Procedure Laterality Date    APPENDECTOMY      COLONOSCOPY      ROTATOR CUFF REPAIR Right        Medications Prior to Admission:   Medications Prior to Admission: baclofen (LIORESAL) 10 MG tablet, Take 10 mg by mouth nightly  lisinopril (PRINIVIL;ZESTRIL) 20 MG tablet, Take 20 mg by mouth daily  loratadine-pseudoephedrine (CLARITIN-D 24 HOUR)  MG per extended release tablet, Take 1 tablet by mouth daily  metoprolol succinate (TOPROL XL) 25 MG extended release tablet, Take 25 mg by mouth daily  pravastatin (PRAVACHOL) 10 MG tablet, Take 10 mg by mouth daily  oxyCODONE-acetaminophen (PERCOCET) 5-325 MG per tablet, Take 1 tablet by mouth 3 times daily as needed for Pain.    [DISCONTINUED] baclofen (LIORESAL) 10 MG tablet, Take 10 mg by mouth nightly  [DISCONTINUED] diclofenac sodium (VOLTAREN) 1 % GEL, apply 2-4 grams to bilateral shoulders and left knee  [DISCONTINUED] omeprazole 20 MG EC tablet, TAKE 1 CAPSULE DAILY  [DISCONTINUED] Insulin Syringe-Needle U-100 27G X 1/2\" 0.5 ML MISC, 20 units  [DISCONTINUED] Handicap Placard MISC, -  [DISCONTINUED] Fe Bisgly-Vit C-Vit B12-FA (GENTLE IRON) 28-60-0.008-0.4 MG CAPS, 1 capsule daily  [DISCONTINUED] acetaminophen (TYLENOL) 325 MG tablet, Take 2 tablets by mouth every 4 hours as needed for Pain or Fever  [DISCONTINUED] insulin glargine (LANTUS) 100 UNIT/ML injection vial, Inject 20 Units into the skin nightly  [DISCONTINUED] acetic acid 0.25 % irrigation, Clean and do wet to dry t the right inner leg wounds, bid  [DISCONTINUED] diclofenac sodium 1 % GEL, Apply 2 g topically 2 times daily  [DISCONTINUED] docusate sodium (COLACE) 100 MG capsule, Take 100 mg by mouth 2 times daily  aspirin 81 MG EC tablet, Take 81 mg by mouth daily. citalopram (CELEXA) 40 MG tablet, Take 40 mg by mouth daily. fluticasone (FLONASE) 50 MCG/ACT nasal spray, 1 spray by Nasal route 2 times daily as needed for Rhinitis. metFORMIN ER (GLUCOPHAGE-XR) 500 MG XR tablet, Take 1,000 mg by mouth 2 times daily (with meals) Indications: Take two 500mg tablets BID with meals Take two 500mg tablets BID with meals  gabapentin (NEURONTIN) 300 MG capsule, Take 300 mg by mouth 3 times daily. omeprazole (PRILOSEC) 20 MG capsule, Take 20 mg by mouth daily. [DISCONTINUED] montelukast (SINGULAIR) 10 MG tablet, Take 10 mg by mouth nightly. Allergies:  Nsaids; Sulfa antibiotics; and Tape Elinore Juba tape]    Social History:   Social History     Socioeconomic History    Marital status:       Spouse name: Not on file    Number of children: Not on file    Years of education: Not on file    Highest education level: Not on file   Occupational History    Not on file   Social Needs    Financial resource strain: Not on file    Food insecurity:     Worry: Not on file     Inability: Not on file    Transportation needs:     Medical: Not on file     Non-medical: Not on file   Tobacco Use iterative reconstruction, and/or weight based adjustment of the mA/kV was utilized to reduce the radiation dose to as low as reasonably achievable. COMPARISON: None. HISTORY: ORDERING SYSTEM PROVIDED HISTORY: ENCEPHALOPATHY TECHNOLOGIST PROVIDED HISTORY: FINDINGS: BRAIN/VENTRICLES: There is no acute intracranial hemorrhage, mass effect or midline shift. No abnormal extra-axial fluid collection. The gray-white differentiation is maintained without evidence of an acute infarct. There is no evidence of hydrocephalus. ORBITS: The visualized portion of the orbits demonstrate no acute abnormality. SINUSES: The visualized paranasal sinuses and mastoid air cells demonstrate no acute abnormality. SOFT TISSUES/SKULL:  No acute abnormality of the visualized skull or soft tissues. No acute intracranial abnormality. Ct Abdomen Pelvis W Iv Contrast Additional Contrast? None    Result Date: 5/22/2019  EXAMINATION: CT OF THE ABDOMEN AND PELVIS WITH CONTRAST 5/22/2019 3:34 pm TECHNIQUE: CT of the abdomen and pelvis was performed with the administration of intravenous contrast. Multiplanar reformatted images are provided for review. Dose modulation, iterative reconstruction, and/or weight based adjustment of the mA/kV was utilized to reduce the radiation dose to as low as reasonably achievable. COMPARISON: None. HISTORY: ORDERING SYSTEM PROVIDED HISTORY: elevated amylase, change in mental status TECHNOLOGIST PROVIDED HISTORY: FINDINGS: Lower Chest: There is skin thickening at the visualized inferior right breast.  The visualized lung bases are clear. Organs: The liver is small in size with a diffusely nodular contour, suggesting hepatic cirrhosis. No focal hepatic lesions are identified. There is peripheral calcification in the gallbladder, possibly gallbladder wall calcifications versus peripherally calcified gallstones filling the lumen. No pericholecystic inflammatory change. The gallbladder appears to be contracted. No intrahepatic or extrahepatic biliary dilatation. The spleen measures at the upper limits of normal, measuring 13.3 cm in AP dimension. There is no pancreatic ductal dilatation or peripancreatic inflammatory change. There is a small left adrenal nodule, measuring 1.2 cm, which is indeterminate. The right adrenal gland is unremarkable. There is symmetric enhancement of the kidneys. No hydronephrosis or perinephric inflammation. GI/Bowel: There is moderate fecal material in the rectum. No rectal wall thickening. Moderate fecal material is scattered throughout the colon. No focal pericolonic inflammation. No abnormal bowel distention. No free air or ascites. The appendix is surgically absent. Pelvis: Best catheter is in place in the urinary bladder. There is gas in the urinary bladder, likely due to recent intervention. No pelvic lymphadenopathy. The uterus and adnexal structures are grossly unremarkable. Peritoneum/Retroperitoneum: The abdominal aorta is normal in caliber. There are several perisplenic and retroperitoneal venous varices. No evidence of retroperitoneal or mesenteric lymphadenopathy. Bones/Soft Tissues: There is a mixed lytic and sclerotic lesion in the inferior left L3 vertebral body. There is no significant vertebral body height loss. The L3-L4 disc space is only mildly narrowed. There is mild grade 1 anterolisthesis at L4-L5 and L5-S1, likely secondary to facet arthropathy. Irregular sclerotic focus is noted in the posterior left iliac bone as well. No acute osseous abnormality identified. 1.  No definite acute process in the abdomen or pelvis. 2.  Cirrhotic morphology of the liver with borderline enlarged spleen and several retroperitoneal varices noted. No ascites. 3. Calcified gallbladder wall or peripherally calcified gallstones filling the lumen of the gallbladder. No discrete mass identified.  4.  Mixed lytic and sclerotic lesions in the inferior left L3 vertebral body and left iliac bone, concerning for metastases. Additional evaluation with bone scan and/or MRI is recommended. 5.  Skin thickening at the visualized inferior right breast, which is nonspecific. This could be related to a cellulitis, but possibility of an inflammatory malignancy in the breast is not excluded. Correlation with physical exam as well as breast imaging is recommended. 6.  Indeterminate left adrenal nodule, most likely an adenoma. Follow-up with adrenal washout CT recommended in 1 year. 7.  Moderate stool burden. Xr Chest Portable    Result Date: 5/22/2019  EXAMINATION: ONE XRAY VIEW OF THE CHEST 5/22/2019 2:02 pm COMPARISON: Chest radiograph 09/27/2018 HISTORY: ORDERING SYSTEM PROVIDED HISTORY: sepsis TECHNOLOGIST PROVIDED HISTORY: sepsis Ordering Physician Provided Reason for Exam: SEPSIS PORT AP SUPINE Acuity: Unknown Type of Exam: Unknown FINDINGS: Clear lungs. No findings of pneumothorax or pleural effusion. Normal mediastinal, hilar, and cardiac contours. Atherosclerotic calcification in the aorta. No acute fracture. No acute cardiopulmonary process. Ir Ultrasound Guidance Vascular Access    Result Date: 5/23/2019  Radiology exam is complete. No Radiologist dictation. Please follow up with ordering provider. ASSESSMENT:  Active Hospital Problems    Diagnosis Date Noted    Encephalopathy [G93.40] 05/23/2019    Cirrhosis ([address] [K74.60] 05/23/2019    Elevated LFTs [R94.5] 05/23/2019    Hypernatremia [E87.0] 05/23/2019    Elevated amylase [R74.8]     Altered mental status [R41.82] 05/22/2019        71-year-old female in which general surgery was consulted for concerns of right breast cellulitis    Plan:  1.  Continue medical mgmt and supportive care per primary  2.  no current signs of acute or active right breast cellulitis or infection  3.  patient has history of previous right-sided breast abscess with incision and drainage last September at [facility]  4.  patient does have baseline asymmetry breast sizes, has followed with yearly mammograms she believes this little clinic  5.  will review records and referred patient back to appropriate surgery Clinic, no acute surgical intervention at this time     discussed with Dr. Alia Agiulera    Electronically signed by Sharath Cristina DO  on 5/28/2019 at 4:33 AM   Attending Physician Statement  I have discussed the case, including pertinent history and exam findings with the resident. I agree with the assessment, plan and orders as documented by the resident. No evidence of right breast cellulitis noted. Asymmetry of breasts noted. Recommend patient to f/u with Dr Karen Barros as outpatient .    Thanks

## 2019-05-28 NOTE — PROGRESS NOTES
cirrhosis/Encephalopathy  · DM  · HTN/HDL/hypothyroidism/depression/anxiety disorder    Recommendations:    · Off Oxygen by nasal cannula   · incentive spirometry every hour WA   · BiPAP for sleep and as needed  · DuoNeb by nebulizer every 4 hours as needed   · Consider stopping IV Rocephin  · Singulair  · Check Biopsy results  · Insulin sliding scale  · IV fluids  · GI on consult; EGD in [place] planned   · IV iron  · Mirapex  0.125 mg po QHS   · Physical therapy  · DVT prophylaxis with EPCs        Brian Alonzo MD, [facility]  Pulmonary Critical Care and Sleep Medicine,  [facility] AT [place]: [phone]

## 2019-05-28 NOTE — PROGRESS NOTES
ANTIMICROBIAL STEWARDSHIP  Upon review of the patient's chart, the committee has the following recommendation for your consideration:      Day of Antimicrobial Therapy: 7    Recommendation:  Patient has been on empiric antibiotic therapy for 6 days and currently has no evident s/sx of infection. We recommend discontinuing ceftriaxone. If not, please document s/sx of infection justifying the use of an antibiotic agent. Recent Labs     05/26/19  0403   WBC 5.9       Unnecessary or inappropriate antimicrobial use increases the risk of microbial resistance and drug-associated toxicities including C. difficile infection. Thank you. Orlando Gamboa, PharmD  5/28/2019  8:17 AM    The Antimicrobial Stewardship Committee at [facility] is led by  Suzie Howard MD, Infectious Diseases Section Chair.

## 2019-05-28 NOTE — PROGRESS NOTES
Occupational Therapy  Facility/Department: [facility] PROGRESSIVE CARE  Daily Treatment Note  NAME: Ino Moore  : 1955  MRN: [id number]    Date of Service: 2019    Discharge Recommendations:  [address]    Patient would benefit from SNF for continued occupational therapy to increase independence with  ADL of bathing, dressing, toileting and grooming. Writer recommending SNF placement for for activity tolerance and strength which will increase independence with ADL's coordinated with bed mobility and chair transfers. Continued skilled OT services to address decreased safety awareness with ADL and IADL tasks and for education and increased independence with DME and AE for fall prevention and ec/ws techniques prior to d/c home. Assessment   Performance deficits / Impairments: Decreased functional mobility ; Decreased ADL status; Decreased strength;Decreased safe awareness;Decreased cognition;Decreased balance  Prognosis: Good  Patient Education: Education provided on OT POC including goals, treatment interventions, and discharge plan/recommendations. Pt educated on fall prevention in hospital setting. Pt encouraged to ALWAYS call for assistance from staff for any OOB needs. Room is organized and all fall hazards are eliminated at this time. Alarm is in place prior to end of therapy session and all patient care needs have been addressed. Pt reminded that patient SAFETY is our goal.    REQUIRES OT FOLLOW UP: Yes  Activity Tolerance  Activity Tolerance: Limited secondary to not feeling well, blood glucose 107, pt states this is low for her and that is why she is not feeling good. PCT stated she will inform RN  Safety Devices  Safety Devices in place: Yes  Type of devices: Call light within reach;Nurse notified;Gait belt;Patient at risk for falls; All fall risk precautions in place; Left in bed;Bed alarm in place         Patient Diagnosis(es): The encounter diagnosis was Altered mental status, unspecified altered mental status type. has a past medical history of Acute urinary tract infection, Anemia, Anemia, iron deficiency, Anxiety disorder, Arthritis, Asthma, Atopic rhinitis, Bandemia, Blood transfusion reaction, Cellulitis and abscess of trunk, Cellulitis of right breast, Cellulitis of right lower extremity- resolving, COPD (chronic obstructive pulmonary disease) ([address] CRP elevated, Depression, Diabetes mellitus ([address] Disorder associated with type 2 diabetes mellitus ([address] Disorder of liver, Displacement of lumbar intervertebral disc without myelopathy, Essential hypertension, Fibromyalgia, Full thickness rotator cuff tear, Gastroesophageal reflux disease, GERD (gastroesophageal reflux disease), Hammer toe of right foot, Hernia, abdominal, Hyperglycemia due to type 2 diabetes mellitus ([address] Hyperlipidemia, Hypertension, Hypothyroid, Iron deficiency anemia, Lactic acidosis, Localized, primary osteoarthritis of shoulder region, Mass of right breast, Migraine, Morbid obesity ([address] ARLYN (obstructive sleep apnea), Osteoarthritis, Pure hypercholesterolemia, Restless leg syndrome, Seasonal allergies, Septicemia ([address] SIRS due to infectious process without acute organ dysfunction ([address] Sleep apnea, Spondylosis, and Thrombocytopenia ([address] has a past surgical history that includes Appendectomy; Rotator cuff repair (Right); and Colonoscopy. Restrictions  Restrictions/Precautions  Restrictions/Precautions: General Precautions, Fall Risk  Position Activity Restriction  Other position/activity restrictions:  Up with assist, bed/chair alarm, IV, telemetry  Subjective   General  Chart Reviewed: Yes  Patient assessed for rehabilitation services?: Yes  Response to previous treatment: Patient with no complaints from previous session  Family / Caregiver Present: No  Oxygen Therapy  O2 Device: None (Room air)   Orientation  Orientation  Overall Orientation Status: Within Functional Limits  Objective ADL  LE Dressing: Stand by assistance;Maximum assistance(Pt able to thread BLE into brief seated EOB SBA. Pt then requiring maxA to pull over hips secondary to feeling hot and like she was going to pass out when standing)        Balance  Sitting Balance: Stand by assistance  Standing Balance: Maximum assistance(x2)  Standing Balance  Activity: Standing EOB with RW with max VC for upright posture with poor carryover. Pt stating she feels hot, like she is going to pass out. Pt seated back to EOB. Functional Mobility  Functional - Mobility Device: Rolling Walker  Bed mobility  Supine to Sit: Minimal assistance  Sit to Supine: Minimal assistance  Comment: With use of bed rails, VC for safer tech with poor carryover  Transfers  Sit to stand: Moderate assistance;2 Person assistance  Stand to sit: 2 Person assistance; Moderate assistance  Transfer Comments: From EOB to RW                       Cognition  Overall Cognitive Status: Exceptions  Following Commands: Follows one step commands with repetition  Attention Span: Attends with cues to redirect  Safety Judgement: Decreased awareness of need for assistance;Decreased awareness of need for safety  Problem Solving: Assistance required to implement solutions;Assistance required to generate solutions;Assistance required to correct errors made;Decreased awareness of errors  Insights: Decreased awareness of deficits  Initiation: Requires cues for some  Sequencing: Requires cues for some  Cognition Comment: Pt tangient in speech and difficult to redirect     Perception  Overall Perceptual Status: [facility]        Additional Activities Comment  Additional Activities:   Upon writer exit, call light within reach, pt retired to bed. All lines intact and patient positioned comfortably. All patient needs addressed prior to ending therapy session. Chart reviewed prior to treatment and patient is agreeable for therapy.   RN reports patient is medically stable for therapy treatment this date.                          Plan   Plan  Times per week: 4-5x/week, 1-2x/day  Current Treatment Recommendations: Strengthening, Balance Training, Functional Mobility Training, Endurance Training, Patient/Caregiver Education & Training, Self-Care / ADL, Safety Education & Training    AM-PAC Score        AM-PAC Inpatient Daily Activity Raw Score: 15  AM-PAC Inpatient ADL T-Scale Score : 34.69  ADL Inpatient CMS 0-100% Score: 56.46  ADL Inpatient CMS G-Code Modifier : CK    Goals  Short term goals  Time Frame for Short term goals: by discharge, pt will  Short term goal 1: demo CGA with ADL transfers with good safety  Short term goal 2: demo CGA with functional mob for ADL completion with good safety/pacing  Short term goal 3: demo CGA with toileting routine with good safety  Short term goal 4: demo SBA with UB ADLs and min A LB ADLs with DME/AE as needed  Short term goal 5: demo and verb good understanding of fall prevention techs, EC/WS techs, and possible equip needs for home  Patient Goals   Patient goals : to go home       Therapy Time   Individual Concurrent Group Co-treatment   Time In 1004         Time Out 1049         Minutes 39              Co-treatment with PT warranted secondary to decreased safety and independence requiring 2 skilled therapy professionals to address individual discipline's goals. OT addressing preparation for ADL transfer, sitting balance for increased ADL performance, sitting/activity tolerance, functional reaching, environmental safety/scanning, fall prevention, functional mobility for ADL transfers, ability to sequence and follow directions and functional UE strength.       KP Arias

## 2019-05-28 NOTE — PROGRESS NOTES
Subjective:     Follow-up hepatic encephalopathy  Doing fairly well alert oriented ×3 no confusion  ROS  Fever no chills no GI/ complaints no more abdominal pain, no cardiopulmonary complaints no TIA no bleeding no headache no sore throat no skin lesions no polyuria no polydipsia no hypoglycemia  physical exam  General Appearance: alert and oriented to person, place and time and in no acute distress  Skin: warm and dry, no rash or erythema  Head: normocephalic and atraumatic  Eyes: pupils equal, round, and reactive to light and sclera anicteric  Neck: neck supple and non tender without mass   Pulmonary/Chest: clear to auscultation bilaterally- no wheezes, rales or rhonchi, normal air movement, no respiratory distress  Cardiovascular: normal rate, regular rhythm, normal S1 and S2, no gallops, intact distal pulses and no carotid bruits  Abdomen: soft, non-tender, non-distended, normal bowel sounds, no masses or organomegaly positive ascites good   Extremities: no edema and pulses no Homans sign    Neurologic: Alert and oriented ×3 with no focal deficit    BP (!) 141/39   Pulse 86   Temp 99.1 °F (37.3 °C) (Oral)   Resp 16   Ht 5' 7\" (1.702 m)   Wt 194 lb 4.8 oz (88.1 kg)   SpO2 99%   BMI 30.43 kg/m²     CBC:   Lab Results   Component Value Date    WBC 6.8 05/28/2019    RBC 2.95 05/28/2019    HGB 8.1 05/28/2019    HCT 26.8 05/28/2019    MCV 90.8 05/28/2019    MCH 27.5 05/28/2019    MCHC 30.2 05/28/2019    RDW 15.1 05/28/2019     05/28/2019    MPV 10.7 05/28/2019     CMP:    Lab Results   Component Value Date     05/28/2019    K 4.3 05/28/2019     05/28/2019    CO2 19 05/28/2019    BUN 11 05/28/2019    CREATININE 0.60 05/28/2019    GFRAA >60 05/28/2019    LABGLOM >60 05/28/2019    GLUCOSE 109 05/28/2019    PROT 5.3 05/28/2019    LABALBU 2.7 05/28/2019    CALCIUM 8.6 05/28/2019    BILITOT 0.76 05/28/2019    ALKPHOS 119 05/28/2019    AST 40 05/28/2019    ALT 37 05/28/2019 Assessment:  Patient Active Problem List   Diagnosis    Cellulitis of right lower extremity- resolving    Diabetes mellitus ([address]    ARLYN (obstructive sleep apnea)    Morbid obesity (HCC)    Essential hypertension    Lactic acidosis    Bandemia    CRP elevated    Depression    Osteoarthritis    Cellulitis of right breast    Septicemia ([address]    SIRS due to infectious process without acute organ dysfunction (HCC)    Cellulitis and abscess of trunk    Acute urinary tract infection    Anemia    Anemia, iron deficiency    Asthma    Atopic rhinitis    Disorder associated with type 2 diabetes mellitus (HCC)    Disorder of liver    Displacement of lumbar intervertebral disc without myelopathy    Fibromyalgia    Full thickness rotator cuff tear    Gastroesophageal reflux disease    Hyperglycemia due to type 2 diabetes mellitus ([address]    Hyperlipidemia    Hypothyroid    Iron deficiency anemia    Localized, primary osteoarthritis of shoulder region    Mass of right breast    Migraine    Pure hypercholesterolemia    Thrombocytopenia (HCC)    Altered mental status    Encephalopathy    Cirrhosis ([address]    Elevated LFTs    Hypernatremia    Elevated amylase     Hepatic encephalopathy resolved  Liver cirrhosis with ascites  Lytic lesions in the bones awaiting biopsy  Type 2 diabetes insulin treated better controlled continue with before meals and at bedtime Accu-Cheks with insulin coverage as needed  Hypertension essential not on target increase Norvasc to 5 mg  Prerenal azotemia better resolved  Hypokalemia resolved  Hypernatremia resolved  Hypothyroid continue with the thyroid treatment  Anemia and thrombocytopenia  Plan:    Meds labs reviewed CT abdomen and pelvis reviewed patient is scheduled for EGD tomorrow we'll keep her nothing by mouth after midnight see orders      Maximiliano Mcclain MD  7:03 PM

## 2019-05-29 ENCOUNTER — ANESTHESIA (OUTPATIENT)
Dept: OPERATING ROOM | Age: 64
DRG: 988 | End: 2019-05-29
Payer: MEDICARE

## 2019-05-29 ENCOUNTER — ANESTHESIA EVENT (OUTPATIENT)
Dept: OPERATING ROOM | Age: 64
DRG: 988 | End: 2019-05-29
Payer: MEDICARE

## 2019-05-29 VITALS
BODY MASS INDEX: 29.65 KG/M2 | TEMPERATURE: 99 F | SYSTOLIC BLOOD PRESSURE: 142 MMHG | HEIGHT: 67 IN | RESPIRATION RATE: 18 BRPM | DIASTOLIC BLOOD PRESSURE: 58 MMHG | WEIGHT: 188.9 LBS | OXYGEN SATURATION: 99 % | HEART RATE: 109 BPM

## 2019-05-29 VITALS — SYSTOLIC BLOOD PRESSURE: 152 MMHG | OXYGEN SATURATION: 100 % | DIASTOLIC BLOOD PRESSURE: 67 MMHG

## 2019-05-29 LAB
ABSOLUTE EOS #: 0.37 K/UL (ref 0–0.44)
ABSOLUTE IMMATURE GRANULOCYTE: 0.11 K/UL (ref 0–0.3)
ABSOLUTE LYMPH #: 2.15 K/UL (ref 1.1–3.7)
ABSOLUTE MONO #: 0.88 K/UL (ref 0.1–1.2)
ALBUMIN (CALCULATED): 4.1 G/DL (ref 3.2–5.2)
ALBUMIN PERCENT: 64 % (ref 45–65)
ALPHA 1 PERCENT: 3 % (ref 3–6)
ALPHA 2 PERCENT: 8 % (ref 6–13)
ALPHA-1-GLOBULIN: 0.2 G/DL (ref 0.1–0.4)
ALPHA-2-GLOBULIN: 0.5 G/DL (ref 0.5–0.9)
ANION GAP SERPL CALCULATED.3IONS-SCNC: 12 MMOL/L (ref 9–17)
BASOPHILS # BLD: 1 % (ref 0–2)
BASOPHILS ABSOLUTE: 0.03 K/UL (ref 0–0.2)
BETA GLOBULIN: 0.7 G/DL (ref 0.5–1.1)
BETA PERCENT: 11 % (ref 11–19)
BUN BLDV-MCNC: 10 MG/DL (ref 8–23)
BUN/CREAT BLD: 14 (ref 9–20)
CALCIUM SERPL-MCNC: 8.8 MG/DL (ref 8.6–10.4)
CHLORIDE BLD-SCNC: 104 MMOL/L (ref 98–107)
CO2: 22 MMOL/L (ref 20–31)
CREAT SERPL-MCNC: 0.7 MG/DL (ref 0.5–0.9)
DIFFERENTIAL TYPE: ABNORMAL
EBV EARLY ANTIGEN IGG: 36 U/ML
EBV INTERPRETATION: ABNORMAL
EBV NUCLEAR AG AB: 708 U/ML
EOSINOPHILS RELATIVE PERCENT: 6 % (ref 1–4)
EPSTEIN-BARR VCA IGG: 603 U/ML
EPSTEIN-BARR VCA IGM: 19 U/ML
GAMMA GLOBULIN %: 13 % (ref 9–20)
GAMMA GLOBULIN: 0.8 G/DL (ref 0.5–1.5)
GFR AFRICAN AMERICAN: >60 ML/MIN
GFR NON-AFRICAN AMERICAN: >60 ML/MIN
GFR SERPL CREATININE-BSD FRML MDRD: ABNORMAL ML/MIN/{1.73_M2}
GFR SERPL CREATININE-BSD FRML MDRD: ABNORMAL ML/MIN/{1.73_M2}
GLUCOSE BLD-MCNC: 107 MG/DL (ref 65–105)
GLUCOSE BLD-MCNC: 109 MG/DL (ref 65–105)
GLUCOSE BLD-MCNC: 112 MG/DL (ref 70–99)
GLUCOSE BLD-MCNC: 150 MG/DL (ref 65–105)
HCT VFR BLD CALC: 25.7 % (ref 36.3–47.1)
HEMOGLOBIN: 8 G/DL (ref 11.9–15.1)
IMMATURE GRANULOCYTES: 2 %
LYMPHOCYTES # BLD: 33 % (ref 24–43)
MCH RBC QN AUTO: 27.5 PG (ref 25.2–33.5)
MCHC RBC AUTO-ENTMCNC: 31.1 G/DL (ref 28.4–34.8)
MCV RBC AUTO: 88.3 FL (ref 82.6–102.9)
MONOCYTES # BLD: 14 % (ref 3–12)
NRBC AUTOMATED: 0 PER 100 WBC
PATHOLOGIST: ABNORMAL
PATHOLOGIST: NORMAL
PDW BLD-RTO: 15.9 % (ref 11.8–14.4)
PLATELET # BLD: 121 K/UL (ref 138–453)
PLATELET ESTIMATE: ABNORMAL
PMV BLD AUTO: 10.8 FL (ref 8.1–13.5)
POTASSIUM SERPL-SCNC: 3.9 MMOL/L (ref 3.7–5.3)
PROTEIN ELECTROPHORESIS, SERUM: ABNORMAL
RBC # BLD: 2.91 M/UL (ref 3.95–5.11)
RBC # BLD: ABNORMAL 10*6/UL
SEG NEUTROPHILS: 44 % (ref 36–65)
SEGMENTED NEUTROPHILS ABSOLUTE COUNT: 2.9 K/UL (ref 1.5–8.1)
SERUM IFX INTERP: NORMAL
SODIUM BLD-SCNC: 138 MMOL/L (ref 135–144)
SURGICAL PATHOLOGY REPORT: NORMAL
TOTAL PROT. SUM,%: 99 % (ref 98–102)
TOTAL PROT. SUM: 6.3 G/DL (ref 6.3–8.2)
TOTAL PROTEIN: 6.3 G/DL (ref 6.4–8.3)
URINE IFX INTERP: NORMAL
URINE IFX SPECIMEN: NORMAL
URINE TOTAL PROTEIN: 4 MG/DL
VOLUME: NORMAL ML
WBC # BLD: 6.4 K/UL (ref 3.5–11.3)
WBC # BLD: ABNORMAL 10*3/UL

## 2019-05-29 PROCEDURE — 88305 TISSUE EXAM BY PATHOLOGIST: CPT

## 2019-05-29 PROCEDURE — 97116 GAIT TRAINING THERAPY: CPT

## 2019-05-29 PROCEDURE — 85025 COMPLETE CBC W/AUTO DIFF WBC: CPT

## 2019-05-29 PROCEDURE — 0DB68ZX EXCISION OF STOMACH, VIA NATURAL OR ARTIFICIAL OPENING ENDOSCOPIC, DIAGNOSTIC: ICD-10-PCS | Performed by: INTERNAL MEDICINE

## 2019-05-29 PROCEDURE — 6360000002 HC RX W HCPCS: Performed by: INTERNAL MEDICINE

## 2019-05-29 PROCEDURE — 2500000003 HC RX 250 WO HCPCS: Performed by: NURSE ANESTHETIST, CERTIFIED REGISTERED

## 2019-05-29 PROCEDURE — 36415 COLL VENOUS BLD VENIPUNCTURE: CPT

## 2019-05-29 PROCEDURE — 3609012400 HC EGD TRANSORAL BIOPSY SINGLE/MULTIPLE: Performed by: INTERNAL MEDICINE

## 2019-05-29 PROCEDURE — 80048 BASIC METABOLIC PNL TOTAL CA: CPT

## 2019-05-29 PROCEDURE — 2580000003 HC RX 258: Performed by: INTERNAL MEDICINE

## 2019-05-29 PROCEDURE — 0DB58ZX EXCISION OF ESOPHAGUS, VIA NATURAL OR ARTIFICIAL OPENING ENDOSCOPIC, DIAGNOSTIC: ICD-10-PCS | Performed by: INTERNAL MEDICINE

## 2019-05-29 PROCEDURE — 88342 IMHCHEM/IMCYTCHM 1ST ANTB: CPT

## 2019-05-29 PROCEDURE — 3700000000 HC ANESTHESIA ATTENDED CARE: Performed by: INTERNAL MEDICINE

## 2019-05-29 PROCEDURE — 97530 THERAPEUTIC ACTIVITIES: CPT

## 2019-05-29 PROCEDURE — 7100000000 HC PACU RECOVERY - FIRST 15 MIN: Performed by: INTERNAL MEDICINE

## 2019-05-29 PROCEDURE — 97110 THERAPEUTIC EXERCISES: CPT

## 2019-05-29 PROCEDURE — 2709999900 HC NON-CHARGEABLE SUPPLY: Performed by: INTERNAL MEDICINE

## 2019-05-29 PROCEDURE — 2580000003 HC RX 258: Performed by: NURSE ANESTHETIST, CERTIFIED REGISTERED

## 2019-05-29 PROCEDURE — C9113 INJ PANTOPRAZOLE SODIUM, VIA: HCPCS | Performed by: INTERNAL MEDICINE

## 2019-05-29 PROCEDURE — 7100000001 HC PACU RECOVERY - ADDTL 15 MIN: Performed by: INTERNAL MEDICINE

## 2019-05-29 PROCEDURE — 6360000002 HC RX W HCPCS: Performed by: NURSE ANESTHETIST, CERTIFIED REGISTERED

## 2019-05-29 PROCEDURE — 6370000000 HC RX 637 (ALT 250 FOR IP): Performed by: INTERNAL MEDICINE

## 2019-05-29 PROCEDURE — 82947 ASSAY GLUCOSE BLOOD QUANT: CPT

## 2019-05-29 PROCEDURE — 43239 EGD BIOPSY SINGLE/MULTIPLE: CPT | Performed by: INTERNAL MEDICINE

## 2019-05-29 PROCEDURE — 0W9G3ZZ DRAINAGE OF PERITONEAL CAVITY, PERCUTANEOUS APPROACH: ICD-10-PCS | Performed by: INTERNAL MEDICINE

## 2019-05-29 RX ORDER — SODIUM CHLORIDE 9 MG/ML
INJECTION, SOLUTION INTRAVENOUS CONTINUOUS PRN
Status: DISCONTINUED | OUTPATIENT
Start: 2019-05-29 | End: 2019-05-29 | Stop reason: SDUPTHER

## 2019-05-29 RX ORDER — FENTANYL CITRATE 50 UG/ML
25 INJECTION, SOLUTION INTRAMUSCULAR; INTRAVENOUS EVERY 5 MIN PRN
Status: DISCONTINUED | OUTPATIENT
Start: 2019-05-29 | End: 2019-05-29 | Stop reason: HOSPADM

## 2019-05-29 RX ORDER — PROMETHAZINE HYDROCHLORIDE 25 MG/ML
6.25 INJECTION, SOLUTION INTRAMUSCULAR; INTRAVENOUS
Status: DISCONTINUED | OUTPATIENT
Start: 2019-05-29 | End: 2019-05-29 | Stop reason: HOSPADM

## 2019-05-29 RX ORDER — PROPOFOL 10 MG/ML
INJECTION, EMULSION INTRAVENOUS PRN
Status: DISCONTINUED | OUTPATIENT
Start: 2019-05-29 | End: 2019-05-29 | Stop reason: SDUPTHER

## 2019-05-29 RX ORDER — HYDRALAZINE HYDROCHLORIDE 20 MG/ML
5 INJECTION INTRAMUSCULAR; INTRAVENOUS EVERY 10 MIN PRN
Status: DISCONTINUED | OUTPATIENT
Start: 2019-05-29 | End: 2019-05-29 | Stop reason: HOSPADM

## 2019-05-29 RX ORDER — LEVOTHYROXINE SODIUM 0.03 MG/1
25 TABLET ORAL DAILY
Qty: 30 TABLET | Refills: 3 | Status: SHIPPED | OUTPATIENT
Start: 2019-05-30

## 2019-05-29 RX ORDER — LABETALOL HYDROCHLORIDE 5 MG/ML
5 INJECTION, SOLUTION INTRAVENOUS EVERY 10 MIN PRN
Status: DISCONTINUED | OUTPATIENT
Start: 2019-05-29 | End: 2019-05-29 | Stop reason: HOSPADM

## 2019-05-29 RX ORDER — OXYCODONE HYDROCHLORIDE AND ACETAMINOPHEN 5; 325 MG/1; MG/1
1 TABLET ORAL PRN
Status: DISCONTINUED | OUTPATIENT
Start: 2019-05-29 | End: 2019-05-29 | Stop reason: HOSPADM

## 2019-05-29 RX ORDER — ONDANSETRON 2 MG/ML
4 INJECTION INTRAMUSCULAR; INTRAVENOUS
Status: DISCONTINUED | OUTPATIENT
Start: 2019-05-29 | End: 2019-05-29 | Stop reason: HOSPADM

## 2019-05-29 RX ORDER — LIDOCAINE HYDROCHLORIDE 20 MG/ML
INJECTION, SOLUTION EPIDURAL; INFILTRATION; INTRACAUDAL; PERINEURAL PRN
Status: DISCONTINUED | OUTPATIENT
Start: 2019-05-29 | End: 2019-05-29 | Stop reason: SDUPTHER

## 2019-05-29 RX ORDER — PRAMIPEXOLE DIHYDROCHLORIDE 0.12 MG/1
0.12 TABLET ORAL NIGHTLY
Qty: 90 TABLET | Refills: 3 | Status: SHIPPED | OUTPATIENT
Start: 2019-05-29

## 2019-05-29 RX ORDER — OXYCODONE HYDROCHLORIDE AND ACETAMINOPHEN 5; 325 MG/1; MG/1
2 TABLET ORAL PRN
Status: DISCONTINUED | OUTPATIENT
Start: 2019-05-29 | End: 2019-05-29 | Stop reason: HOSPADM

## 2019-05-29 RX ADMIN — POTASSIUM CHLORIDE: 2 INJECTION, SOLUTION, CONCENTRATE INTRAVENOUS at 05:37

## 2019-05-29 RX ADMIN — SODIUM CHLORIDE: 9 INJECTION, SOLUTION INTRAVENOUS at 11:13

## 2019-05-29 RX ADMIN — Medication 10 ML: at 08:57

## 2019-05-29 RX ADMIN — IRON SUCROSE 100 MG: 20 INJECTION, SOLUTION INTRAVENOUS at 13:06

## 2019-05-29 RX ADMIN — LIDOCAINE HYDROCHLORIDE 100 MG: 20 INJECTION, SOLUTION EPIDURAL; INFILTRATION; INTRACAUDAL; PERINEURAL at 11:15

## 2019-05-29 RX ADMIN — PANTOPRAZOLE SODIUM 40 MG: 40 INJECTION, POWDER, FOR SOLUTION INTRAVENOUS at 08:56

## 2019-05-29 RX ADMIN — MICONAZOLE NITRATE: 20.6 POWDER TOPICAL at 13:06

## 2019-05-29 RX ADMIN — INSULIN LISPRO 1 UNITS: 100 INJECTION, SOLUTION INTRAVENOUS; SUBCUTANEOUS at 17:28

## 2019-05-29 RX ADMIN — PROPOFOL 50 MG: 10 INJECTION, EMULSION INTRAVENOUS at 11:15

## 2019-05-29 RX ADMIN — PROPOFOL 50 MG: 10 INJECTION, EMULSION INTRAVENOUS at 11:17

## 2019-05-29 ASSESSMENT — PULMONARY FUNCTION TESTS
PIF_VALUE: 1

## 2019-05-29 ASSESSMENT — PAIN SCALES - GENERAL
PAINLEVEL_OUTOF10: 0

## 2019-05-29 ASSESSMENT — PAIN DESCRIPTION - FREQUENCY: FREQUENCY: INTERMITTENT

## 2019-05-29 ASSESSMENT — PAIN DESCRIPTION - PAIN TYPE: TYPE: ACUTE PAIN

## 2019-05-29 ASSESSMENT — PAIN DESCRIPTION - LOCATION: LOCATION: BACK

## 2019-05-29 NOTE — PROGRESS NOTES
DATE: 2019    NAME: Idania Zepeda  MRN: [id number]   : 1955    Patient not seen this date for Physical Therapy due to:  [] Blood transfusion in progress  [] Cancel by RN  [] Hemodialysis  []  Refusal by Patient   [] Spine Precautions   [] Strict Bedrest  [] Surgery  [x] Testing (EGD)     [] Other        [] PT being discontinued at this time. Patient independent. No further needs. [] PT being discontinued at this time as the patient has been transferred to hospice care. No further needs.     JANY JONES, PTA

## 2019-05-29 NOTE — PROGRESS NOTES
Department of Internal Medicine  Nephrology Glenna David MD    Progress Note    Interval history: Patient was seen and examined today. She feels well and is NPO for EGD. She denies dyspnea or urinary symptoms. History of presenting illness: This is a 59 y.o. female with a significant past medical history of Depression, Fibromyalgia [on gabapentin], hypertension hyperlipidemia and COPD, who was brought in by her brother after 4 days of progressive decline in mental status. Laboratory studies at presentation revealed serum sodium 150 mmol/L but serum creatinine was normal.     Physical Exam:    VITALS:  BP (!) 126/31   Pulse 83   Temp 97.9 °F (36.6 °C) (Oral)   Resp 18   Ht 5' 7\" (1.702 m)   Wt 188 lb 14.4 oz (85.7 kg)   SpO2 99%   BMI 29.59 kg/m²   24HR INTAKE/OUTPUT:      Intake/Output Summary (Last 24 hours) at 5/29/2019 0737  Last data filed at 5/29/2019 1816  Gross per 24 hour   Intake 1257 ml   Output 700 ml   Net 557 ml       Constitutional:  Awake and not in acute respiratory distress. Cardiovascular/Edema: S1, S2 with no pericardial rub. Respiratory:  Clinically clear. Gastrointestinal:  Generally soft. CBC:   Recent Labs     05/28/19  0802 05/29/19  0413   WBC 6.8 6.4   HGB 8.1* 8.0*   * 121*     BMP:    Recent Labs     05/27/19  0623  05/28/19  0802 05/28/19  2041 05/29/19  0413      < > 136 135 138   K 4.3   < > 4.3 3.7 3.9      < > 105 101 104   CO2 22   < > 19* 22 22   BUN 12  --  11  --  10   CREATININE 0.52  --  0.60  --  0.70   GLUCOSE 129*  --  109*  --  112*    < > = values in this interval not displayed.        Lab Results   Component Value Date    NITRU NEGATIVE 05/22/2019    COLORU YELLOW 05/22/2019    PHUR 6.0 05/22/2019    WBCUA 5 TO 10 09/26/2018    RBCUA 5 TO 10 09/26/2018    MUCUS NOT REPORTED 09/26/2018    TRICHOMONAS NOT REPORTED 09/26/2018    YEAST NOT REPORTED 09/26/2018    BACTERIA MANY 09/26/2018    SPECGRAV 1.025 05/22/2019

## 2019-05-29 NOTE — CARE COORDINATION
Discharge planning    Met with patient to follow up on POC for home care. She stated she has had both [facility] and [facility] . One of them did not take her MMO insurance. Call to [facility] and they were a non admit. Call to [address] [place] [address] to see if they have had her in past and did verify that she was with them last year. TOM is in epic. Will need signed and completed. E referral to [facility] and they will call if any issue with insurance.       Discharging to Facility/ Agency   · Name: [facility]   · Address:  · Phone: [phone]  · Fax: [phone]

## 2019-05-29 NOTE — DISCHARGE INSTR - COC
organ dysfunction (HCC) A41.9    Cellulitis and abscess of trunk L03.319, L02.219    Acute urinary tract infection N39.0    Anemia D64.9    Anemia, iron deficiency D50.9    Asthma J45.909    Atopic rhinitis J30.9    Disorder associated with type 2 diabetes mellitus (HCC) E11.8    Disorder of liver K76.9    Displacement of lumbar intervertebral disc without myelopathy M51.26    Fibromyalgia M79.7    Full thickness rotator cuff tear M75.120    Gastroesophageal reflux disease K21.9    Hyperglycemia due to type 2 diabetes mellitus (HCC) E11.65    Hyperlipidemia E78.5    Hypothyroid E03.9    Iron deficiency anemia D50.9    Localized, primary osteoarthritis of shoulder region M19.019    Mass of right breast N63.10    Migraine G43.909    Pure hypercholesterolemia E78.00    Thrombocytopenia (HCC) D69.6    Altered mental status R41.82    Encephalopathy G93.40    Cirrhosis (HCC) K74.60    Elevated LFTs R94.5    Hypernatremia E87.0    Elevated amylase R74.8       Isolation/Infection:   Isolation          No Isolation            Nurse Assessment:  Last Vital Signs: BP (!) 128/52   Pulse 82   Temp 97.7 °F (36.5 °C)   Resp 21   Ht 5' 7\" (1.702 m)   Wt 188 lb 14.4 oz (85.7 kg)   SpO2 99%   BMI 29.59 kg/m²     Last documented pain score (0-10 scale): Pain Level: 0  Last Weight:   Wt Readings from Last 1 Encounters:   05/29/19 188 lb 14.4 oz (85.7 kg)     Mental Status:  oriented and alert    IV Access:  - None    Nursing Mobility/ADLs:  Walking   Assisted  Transfer  Assisted  Bathing  Independent  Dressing  Independent  Toileting  Independent  Feeding  Independent  Med Admin  Independent  Med Delivery   whole    Wound Care Documentation and Therapy:  Wound 08/11/18 Blister Leg Lateral right lateral leg calf (Active)   Number of days: 290       Wound 08/11/18 Blister Foot Right intact blister to top of foot and open blister on top of foot (Active)   Number of days: 290       Wound 09/27/18 Ankle Right;Lateral (Active)   Number of days: 244       Wound 09/27/18 Foot Right;Medial (Active)   Number of days: 244       Wound 09/27/18 Pretibial Right;Proximal (Active)   Number of days: 244       Wound 09/27/18 Pretibial Right;Distal (Active)   Number of days: 244       Wound 09/27/18 Blister Chest Right;Lateral (Active)   Number of days: 244       Incision 09/27/18 Breast Right;Lateral (Active)   Number of days: 243       Incision 09/27/18 Axilla Right (Active)   Number of days: 243       Puncture 05/24/19 Back Left; Lower (Active)   Wound Assessment Clean;Dry; Intact 5/27/2019  4:00 AM   Dressing/Treatment Open to air 5/27/2019  4:00 AM   Dressing Changed Changed/New 5/24/2019 11:51 AM   Dressing Status Clean;Dry; Intact 5/25/2019  4:00 AM   Number of days: 5       Wound 05/22/19 Arm Anterior;Left;Lower (Active)   Wound Skin Tear 5/28/2019  8:48 AM   Dressing Status Dry;Clean; Intact 5/29/2019  7:30 AM   Dressing Changed Changed/New 5/29/2019  7:30 AM   Dressing/Treatment Foam 5/29/2019  7:30 AM   Wound Assessment EDY 5/29/2019  7:30 AM   Drainage Amount None 5/29/2019  7:30 AM   Drainage Description EDY 5/29/2019  7:30 AM   Odor None 5/29/2019  7:30 AM   Radha-wound Assessment Intact 5/29/2019  7:30 AM   Culture Taken No 5/22/2019  6:00 PM   Number of days: 6       Wound 05/22/19 Arm Anterior;Right;Upper; Inner (Active)   Wound Skin Tear 5/25/2019  8:00 AM   Dressing Status Clean;Dry; Intact 5/29/2019  7:30 AM   Dressing Changed Other (Comment) 5/29/2019  7:30 AM   Dressing/Treatment Foam 5/29/2019  7:30 AM   Wound Assessment EDY 5/29/2019  7:30 AM   Drainage Amount None 5/29/2019  7:30 AM   Odor None 5/29/2019  7:30 AM   Margins Undefined edges 5/29/2019  7:30 AM   Radha-wound Assessment Ecchymosis; Intact 5/29/2019  7:30 AM   Culture Taken No 5/22/2019  6:00 PM   Number of days: 6        Elimination:  Continence:   · Bowel:  Yes  · Bladder: Yes  Urinary Catheter: None   Colostomy/Ileostomy/Ileal Conduit: No  [REMOVED] Rectal Tube With balloon-Stool Appearance: Watery  [REMOVED] Rectal Tube With balloon-Stool Color: Brown  [REMOVED] Rectal Tube With balloon-Stool Amount: Small    Date of Last BM: ***    Intake/Output Summary (Last 24 hours) at 5/29/2019 1243  Last data filed at 5/29/2019 1215  Gross per 24 hour   Intake 1419 ml   Output 900 ml   Net 519 ml     I/O last 3 completed shifts: In: 3328 [I.V.:1257]  Out: 700 [Urine:700]    Safety Concerns: At Risk for Falls    Impairments/Disabilities:      None    Nutrition Therapy:  Current Nutrition Therapy:   - Oral Diet:  Dental Soft and Low Sodium (3-4gm)    Routes of Feeding: None  Liquids: No Restrictions  Daily Fluid Restriction: yes - amount 1800  Last Modified Barium Swallow with Video (Video Swallowing Test): not done    Treatments at the Time of Hospital Discharge:   Respiratory Treatments: na  Oxygen Therapy:  is not on home oxygen therapy. Ventilator:    - No ventilator support    Rehab Therapies: Physical Therapy and Occupational Therapy  Weight Bearing Status/Restrictions: No weight bearing restirctions  Other Medical Equipment (for information only, NOT a DME order):  walker  Other Treatments:   1. Skilled RN assessment   2.  Medication reconciliation     Patient's personal belongings (please select all that are sent with patient):  {P DME Belongings:420134424}    RN SIGNATURE:  {Esignature:230092004}    CASE MANAGEMENT/SOCIAL WORK SECTION    Inpatient Status Date: 5/22/19    Readmission Risk Assessment Score:  Readmission Risk              Risk of Unplanned Readmission:        19           Discharging to Facility/ Agency   · Name: [address] [facility]   · Address:  · Phone: [phone]  · Fax: [phone]    Dialysis Facility (if applicable)   · Name:  · Address:  · Dialysis Schedule:  · Phone:  · Fax:    / signature: Electronically signed by Remington Mendoza RN on 5/29/19 at 12:46 PM    PHYSICIAN SECTION    Prognosis: {Prognosis:8909324175}    Condition at Discharge: Blayne De La Curz Patient Condition:[id number]}    Rehab Potential (if transferring to Rehab): {Prognosis:8091155002}    Recommended Labs or Other Treatments After Discharge: ***    Physician Certification: I certify the above information and transfer of Dimitrios Moya  is necessary for the continuing treatment of the diagnosis listed and that she requires {Admit to Appropriate Level of Care:47398} for {GREATER/LESS:607723767} 30 days.      Update Admission H&P: {CHP DME Changes in HVBAT:590265439}    PHYSICIAN SIGNATURE:  {Esignature:[id number]}

## 2019-05-29 NOTE — PROGRESS NOTES
Physical Therapy  Facility/Department: [facility]  Daily Treatment Note  NAME: Daniel French  : 1955  MRN: [id number]    Date of Service: 2019    Discharge Recommendations:  [address]        Patient Diagnosis(es): The encounter diagnosis was Altered mental status, unspecified altered mental status type. has a past medical history of Acute urinary tract infection, Anemia, Anemia, iron deficiency, Anxiety disorder, Arthritis, Asthma, Atopic rhinitis, Bandemia, Blood transfusion reaction, Cellulitis and abscess of trunk, Cellulitis of right breast, Cellulitis of right lower extremity- resolving, COPD (chronic obstructive pulmonary disease) ([address] CRP elevated, Depression, Diabetes mellitus ([address] Disorder associated with type 2 diabetes mellitus ([address] Disorder of liver, Displacement of lumbar intervertebral disc without myelopathy, Essential hypertension, Fibromyalgia, Full thickness rotator cuff tear, Gastroesophageal reflux disease, GERD (gastroesophageal reflux disease), Hammer toe of right foot, Hernia, abdominal, Hyperglycemia due to type 2 diabetes mellitus ([address] Hyperlipidemia, Hypertension, Hypothyroid, Iron deficiency anemia, Lactic acidosis, Localized, primary osteoarthritis of shoulder region, Mass of right breast, Migraine, Morbid obesity ([address] ARLYN (obstructive sleep apnea), Osteoarthritis, Pure hypercholesterolemia, Restless leg syndrome, Seasonal allergies, Septicemia ([address] SIRS due to infectious process without acute organ dysfunction ([address] Sleep apnea, Spondylosis, and Thrombocytopenia ([address] has a past surgical history that includes Appendectomy; Rotator cuff repair (Right); Colonoscopy; Upper gastrointestinal endoscopy (2019); and Upper gastrointestinal endoscopy (N/A, 2019).     Restrictions  Restrictions/Precautions  Restrictions/Precautions: General Precautions, Fall Risk  Position Activity Restriction  Other position/activity restrictions: Up with assist, bed/chair alarm, IV, telemetry  Subjective   General  Chart Reviewed: Yes  Subjective  Subjective: Patient pleasant and agreeable to PT. General Comment  Comments: RN reports patient doing much better today and OK to get up to chair. Pain Screening  Patient Currently in Pain: No  Pain Assessment  Pain Assessment: 0-10  Patient's Stated Pain Goal: No pain  Pain Type: Acute pain  Pain Location: Back  Pain Frequency: Intermittent  Vital Signs  Patient Currently in Pain: No  Pre Treatment Pain Screening  Intervention List: Patient able to continue with treatment    Orientation     Cognition      Objective      Transfers  Sit to Stand: Stand by assistance  Stand to sit: Stand by assistance  Squat Pivot Transfers: Stand by assistance  Ambulation  Ambulation?: Yes  Ambulation 1  Surface: level tile  Device: Rolling Walker  Assistance: Stand by assistance  Quality of Gait: gait steady, one slight LOB pt able to self correct  Distance: 150 ft, 60 ft     Balance  Sitting - Static: Fair;+  Sitting - Dynamic: Fair;+  Standing - Static: Poor  Standing - Dynamic: Poor  Exercises  Comments: general strengthening ex program issued pt tband for ther ex                      pt to bathroom for toileting, pt independent with rebeca care after toileting     Assessment   Body structures, Functions, Activity limitations: Decreased functional mobility ; Decreased ADL status; Decreased ROM; Decreased strength;Decreased safe awareness;Decreased cognition;Decreased endurance;Decreased balance;Decreased coordination  Assessment: Recommend SNF at discharge due to patient requiring 2 assist for transfers. Patient with bilateral DF contractures.   Activity Tolerance  Activity Tolerance: Patient Tolerated treatment well     G-Code     OutComes Score                                                  AM-PAC Score  AM-PAC Inpatient Mobility Raw Score : 15  AM-PAC Inpatient T-Scale Score : 39.45  Mobility Inpatient CMS 0-100% Score: 57.7  Mobility Inpatient CMS G-Code Modifier : CK          Goals  Short term goals  Time Frame for Short term goals: 12 visits  Short term goal 1: Patient will be min assist with transfers. Short term goal 2: Patient will amb 40 feet with RW and min assist.  Short term goal 3: Patient will be indep with bed mobility. Short term goal 4: Patient will be indep with HEP. Short term goal 5: Patient will have Bilateral DF to neutral.  Patient Goals   Patient goals : improve walking    Plan    Plan  Times per week: 1-2 x/d, 5-7 days a wk  Current Treatment Recommendations: Strengthening, Balance Training, Functional Mobility Training, Transfer Training, ROM, Endurance Training, Gait Training, Neuromuscular Re-education, Home Exercise Program, Safety Education & Training, Patient/Caregiver Education & Training  Safety Devices  Type of devices:  All fall risk precautions in place, Call light within reach, Chair alarm in place, Gait belt, Left in chair, Nurse notified     Therapy Time   Individual Concurrent Group Co-treatment   Time In  1530         Time Out  1610         Minutes  40                 [address], [place]

## 2019-05-29 NOTE — PROGRESS NOTES
Occupational Therapy    DATE: 2019    NAME: Norberto Uribe  MRN: [id number]   : 1955      [facility]  Occupational Therapy Not Seen Note    Patient not available for Occupational Therapy due to:    [x] Testing: EGD    [] Hemodialysis    [] Cancelled by RN:    []Refusal by Patient:    [] Surgery:     [] Intubation:     [] Pain Medication:    [] Sedation:     [] Spine Precautions :    [] Medical Instability:    [] Other:    NICOLASA Hawkins/JUSTIN

## 2019-05-29 NOTE — PROGRESS NOTES
I was called  to come to patient's room to remove IV's cause she is going AMA/  Patient was giving news about her cancer and she needed to go home to take care of her brother. She was not going to stay her any longer. She was ignoring Dr Benigno Jansen and becoming abrupt. She said she was calling the police cause we were not letting her go. She grabbed her walker and left after she placed an x on the AMA form an threw the pen.

## 2019-05-29 NOTE — PLAN OF CARE
Problem: Nutrition  Goal: Optimal nutrition therapy  Description  Nutrition Problem: Inadequate oral intake  Intervention: Food and/or Nutrient Delivery: Continue current diet  Nutritional Goals: PO intake to meet >80% of estimated kcal/protein needs with good GI tolerance    Outcome: Ongoing

## 2019-05-29 NOTE — OP NOTE
PROCEDURE NOTE    DATE OF PROCEDURE: 5/29/2019     SURGEON: Kwame Gerardo MD    ASSISTANT: None    PREOPERATIVE DIAGNOSIS: ABDOMINAL PAINS  ABNORMAL IMAGING  GERD    POSTOPERATIVE DIAGNOSIS: As described below    OPERATION: Upper GI endoscopy with Biopsy    ANESTHESIA: Moderate Sedation     ESTIMATED BLOOD LOSS: Less than 50 ml    COMPLICATIONS: None. SPECIMENS:  Was Obtained:     HISTORY: The patient is a 59y.o. year old female with history of above preop diagnosis. I recommended esophagogastroduodenoscopy with possible biopsy and I explained the risk, benefits, expected outcome, and alternatives to the procedure. Risks included but are not limited to bleeding, infection, respiratory distress, hypotension, and perforation of the esophagus, stomach, or duodenum. Patient understands and is in agreement. PROCEDURE: The patient was given IV conscious sedation. The patient's SPO2 remained above 90% throughout the procedure. The gastroscope was inserted orally and advanced under direct vision through the esophagus, through the stomach, through the pylorus, and into the descending duodenum. Findings:    Retropharyngeal area was grossly normal appearing    Esophagus: abnormal: EVIDENCE FOR AGARWAL'S ABOUT 1-2 CM TOUNGUES  BIOPSIES AND PICTURES WERE TAKEN    TWO CM HIATAL HERNIA    Stomach:    Fundus: normal    Body: abnormal: EROSIVE GASTRITIS MILD     Antrum: abnormal: MOD EROSIVE GASTRITIS BIOPSIES WERE TAKEN    Duodenum:     Descending: normal    Bulb: normal    The scope was removed and the patient tolerated the procedure well. Recommendations/Plan:   1. F/U Biopsies  2. F/U In Office in 3-4 weeks  3. Discussed with the family  4.  Post sedation patient was stable with stable vital signs and stable O2 saturations    Electronically signed by Kwame Gerardo MD  on 5/29/2019 at 11:20 AM

## 2019-05-29 NOTE — ANESTHESIA PRE PROCEDURE
Department of Anesthesiology  Preprocedure Note       Name:  Damaris Howell   Age:  59 y.o.  :  1955                                          MRN:  [id number]         Date:  2019      Surgeon: Rosangela Ruiz):  Declan Montero MD    Procedure: EGD DIAGNOSTIC ONLY (N/A )    Medications prior to admission:   Prior to Admission medications    Medication Sig Start Date End Date Taking? Authorizing Provider   baclofen (LIORESAL) 10 MG tablet Take 10 mg by mouth nightly   Yes Historical Provider, MD   lisinopril (PRINIVIL;ZESTRIL) 20 MG tablet Take 20 mg by mouth daily    Historical Provider, MD   loratadine-pseudoephedrine (CLARITIN-D 24 HOUR)  MG per extended release tablet Take 1 tablet by mouth daily    Historical Provider, MD   metoprolol succinate (TOPROL XL) 25 MG extended release tablet Take 25 mg by mouth daily    Historical Provider, MD   pravastatin (PRAVACHOL) 10 MG tablet Take 10 mg by mouth daily    Historical Provider, MD   oxyCODONE-acetaminophen (PERCOCET) 5-325 MG per tablet Take 1 tablet by mouth 3 times daily as needed for Pain. Historical Provider, MD   aspirin 81 MG EC tablet Take 81 mg by mouth daily. Historical Provider, MD   citalopram (CELEXA) 40 MG tablet Take 40 mg by mouth daily. Historical Provider, MD   fluticasone (FLONASE) 50 MCG/ACT nasal spray 1 spray by Nasal route 2 times daily as needed for Rhinitis. Historical Provider, MD   metFORMIN ER (GLUCOPHAGE-XR) 500 MG XR tablet Take 1,000 mg by mouth 2 times daily (with meals) Indications: Take two 500mg tablets BID with meals Take two 500mg tablets BID with meals    Historical Provider, MD   gabapentin (NEURONTIN) 300 MG capsule Take 300 mg by mouth 3 times daily. Historical Provider, MD   omeprazole (PRILOSEC) 20 MG capsule Take 20 mg by mouth daily.     Historical Provider, MD       Current medications:    Current Facility-Administered Medications   Medication Dose Route Frequency Provider Last Rate Last Dose  ondansetron (ZOFRAN-ODT) disintegrating tablet 4 mg  4 mg Oral Q6H PRN Nadia Morales MD        Or    ondansetron [facility]) injection 4 mg  4 mg Intravenous Q6H PRN Osmar Em MD   4 mg at 05/27/19 0956    acetaminophen (TYLENOL) tablet 650 mg  650 mg Oral Q4H PRN Layla Jones MD        pramipexole (MIRAPEX) tablet 0.125 mg  0.125 mg Oral Nightly Leeann Matt MD   0.125 mg at 05/28/19 2056    pantoprazole (PROTONIX) injection 40 mg  40 mg Intravenous BID Bebeto Hobbs MD   40 mg at 05/29/19 0856    And    sodium chloride (PF) 0.9 % injection 10 mL  10 mL Intravenous BID Bebeto Hobbs MD   10 mL at 05/29/19 0857    potassium chloride 40 mEq in sodium chloride 0.45 % 1,000 mL infusion   Intravenous Continuous Nadia Morales MD 50 mL/hr at 05/29/19 0537      iron sucrose (VENOFER) 100 mg in sodium chloride 0.9 % 100 mL IVPB  100 mg Intravenous Q24H Layla Jones MD   Stopped at 05/28/19 1315    levothyroxine (SYNTHROID) tablet 25 mcg  25 mcg Oral Daily Layla Jones MD   25 mcg at 05/28/19 0610    metoprolol (LOPRESSOR) injection 5 mg  5 mg Intravenous Q6H PRN Layla Jones MD        insulin lispro (HUMALOG) injection vial 0-6 Units  0-6 Units Subcutaneous TID WC Layla Jones MD   1 Units at 05/25/19 1726    insulin lispro (HUMALOG) injection vial 0-3 Units  0-3 Units Subcutaneous Nightly Layla Jones MD   1 Units at 05/24/19 2109    sodium chloride flush 0.9 % injection 10 mL  10 mL Intravenous PRN Layla Jones MD        glucose (GLUTOSE) 40 % oral gel 15 g  15 g Oral PRN Layla Jones MD        dextrose 50 % solution 12.5 g  12.5 g Intravenous PRN Layla Jones MD        glucagon (rDNA) injection 1 mg  1 mg Intramuscular PRN Layla Jones MD        dextrose 5 % solution  100 mL/hr Intravenous PRN Layla Jones MD        sodium chloride flush 0.9 % injection 10 mL  10 mL Intravenous 2 times per day Layla Jones MD   10 mL at 05/29/19 0857    sodium chloride flush 0.9 % injection 10 mL  10 mL Intravenous PRN Alex Dumont MD        miconazole (MICOTIN) 2 % powder   Topical BID Alex Dumont MD        rifaximin Maddie Fournier) tablet 550 mg  550 mg Oral BID Alex Dumont MD   Stopped at 05/29/19 0923    cefTRIAXone (ROCEPHIN) 1 g IVPB in 50 mL D5W minibag  1 g Intravenous Q24H Alex Dumont MD   Stopped at 05/28/19 1842       Allergies:     Allergies   Allergen Reactions    Nsaids Other (See Comments)    Sulfa Antibiotics Other (See Comments) and Hives     Other reaction(s): Unknown  Patient unsure of the reaction    Tape [Adhesive Tape] Rash     Other reaction(s): Unknown       Problem List:    Patient Active Problem List   Diagnosis Code    Cellulitis of right lower extremity- resolving L03.115    Diabetes mellitus ([facility] E11.9    ARLYN (obstructive sleep apnea) G47.33    Morbid obesity ([address] E66.01    Essential hypertension I10    Lactic acidosis E87.2    Bandemia D72.825    CRP elevated R79.82    Depression F32.9    Osteoarthritis M19.90    Cellulitis of right breast N61.0    Septicemia ([address] A41.9    SIRS due to infectious process without acute organ dysfunction ([address] A41.9    Cellulitis and abscess of trunk L03.319, L02.219    Acute urinary tract infection N39.0    Anemia D64.9    Anemia, iron deficiency D50.9    Asthma J45.909    Atopic rhinitis J30.9    Disorder associated with type 2 diabetes mellitus (HCC) E11.8    Disorder of liver K76.9    Displacement of lumbar intervertebral disc without myelopathy M51.26    Fibromyalgia M79.7    Full thickness rotator cuff tear M75.120    Gastroesophageal reflux disease K21.9    Hyperglycemia due to type 2 diabetes mellitus (HCC) E11.65    Hyperlipidemia E78.5    Hypothyroid E03.9    Iron deficiency anemia D50.9    Localized, primary osteoarthritis of shoulder region M19.019    Mass of right breast N63.10    Migraine G43.909    Pure Value Date    PROTIME 15.4 05/24/2019    INR 1.5 05/24/2019    APTT 32.2 09/26/2018       HCG (If Applicable): No results found for: PREGTESTUR, PREGSERUM, HCG, HCGQUANT     ABGs: No results found for: PHART, PO2ART, ZND6IKG, WZV5JFZ, BEART, J9SSQWKF     Type & Screen (If Applicable):  No results found for: LABABO, [address]    Anesthesia Evaluation  Patient summary reviewed and Nursing notes reviewed no history of anesthetic complications:   Airway: Mallampati: II  TM distance: >3 FB   Neck ROM: full  Mouth opening: > = 3 FB Dental:          Pulmonary:normal exam    (+) COPD:  sleep apnea:  asthma:                            Cardiovascular:  Exercise tolerance: no interval change,   (+) hypertension:,     (-) pacemaker, past MI, CAD and CABG/stent        Rate: normal  Echocardiogram reviewed                  Neuro/Psych:   (+) neuromuscular disease:, headaches:, psychiatric history:            GI/Hepatic/Renal:   (+) GERD:, liver disease:,           Endo/Other:    (+) Diabetes, hypothyroidism::., .                 Abdominal:           Vascular:                                        Anesthesia Plan      MAC     ASA 3       Induction: intravenous. Anesthetic plan and risks discussed with patient. Plan discussed with CRNA.     Attending anesthesiologist reviewed and agrees with Pre Eval content              Francisca Cotto DO   5/29/2019

## 2019-05-29 NOTE — DISCHARGE SUMMARY
Physician Discharge Summary     Patient ID:  Brian Cantor  [id number]  87 y.o.  1955    Admit date: 5/22/2019    Discharge date and time:  May 29, 2019 Admission Diagnoses:   Patient Active Problem List   Diagnosis    Cellulitis of right lower extremity- resolving    Diabetes mellitus ([address]    ARLYN (obstructive sleep apnea)    Morbid obesity ([address]    Essential hypertension    Lactic acidosis    Bandemia    CRP elevated    Depression    Osteoarthritis    Cellulitis of right breast    Septicemia ([address]    SIRS due to infectious process without acute organ dysfunction (HCC)    Cellulitis and abscess of trunk    Acute urinary tract infection    Anemia    Anemia, iron deficiency    Asthma    Atopic rhinitis    Disorder associated with type 2 diabetes mellitus (HCC)    Disorder of liver    Displacement of lumbar intervertebral disc without myelopathy    Fibromyalgia    Full thickness rotator cuff tear    Gastroesophageal reflux disease    Hyperglycemia due to type 2 diabetes mellitus ([address]    Hyperlipidemia    Hypothyroid    Iron deficiency anemia    Localized, primary osteoarthritis of shoulder region    Mass of right breast    Migraine    Pure hypercholesterolemia    Thrombocytopenia (HCC)    Altered mental status    Encephalopathy    Cirrhosis ([address]    Elevated LFTs    Hypernatremia    Elevated amylase       Discharge Diagnoses: Hepatic encephalopathy resolved  Liver cirrhosis with ascites unkown cause   iron def anemia  Metastatic poorly differentiated non-small cell CA  Lewis's esophagus  Erosive gastritis  Type 2 diabetes currently controlled  Hypertension essential  Prerenal azotemia results  Thrombocytopenia  Hypothyroid  Hypokalemia  Consults: cardiology, GI, nephrology and general surgery pulmonary    Procedures: Paracentesis.  egd midline    Hospital Course:  Hepatic encephalopathy lactulose was January consultation was obtained post paracentesis patient that we will Comments:   Reason for Stopping:         omeprazole 20 MG EC tablet Comments:   Reason for Stopping:         Insulin Syringe-Needle U-100 27G X 1/2\" 0.5 ML MISC Comments:   Reason for Stopping:         Handicap Placard MISC Comments:   Reason for Stopping:         Fe Bisgly-Vit C-Vit B12-FA (GENTLE IRON) 28-60-0.008-0.4 MG CAPS Comments:   Reason for Stopping:         acetaminophen (TYLENOL) 325 MG tablet Comments:   Reason for Stopping:         insulin glargine (LANTUS) 100 UNIT/ML injection vial Comments:   Reason for Stopping:         acetic acid 0.25 % irrigation Comments:   Reason for Stopping:         diclofenac sodium 1 % GEL Comments:   Reason for Stopping:         docusate sodium (COLACE) 100 MG capsule Comments:   Reason for Stopping:         montelukast (SINGULAIR) 10 MG tablet Comments:   Reason for Stopping:             Activity: activity as tolerated  Diet: diabetic diet and ns (67526 ml fluid per daynas 2000 ml fluid restriction     Patient signed against medical advice, patient was informed of the metastasis to bone changes to follow up with oncology she needs to follow-up also with GI she states that she will follow up with her PCP Dr. Clay Johnson will fax  discharg discharge  summary to her PCP tyrel Richards office to see if she can follow up with oncology  and GI   Signed:   Rebecca Carlton MD  5/29/2019  6:26 PM

## 2019-05-29 NOTE — PROGRESS NOTES
Pulmonary Critical Care Progress Note  Aleisha San CNP / Dr. Gwendolyn Portillo M.D. Patient seen for the follow up of altered mental status/nonalcoholic liver cirrhosis/asthma, metastatic bone lesion     Subjective:  She denies any shortness of breath, no cough or chest pain. She underwent EGD earlier this morning with biopsy and tolerated procedure well. She has been ambulating in the hallways and tolerating activity well. She is hoping for discharge home later today. Examination:  Vitals: BP (!) 142/58   Pulse 109   Temp 99 °F (37.2 °C) (Oral)   Resp 18   Ht 5' 7\" (1.702 m)   Wt 188 lb 14.4 oz (85.7 kg)   SpO2 99%   BMI 29.59 kg/m²     General appearance: alert and cooperative with exam, no distress  Neck: No JVD  Lungs: Moderate air exchange no rales, no wheeze  Heart: regular rate and rhythm, S1, S2 normal, no gallop  Abdomen: Soft, non tender, + BS  Extremities: no cyanosis or clubbing.  No significant edema    LABs:  CBC:   Recent Labs     05/28/19  0802 05/29/19  0413   WBC 6.8 6.4   HGB 8.1* 8.0*   HCT 26.8* 25.7*   * 121*     BMP:   Recent Labs     05/28/19  0802 05/28/19  2041 05/29/19  0413    135 138   K 4.3 3.7 3.9   CO2 19* 22 22   BUN 11  --  10   CREATININE 0.60  --  0.70   LABGLOM >60  --  >60   GLUCOSE 109*  --  112*     LIVER PROFILE:  Recent Labs     05/27/19  0623 05/28/19  0802   AST  --  40*   ALT  --  37*   LABALBU 3.0* 2.7*     Impression:  · Obstructive sleep apnea/obesity  · History of Asthma  · RLS  · Iron deficiency Anemia  · Lytic lesion of ileum and L3 vertebrae, S/p bone biopsy 5/24  · Metastatic poorly differentiated non-small cell carcinoma, suggestive of breast as primary   · Liver cirrhosis  · Hepatic Encephalopathy - improved  · DM/HTN/HDL/hypothyroidism/depression/anxiety disorder    Recommendations:  · BiPAP for sleep and as needed  · Completed Rocephin  · Albuterol Q 4 hours prn  · IV fluids, Nephrology on consult   · S/p EGD 5/29 with evidence of

## 2019-05-29 NOTE — PROGRESS NOTES
Writer answered call logan, patient was standing in the restroom stating she wants to rip her IV's out. She got dressed and called her brother. Nurse called up to nurse station to request patient's nurse asap. Nurses and MD arrived to speak with patient due her being upset and wanting to leave hospital. Staff attempting to calm patient down, she insisted on leaving. Nurse removed both IV sites. Patient walked out.

## 2019-05-29 NOTE — PROGRESS NOTES
Nutrition Assessment    Type and Reason for Visit: RD Nutrition Re-Screen(LOS=7 days)    Nutrition Recommendations: 1. Suggest continuing with current Dental Soft, No added salt (3gm), Fluid Restricted diet. 2. Will monitor need for Carbohydrate Control diet as appropriate. Nutrition Assessment: Patient improving from a nutritional standpoint as evidenced by PO intake 75%, having a good appetite, and resolved edema. Patient had 21lb (10%) weight loss x4 days however, likely due to edema on admission. Patient also had intentional 59lb (24%) weight loss x8 months. Patient at risk for further nutritional compromise related to increased needs for multiple wounds. Patient declined oral nutrition supplement at this time but will continue to monitor patient weight and need for ONS throughout admission. Malnutrition Assessment:  · Malnutrition Status: At risk for malnutrition  · Context: Acute illness or injury  · Findings of the 6 clinical characteristics of malnutrition (Minimum of 2 out of 6 clinical characteristics is required to make the diagnosis of moderate or severe Protein Calorie Malnutrition based on AND/ASPEN Guidelines):  1. Energy Intake-Less than or equal to 75% of estimated energy requirement, (5/23 to 5/29)    2. Weight Loss-10% loss or greater, in 1 week  3. Fat Loss-No significant subcutaneous fat loss  4. Muscle Loss-No significant muscle mass loss  5. Fluid Accumulation-No significant fluid accumulation  6.  Strength- Not measured     Nutrition Risk Level:  Moderate    Nutrient Needs:  · Estimated Daily Total Kcal: 8387-8450 (20-22 kcal/kg)  · Estimated Daily Protein (g): 85-95 (1.0-1.1 g/kg)  · Estimated Daily Total Fluid (ml/day): 5016-0365 (1ml/kcal)    Nutrition Diagnosis:   · Problem: Inadequate oral intake  · Etiology: related to Partial or complete edentulism, Endocrine dysfunction     Signs and symptoms:  as evidenced by Patient report of, Weight loss, Presence of wounds    Objective Information:  · Nutrition-Focused Physical Findings: GI: soft, last BM 5/29, nontender, active bowel sounds  PV: WDL  Skin: dry, warm, wound: right upper arm, left lower arm  · Wound Type: Multiple, Venous Stasis  · Current Nutrition Therapies:  · Oral Diet Orders: No Added Salt (3-4gm), Dental Soft, Fluid Restriction   · Oral Diet intake: %  · Oral Nutrition Supplement (ONS) Orders: None  · Anthropometric Measures:  · Ht: 5' 7\" (170.2 cm)   · Current Body Wt: 188 lb 14.4 oz (85.7 kg)  · Admission Body Wt: 209 lb (94.8 kg)  · Usual Body Wt: 209 lb (94.8 kg)  · % Weight Change:  ,  10% loss in 4 days, 24% loss in 8 months   · Ideal Body Wt: 135 lb (61.2 kg), % Ideal Body 139%  · BMI Classification: BMI 25.0 - 29.9 Overweight    Nutrition Interventions:   Continue current diet  Continued Inpatient Monitoring, Coordination of Care    Nutrition Evaluation:   · Evaluation: Goals set   · Goals: PO intake to meet >80% of estimated kcal/protein needs with good GI tolerance     · Monitoring: Meal Intake, Diet Tolerance, Skin Integrity, Wound Healing, I&O, Mental Status/Confusion, Weight, Pertinent Labs, Chewing/Swallowing, Monitor Bowel Function    Anali Akers, Dietetic Intern   Electronically signed by Ren Owens RD, LD on 5/29/19 at 3:27 PM    Contact Number: [id number]

## 2019-05-30 LAB — MITOCHONDRIAL ANTIBODY: 6.3 UNITS (ref 0–20)

## 2019-05-31 LAB — SURGICAL PATHOLOGY REPORT: NORMAL

## 2019-06-17 ENCOUNTER — TELEPHONE (OUTPATIENT)
Dept: CASE MANAGEMENT | Age: 64
End: 2019-06-17

## 2019-06-17 NOTE — TELEPHONE ENCOUNTER
Writer called Dr. Lucy Pierre to see if patient has had a f/u. Pt had a f/u on 6/6/19. Per Dr. Abimbola Parker note pt is to f/u with Dr. Bernadette christy. Writer attempted to call patient for the third time. VM message left asking pt to call me asap to schedule a f/u. Emergency contact, Mariano, pt brother called. Writer introduced self and asked if he could let his sister know I am trying to contact her. Mariano verbalized he would relay message and seemed appreciative of call. Will continue to follow.

## 2019-07-02 ENCOUNTER — TELEPHONE (OUTPATIENT)
Dept: GASTROENTEROLOGY | Age: 64
End: 2019-07-02

## 2019-07-09 ENCOUNTER — TELEPHONE (OUTPATIENT)
Dept: ONCOLOGY | Age: 64
End: 2019-07-09

## 2019-07-23 ENCOUNTER — TELEPHONE (OUTPATIENT)
Dept: ONCOLOGY | Age: 64
End: 2019-07-23

## 2019-08-05 ENCOUNTER — TELEPHONE (OUTPATIENT)
Dept: INFUSION THERAPY | Age: 64
End: 2019-08-05

## 2019-09-19 ENCOUNTER — TELEPHONE (OUTPATIENT)
Dept: GASTROENTEROLOGY | Age: 64
End: 2019-09-19

## 2019-09-19 NOTE — TELEPHONE ENCOUNTER
Patient has been rescheduled with Dr. Brian Lopez on 10/14/19 at 3:15 PM.  At [address] [place] [address].

## 2020-03-27 ENCOUNTER — TELEPHONE (OUTPATIENT)
Dept: GASTROENTEROLOGY | Age: 65
End: 2020-03-27

## 2020-03-27 NOTE — TELEPHONE ENCOUNTER
Yoselin Hudson returned call and is now scheduled for a telephone visit with Dr. Mildred Borrego on 3/30/20.

## 2020-03-30 ENCOUNTER — TELEMEDICINE (OUTPATIENT)
Dept: GASTROENTEROLOGY | Age: 65
End: 2020-03-30
Payer: MEDICARE

## 2020-03-30 PROCEDURE — 99442 PR PHYS/QHP TELEPHONE EVALUATION 11-20 MIN: CPT | Performed by: INTERNAL MEDICINE

## 2020-03-30 RX ORDER — LACTULOSE 10 G/15ML
20 SOLUTION ORAL DAILY
Qty: 1000 ML | Refills: 3 | Status: SHIPPED | OUTPATIENT
Start: 2020-03-30 | End: 2020-07-13

## 2020-03-30 NOTE — PROGRESS NOTES
Piedad Perez is a 62 y.o. female evaluated via telephone on 3/30/2020. Consent:  She and/or health care decision maker is aware that that she may receive a bill for this telephone service, depending on her insurance coverage, and has provided verbal consent to proceed: Yes      Documentation:  I communicated with the patient and/or health care decision maker about cirrhosis. Details of this discussion including any medical advice provided: She reports no further confusion. She has had issues with her memory for years. She reports no alcohol use. She has never been a drinker. Her weight used to be above 300. She has been trying to lose weight. She reports no abdominal pain. No nausea or vomiting. No blood in stool or melena. She takes Percocet 3 times a day for her fibromyalgia. Recently she was started on Cymbalta. This helps her with pain. I recommended to her to cut down on use of narcotic pain medications as much as possible. Cymbalta should be okay in her case. I gave her prescription for lactulose. Titrate the dose to achieve 2 or 3 bowel movements per day. We will need to see her in the office in 3 months. [facility] 6 months basis. I affirm this is a Patient Initiated Episode with an Established Patient who has not had a related appointment within my department in the past 7 days or scheduled within the next 24 hours.     Total Time: minutes: 11-20 minutes    Note: not billable if this call serves to triage the patient into an appointment for the relevant concern      Zeeshan Elina

## 2020-04-07 ENCOUNTER — TELEPHONE (OUTPATIENT)
Dept: GASTROENTEROLOGY | Age: 65
End: 2020-04-07

## 2020-07-13 RX ORDER — LACTULOSE 10 G/15ML
SOLUTION ORAL
Qty: 473 ML | Refills: 2 | Status: SHIPPED | OUTPATIENT
Start: 2020-07-13 | End: 2020-08-06

## 2020-08-06 RX ORDER — LACTULOSE 10 G/15ML
SOLUTION ORAL
Qty: 1000 ML | Refills: 1 | Status: SHIPPED | OUTPATIENT
Start: 2020-08-06 | End: 2020-09-08

## 2020-08-17 ENCOUNTER — OFFICE VISIT (OUTPATIENT)
Dept: GASTROENTEROLOGY | Age: 65
End: 2020-08-17
Payer: MEDICARE

## 2020-08-17 VITALS — TEMPERATURE: 97.1 F

## 2020-08-17 PROCEDURE — 99214 OFFICE O/P EST MOD 30 MIN: CPT | Performed by: INTERNAL MEDICINE

## 2020-08-17 RX ORDER — DULOXETIN HYDROCHLORIDE 30 MG/1
CAPSULE, DELAYED RELEASE ORAL
Status: ON HOLD | COMMUNITY
Start: 2020-07-30 | End: 2020-11-23 | Stop reason: HOSPADM

## 2020-08-17 RX ORDER — BACLOFEN 10 MG/1
TABLET ORAL
COMMUNITY
Start: 2020-05-27

## 2020-08-17 ASSESSMENT — ENCOUNTER SYMPTOMS
NAUSEA: 0
CONSTIPATION: 1
WHEEZING: 0
ABDOMINAL PAIN: 0
VOICE CHANGE: 0
BACK PAIN: 1
ANAL BLEEDING: 0
BLOOD IN STOOL: 0
ABDOMINAL DISTENTION: 1
CHOKING: 0
DIARRHEA: 0
COUGH: 0
TROUBLE SWALLOWING: 0
RECTAL PAIN: 0
SINUS PRESSURE: 1
SORE THROAT: 0
VOMITING: 0

## 2020-08-17 NOTE — PROGRESS NOTES
GI CLINIC FOLLOW UP    INTERVAL HISTORY:   No referring provider defined for this encounter. No chief complaint on file. HISTORY OF PRESENT ILLNESS: Cristine Costa is a 62 y.o. female with liver cirrhosis and encephalopathy. She has been taking lactulose. She reports 2 or 3 bowel movements per day. No further confusion or fogginess. No abdominal pain. No nausea or vomiting. She reports pain in her left hip. She had a bone lesion with biopsy suggestive of squamous cell carcinoma. She reports no follow-up with oncology since then. She attributes that to COVID-19 pandemic. She smokes. Past Medical,Family, and Social History reviewed and does not contribute to the patient presentingcondition. Patient's PMH/PSH,SH,PSYCH Hx, MEDs, ALLERGIES, and ROS were all reviewed and updated in the appropriate sections.     PAST MEDICAL HISTORY:  Past Medical History:   Diagnosis Date    Acute urinary tract infection 9/6/2018    Anemia     h/o anemia,transfused 2/2014    Anemia, iron deficiency 10/8/2018    Anxiety disorder     can get SOB with an anxiety attack    Arthritis     Asthma     Atopic rhinitis 9/6/2018    Bandemia     Blood transfusion reaction     x 2 unit prbc's 2/2014    Cellulitis and abscess of trunk     Cellulitis of right breast 9/26/2018    Cellulitis of right lower extremity- resolving 8/9/2018    COPD (chronic obstructive pulmonary disease) ([address]     patient denies COPD, only has asthma    CRP elevated     Depression     Diabetes mellitus ([address]     Disorder associated with type 2 diabetes mellitus ([address] 10/8/2018    Disorder of liver 9/6/2018    Displacement of lumbar intervertebral disc without myelopathy 10/8/2018    Essential hypertension 8/9/2018    Fibromyalgia 10/8/2018    Full thickness rotator cuff tear 10/8/2018    Gastroesophageal reflux disease 9/6/2018    GERD (gastroesophageal reflux disease)     Hammer toe of right foot     Hernia, abdominal 1998    Hyperglycemia due to type 2 diabetes mellitus ([address] 10/8/2018    Hyperlipidemia     Hypertension     Hypothyroid 10/8/2018    Iron deficiency anemia 10/8/2018    Lactic acidosis     Localized, primary osteoarthritis of shoulder region 10/8/2018    Mass of right breast 10/8/2018    Migraine 9/6/2018    Morbid obesity ([address] 8/9/2018    ARLYN (obstructive sleep apnea) 8/9/2018    Osteoarthritis 9/26/2018    Pure hypercholesterolemia 10/8/2018    Restless leg syndrome     Seasonal allergies     Septicemia ([address]     SIRS due to infectious process without acute organ dysfunction ([address]     Sleep apnea     doesn't use CPAP, sleeps in a recliner    Spondylosis     lower back    Thrombocytopenia ([address] 10/8/2018       Past Surgical History:   Procedure Laterality Date    APPENDECTOMY      COLONOSCOPY      ROTATOR CUFF REPAIR Right     UPPER GASTROINTESTINAL ENDOSCOPY  05/29/2019    with biopsy by Dr. Sultana Giles N/A 5/29/2019    EGD BIOPSY performed by Sergo Khalil MD at [facility]:    Current Outpatient Medications:     lactulose (CHRONULAC) 10 GM/15ML solution, TAKE 15 TO 30 ML BY MOUTH DAILY INCREASE TO 60 ML BY MOUTH IF NEEDED TO ACHIEVE 2 TO 3 BOWEL MOVENTS A DAY, Disp: 1000 mL, Rfl: 1    pramipexole (MIRAPEX) 0.125 MG tablet, Take 1 tablet by mouth nightly, Disp: 90 tablet, Rfl: 3    levothyroxine (SYNTHROID) 25 MCG tablet, Take 1 tablet by mouth Daily, Disp: 30 tablet, Rfl: 3    lisinopril (PRINIVIL;ZESTRIL) 20 MG tablet, Take 20 mg by mouth daily, Disp: , Rfl:     loratadine-pseudoephedrine (CLARITIN-D 24 HOUR)  MG per extended release tablet, Take 1 tablet by mouth daily, Disp: , Rfl:     metoprolol succinate (TOPROL XL) 25 MG extended release tablet, Take 25 mg by mouth daily, Disp: , Rfl:     oxyCODONE-acetaminophen (PERCOCET) 5-325 MG per tablet, Take 1 tablet by mouth 3 times daily as needed for Pain.  , Disp: , Rfl:     aspirin 81 MG EC tablet, Take 81 mg by mouth daily. , Disp: , Rfl:     citalopram (CELEXA) 40 MG tablet, Take 40 mg by mouth daily. , Disp: , Rfl:     fluticasone (FLONASE) 50 MCG/ACT nasal spray, 1 spray by Nasal route 2 times daily as needed for Rhinitis., Disp: , Rfl:     metFORMIN ER (GLUCOPHAGE-XR) 500 MG XR tablet, Take 1,000 mg by mouth 2 times daily (with meals) Indications: Take two 500mg tablets BID with meals Take two 500mg tablets BID with meals, Disp: , Rfl:     gabapentin (NEURONTIN) 300 MG capsule, Take 300 mg by mouth 3 times daily. , Disp: , Rfl:     omeprazole (PRILOSEC) 20 MG capsule, Take 20 mg by mouth daily. , Disp: , Rfl:     ALLERGIES:   Allergies   Allergen Reactions    Nsaids Other (See Comments)    Sulfa Antibiotics Other (See Comments) and Hives     Other reaction(s): Unknown  Patient unsure of the reaction    Tape Sandre Jordyn Tape] Rash     Other reaction(s): Unknown       FAMILY HISTORY:       Problem Relation Age of Onset    Heart Disease Mother     Pacemaker Mother     Hypertension Mother     Diabetes Father     Breast Cancer Neg Hx          SOCIAL HISTORY:   Social History     Socioeconomic History    Marital status:       Spouse name: Not on file    Number of children: Not on file    Years of education: Not on file    Highest education level: Not on file   Occupational History    Not on file   Social Needs    Financial resource strain: Not on file    Food insecurity     Worry: Not on file     Inability: Not on file    Transportation needs     Medical: Not on file     Non-medical: Not on file   Tobacco Use    Smoking status: Never Smoker    Smokeless tobacco: Never Used   Substance and Sexual Activity    Alcohol use: No    Drug use: No    Sexual activity: Not on file   Lifestyle    Physical activity     Days per week: Not on file     Minutes per session: Not on file    Stress: Not on file   Relationships    Social connections     Talks on phone: Not on file     Gets together: Not on file     Attends Latter day service: Not on file     Active member of club or organization: Not on file     Attends meetings of clubs or organizations: Not on file     Relationship status: Not on file    Intimate partner violence     Fear of current or ex partner: Not on file     Emotionally abused: Not on file     Physically abused: Not on file     Forced sexual activity: Not on file   Other Topics Concern    Not on file   Social History Narrative    Not on file       REVIEW OF SYSTEMS: A 12-point review of systemswas obtained and pertinent positives and negatives were enumerated above in the history of present illness. All other reviewed systems / symptoms were negative. Review of Systems   Constitutional: Positive for fatigue. Negative for appetite change and unexpected weight change. HENT: Positive for sinus pressure. Negative for dental problem, postnasal drip, sore throat, trouble swallowing and voice change. Eyes: Positive for visual disturbance. Respiratory: Negative for cough, choking and wheezing. Cardiovascular: Positive for leg swelling. Negative for chest pain and palpitations. Gastrointestinal: Positive for abdominal distention and constipation. Negative for abdominal pain, anal bleeding, blood in stool, diarrhea, nausea, rectal pain and vomiting. Endocrine: Negative. Genitourinary: Negative. Negative for difficulty urinating. Musculoskeletal: Positive for arthralgias, back pain and joint swelling. Negative for gait problem and myalgias. Skin: Negative. Allergic/Immunologic: Positive for environmental allergies. Negative for food allergies. Neurological: Negative for dizziness, weakness, light-headedness, numbness and headaches. Hematological: Bruises/bleeds easily. Psychiatric/Behavioral: Positive for sleep disturbance. The patient is nervous/anxious.             LABORATORY DATA: Reviewed  Lab Results   Component Value Date    WBC 6.4 05/29/2019    HGB 8.0 (L) 05/29/2019    HCT 25.7 (L) 05/29/2019    MCV 88.3 05/29/2019     (L) 05/29/2019     05/29/2019    K 3.9 05/29/2019     05/29/2019    CO2 22 05/29/2019    BUN 10 05/29/2019    CREATININE 0.70 05/29/2019    LABALBU 2.7 (L) 05/28/2019    BILITOT 0.76 05/28/2019    ALKPHOS 119 (H) 05/28/2019    AST 40 (H) 05/28/2019    ALT 37 (H) 05/28/2019    INR 1.5 05/24/2019         Lab Results   Component Value Date    RBC 2.91 (L) 05/29/2019    HGB 8.0 (L) 05/29/2019    MCV 88.3 05/29/2019    MCH 27.5 05/29/2019    MCHC 31.1 05/29/2019    RDW 15.9 (H) 05/29/2019    MPV 10.8 05/29/2019    BASOPCT 1 05/29/2019    LYMPHSABS 2.15 05/29/2019    MONOSABS 0.88 05/29/2019    NEUTROABS 2.90 05/29/2019    EOSABS 0.37 05/29/2019    BASOSABS 0.03 05/29/2019         DIAGNOSTIC TESTING:     No results found. PHYSICAL EXAMINATION: Vital signs reviewed per the nursing documentation. There were no vitals taken for this visit. There is no height or weight on file to calculate BMI. Physical Exam      I personally reviewed the nurse's notes and documentation and I agree with her notes. General: alert, appears stated age and cooperative Psych: Normal. and Alert and oriented, appropriate affect. . Normal affect. Mentation normal  HEENT: PERRLA. Clear conjunctivae and sclerae. Moist oral mucosae, no lesions or ulcers. The neck is supple, without lymphadenopathy or jugular venous distension. No masses. Normal thyroid. Cardiovascular: S1 S2 RRR no rubs or murmurs. Pulmonary: clear BL. No accessory muscle usage. Abdominal Exam: Soft, NT ND, no hepato or spleno megaly, +BS, no ascites. Obese      IMPRESSION: Ms. Eunice Pleitez is a 62 y.o. female with liver cirrhosis. Hepatic encephalopathy. Continue lactulose. [facility]. She may benefit from a colonoscopy. She has a bone lesion with biopsy suggestive of squamous cell carcinoma.   We recommended to her to call her oncologist to schedule an appointment as she has not been seen yet. We will see her back in 3 months. Thank you for allowing me to participate in the care of Ms. Eunice Pleitez. For any further questions please do not hesitate to contact me. I have reviewed and agree with the ROS entered by the MA/LPN. Note is dictated utilizing voice recognition software. Unfortunately this leads to occasional typographical errors. Please contact our office if you have any questions.     Manley Apley, MD  [facility]  O: #[phone]

## 2020-09-08 RX ORDER — LACTULOSE 10 G/15ML
SOLUTION ORAL
Qty: 1000 ML | Refills: 0 | Status: SHIPPED | OUTPATIENT
Start: 2020-09-08 | End: 2020-11-11

## 2020-11-16 ENCOUNTER — APPOINTMENT (OUTPATIENT)
Dept: CT IMAGING | Age: 65
DRG: 982 | End: 2020-11-16
Payer: MEDICARE

## 2020-11-16 ENCOUNTER — HOSPITAL ENCOUNTER (INPATIENT)
Age: 65
LOS: 8 days | Discharge: ANOTHER HOSPITAL | DRG: 982 | End: 2020-11-24
Attending: EMERGENCY MEDICINE | Admitting: FAMILY MEDICINE
Payer: MEDICARE

## 2020-11-16 ENCOUNTER — APPOINTMENT (OUTPATIENT)
Dept: GENERAL RADIOLOGY | Age: 65
DRG: 982 | End: 2020-11-16
Payer: MEDICARE

## 2020-11-16 DIAGNOSIS — C80.1 CANCER (HCC): ICD-10-CM

## 2020-11-16 DIAGNOSIS — M25.552 LEFT HIP PAIN: Primary | ICD-10-CM

## 2020-11-16 DIAGNOSIS — S32.402A CLOSED NONDISPLACED FRACTURE OF LEFT ACETABULUM, UNSPECIFIED PORTION OF ACETABULUM, INITIAL ENCOUNTER (HCC): ICD-10-CM
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
## 2020-11-16 PROBLEM — L03.115 CELLULITIS OF RIGHT LOWER EXTREMITY: Status: RESOLVED | Noted: 2018-08-09 | Resolved: 2020-11-16

## 2020-11-16 PROBLEM — G93.40 ENCEPHALOPATHY: Status: RESOLVED | Noted: 2019-05-23 | Resolved: 2020-11-16

## 2020-11-16 PROBLEM — D69.6 THROMBOCYTOPENIA (HCC): Status: RESOLVED | Noted: 2018-10-08 | Resolved: 2020-11-16

## 2020-11-16 PROBLEM — N61.0 CELLULITIS OF RIGHT BREAST: Status: RESOLVED | Noted: 2018-09-26 | Resolved: 2020-11-16

## 2020-11-16 PROBLEM — N39.0 ACUTE URINARY TRACT INFECTION: Status: RESOLVED | Noted: 2018-09-06 | Resolved: 2020-11-16

## 2020-11-16 PROBLEM — M84.50XA PATHOLOGICAL FRACTURE DUE TO METASTATIC BONE DISEASE: Status: ACTIVE | Noted: 2020-11-16

## 2020-11-16 LAB
ABSOLUTE EOS #: 0.34 K/UL (ref 0–0.44)
ABSOLUTE IMMATURE GRANULOCYTE: 0.03 K/UL (ref 0–0.3)
ABSOLUTE LYMPH #: 1.7 K/UL (ref 1.1–3.7)
ABSOLUTE MONO #: 1.05 K/UL (ref 0.1–1.2)
ANION GAP SERPL CALCULATED.3IONS-SCNC: 9 MMOL/L (ref 9–17)
BASOPHILS # BLD: 1 % (ref 0–2)
BASOPHILS ABSOLUTE: 0.05 K/UL (ref 0–0.2)
BUN BLDV-MCNC: 16 MG/DL (ref 8–23)
BUN/CREAT BLD: NORMAL (ref 9–20)
CALCIUM SERPL-MCNC: 9 MG/DL (ref 8.6–10.4)
CHLORIDE BLD-SCNC: 103 MMOL/L (ref 98–107)
CO2: 24 MMOL/L (ref 20–31)
CREAT SERPL-MCNC: 0.5 MG/DL (ref 0.5–0.9)
DIFFERENTIAL TYPE: ABNORMAL
EOSINOPHILS RELATIVE PERCENT: 5 % (ref 1–4)
GFR AFRICAN AMERICAN: >60 ML/MIN
GFR NON-AFRICAN AMERICAN: >60 ML/MIN
GFR SERPL CREATININE-BSD FRML MDRD: NORMAL ML/MIN/{1.73_M2}
GFR SERPL CREATININE-BSD FRML MDRD: NORMAL ML/MIN/{1.73_M2}
GLUCOSE BLD-MCNC: 83 MG/DL (ref 70–99)
HCT VFR BLD CALC: 37.4 % (ref 36.3–47.1)
HEMOGLOBIN: 12.2 G/DL (ref 11.9–15.1)
IMMATURE GRANULOCYTES: 0 %
LYMPHOCYTES # BLD: 24 % (ref 24–43)
MCH RBC QN AUTO: 30.3 PG (ref 25.2–33.5)
MCHC RBC AUTO-ENTMCNC: 32.6 G/DL (ref 28.4–34.8)
MCV RBC AUTO: 93 FL (ref 82.6–102.9)
MONOCYTES # BLD: 15 % (ref 3–12)
NRBC AUTOMATED: 0 PER 100 WBC
PDW BLD-RTO: 14.3 % (ref 11.8–14.4)
PLATELET # BLD: 143 K/UL (ref 138–453)
PLATELET ESTIMATE: ABNORMAL
PMV BLD AUTO: 10.5 FL (ref 8.1–13.5)
POTASSIUM SERPL-SCNC: 3.9 MMOL/L (ref 3.7–5.3)
RBC # BLD: 4.02 M/UL (ref 3.95–5.11)
RBC # BLD: ABNORMAL 10*6/UL
SARS-COV-2, RAPID: NOT DETECTED
SARS-COV-2: NORMAL
SARS-COV-2: NORMAL
SEG NEUTROPHILS: 55 % (ref 36–65)
SEGMENTED NEUTROPHILS ABSOLUTE COUNT: 3.8 K/UL (ref 1.5–8.1)
SODIUM BLD-SCNC: 136 MMOL/L (ref 135–144)
SOURCE: NORMAL
WBC # BLD: 7 K/UL (ref 3.5–11.3)
WBC # BLD: ABNORMAL 10*3/UL

## 2020-11-16 PROCEDURE — 73523 X-RAY EXAM HIPS BI 5/> VIEWS: CPT

## 2020-11-16 PROCEDURE — 72192 CT PELVIS W/O DYE: CPT

## 2020-11-16 PROCEDURE — 73502 X-RAY EXAM HIP UNI 2-3 VIEWS: CPT

## 2020-11-16 PROCEDURE — 76376 3D RENDER W/INTRP POSTPROCES: CPT

## 2020-11-16 PROCEDURE — 6360000002 HC RX W HCPCS: Performed by: STUDENT IN AN ORGANIZED HEALTH CARE EDUCATION/TRAINING PROGRAM

## 2020-11-16 PROCEDURE — 71260 CT THORAX DX C+: CPT

## 2020-11-16 PROCEDURE — 6360000004 HC RX CONTRAST MEDICATION: Performed by: STUDENT IN AN ORGANIZED HEALTH CARE EDUCATION/TRAINING PROGRAM

## 2020-11-16 PROCEDURE — 80048 BASIC METABOLIC PNL TOTAL CA: CPT

## 2020-11-16 PROCEDURE — U0002 COVID-19 LAB TEST NON-CDC: HCPCS

## 2020-11-16 PROCEDURE — 85025 COMPLETE CBC W/AUTO DIFF WBC: CPT

## 2020-11-16 PROCEDURE — 99222 1ST HOSP IP/OBS MODERATE 55: CPT | Performed by: NURSE PRACTITIONER

## 2020-11-16 PROCEDURE — 99284 EMERGENCY DEPT VISIT MOD MDM: CPT

## 2020-11-16 PROCEDURE — 73552 X-RAY EXAM OF FEMUR 2/>: CPT

## 2020-11-16 PROCEDURE — 6370000000 HC RX 637 (ALT 250 FOR IP): Performed by: STUDENT IN AN ORGANIZED HEALTH CARE EDUCATION/TRAINING PROGRAM

## 2020-11-16 PROCEDURE — 1200000000 HC SEMI PRIVATE

## 2020-11-16 RX ORDER — ACETAMINOPHEN 325 MG/1
650 TABLET ORAL ONCE
Status: COMPLETED | OUTPATIENT
Start: 2020-11-16 | End: 2020-11-16

## 2020-11-16 RX ORDER — MORPHINE SULFATE 4 MG/ML
4 INJECTION, SOLUTION INTRAMUSCULAR; INTRAVENOUS ONCE
Status: DISCONTINUED | OUTPATIENT
Start: 2020-11-16 | End: 2020-11-16

## 2020-11-16 RX ADMIN — ACETAMINOPHEN 650 MG: 325 TABLET ORAL at 16:18

## 2020-11-16 RX ADMIN — HYDROMORPHONE HYDROCHLORIDE 1 MG: 1 INJECTION, SOLUTION INTRAMUSCULAR; INTRAVENOUS; SUBCUTANEOUS at 21:40

## 2020-11-16 RX ADMIN — IOPAMIDOL 75 ML: 755 INJECTION, SOLUTION INTRAVENOUS at 22:30

## 2020-11-16 ASSESSMENT — ENCOUNTER SYMPTOMS
CONSTIPATION: 0
COUGH: 0
SHORTNESS OF BREATH: 0
STRIDOR: 0
DIARRHEA: 0
ABDOMINAL PAIN: 0
NAUSEA: 0
BLOOD IN STOOL: 0
VOMITING: 0
WHEEZING: 0

## 2020-11-16 ASSESSMENT — PAIN SCALES - GENERAL: PAINLEVEL_OUTOF10: 6

## 2020-11-16 ASSESSMENT — PAIN DESCRIPTION - ORIENTATION: ORIENTATION: LEFT

## 2020-11-16 NOTE — ED PROVIDER NOTES
[facility] ED     Emergency Department     Faculty Attestation        I performed a history and physical examination of the patient and discussed management with the resident. I reviewed the residents note and agree with the documented findings and plan of care. Any areas of disagreement are noted on the chart. I was personally present for the key portions of any procedures. I have documented in the chart those procedures where I was not present during the key portions. I have reviewed the emergency nurses triage note. I agree with the chief complaint, past medical history, past surgical history, allergies, medications, social and family history as documented unless otherwise noted below. For mid-level providers such as nurse practitioners as well as physicians assistants:    I have personally seen and evaluated the patient. I find the patient's history and physical exam are consistent with NP/PA documentation. I agree with the care provided, treatment rendered, disposition, & follow-up plan. Additional findings are as noted. Vital Signs: BP (!) 140/80   Pulse 76   Temp 98.6 °F (37 °C) (Oral)   Resp 18   Ht 5' 7\" (1.702 m)   Wt 180 lb (81.6 kg)   SpO2 97%   BMI 28.19 kg/m²   PCP:  Mauro Bean    Pertinent Comments:     Left hip and leg pain after falling out of bed 2 days ago. No bruising ecchymosis lacerations. She is otherwise afebrile toxic. Will image.       Critical Care  None          Rich Polo MD  Attending Emergency Medicine Physician              Dallas Triplett MD  11/16/20 [id number]

## 2020-11-16 NOTE — ED PROVIDER NOTES
Faculty Sign-Out Attestation  Handoff taken on the following patient from prior Attending Physician: Steven Mims    I was available and discussed any additional care issues that arose and coordinated the management plans with the resident(s) caring for the patient during my duty period. Any areas of disagreement with residents documentation of care or procedures are noted on the chart. I was personally present for the key portions of any/all procedures during my duty period. I have documented in the chart those procedures where I was not present during the key portions. XR HIP W PELVIS MIN 5 VWS BILATERAL   Final Result   No acute osseous abnormality. XR HIP LEFT (2-3 VIEWS)   Preliminary Result   1. Possible acute nondisplaced fracture of the left acetabulum. Suggest   clinical correlation, and if concerning, consider further characterization   with a follow-up left hip CT study. 2. No acute abnormality of the left femur. XR FEMUR LEFT (MIN 2 VIEWS)   Preliminary Result   1. Possible acute nondisplaced fracture of the left acetabulum. Suggest   clinical correlation, and if concerning, consider further characterization   with a follow-up left hip CT study. 2. No acute abnormality of the left femur.          CT PELVIS WO CONTRAST Additional Contrast? None    (Results Pending)         Louisa Vogel MD  Attending Physician       Louisa Vogel MD  11/23/20 [id number]

## 2020-11-16 NOTE — CONSULTS
Orthopedic Surgery Consult      CC/Reason for consult:  Left acetabulum fracture    HPI:    The patient is a 60 y.o. female that present to the ED with left hip pain. The patient states that the pain started two days ago after she stumbled while ambulated. She states she did not fall or hit her head. She has been unable to ambulate following so she presented to ED today. She describes severe left hip pain made worse with motion of hip. Patient states she lives at home. She states that she had a bone biopsy at [facility] prior to [facility] where she states she was diagnosed with cancer. She is unable to recall what cancer she has. She states she was going to follow up with a doctor but [facility] pandemic and financial issues prevented her from following up. She states she has recurrent cellulitis and lymphedema to BLEs. States she is not on any blood thinners. Denies any numbness or tingling in the extremities.      Past Medical History:    Past Medical History:   Diagnosis Date    Acute urinary tract infection 9/6/2018    Anemia     h/o anemia,transfused 2/2014    Anemia, iron deficiency 10/8/2018    Anxiety disorder     can get SOB with an anxiety attack    Arthritis     Asthma     Atopic rhinitis 9/6/2018    Bandemia     Blood transfusion reaction     x 2 unit prbc's 2/2014    Cellulitis and abscess of trunk     Cellulitis of right breast 9/26/2018    Cellulitis of right lower extremity- resolving 8/9/2018    COPD (chronic obstructive pulmonary disease) ([address]     patient denies COPD, only has asthma    CRP elevated     Depression     Diabetes mellitus ([address]     Disorder associated with type 2 diabetes mellitus ([address] 10/8/2018    Disorder of liver 9/6/2018    Displacement of lumbar intervertebral disc without myelopathy 10/8/2018    Essential hypertension 8/9/2018    Fibromyalgia 10/8/2018    Full thickness rotator cuff tear 10/8/2018    Gastroesophageal reflux disease 9/6/2018    GERD (gastroesophageal reflux disease)     Hammer toe of right foot     Hernia, abdominal 1998    Hyperglycemia due to type 2 diabetes mellitus ([address] 10/8/2018    Hyperlipidemia     Hypertension     Hypothyroid 10/8/2018    Iron deficiency anemia 10/8/2018    Lactic acidosis     Localized, primary osteoarthritis of shoulder region 10/8/2018    Mass of right breast 10/8/2018    Migraine 9/6/2018    Morbid obesity ([address] 8/9/2018    ARLYN (obstructive sleep apnea) 8/9/2018    Osteoarthritis 9/26/2018    Pure hypercholesterolemia 10/8/2018    Restless leg syndrome     Seasonal allergies     Septicemia ([address]     SIRS due to infectious process without acute organ dysfunction     Sleep apnea     doesn't use CPAP, sleeps in a recliner    Spondylosis     lower back    Thrombocytopenia ([address] 10/8/2018       Past Surgical History:    Past Surgical History:   Procedure Laterality Date    APPENDECTOMY      COLONOSCOPY      ROTATOR CUFF REPAIR Right     UPPER GASTROINTESTINAL ENDOSCOPY  05/29/2019    with biopsy by Dr. Florina Desouza 5/29/2019    EGD BIOPSY performed by Raulito White MD at [address] [place] [address]       Medications Prior to Admission:   Prior to Admission medications    Medication Sig Start Date End Date Taking?  Authorizing Provider   lactulose (CHRONULAC) 10 GM/15ML solution TAKE 15 TO 30 ML BY MOUTH DAILY INCREASE TO 60 ML BY MOUTH IF NEEDED TO ACHIEVE 2 TO 3 BOWEL MOVENTS A DAY 11/11/20   Latrice Bond MD   baclofen (LIORESAL) 10 MG tablet  5/27/20   Historical Provider, MD   DULoxetine (CYMBALTA) 30 MG extended release capsule  7/30/20   Historical Provider, MD   pramipexole (MIRAPEX) 0.125 MG tablet Take 1 tablet by mouth nightly 5/29/19   Stiven Leiva MD   levothyroxine (SYNTHROID) 25 MCG tablet Take 1 tablet by mouth Daily 5/30/19   Stiven Leiva MD   lisinopril (PRINIVIL;ZESTRIL) 20 MG tablet Take 20 mg by mouth daily    Historical Provider, MD Gets together: Not on file     Attends [employer] service: Not on file     Active member of club or organization: Not on file     Attends meetings of clubs or organizations: Not on file     Relationship status: Not on file    Intimate partner violence     Fear of current or ex partner: Not on file     Emotionally abused: Not on file     Physically abused: Not on file     Forced sexual activity: Not on file   Other Topics Concern    Not on file   Social History Narrative    Not on file       Family History:  Family History   Problem Relation Age of Onset    Heart Disease Mother     Pacemaker Mother     Hypertension Mother     Diabetes Father     Breast Cancer Neg Hx        REVIEW OF SYSTEMS:   Constitutional: Negative for fever and chills. Respiratory: Negative for cough, shortness of breath and wheezing. Cardiovascular: Negative for chest pain and palpitations. Gastrointestinal: Negative for nausea. Negative for vomiting. Musculoskeletal: Positive for left hip pain   Skin: Positive for bilateral lower extremity cellulitis    Neurological: Negative for dizziness, sensory change and headaches. PHYSICAL EXAM:  Blood pressure (!) 140/80, pulse 76, temperature 98.6 °F (37 °C), temperature source Oral, resp. rate 18, height 5' 7\" (1.702 m), weight 180 lb (81.6 kg), SpO2 97 %. Gen: alert and oriented, cooperative    Head: normocephalic atraumatic     Chest: Non labored breathing. b/l clavicles without TTP, crepitus, step off, or deformity    Cardiovascular: Regular rate, no dependent edema, distal pulses 2+    Respiratory: Chest symmetric, no accessory muscle use, normal respirations    Pelvis: stable to anterior and lateral compression    RUE: Skin intact. No pain with ROM of extremity. Non tender to palpation. Compartments soft and compressible. 2+ rad pulse. Median/Radial/Ulnar/AIN/PIN motor intact. Median/Radial/Ulnar nerve SILT. LUE: Skin intact. No pain with ROM of extremity.  Non tender to palpation. Compartments soft and compressible. 2+ rad pulse. Median/Radial/Ulnar/AIN/PIN motor intact. Median/Radial/Ulnar nerve SILT. RLE: Edema and cellulitis to lower leg. Compartments soft and compressible. EHL/FHL/TA/GS complex motor intact. Sural, saphenous, superificial/deep peroneal, and plantar nerve distribution SILT. Foot warm and perfused. -log roll. Knee ligaments grossly intact. LLE: Edema and cellulitis to lower leg. Compartments soft and compressible. EHL/FHL/TA/GS complex motor intact. Sural, saphenous, superificial/deep peroneal, and plantar nerve distribution SILT. Foot warm and perfused. Severe pain with log roll and straight leg raise. LABS:  No results for input(s): WBC, HGB, HCT, PLT, INR, PTT, NA, K, BUN, CREATININE, GLUCOSE, SEDRATE, CRP in the last 72 hours. Invalid input(s): PT     Radiology:    Review of plain films and CT imaging of the pelvis demonstrates a transverse displaced pathologic fracture of the left acetabulum with extensive metastatic disease present within pelvis. A/P: 60 y.o. 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 Will follow oncology work up and recommendations  -Pain control and medical management per admitting team   -DVT ppx: [id number] Lillian Springer from ortho standpoint at this time. No surgical intervention planned.   -Will continue to follow.  Will follow up oncology recommendations   -Please page Ortho with any questions or concerns    Aj Coronel DO   Orthopedic Surgery Resident PGY-3  [facility]

## 2020-11-16 NOTE — ED NOTES
COVID swab collected, labeled, & sent to lab      [facility], RN  11/16/20 1804

## 2020-11-16 NOTE — ED PROVIDER NOTES
[address]  Emergency Department Encounter  Emergency Medicine Resident         This patient was evaluated in the Emergency Department for symptoms described in the history of present illness. He/she was evaluated in the context of the global COVID-19 pandemic, which necessitated consideration that the patient might be at risk for infection with the SARS-CoV-2 virus that causes COVID-19. Institutional protocols and algorithms that pertain to the evaluation of patients at risk for COVID-19 are in a state of rapid change based on information released by regulatory bodies including the CDC and federal and state organizations. These policies and algorithms were followed during the patient's care in the ED. Pt Name: Scottie Velasco  MRN: [id number]  [place] 1955  Date of evaluation: 11/16/20  PCP:  Robin Carrasco Dr       Chief Complaint   Patient presents with    Leg Pain    Groin Pain       HISTORY OF PRESENT ILLNESS  (Location/Symptom, Timing/Onset, Context/Setting, Quality, Duration, Modifying Factors, Severity.)    Scottie Velasco is a 60 y.o. female  who presents with left leg pain and left hip pain after almost sustaining a fall 2 days ago. She was walking into the bathroom when she felt unsteady on her feet and fell towards her cat tower grabbing it to break her fall. She then lowered herself to the ground. No head strike no loss of consciousness no altered sensorium afterward. She states that she has these random episodes where she suddenly feels unsteady on her feet but they are not preceded by any symptoms no nausea vomiting visual changes loss of sensation palpitations or otherwise before or after. She was able to crawl to her bed and get up afterwards whole process of approximately 10 minutes per her estimate. No treatments prior to arrival.  No numbness tingling weakness. Pain is primarily over the left hip and left thigh no radiation time 10 severity. Not on file    Food insecurity     Worry: Not on file     Inability: Not on file    Transportation needs     Medical: Not on file     Non-medical: Not on file   Tobacco Use    Smoking status: Never Smoker    Smokeless tobacco: Never Used   Substance and Sexual Activity    Alcohol use: No    Drug use: No    Sexual activity: Not on file   Lifestyle    Physical activity     Days per week: Not on file     Minutes per session: Not on file    Stress: Not on file   Relationships    Social connections     Talks on phone: Not on file     Gets together: Not on file     Attends Worship service: Not on file     Active member of club or organization: Not on file     Attends meetings of clubs or organizations: Not on file     Relationship status: Not on file    Intimate partner violence     Fear of current or ex partner: Not on file     Emotionally abused: Not on file     Physically abused: Not on file     Forced sexual activity: Not on file   Other Topics Concern    Not on file   Social History Narrative    Not on file       Family History   Problem Relation Age of Onset    Heart Disease Mother     Pacemaker Mother     Hypertension Mother     Diabetes Father     Breast Cancer Neg Hx        Allergies:    Nsaids; Sulfa antibiotics; and Tape [adhesive tape]    Home Medications:  Prior to Admission medications    Medication Sig Start Date End Date Taking?  Authorizing Provider   lactulose (CHRONULAC) 10 GM/15ML solution TAKE 15 TO 30 ML BY MOUTH DAILY INCREASE TO 60 ML BY MOUTH IF NEEDED TO ACHIEVE 2 TO 3 BOWEL MOVENTS A DAY 11/11/20   Latrice Bond MD   baclofen (LIORESAL) 10 MG tablet  5/27/20   Historical Provider, MD   DULoxetine (CYMBALTA) 30 MG extended release capsule  7/30/20   Historical Provider, MD   pramipexole (MIRAPEX) 0.125 MG tablet Take 1 tablet by mouth nightly 5/29/19   Stiven Leiva MD   levothyroxine (SYNTHROID) 25 MCG tablet Take 1 tablet by mouth Daily 5/30/19   Stiven Leiva MD lisinopril (PRINIVIL;ZESTRIL) 20 MG tablet Take 20 mg by mouth daily    Historical Provider, MD   loratadine-pseudoephedrine (CLARITIN-D 24 HOUR)  MG per extended release tablet Take 1 tablet by mouth daily    Historical Provider, MD   metoprolol succinate (TOPROL XL) 25 MG extended release tablet Take 25 mg by mouth daily    Historical Provider, MD   oxyCODONE-acetaminophen (PERCOCET) 5-325 MG per tablet Take 1 tablet by mouth 3 times daily as needed for Pain. Historical Provider, MD   aspirin 81 MG EC tablet Take 81 mg by mouth daily. Historical Provider, MD   citalopram (CELEXA) 40 MG tablet Take 40 mg by mouth daily. Historical Provider, MD   fluticasone (FLONASE) 50 MCG/ACT nasal spray 1 spray by Nasal route 2 times daily as needed for Rhinitis. Historical Provider, MD   metFORMIN ER (GLUCOPHAGE-XR) 500 MG XR tablet Take 1,000 mg by mouth 2 times daily (with meals) Indications: Take two 500mg tablets BID with meals Take two 500mg tablets BID with meals    Historical Provider, MD   omeprazole (PRILOSEC) 20 MG capsule Take 20 mg by mouth daily. Historical Provider, MD       REVIEW OF SYSTEMS    (2-9 systems for level 4, 10 or more for level 5)    Negative unless specified in HPI      Gen: No Fever, No chills  EYES: No blurry visiion, no double vision  HENT: No sore throat, No runny nose. No cough  CV: No CP , No palpitation  RESP: No SOB, No respiratory distress  GI: No N/V, No Abdm pain  : No dysuria  SKIN: No rash  MSK: No back pain, no joint pain  NEURO: No HA, no weakness  PSYCH: No SI/HI      PHYSICAL EXAM   (up to 7 for level 4, 8 or more for level 5)     INITIAL VITALS:  height is 5' 7\" (1.702 m) and weight is 180 lb (81.6 kg). Her oral temperature is 98.6 °F (37 °C). Her blood pressure is 140/80 (abnormal) and her pulse is 76. Her respiration is 18 and oxygen saturation is 97%.      Physical Exam  GENERAL: upon initial examination, patient is well appearing, nontoxic, and not in acute respiratory distress. She is sitting in the bed resting comfortably talking with her . Vital signs notable for slight hypertension. Not tachycardic not tachypneic and hypoxic on room air. Morbidly obese  HENT: normocephalic , nose normal, no cervical thoracic or lumbar tenderness palpation no midline subsequent. EYES: no occular discharge, no scleral icterus  NECK: no JVD, no tracheal deviation  CV: Normal S1 S2, no MRG  PULM / CHEST: CTA Bilaterally all fields, no WRR  ABDOMEN: SNTND, No peritoneal signs  MSK: no gross deformity, no edema, no TTP  NEURO: Awake alert cooperative moves all extremities with decreased movement of left lower extremity secondary to patient's pain in hip and upper leg. DP PT femoral +2 bilaterally. Sensation is intact in bilateral lower extremities. No leg length inequality. Bilateral lower extremities to have cellulitis that is being treated previously. Right lower extremity of hip leg foot 5 out of 5 strength flexion extension.   SKIN: no rash, no erythema, cap refill < 2 sec  PSYCH / BEHAVIORAL: mood/affect normal, behavior normal, no flight of ideas    DIFFERENTIAL  DIAGNOSIS / MDM   PLAN (LABS / IMAGING / EKG):  Orders Placed This Encounter   Procedures    XR HIP LEFT (2-3 VIEWS)    XR FEMUR LEFT (MIN 2 VIEWS)    XR HIP W PELVIS MIN 5 VWS BILATERAL    CT PELVIS WO CONTRAST Additional Contrast? None    CT 3D RECONSTRUCTION    CT CHEST ABDOMEN PELVIS W CONTRAST    COVID-19    CBC WITH AUTO DIFFERENTIAL    BASIC METABOLIC PANEL    Inpatient consult to Orthopedic Surgery    Inpatient consult to Hospitalist    PATIENT STATUS (FROM ED OR OR/PROCEDURAL) Inpatient       MEDICATIONS ORDERED:  Orders Placed This Encounter   Medications    acetaminophen (TYLENOL) tablet 650 mg    DISCONTD: morphine injection 4 mg    HYDROmorphone (DILAUDID) injection 1 mg         MDM:    Leonardo Early is a 60 y.o. female who presents with left-sided hip pain as well as left thigh pain. Negative [place] CT head cervical.  No ecchymosis edema or leg length inequality's. Significant tenderness palpation over the left hip as well as the left femur. Tylenol ibuprofen x-rays. EMERGENCY DEPARTMENT COURSE:  ED Course as of Nov 16 2209   Mon Nov 16, 2020   1648 Possible  acute nondisplaced fracture of the left acetabulum. Will consult ortho     [RB]   2045 1. Pathologic fracture of the left acetabulum centered along the superior  acetabulum with fracture lines involving both the anterior and posterior  columns. 2. Osseous metastatic disease with destructive lesions involving the  bilateral hemipelvis, sacrum, and partially imaged lumbar spine. 3. Metastatic lymphadenopathy within the imaged pelvis with the largest node  measuring 2.3 cm in short axis dimension. [RB]   2114 Case was discussed with orthopedic surgery nonoperative candidate. Will need admission for oncology evaluation as well as looking for primary site of mets. [RB]      ED Course User Index  [RB] Patricia Carrizales,          DIAGNOSTIC RESULTS / EMERGENCYDEPARTMENT COURSE / MDM   LABS:  Labs Reviewed   CBC WITH AUTO DIFFERENTIAL - Abnormal; Notable for the following components:       Result Value    Monocytes 15 (*)     Eosinophils % 5 (*)     All other components within normal limits   [id number]   BASIC METABOLIC PANEL       RADIOLOGY:  Xr Hip Left (2-3 Views)    Result Date: 11/16/2020  EXAMINATION: TWO XRAY VIEWS OF THE LEFT FEMUR, TWO VIEWS LEFT HIP 11/16/2020 4:26 pm COMPARISON: None. HISTORY: ORDERING SYSTEM PROVIDED HISTORY: fall TECHNOLOGIST PROVIDED HISTORY: fall Reason for Exam: fall Acuity: Acute Type of Exam: Initial Acute left hip and femoral pain FINDINGS: Frontal and frog-leg lateral views of the left hip were performed. Multiple curvilinear lucencies involving the left acetabulum, which could represent acute nondisplaced fractures of the left acetabulum in the right clinical setting.   No additional fracture is identified. There is no evidence of dislocation. There is mild left hip osteoarthritis. No destructive osseous lesion is seen. Mild enthesopathic changes are evident. Frontal and lateral views of the proximal and distal aspects of the left femur were performed. There is no acute fracture or dislocation of the left femur. No periosteal reaction or osseous destruction is evident. There is mild osteoarthritis at the left knee joint. No soft tissue abnormality or radiopaque foreign body is evident. 1. Possible acute nondisplaced fracture of the left acetabulum. Suggest clinical correlation, and if concerning, consider further characterization with a follow-up left hip CT study. 2. No acute abnormality of the left femur. Xr Femur Left (min 2 Views)    Result Date: 11/16/2020  EXAMINATION: TWO XRAY VIEWS OF THE LEFT FEMUR, TWO VIEWS LEFT HIP 11/16/2020 4:26 pm COMPARISON: None. HISTORY: ORDERING SYSTEM PROVIDED HISTORY: fall TECHNOLOGIST PROVIDED HISTORY: fall Reason for Exam: fall Acuity: Acute Type of Exam: Initial Acute left hip and femoral pain FINDINGS: Frontal and frog-leg lateral views of the left hip were performed. Multiple curvilinear lucencies involving the left acetabulum, which could represent acute nondisplaced fractures of the left acetabulum in the right clinical setting. No additional fracture is identified. There is no evidence of dislocation. There is mild left hip osteoarthritis. No destructive osseous lesion is seen. Mild enthesopathic changes are evident. Frontal and lateral views of the proximal and distal aspects of the left femur were performed. There is no acute fracture or dislocation of the left femur. No periosteal reaction or osseous destruction is evident. There is mild osteoarthritis at the left knee joint. No soft tissue abnormality or radiopaque foreign body is evident. 1. Possible acute nondisplaced fracture of the left acetabulum. Suggest clinical correlation, and if concerning, consider further characterization with a follow-up left hip CT study. 2. No acute abnormality of the left femur. Ct Pelvis Wo Contrast Additional Contrast? None    Result Date: 11/16/2020  EXAMINATION: CT OF THE PELVIS WITHOUT CONTRAST 11/16/2020 7:52 pm TECHNIQUE: CT of the pelvis was performed without the administration of intravenous contrast.  Multiplanar reformatted images are provided for review. Dose modulation, iterative reconstruction, and/or weight based adjustment of the mA/kV was utilized to reduce the radiation dose to as low as reasonably achievable. COMPARISON: Pelvis and left hip radiograph same day HISTORY: ORDERING SYSTEM PROVIDED HISTORY: Rule out left tab fracture. TECHNOLOGIST PROVIDED HISTORY: 2mm thin cuts. Please include left proximal hip in imaging. Thank you. Rule out left tab fracture. Reason for Exam: Left Leg/Groin pain - Rule out left tab fracture. Acuity: Acute Type of Exam: Initial FINDINGS: Bones demonstrate no destructive sclerotic lesions involving the partially imaged L3 vertebral body and posterior elements, bilateral iliac bones, left greater than right, and sacrum with associated destructive changes of the osseous structures. Findings consistent with metastatic disease. Lesion within the left iliac bone also extends to involve the acetabulum and superior pubic ramus on the left proximally. Sclerotic lesion also identified at the left ischial tuberosity compatible with metastatic lesion. Acute pathologic fracture of the left acetabulum with fracture lines centered at the superior acetabulum and involving both the anterior and posterior columns. The fracture is mildly displaced. No additional fractures are identified. Mild-to-moderate osteoarthritic changes of the bilateral hips. Moderate to severe degenerative changes of the imaged lower lumbar spine.  Visualized intrapelvic structures demonstrate retroperitoneal and intra-abdominal lymphadenopathy most consistent with metastatic disease. Largest node measures approximately 2.3 cm in short axis dimension (image 17 series 2). Severe atherosclerotic disease. Soft tissues demonstrate anasarca. 1. Pathologic fracture of the left acetabulum centered along the superior acetabulum with fracture lines involving both the anterior and posterior columns. 2. Osseous metastatic disease with destructive lesions involving the bilateral hemipelvis, sacrum, and partially imaged lumbar spine. 3. Metastatic lymphadenopathy within the imaged pelvis with the largest node measuring 2.3 cm in short axis dimension. Xr Hip W Pelvis Min 5 Vws Bilateral    Result Date: 11/16/2020  EXAMINATION: ONE XRAY VIEW OF THE PELVIS AND TWO XRAY VIEWS OF EACH OF THE BILATERAL HIPS 11/16/2020 5:10 pm COMPARISON: None. HISTORY: ORDERING SYSTEM PROVIDED HISTORY: AP pelvis, Inlet, Outlet, Cross-table lateral left hip TECHNOLOGIST PROVIDED HISTORY: Please and thank you. AP pelvis, Inlet, Outlet, Cross-table lateral left hip Reason for Exam: Pain left hip, s/p multiple falls. FINDINGS: There is no evidence of acute fracture. There is normal alignment. No acute joint abnormality. No focal osseous lesion. No focal soft tissue abnormality. No acute osseous abnormality. CONSULTS:  IP CONSULT TO ORTHOPEDIC SURGERY  IP CONSULT TO HOSPITALIST    CRITICAL CARE:  Please see attending note    FINAL IMPRESSION     1. Left hip pain    2. Cancer ([address]    3. Closed nondisplaced fracture of left acetabulum, unspecified portion of acetabulum, initial encounter ([address]          DISPOSITION / [id number] Admitted 11/16/2020 09:34:59 PM        PATIENT REFERRED TO:  No follow-up provider specified. DISCHARGE MEDICATIONS:  New Prescriptions    No medications on file       Dr. Scottie Hoffman.  [facility]  Emergency Medicine Resident Physician, PGY-3    (Please note that portions of this note were completed with a voice recognition program.  Efforts were made to edit the dictations but occasionally words are mis-transcribed.)            Yamel Cancer, DO  Resident  11/16/20 7016

## 2020-11-16 NOTE — ED NOTES
Pt arrived to ED with c/o left leg/groin pain after falling out of bed 2 days ago,   Pt reports previous difficulty walking and states she uses a brief at home & has a bedside commode @ home. Pt reports groin pain started after fall and states pain is unbearable with any movement,  Pt became tearful during triage and states she was \"diagnosed with cancer in the hip\" but reports she \"ignored the diagnosis\" because she \"didn't want to deal with it\" pt now fearful leg pain could be related to diagnosis,  Pt also reports right arm swelling,   Pt denies LOC, denies hitting her head,  Pt A&Ox4, RR even/unlabored, NAD noted. call light within reach, will cont to monitor.        Fred Baca RN  11/16/20 0312

## 2020-11-17 ENCOUNTER — APPOINTMENT (OUTPATIENT)
Dept: GENERAL RADIOLOGY | Age: 65
DRG: 982 | End: 2020-11-17
Payer: MEDICARE

## 2020-11-17 PROBLEM — C41.4: Status: ACTIVE | Noted: 2020-11-17

## 2020-11-17 PROBLEM — R22.31 LOCALIZED SWELLING OF RIGHT UPPER EXTREMITY: Status: ACTIVE | Noted: 2020-11-17

## 2020-11-17 LAB
ANION GAP SERPL CALCULATED.3IONS-SCNC: 12 MMOL/L (ref 9–17)
BUN BLDV-MCNC: 16 MG/DL (ref 8–23)
BUN/CREAT BLD: NORMAL (ref 9–20)
CALCIUM SERPL-MCNC: 9.1 MG/DL (ref 8.6–10.4)
CHLORIDE BLD-SCNC: 106 MMOL/L (ref 98–107)
CO2: 21 MMOL/L (ref 20–31)
CREAT SERPL-MCNC: 0.52 MG/DL (ref 0.5–0.9)
ESTIMATED AVERAGE GLUCOSE: 111 MG/DL
GFR AFRICAN AMERICAN: >60 ML/MIN
GFR NON-AFRICAN AMERICAN: >60 ML/MIN
GFR SERPL CREATININE-BSD FRML MDRD: NORMAL ML/MIN/{1.73_M2}
GFR SERPL CREATININE-BSD FRML MDRD: NORMAL ML/MIN/{1.73_M2}
GLUCOSE BLD-MCNC: 121 MG/DL (ref 65–105)
GLUCOSE BLD-MCNC: 129 MG/DL (ref 65–105)
GLUCOSE BLD-MCNC: 131 MG/DL (ref 65–105)
GLUCOSE BLD-MCNC: 74 MG/DL (ref 65–105)
GLUCOSE BLD-MCNC: 78 MG/DL (ref 70–99)
GLUCOSE BLD-MCNC: 95 MG/DL (ref 65–105)
GLUCOSE BLD-MCNC: 97 MG/DL (ref 65–105)
HBA1C MFR BLD: 5.5 % (ref 4–6)
HCT VFR BLD CALC: 37.3 % (ref 36.3–47.1)
HEMOGLOBIN: 11.8 G/DL (ref 11.9–15.1)
INR BLD: 1.4
MCH RBC QN AUTO: 29.9 PG (ref 25.2–33.5)
MCHC RBC AUTO-ENTMCNC: 31.6 G/DL (ref 28.4–34.8)
MCV RBC AUTO: 94.4 FL (ref 82.6–102.9)
NRBC AUTOMATED: 0 PER 100 WBC
PDW BLD-RTO: 14.3 % (ref 11.8–14.4)
PLATELET # BLD: 145 K/UL (ref 138–453)
PMV BLD AUTO: 10.6 FL (ref 8.1–13.5)
POTASSIUM SERPL-SCNC: 4.1 MMOL/L (ref 3.7–5.3)
PROTHROMBIN TIME: 14.1 SEC (ref 9–12)
RBC # BLD: 3.95 M/UL (ref 3.95–5.11)
SODIUM BLD-SCNC: 139 MMOL/L (ref 135–144)
WBC # BLD: 7.4 K/UL (ref 3.5–11.3)

## 2020-11-17 PROCEDURE — 85027 COMPLETE CBC AUTOMATED: CPT

## 2020-11-17 PROCEDURE — 1200000000 HC SEMI PRIVATE

## 2020-11-17 PROCEDURE — 99232 SBSQ HOSP IP/OBS MODERATE 35: CPT | Performed by: FAMILY MEDICINE

## 2020-11-17 PROCEDURE — 82947 ASSAY GLUCOSE BLOOD QUANT: CPT

## 2020-11-17 PROCEDURE — 73060 X-RAY EXAM OF HUMERUS: CPT

## 2020-11-17 PROCEDURE — 99223 1ST HOSP IP/OBS HIGH 75: CPT | Performed by: INTERNAL MEDICINE

## 2020-11-17 PROCEDURE — 6370000000 HC RX 637 (ALT 250 FOR IP): Performed by: NURSE PRACTITIONER

## 2020-11-17 PROCEDURE — 93971 EXTREMITY STUDY: CPT

## 2020-11-17 PROCEDURE — 73030 X-RAY EXAM OF SHOULDER: CPT

## 2020-11-17 PROCEDURE — 83036 HEMOGLOBIN GLYCOSYLATED A1C: CPT

## 2020-11-17 PROCEDURE — 85610 PROTHROMBIN TIME: CPT

## 2020-11-17 PROCEDURE — 36415 COLL VENOUS BLD VENIPUNCTURE: CPT

## 2020-11-17 PROCEDURE — 80048 BASIC METABOLIC PNL TOTAL CA: CPT

## 2020-11-17 PROCEDURE — 73090 X-RAY EXAM OF FOREARM: CPT

## 2020-11-17 PROCEDURE — 2580000003 HC RX 258: Performed by: NURSE PRACTITIONER

## 2020-11-17 PROCEDURE — 6360000002 HC RX W HCPCS: Performed by: NURSE PRACTITIONER

## 2020-11-17 RX ORDER — ACETAMINOPHEN 325 MG/1
650 TABLET ORAL EVERY 6 HOURS PRN
Status: DISCONTINUED | OUTPATIENT
Start: 2020-11-17 | End: 2020-11-24 | Stop reason: HOSPADM

## 2020-11-17 RX ORDER — NICOTINE 21 MG/24HR
1 PATCH, TRANSDERMAL 24 HOURS TRANSDERMAL DAILY PRN
Status: DISCONTINUED | OUTPATIENT
Start: 2020-11-17 | End: 2020-11-24 | Stop reason: HOSPADM

## 2020-11-17 RX ORDER — SODIUM CHLORIDE 0.9 % (FLUSH) 0.9 %
10 SYRINGE (ML) INJECTION EVERY 12 HOURS SCHEDULED
Status: DISCONTINUED | OUTPATIENT
Start: 2020-11-17 | End: 2020-11-24 | Stop reason: HOSPADM

## 2020-11-17 RX ORDER — CITALOPRAM 20 MG/1
40 TABLET ORAL DAILY
Status: DISCONTINUED | OUTPATIENT
Start: 2020-11-17 | End: 2020-11-24 | Stop reason: HOSPADM

## 2020-11-17 RX ORDER — PRAMIPEXOLE DIHYDROCHLORIDE 0.25 MG/1
0.12 TABLET ORAL NIGHTLY
Status: DISCONTINUED | OUTPATIENT
Start: 2020-11-17 | End: 2020-11-24 | Stop reason: HOSPADM

## 2020-11-17 RX ORDER — LACTULOSE 10 G/15ML
30 SOLUTION ORAL 3 TIMES DAILY
Status: DISCONTINUED | OUTPATIENT
Start: 2020-11-17 | End: 2020-11-24 | Stop reason: HOSPADM

## 2020-11-17 RX ORDER — POTASSIUM CHLORIDE 7.45 MG/ML
10 INJECTION INTRAVENOUS PRN
Status: DISCONTINUED | OUTPATIENT
Start: 2020-11-17 | End: 2020-11-24 | Stop reason: HOSPADM

## 2020-11-17 RX ORDER — FLUTICASONE PROPIONATE 50 MCG
1 SPRAY, SUSPENSION (ML) NASAL 2 TIMES DAILY PRN
Status: DISCONTINUED | OUTPATIENT
Start: 2020-11-17 | End: 2020-11-24 | Stop reason: HOSPADM

## 2020-11-17 RX ORDER — ONDANSETRON 2 MG/ML
4 INJECTION INTRAMUSCULAR; INTRAVENOUS EVERY 6 HOURS PRN
Status: DISCONTINUED | OUTPATIENT
Start: 2020-11-17 | End: 2020-11-24 | Stop reason: HOSPADM

## 2020-11-17 RX ORDER — POLYETHYLENE GLYCOL 3350 17 G/17G
17 POWDER, FOR SOLUTION ORAL DAILY PRN
Status: DISCONTINUED | OUTPATIENT
Start: 2020-11-17 | End: 2020-11-24 | Stop reason: HOSPADM

## 2020-11-17 RX ORDER — DEXTROSE MONOHYDRATE 25 G/50ML
12.5 INJECTION, SOLUTION INTRAVENOUS PRN
Status: DISCONTINUED | OUTPATIENT
Start: 2020-11-17 | End: 2020-11-24 | Stop reason: HOSPADM

## 2020-11-17 RX ORDER — MAGNESIUM SULFATE 1 G/100ML
1 INJECTION INTRAVENOUS PRN
Status: DISCONTINUED | OUTPATIENT
Start: 2020-11-17 | End: 2020-11-24 | Stop reason: HOSPADM

## 2020-11-17 RX ORDER — NICOTINE POLACRILEX 4 MG
15 LOZENGE BUCCAL PRN
Status: DISCONTINUED | OUTPATIENT
Start: 2020-11-17 | End: 2020-11-24 | Stop reason: HOSPADM

## 2020-11-17 RX ORDER — PROMETHAZINE HYDROCHLORIDE 12.5 MG/1
12.5 TABLET ORAL EVERY 6 HOURS PRN
Status: DISCONTINUED | OUTPATIENT
Start: 2020-11-17 | End: 2020-11-24 | Stop reason: HOSPADM

## 2020-11-17 RX ORDER — METOPROLOL SUCCINATE 25 MG/1
25 TABLET, EXTENDED RELEASE ORAL DAILY
Status: DISCONTINUED | OUTPATIENT
Start: 2020-11-17 | End: 2020-11-24 | Stop reason: HOSPADM

## 2020-11-17 RX ORDER — ASPIRIN 81 MG/1
81 TABLET ORAL DAILY
Status: DISCONTINUED | OUTPATIENT
Start: 2020-11-17 | End: 2020-11-24 | Stop reason: HOSPADM

## 2020-11-17 RX ORDER — POTASSIUM CHLORIDE 20 MEQ/1
40 TABLET, EXTENDED RELEASE ORAL PRN
Status: DISCONTINUED | OUTPATIENT
Start: 2020-11-17 | End: 2020-11-24 | Stop reason: HOSPADM

## 2020-11-17 RX ORDER — PANTOPRAZOLE SODIUM 40 MG/1
40 TABLET, DELAYED RELEASE ORAL
Status: DISCONTINUED | OUTPATIENT
Start: 2020-11-17 | End: 2020-11-24 | Stop reason: HOSPADM

## 2020-11-17 RX ORDER — DEXTROSE MONOHYDRATE 50 MG/ML
100 INJECTION, SOLUTION INTRAVENOUS PRN
Status: DISCONTINUED | OUTPATIENT
Start: 2020-11-17 | End: 2020-11-24 | Stop reason: HOSPADM

## 2020-11-17 RX ORDER — LISINOPRIL 20 MG/1
20 TABLET ORAL DAILY
Status: DISCONTINUED | OUTPATIENT
Start: 2020-11-17 | End: 2020-11-24 | Stop reason: HOSPADM

## 2020-11-17 RX ORDER — OXYCODONE HYDROCHLORIDE AND ACETAMINOPHEN 5; 325 MG/1; MG/1
2 TABLET ORAL EVERY 6 HOURS PRN
Status: DISCONTINUED | OUTPATIENT
Start: 2020-11-17 | End: 2020-11-22

## 2020-11-17 RX ORDER — SODIUM CHLORIDE 0.9 % (FLUSH) 0.9 %
10 SYRINGE (ML) INJECTION PRN
Status: DISCONTINUED | OUTPATIENT
Start: 2020-11-17 | End: 2020-11-24 | Stop reason: HOSPADM

## 2020-11-17 RX ORDER — LEVOTHYROXINE SODIUM 0.03 MG/1
25 TABLET ORAL DAILY
Status: DISCONTINUED | OUTPATIENT
Start: 2020-11-17 | End: 2020-11-24 | Stop reason: HOSPADM

## 2020-11-17 RX ORDER — ACETAMINOPHEN 650 MG/1
650 SUPPOSITORY RECTAL EVERY 6 HOURS PRN
Status: DISCONTINUED | OUTPATIENT
Start: 2020-11-17 | End: 2020-11-24 | Stop reason: HOSPADM

## 2020-11-17 RX ADMIN — SODIUM CHLORIDE, PRESERVATIVE FREE 10 ML: 5 INJECTION INTRAVENOUS at 22:24

## 2020-11-17 RX ADMIN — SODIUM CHLORIDE, PRESERVATIVE FREE 10 ML: 5 INJECTION INTRAVENOUS at 09:13

## 2020-11-17 RX ADMIN — FLUTICASONE PROPIONATE 1 SPRAY: 50 SPRAY, METERED NASAL at 03:13

## 2020-11-17 RX ADMIN — PANTOPRAZOLE SODIUM 40 MG: 40 TABLET, DELAYED RELEASE ORAL at 06:28

## 2020-11-17 RX ADMIN — Medication 81 MG: at 09:11

## 2020-11-17 RX ADMIN — OXYCODONE HYDROCHLORIDE AND ACETAMINOPHEN 2 TABLET: 5; 325 TABLET ORAL at 09:11

## 2020-11-17 RX ADMIN — ENOXAPARIN SODIUM 40 MG: 40 INJECTION SUBCUTANEOUS at 09:11

## 2020-11-17 RX ADMIN — METOPROLOL SUCCINATE 25 MG: 25 TABLET, FILM COATED, EXTENDED RELEASE ORAL at 09:11

## 2020-11-17 RX ADMIN — LEVOTHYROXINE SODIUM 25 MCG: 25 TABLET ORAL at 06:28

## 2020-11-17 RX ADMIN — LISINOPRIL 20 MG: 20 TABLET ORAL at 09:11

## 2020-11-17 RX ADMIN — PRAMIPEXOLE DIHYDROCHLORIDE 0.12 MG: 0.25 TABLET ORAL at 22:22

## 2020-11-17 RX ADMIN — OXYCODONE HYDROCHLORIDE AND ACETAMINOPHEN 2 TABLET: 5; 325 TABLET ORAL at 03:09

## 2020-11-17 RX ADMIN — OXYCODONE HYDROCHLORIDE AND ACETAMINOPHEN 2 TABLET: 5; 325 TABLET ORAL at 22:26

## 2020-11-17 RX ADMIN — LACTULOSE 30 G: 20 SOLUTION ORAL at 22:23

## 2020-11-17 RX ADMIN — OXYCODONE HYDROCHLORIDE AND ACETAMINOPHEN 2 TABLET: 5; 325 TABLET ORAL at 15:01

## 2020-11-17 RX ADMIN — CITALOPRAM 40 MG: 20 TABLET, FILM COATED ORAL at 09:11

## 2020-11-17 RX ADMIN — LACTULOSE 30 G: 20 SOLUTION ORAL at 09:11

## 2020-11-17 ASSESSMENT — PAIN SCALES - GENERAL
PAINLEVEL_OUTOF10: 9
PAINLEVEL_OUTOF10: 5
PAINLEVEL_OUTOF10: 7
PAINLEVEL_OUTOF10: 0

## 2020-11-17 NOTE — H&P
[facility]  Office: [address], DO, Tanjam Ormond, DO, Priscaeduard Ron, DO, Dahlia Green, DO, Eleonora Mooney MD, Daija Richard MD, Gonzalo Pitts MD, Humberto Lagunas MD, Raul Duvall MD, Carol Gu MD, Jabier Wiggins MD, Sharyle Haff, MD, Courtney Palacios MD, Anjel Gregory DO, Eric Rodriguez MD, Bree Springer MD, Alfredo Hardy DO, Jesenia Russell MD,  Carline Hutchison DO, Bienvenido Herrera MD, Gavin De Oliveira MD, Rossy [facility], [facility], CNP, Shai Luis, CNP, Saad Arteaga, CNS, Maria Elena Gary, CNP, Cherise Owens, CNP, Jony Hubbard, CNP, Marius Spatz, CNP, Martín Hermosillo, CNP, Violeta Coon PA-C, Brooklyn Moran, JULIETH, Antoinette Paula, CNP, Brandon Estrada, CNP, [place], CNP, Rula Aaron, CNP, Claudia Negrete, CNP         [address]    HISTORY AND PHYSICAL EXAMINATION            Date:   11/16/2020  Patient name:  Linh Mane  Date of admission:  11/16/2020  3:05 PM  MRN:   [id number]  Account:  [de-identified]  YOB: 1955  PCP:    Julieta Rodriguez  Room:   01/01  Code Status:    Prior    Chief Complaint:     Chief Complaint   Patient presents with    Leg Pain    Groin Pain       History Obtained From:     patient, electronic medical record    History of Present Illness: Linh Mane is a 60 y.o. Non-/non  female who presents with Leg Pain and Groin Pain   and is admitted to the hospital for the management of Pathological Left Acetabulum fracture due to metastatic bone disease. Patient presents to the emergency room with a complaint of left leg pain and left hip pain after sustaining a near fall incident 2 days prior.   Patient reports that she was walking and developed an unsteady sensation and started to fall forward almost catching herself on the bedside commode however then switching to her cat tower and then catching herself in a\" doing a splits motion\" and stumbled to her bed but did not fall. Patient states that since the incident she has been having worsening pain decided to seek treatment in the emergency room. Patient denied any presyncopal symptomology and denies any other concerns or complaints per review of systems. However on exam I noted her right arm was grossly swollen in comparison to her left and stated that it has been that way since the \"near fall\". Her work-up in the emergency room revealed a pathological fracture with metastatic bone disease which she states she is not surprised because she was due to see heme-onc after previous diagnosis however she did not secondary to Covid and the copayment cost.        Work up in the emergency room      Vitals:    Temp. 98.6     HR. 76     RR.  18      BP.   140/80      SPO2. 97%    Laboratories:   Metabolic panel. -Normal-sodium 136, potassium 3.9, chloride 103, CO2 24, BUN 16, creatinine 0.5 glucose 83,  GI/Liver Panel-not done  Hematology.-Normal-WBC 7.0, hemoglobin 12.2, hematocrit 37.4, RDW 14.3, eosinophils 5%, monocytes 15  Coagulation-not done  Cardiac Markers-not done  EKG-not done  Urine-not done      Imaging and Diagnostics:     Xr Hip Left (2-3 Views)    1. Possible acute nondisplaced fracture of the left acetabulum. Suggest clinical correlation, and if concerning, consider further characterization with a follow-up left hip CT study. 2. No acute abnormality of the left femur. Xr Femur Left (min 2 Views)    1. Possible acute nondisplaced fracture of the left acetabulum. Suggest clinical correlation, and if concerning, consider further characterization with a follow-up left hip CT study. 2. No acute abnormality of the left femur. Ct Pelvis Wo Contrast Additional Contrast? None    1. Pathologic fracture of the left acetabulum centered along the superior acetabulum with fracture lines involving both the anterior and posterior columns.  2. Osseous metastatic disease with destructive lesions involving the bilateral hemipelvis, sacrum, and partially imaged lumbar spine. 3. Metastatic lymphadenopathy within the imaged pelvis with the largest node measuring 2.3 cm in short axis dimension. Xr Hip W Pelvis Min 5 Vws Bilateral    No acute osseous abnormality.            Past Medical History:     Past Medical History:   Diagnosis Date    Acute urinary tract infection 9/6/2018    Anemia     h/o anemia,transfused 2/2014    Anemia, iron deficiency 10/8/2018    Anxiety disorder     can get SOB with an anxiety attack    Arthritis     Asthma     Atopic rhinitis 9/6/2018    Bandemia     Blood transfusion reaction     x 2 unit prbc's 2/2014    Cellulitis and abscess of trunk     Cellulitis of right breast 9/26/2018    Cellulitis of right lower extremity- resolving 8/9/2018    COPD (chronic obstructive pulmonary disease) ([address]     patient denies COPD, only has asthma    CRP elevated     Depression     Diabetes mellitus ([address]     Disorder associated with type 2 diabetes mellitus ([address] 10/8/2018    Disorder of liver 9/6/2018    Displacement of lumbar intervertebral disc without myelopathy 10/8/2018    Essential hypertension 8/9/2018    Fibromyalgia 10/8/2018    Full thickness rotator cuff tear 10/8/2018    Gastroesophageal reflux disease 9/6/2018    GERD (gastroesophageal reflux disease)     Hammer toe of right foot     Hernia, abdominal 1998    Hyperglycemia due to type 2 diabetes mellitus ([address] 10/8/2018    Hyperlipidemia     Hypertension     Hypothyroid 10/8/2018    Iron deficiency anemia 10/8/2018    Lactic acidosis     Localized, primary osteoarthritis of shoulder region 10/8/2018    Mass of right breast 10/8/2018    Migraine 9/6/2018    Morbid obesity ([address] 8/9/2018    ARLYN (obstructive sleep apnea) 8/9/2018    Osteoarthritis 9/26/2018    Pure hypercholesterolemia 10/8/2018    Restless leg syndrome     Seasonal allergies     Septicemia ([address]     SIRS due to infectious process without acute organ dysfunction     Sleep apnea     doesn't use CPAP, sleeps in a recliner    Spondylosis     lower back    Thrombocytopenia ([facility] 10/8/2018        Past Surgical History:     Past Surgical History:   Procedure Laterality Date    APPENDECTOMY      COLONOSCOPY      ROTATOR CUFF REPAIR Right     UPPER GASTROINTESTINAL ENDOSCOPY  05/29/2019    with biopsy by Dr. Kaylee Medina 5/29/2019    EGD BIOPSY performed by Aileen Spring MD at [facility]        Medications Prior to Admission:     Prior to Admission medications    Medication Sig Start Date End Date Taking? Authorizing Provider   lactulose (CHRONULAC) 10 GM/15ML solution TAKE 15 TO 30 ML BY MOUTH DAILY INCREASE TO 60 ML BY MOUTH IF NEEDED TO ACHIEVE 2 TO 3 BOWEL MOVENTS A DAY 11/11/20   Raphael Solo MD   baclofen (LIORESAL) 10 MG tablet  5/27/20   Historical Provider, MD   DULoxetine (CYMBALTA) 30 MG extended release capsule  7/30/20   Historical Provider, MD   pramipexole (MIRAPEX) 0.125 MG tablet Take 1 tablet by mouth nightly 5/29/19   Anh Santana MD   levothyroxine (SYNTHROID) 25 MCG tablet Take 1 tablet by mouth Daily 5/30/19   Anh Santana MD   lisinopril (PRINIVIL;ZESTRIL) 20 MG tablet Take 20 mg by mouth daily    Historical Provider, MD   loratadine-pseudoephedrine (CLARITIN-D 24 HOUR)  MG per extended release tablet Take 1 tablet by mouth daily    Historical Provider, MD   metoprolol succinate (TOPROL XL) 25 MG extended release tablet Take 25 mg by mouth daily    Historical Provider, MD   oxyCODONE-acetaminophen (PERCOCET) 5-325 MG per tablet Take 1 tablet by mouth 3 times daily as needed for Pain. Historical Provider, MD   aspirin 81 MG EC tablet Take 81 mg by mouth daily. Historical Provider, MD   citalopram (CELEXA) 40 MG tablet Take 40 mg by mouth daily.     Historical Provider, MD   fluticasone (FLONASE) 50 MCG/ACT nasal spray 1 spray by Nasal route 2 times daily as needed for Rhinitis. Historical Provider, MD   metFORMIN ER (GLUCOPHAGE-XR) 500 MG XR tablet Take 1,000 mg by mouth 2 times daily (with meals) Indications: Take two 500mg tablets BID with meals Take two 500mg tablets BID with meals    Historical Provider, MD   omeprazole (PRILOSEC) 20 MG capsule Take 20 mg by mouth daily. Historical Provider, MD        Allergies:     Nsaids; Sulfa antibiotics; and Tape [adhesive tape]    Social History:     Tobacco:    reports that she has never smoked. She has never used smokeless tobacco.  Alcohol:      reports no history of alcohol use. Drug Use:  reports no history of drug use. Family History:     Family History   Problem Relation Age of Onset    Heart Disease Mother    Sarah Boyec Pacemaker Mother     Hypertension Mother     Diabetes Father     Breast Cancer Neg Hx        Review of Systems:     Positive and Negative as described in HPI. Review of Systems   Constitutional: Negative for chills, diaphoresis and fever. HENT: Negative for congestion and hearing loss. Respiratory: Negative for cough, shortness of breath, wheezing and stridor. Cardiovascular: Negative for chest pain, palpitations and leg swelling. Gastrointestinal: Negative for abdominal pain, blood in stool, constipation, diarrhea, nausea and vomiting. Genitourinary: Negative for dysuria and frequency. Musculoskeletal: Positive for arthralgias. Negative for myalgias. Skin: Negative for rash. Neurological: Negative for dizziness, seizures and headaches. Psychiatric/Behavioral: The patient is not nervous/anxious. Physical Exam:   BP (!) 140/80   Pulse 76   Temp 98.6 °F (37 °C) (Oral)   Resp 18   Ht 5' 7\" (1.702 m)   Wt 180 lb (81.6 kg)   SpO2 97%   BMI 28.19 kg/m²   Temp (24hrs), Av.6 °F (37 °C), Min:98.6 °F (37 °C), Max:98.6 °F (37 °C)    No results for input(s): POCGLU in the last 72 hours.   No intake or output data in the 24 hours ending 20 1012    Physical Exam  Vitals signs and nursing note reviewed. Constitutional:       General: She is not in acute distress. Appearance: She is well-developed. She is not diaphoretic. HENT:      Head: Normocephalic and atraumatic. Right Ear: Hearing normal.      Left Ear: Hearing normal.      Nose: Nose normal. No rhinorrhea. Eyes:      General: Lids are normal.      Extraocular Movements:      Right eye: Normal extraocular motion. Left eye: Normal extraocular motion. Conjunctiva/sclera: Conjunctivae normal.      Right eye: Right conjunctiva is not injected. Left eye: Left conjunctiva is not injected. Pupils: Pupils are equal, round, and reactive to light. Pupils are equal.      Right eye: Pupil is reactive. Left eye: Pupil is reactive. Neck:      Musculoskeletal: Neck supple. Thyroid: No thyromegaly. Vascular: No carotid bruit. Trachea: Trachea and phonation normal. No tracheal deviation. Cardiovascular:      Rate and Rhythm: Normal rate and regular rhythm. Pulses: Normal pulses. Heart sounds: Normal heart sounds. No murmur. Pulmonary:      Effort: Pulmonary effort is normal. No respiratory distress. Breath sounds: Normal breath sounds. No stridor. No decreased breath sounds. Abdominal:      General: Bowel sounds are normal. There is no distension. Palpations: Abdomen is soft. There is no mass. Tenderness: There is no abdominal tenderness. There is no guarding. Musculoskeletal:         General: No tenderness. Skin:     General: Skin is warm and dry. Findings: No erythema, lesion or rash. Neurological:      Mental Status: She is alert and oriented to person, place, and time. She is not disoriented. Cranial Nerves: No cranial nerve deficit. Psychiatric:         Speech: Speech normal.         Behavior: Behavior normal. Behavior is cooperative.          Investigations:      Laboratory Testing:  Recent Results (from the past 24 hour(s))   COVID-19    Collection Time: 11/16/20  7:00 PM    Specimen: Other   Result Value Ref Range    SARS-CoV-2          SARS-CoV-2, Rapid Not Detected Not Detected    Source . NASOPHARYNGEAL SWAB     SARS-CoV-2         CBC WITH AUTO DIFFERENTIAL    Collection Time: 11/16/20  9:43 PM   Result Value Ref Range    WBC 7.0 3.5 - 11.3 k/uL    RBC 4.02 3.95 - 5.11 m/uL    Hemoglobin 12.2 11.9 - 15.1 g/dL    Hematocrit 37.4 36.3 - 47.1 %    MCV 93.0 82.6 - 102.9 fL    MCH 30.3 25.2 - 33.5 pg    MCHC 32.6 28.4 - 34.8 g/dL    RDW 14.3 11.8 - 14.4 %    Platelets 745 280 - 546 k/uL    MPV 10.5 8.1 - 13.5 fL    NRBC Automated 0.0 0.0 per 100 WBC    Differential Type NOT REPORTED     Seg Neutrophils 55 36 - 65 %    Lymphocytes 24 24 - 43 %    Monocytes 15 (H) 3 - 12 %    Eosinophils % 5 (H) 1 - 4 %    Basophils 1 0 - 2 %    Immature Granulocytes 0 0 %    Segs Absolute 3.80 1.50 - 8.10 k/uL    Absolute Lymph # 1.70 1.10 - 3.70 k/uL    Absolute Mono # 1.05 0.10 - 1.20 k/uL    Absolute Eos # 0.34 0.00 - 0.44 k/uL    Basophils Absolute 0.05 0.00 - 0.20 k/uL    Absolute Immature Granulocyte 0.03 0.00 - 0.30 k/uL    WBC Morphology NOT REPORTED     RBC Morphology NOT REPORTED     Platelet Estimate NOT REPORTED    BASIC METABOLIC PANEL    Collection Time: 11/16/20  9:43 PM   Result Value Ref Range    Glucose 83 70 - 99 mg/dL    BUN 16 8 - 23 mg/dL    CREATININE 0.50 0.50 - 0.90 mg/dL    Bun/Cre Ratio NOT REPORTED 9 - 20    Calcium 9.0 8.6 - 10.4 mg/dL    Sodium 136 135 - 144 mmol/L    Potassium 3.9 3.7 - 5.3 mmol/L    Chloride 103 98 - 107 mmol/L    CO2 24 20 - 31 mmol/L    Anion Gap 9 9 - 17 mmol/L    GFR Non-African American >60 >60 mL/min    GFR African American >60 >60 mL/min    GFR Comment          GFR Staging NOT REPORTED        Imaging/Diagnostics:  Xr Hip Left (2-3 Views)    Result Date: 11/16/2020  1. Possible acute nondisplaced fracture of the left acetabulum.   Suggest clinical correlation, and if concerning, consider further characterization with a follow-up left hip CT study. 2. No acute abnormality of the left femur. Xr Femur Left (min 2 Views)    Result Date: 11/16/2020  1. Possible acute nondisplaced fracture of the left acetabulum. Suggest clinical correlation, and if concerning, consider further characterization with a follow-up left hip CT study. 2. No acute abnormality of the left femur. Ct Pelvis Wo Contrast Additional Contrast? None    Result Date: 11/16/2020  1. Pathologic fracture of the left acetabulum centered along the superior acetabulum with fracture lines involving both the anterior and posterior columns. 2. Osseous metastatic disease with destructive lesions involving the bilateral hemipelvis, sacrum, and partially imaged lumbar spine. 3. Metastatic lymphadenopathy within the imaged pelvis with the largest node measuring 2.3 cm in short axis dimension. Xr Hip W Pelvis Min 5 Vws Bilateral    Result Date: 11/16/2020  No acute osseous abnormality. Assessment :      Hospital Problems           Last Modified POA    * (Principal) Pathological fracture due to metastatic bone disease 11/16/2020 Yes    Fall from standing 11/16/2020 Yes    Localized swelling of right upper extremity 11/17/2020 Yes    Diabetes mellitus ([address] 11/16/2020 Yes    ARLYN (obstructive sleep apnea) 11/16/2020 Yes    Morbid obesity ([address] 11/16/2020 Yes    Essential hypertension 11/16/2020 Yes    Depression 11/16/2020 Yes    Osteoarthritis 11/16/2020 Yes    Anemia, iron deficiency 11/16/2020 Yes    Asthma 11/16/2020 Yes    Hyperlipidemia 11/16/2020 Yes    Pure hypercholesterolemia 11/16/2020 Yes          Plan:     Patient status inpatient in the Med/Surge    1.  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 Near Fall from standing   Will rule out fracture right upper arm as she stated the swelling started with the fall. No acute syncopal symptomology reported   3. Localized swelling of the right upper extremity   Obtain imaging and vascular studies  4. Diabetes   Insulin sliding scale, hypoglycemia protocol  5. Obstructive sleep apnea   RT protocol for noninvasive positive therapy if she is unable to get her home machine  6. Essential hypertension   Continue with home medications of lisinopril and Lopressor  7. Uncomplicated asthma   RT protocol as needed no acute symptomology of exacerbation based off exam or  HPI review of systems  8. Cirrhosis of the liver with hyperammonemia   Resume home lactulose as she has not been taking it for 2 days no acute mental  status changes noted at this time  9.  GI prophylaxis   Protonix  10. DVT prophylaxis   Lovenox  Consultations:   IP CONSULT TO ORTHOPEDIC SURGERY  IP CONSULT TO HOSPITALIST     Patient is admitted as inpatient status because of co-morbidities listed above, severity of signs and symptoms as outlined, requirement for current medical therapies and most importantly because of direct risk to patient if care not provided in a hospital setting. Expected length of stay > 48 hours.     ZACARIAS Miller - [facility]  11/16/2020  10:38 PM    Copy sent to Dr. Julieta Rodriguez

## 2020-11-17 NOTE — ED NOTES
Pt resting comfortably on cot, pt readjusted on cot, NAD noted, call light remains within reach, will cont to monitor       Parul Gilbert RN  11/16/20 2024

## 2020-11-17 NOTE — PROGRESS NOTES
[facility]  Office: [address], DO, Mario Clink, DO, Colton Jerryon, DO, Raghavendra Freeman Blood, DO, Virgil Lazcano MD, Cynthia Hudson MD, Memo Pace MD, Skylar Celis MD, Rafal Griffith MD, Shalini Arellano MD, Larna Mohs, MD, Ruben Prince MD, Courtney Sheehan MD, Raul Mcginnis, DO, Blanche Arreola MD, Geeta Roberts MD, Tio Lay, DO, David Oneill MD,  Tanja Burns, DO, Svitlana Hernandez MD, Cecilio Palomares MD, Nicole Daly, [facility], [facility], CNP, Tip Cristobal, CNP, Simon Krause, CNS, Gianluca Packer, CNP, Sunitha Bolanos, CNP, Richardson Phillips, CNP, Angie Nunes, CNP, Johnny Olmedo, CNP, Naldo Davis PA-C, Ky Gosselin, [facility], Chuy Ernst, CNP, Lolis Kinney, CNP, Gisele Lee, CNP, Percy Alejandro, CNP, Akila Ernst, CNP         [facility]   [address] [facility]    Progress Note    11/17/2020    12:34 PM    Name:   Yary Maurer  MRN:     [id number]     Acct:      [de-identified]   Room:   [address] [place] [address] Day:  1  Admit Date:  11/16/2020  3:05 PM    PCP:   Fady Mahajan  Code Status:  Full Code    Subjective:     C/C:   Chief Complaint   Patient presents with    Leg Pain    Groin Pain      Interval History Status: not changed. Pt was seen and examined this morning  Resting comfortably in bed at this time   Denies any complaints currently          Review of Systems:     12 point ROS performed today and negative for anything other than what was stated in subjective     Medications: Allergies:     Allergies   Allergen Reactions    Nsaids Other (See Comments)    Sulfa Antibiotics Other (See Comments) and Hives     Other reaction(s): Unknown  Patient unsure of the reaction    Tape Worthy Jaimes Tape] Rash     Other reaction(s): Unknown       Current Meds:   Scheduled Meds:    aspirin  81 mg Oral Daily    citalopram  40 mg Oral Daily    lactulose  30 g Oral TID    levothyroxine  25 mcg Oral Daily    lisinopril  20 mg Oral Thrombocytopenia ([facility] [address] and Thrombocytopenia ([facility] [address] Social History:   reports that she has never smoked. She has never used smokeless tobacco. She reports that she does not drink alcohol or use drugs. Family History:   Family History   Problem Relation Age of Onset    Heart Disease Mother    [facility] Pacemaker Mother     Hypertension Mother     Diabetes Father     Breast Cancer Neg Hx        Vitals:  BP (!) 103/57   Pulse 83   Temp 98.1 °F (36.7 °C) (Oral)   Resp 18   Ht 5' 7\" (1.702 m)   Wt 180 lb (81.6 kg)   SpO2 92%   BMI 28.19 kg/m²   Temp (24hrs), Av.9 °F (36.6 °C), Min:97.1 °F (36.2 °C), Max:98.6 °F (37 °C)    Recent Labs     20  0815   POCGLU 74 121* 95       I/O (24Hr): No intake or output data in the 24 hours ending 20 1234    Labs:  Hematology:  Recent Labs     20  0350   WBC 7.0 7.4   RBC 4.02 3.95   HGB 12.2 11.8*   HCT 37.4 37.3   MCV 93.0 94.4   MCH 30.3 29.9   MCHC 32.6 31.6   RDW 14.3 14.3    145   MPV 10.5 10.6   INR  --  1.4     Chemistry:  Recent Labs     20  0350    139   K 3.9 4.1    106   CO2 24 21   GLUCOSE 83 78   BUN 16 16   CREATININE 0.50 0.52   ANIONGAP 9 12   LABGLOM >60 >60   GFRAA >60 >60   CALCIUM 9.0 9.1     Recent Labs     20  0616 20  0815   POCGLU 74 121* 95     ABG:  Lab Results   Component Value Date    FIO2 RA 2018     Lab Results   Component Value Date/Time    SPECIAL NOT REPORTED 2019 08:09 PM     Lab Results   Component Value Date/Time    CULTURE NO GROWTH 6 DAYS 2019 08:09 PM       Radiology:  Wilburt Snow Shoulder Right (min 2 Views)    Result Date: 2020  1. No evidence for acute fracture or dislocation in the right shoulder, humerus or forearm. 2. Apparent diffuse soft tissue swelling of the elbow and proximal 2/3 of the forearm. No radiopaque foreign body.      Xr Humerus Right (min 2 Views)    Result Date: 11/17/2020  1. No evidence for acute fracture or dislocation in the right shoulder, humerus or forearm. 2. Apparent diffuse soft tissue swelling of the elbow and proximal 2/3 of the forearm. No radiopaque foreign body. Xr Radius Ulna Right (2 Views)    Result Date: 11/17/2020  1. No evidence for acute fracture or dislocation in the right shoulder, humerus or forearm. 2. Apparent diffuse soft tissue swelling of the elbow and proximal 2/3 of the forearm. No radiopaque foreign body. Xr Hip Left (2-3 Views)    Result Date: 11/16/2020  1. Possible acute nondisplaced fracture of the left acetabulum. Suggest clinical correlation, and if concerning, consider further characterization with a follow-up left hip CT study. 2. No acute abnormality of the left femur. Xr Femur Left (min 2 Views)    Result Date: 11/16/2020  1. Possible acute nondisplaced fracture of the left acetabulum. Suggest clinical correlation, and if concerning, consider further characterization with a follow-up left hip CT study. 2. No acute abnormality of the left femur. Ct Pelvis Wo Contrast Additional Contrast? None    Result Date: 11/16/2020  1. Pathologic fracture of the left acetabulum centered along the superior acetabulum with fracture lines involving both the anterior and posterior columns. 2. Osseous metastatic disease with destructive lesions involving the bilateral hemipelvis, sacrum, and partially imaged lumbar spine. 3. Metastatic lymphadenopathy within the imaged pelvis with the largest node measuring 2.3 cm in short axis dimension. Ct Chest Abdomen Pelvis W Contrast    Result Date: 11/16/2020  1. Redemonstration of mixed sclerotic and lytic multifocal marrow changes throughout the visualized skeleton consistent with metastatic disease. 2. No definitive identification of primary tumor mass lesion. 3. Multifocal bilateral lung ground-glass opacification, greatest within the left upper lung lobe.   Differential diagnostic considerations include association with viral pneumonia, opportunistic infection, hypersensitivity pneumonitis and drug toxicity. Recommend clinical correlation. 4. Trace right-sided layering posterior pleural effusion. 5. Mild cardiomegaly. 6. Severe hepatic cirrhosis with associated scattered marked varices related to portal venous hypertension. 7. Small hiatal hernia. 8. Mild pelvic free fluid. 9. Moderate diffuse abdominal and pelvic wall subcutaneous edema. 10. Suggestion porcelain gallbladder. Xr Hip W Pelvis Min 5 Vws Bilateral    Result Date: 11/16/2020  No acute osseous abnormality. Physical Examination:        General appearance:  alert, cooperative and no distress  Mental Status:  oriented to person, place and time and normal affect  Lungs:  clear to auscultation bilaterally, normal effort  Heart:  regular rate and rhythm, no murmur  Abdomen:  soft, nontender, nondistended, normal bowel sounds   Extremities:  no edema, redness, tenderness in the calves  Skin:  no gross lesions, rashes, induration    Assessment:        Hospital Problems           Last Modified POA    * (Principal) Pathological fracture due to metastatic bone disease 11/16/2020 Yes    Diabetes mellitus ([address] 11/16/2020 Yes    ARLYN (obstructive sleep apnea) 11/16/2020 Yes    Morbid obesity ([address] 11/16/2020 Yes    Essential hypertension 11/16/2020 Yes    Depression 11/16/2020 Yes    Osteoarthritis 11/16/2020 Yes    Anemia, iron deficiency 11/16/2020 Yes    Asthma 11/16/2020 Yes    Hyperlipidemia 11/16/2020 Yes    Pure hypercholesterolemia 11/16/2020 Yes    Cirrhosis ([address] 11/17/2020 Yes    Fall from standing 11/16/2020 Yes    Localized swelling of right upper extremity 11/17/2020 Yes    Malignant neoplasm of pelvic bone ([address] 11/17/2020 Yes          Plan:          1. Pathological fracture of left acetabulum due to metastatic bone disease   2.  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 - heme/onc consult placed   - RASHMI FOWLER   - no plan for OR at this time per orthopedic surgery. - continue pain control     3. DMII   - continue accu checks ac/hs with ISS     4. ARLYN  - cpap at night     5. HTN   - v/s per unit protocol   - continue lisinopril and toprol xl      6. Hx of asthma   - stable   - breathing treatments prn     7. Liver cirrhosis with hyperammonemia   - resume home lactulose   - continue to monitor     8. DVT prophylaxis   - lovenox      9.  Hypothyroidism   - continue synthroid       Rudy Jones MD  11/17/2020  12:34 PM

## 2020-11-17 NOTE — CARE COORDINATION
Case Management Initial Discharge Plan  Adrian Mace,             Met with:patient to discuss discharge plans. Information verified: address, contacts, phone number, , insurance Yes    Emergency Contact/Next of Kin name & number:   Karolina Rosenbaum    No Brother/Sister  Child [phone]         PCP: Nate Parikh  Date of last visit: 3 weeks    Insurance Provider: [employer]*    Discharge Planning    Living Arrangements:  Alone   Support Systems:  Family Members    Home has multi stories  0 stairs to climb to get into front door, 0 elevator stairs to climb to reach second floor  Location of bedroom/bathroom in home main    Patient able to perform ADL's:Independent    Current Services (outpatient & in home) none feels she needs help now  DME equipment: walker cane bedside commode  DME provider:     Receiving oral anticoagulation therapy? No pt does not think so    If indicated:   Physician managing anticoagulation treatment: n/a  Where does patient obtain lab work for ATC treatment? n/a      Potential Assistance Needed:  Home Care    Patient agreeable to home care: Yes wants [facility]  Freedom of choice provided:  yes    Prior SNF/Rehab Placement and Facility: [facility] to SNF/Rehab: Yes  Eleva of choice provided: yes     Evaluation: no    Expected Discharge date:       Patient expects to be discharged to:  home  Follow Up Appointment: Best Day/ Time:      Transportation provider: to be determined  Transportation arrangements needed for discharge: Yes    Readmission Risk              Risk of Unplanned Readmission:        8             Does patient have a readmission risk score greater than 14?: No  If yes, follow-up appointment must be made within 7 days of discharge.      Goals of Care: Get hip fixed      Discharge Plan: Pt will go to SNF if needed she is concerned with the financing she was billed but was told it was covered, Freedom of choice given. If home care is acceptable she wants [facility]. Pcp established          Electronically signed by Aicha Rose RN on 11/16/20 at 10:17 PM EST

## 2020-11-17 NOTE — PLAN OF CARE
Problem: Falls - Risk of:  Goal: Will remain free from falls  Description: Will remain free from falls  11/17/2020 1857 by Remedios Bhatti RN  Outcome: Ongoing  11/17/2020 0608 by Rita Triana RN  Outcome: Ongoing  Goal: Absence of physical injury  Description: Absence of physical injury  11/17/2020 1857 by Remedios Bhatti RN  Outcome: Ongoing  11/17/2020 0608 by Rita Triana RN  Outcome: Ongoing

## 2020-11-17 NOTE — PROGRESS NOTES
Physical Therapy  DATE: 2020    NAME: Diana Sheets  MRN: [id number]   : 1955    Patient not seen this date for Physical Therapy due to:  [] Blood transfusion in progress  [] Hemodialysis  [] Patient Declined  [] Spine Precautions   [] Strict Bedrest  [] Surgery/ Procedure  [] Testing      [x] Other- UE dopplers ordered and pelvic xray. PT will check back as time allows. [] PT is being discontinued at this time. Patient independent. No further needs. [] PT is being discontinued at this time due to declining physical/ medical status. Therapy is not appropriate at this time.     Allison Sal PT

## 2020-11-17 NOTE — PROGRESS NOTES
[facility]  Occupational Therapy Not Seen Note    DATE: 2020  Name: Mare Chester  : 1955  MRN: [id number]    Patient not available for Occupational Therapy due to:    [x] Testing: Dopplers of UE, XR of pelvis     [] Hemodialysis    [] Blood Transfusion in Progress    []Refusal by Patient:    [] Surgery/Procedure:    [] Strict Bedrest    [] Sedation    [] Spine Precautions     [] Pt being transferred to palliative care at this time. Spoke with pt/family and OT services to be defered. [] Pt independent with functional mobility and functional tasks.  Pt with no OT acute care needs at this time, will defer OT eval.    [] Other    Next Scheduled Treatment: Ck in pm as able or      Adair Montana OTR/L

## 2020-11-17 NOTE — CONSULTS
oncologist either. Past Medical History:   has a past medical history of Acute urinary tract infection, Anemia, Anemia, iron deficiency, Anxiety disorder, Arthritis, Asthma, Atopic rhinitis, Bandemia, Blood transfusion reaction, Cellulitis and abscess of trunk, Cellulitis of right breast, Cellulitis of right lower extremity- resolving, COPD (chronic obstructive pulmonary disease) ([address] CRP elevated, Depression, Diabetes mellitus ([address] Disorder associated with type 2 diabetes mellitus ([address] Disorder of liver, Displacement of lumbar intervertebral disc without myelopathy, Essential hypertension, Fibromyalgia, Full thickness rotator cuff tear, Gastroesophageal reflux disease, GERD (gastroesophageal reflux disease), Hammer toe of right foot, Hernia, abdominal, Hyperglycemia due to type 2 diabetes mellitus ([address] Hyperlipidemia, Hypertension, Hypothyroid, Iron deficiency anemia, Lactic acidosis, Localized, primary osteoarthritis of shoulder region, Mass of right breast, Migraine, Morbid obesity ([address] ARLYN (obstructive sleep apnea), Osteoarthritis, Pure hypercholesterolemia, Restless leg syndrome, Seasonal allergies, Septicemia ([address] SIRS due to infectious process without acute organ dysfunction, Sleep apnea, Spondylosis, Thrombocytopenia ([address] and Thrombocytopenia ([address] Past Surgical History:   has a past surgical history that includes Appendectomy; Rotator cuff repair (Right); Colonoscopy; Upper gastrointestinal endoscopy (05/29/2019); and Upper gastrointestinal endoscopy (N/A, 5/29/2019). Medications:    Prior to Admission medications    Medication Sig Start Date End Date Taking?  Authorizing Provider   lactulose (CHRONULAC) 10 GM/15ML solution TAKE 15 TO 30 ML BY MOUTH DAILY INCREASE TO 60 ML BY MOUTH IF NEEDED TO ACHIEVE 2 TO 3 BOWEL MOVENTS A DAY 11/11/20   Sushma Mendez MD   baclofen (LIORESAL) 10 MG tablet  5/27/20   Historical Provider, MD   DULoxetine (CYMBALTA) 30 MG extended release capsule 7/30/20   Historical Provider, MD   pramipexole (MIRAPEX) 0.125 MG tablet Take 1 tablet by mouth nightly 5/29/19   Anh Santana MD   levothyroxine (SYNTHROID) 25 MCG tablet Take 1 tablet by mouth Daily 5/30/19   Anh Santana MD   lisinopril (PRINIVIL;ZESTRIL) 20 MG tablet Take 20 mg by mouth daily    Historical Provider, MD   loratadine-pseudoephedrine (CLARITIN-D 24 HOUR)  MG per extended release tablet Take 1 tablet by mouth daily    Historical Provider, MD   metoprolol succinate (TOPROL XL) 25 MG extended release tablet Take 25 mg by mouth daily    Historical Provider, MD   oxyCODONE-acetaminophen (PERCOCET) 5-325 MG per tablet Take 1 tablet by mouth 3 times daily as needed for Pain. Historical Provider, MD   aspirin 81 MG EC tablet Take 81 mg by mouth daily. Historical Provider, MD   citalopram (CELEXA) 40 MG tablet Take 40 mg by mouth daily. Historical Provider, MD   fluticasone (FLONASE) 50 MCG/ACT nasal spray 1 spray by Nasal route 2 times daily as needed for Rhinitis. Historical Provider, MD   metFORMIN ER (GLUCOPHAGE-XR) 500 MG XR tablet Take 1,000 mg by mouth 2 times daily (with meals) Indications: Take two 500mg tablets BID with meals Take two 500mg tablets BID with meals    Historical Provider, MD   omeprazole (PRILOSEC) 20 MG capsule Take 20 mg by mouth daily.     Historical Provider, MD     Current Facility-Administered Medications   Medication Dose Route Frequency Provider Last Rate Last Dose    aspirin EC tablet 81 mg  81 mg Oral Daily ZACARIAS Lang CNP   81 mg at 11/17/20 0911    citalopram (CELEXA) tablet 40 mg  40 mg Oral Daily ZACARIAS Lang CNP   40 mg at 11/17/20 0911    fluticasone (FLONASE) 50 MCG/ACT nasal spray 1 spray  1 spray Nasal BID PRN ZACARIAS Lang CNP   1 spray at 11/17/20 0313    lactulose (CHRONULAC) 10 GM/15ML solution 30 g  30 g Oral TID ZACARIAS Lang CNP   30 g at 11/17/20 0911    levothyroxine (SYNTHROID) tablet 25 mcg  25 mcg Oral Daily Caryle Roys, APRN - CNP   25 mcg at 11/17/20 9991    lisinopril (PRINIVIL;ZESTRIL) tablet 20 mg  20 mg Oral Daily Caryle Roys, APRN - CNP   20 mg at 11/17/20 0911    metoprolol succinate (TOPROL XL) extended release tablet 25 mg  25 mg Oral Daily Caryle Roys, APRN - CNP   25 mg at 11/17/20 0911    pantoprazole (PROTONIX) tablet 40 mg  40 mg Oral QAM AC Caryle Roys, APRN - CNP   40 mg at 11/17/20 8132    pramipexole (MIRAPEX) tablet 0.125 mg  0.125 mg Oral Nightly Caryle Roys, APRN - CNP        oxyCODONE-acetaminophen (PERCOCET) 5-325 MG per tablet 2 tablet  2 tablet Oral Q6H PRN Caryle Roys, APRN - CNP   2 tablet at 11/17/20 1501    sodium chloride flush 0.9 % injection 10 mL  10 mL Intravenous 2 times per day Caryle Roys, APRN - CNP   10 mL at 11/17/20 0913    sodium chloride flush 0.9 % injection 10 mL  10 mL Intravenous PRN Caryle Roys, APRN - CNP        potassium chloride (KLOR-CON M) extended release tablet 40 mEq  40 mEq Oral PRN Caryle Roys, APRN - CNP        Or    potassium bicarb-citric acid (EFFER-K) effervescent tablet 40 mEq  40 mEq Oral PRN Caryle Roys, APRN - CNP        Or    potassium chloride 10 mEq/100 mL IVPB (Peripheral Line)  10 mEq Intravenous PRN Caryle Roys, APRN - CNP        magnesium sulfate 1 g in dextrose 5% 100 mL IVPB  1 g Intravenous PRN Caryle Roys, APRN - CNP        acetaminophen (TYLENOL) tablet 650 mg  650 mg Oral Q6H PRN Caryle Roys, APRN - CNP        Or    acetaminophen (TYLENOL) suppository 650 mg  650 mg Rectal Q6H PRN Caryle Roys, APRN - CNP        polyethylene glycol (GLYCOLAX) packet 17 g  17 g Oral Daily PRN Caryle Roys, APRN - CNP        promethazine (PHENERGAN) tablet 12.5 mg  12.5 mg Oral Q6H PRN Caryle Roys, APRN - CNP Or    ondansetron (ZOFRAN) injection 4 mg  4 mg Intravenous Q6H PRN Jimmy Number, APRN - CNP        nicotine (NICODERM CQ) 21 MG/24HR 1 patch  1 patch Transdermal Daily PRN Jimmy Number, APRN - CNP        enoxaparin (LOVENOX) injection 40 mg  40 mg Subcutaneous Daily Jimmy Number, APRN - CNP   40 mg at 11/17/20 0911    glucose (GLUTOSE) 40 % oral gel 15 g  15 g Oral PRN Jimmy Number, APRN - CNP        dextrose 50 % IV solution  12.5 g Intravenous PRN Jimmy Number, APRN - CNP        glucagon (rDNA) injection 1 mg  1 mg Intramuscular PRN Jimmy Number, APRN - CNP        dextrose 5 % solution  100 mL/hr Intravenous PRN Jimmy Number, APRN - CNP        insulin lispro (HUMALOG) injection vial 0-12 Units  0-12 Units Subcutaneous TID WC Jimmy Number, APRN - CNP        insulin lispro (HUMALOG) injection vial 0-6 Units  0-6 Units Subcutaneous Nightly Jimmy Number, APRN - CNP           Allergies:  Nsaids; Sulfa antibiotics; and Tape [adhesive tape]    Social History:   reports that she has never smoked. She has never used smokeless tobacco. She reports that she does not drink alcohol or use drugs. Family History: family history includes Diabetes in her father; Heart Disease in her mother; Hypertension in her mother; Pacemaker in her mother. REVIEW OF SYSTEMS:      Constitutional: No fever or chills.  No night sweats, no weight loss   Eyes: No eye discharge, double vision, or eye pain   HEENT: negative for sore mouth, sore throat, hoarseness and voice change   Respiratory: negative for cough , sputum, dyspnea, wheezing, hemoptysis, chest pain   Cardiovascular: negative for chest pain, dyspnea, palpitations, orthopnea, PND   Gastrointestinal: negative for nausea, vomiting, diarrhea, constipation, abdominal pain, Dysphagia, hematemesis and hematochezia   Genitourinary: negative for frequency, dysuria, nocturia, urinary incontinence, and hematuria   Integument: negative for rash, skin lesions, bruises. Hematologic/Lymphatic: negative for easy bruising, bleeding, lymphadenopathy, or petechiae   Endocrine: negative for heat or cold intolerance,weight changes, change in bowel habits and hair loss   Musculoskeletal: n severe pelvic pain. Neurological: negative for headaches, dizziness, seizures, weakness, numbness    PHYSICAL EXAM:        BP (!) 103/57   Pulse 83   Temp 98.1 °F (36.7 °C) (Oral)   Resp 18   Ht 5' 7\" (1.702 m)   Wt 180 lb (81.6 kg)   SpO2 92%   BMI 28.19 kg/m²    Temp (24hrs), Av.6 °F (36.4 °C), Min:97.1 °F (36.2 °C), Max:98.1 °F (36.7 °C)      General appearance - well appearing, no in pain or distress   Mental status - alert and cooperative   Eyes - pupils equal and reactive, extraocular eye movements intact   Ears - bilateral TM's and external ear canals normal   Mouth - mucous membranes moist, pharynx normal without lesions   Neck - supple, no significant adenopathy   Lymphatics - no palpable lymphadenopathy, no hepatosplenomegaly   Chest - clear to auscultation, no wheezes, rales or rhonchi, symmetric air entry   Heart - normal rate, regular rhythm, normal S1, S2, no murmurs  Abdomen - soft, nontender, nondistended, no masses or organomegaly   Neurological - alert, oriented, normal speech, no focal findings or movement disorder noted   Musculoskeletal - no joint tenderness, deformity or swelling   Extremities - peripheral pulses normal, no pedal edema, no clubbing or cyanosis   Skin - normal coloration and turgor, no rashes, no suspicious skin lesions noted ,  Breast: Examination performed in the presence of female nurse. Patient has asymmetry of the 2 breasts. Right breast is edematous with pitting edema. There is also swelling of the axilla. However no palpable masses appreciated. No lymphadenopathy is appreciated.     DATA:      Labs:     Results for orders placed or performed during the 106 98 - 107 mmol/L    CO2 21 20 - 31 mmol/L    Anion Gap 12 9 - 17 mmol/L    GFR Non-African American >60 >60 mL/min    GFR African American >60 >60 mL/min    GFR Comment          GFR Staging NOT REPORTED    CBC   Result Value Ref Range    WBC 7.4 3.5 - 11.3 k/uL    RBC 3.95 3.95 - 5.11 m/uL    Hemoglobin 11.8 (L) 11.9 - 15.1 g/dL    Hematocrit 37.3 36.3 - 47.1 %    MCV 94.4 82.6 - 102.9 fL    MCH 29.9 25.2 - 33.5 pg    MCHC 31.6 28.4 - 34.8 g/dL    RDW 14.3 11.8 - 14.4 %    Platelets 874 437 - 647 k/uL    MPV 10.6 8.1 - 13.5 fL    NRBC Automated 0.0 0.0 per 100 WBC   Protime-INR   Result Value Ref Range    Protime 14.1 (H) 9.0 - 12.0 sec    INR 1.4    POC Glucose Fingerstick   Result Value Ref Range    POC Glucose 74 65 - 105 mg/dL   POC Glucose Fingerstick   Result Value Ref Range    POC Glucose 121 (H) 65 - 105 mg/dL   POC Glucose Fingerstick   Result Value Ref Range    POC Glucose 95 65 - 105 mg/dL   POC Glucose Fingerstick   Result Value Ref Range    POC Glucose 129 (H) 65 - 105 mg/dL         IMAGING DATA:    Xr Shoulder Right (min 2 Views)    Result Date: 11/17/2020  EXAMINATION: TWO XRAY VIEWS OF THE RIGHT FOREARM; THREE XRAY VIEWS OF THE RIGHT SHOULDER; TWO XRAY VIEWS OF THE RIGHT HUMERUS 11/17/2020 3:42 am COMPARISON: None. HISTORY: ORDERING SYSTEM PROVIDED HISTORY: Swelling and pain TECHNOLOGIST PROVIDED HISTORY: Swelling and pain Reason for Exam: Swelling throughout right arm, no known injury, no complaints of pain. FINDINGS: Normal glenohumeral and acromioclavicular alignment. No acute fracture or dislocation in the shoulder. No lytic or blastic lesions. Humeral shaft shows no evidence for fracture. No abnormal periosteal reaction. Normal alignment at the elbow and wrist.  No evidence for elbow joint effusion. No acute fracture of the radius or ulna. Evaluation of the soft tissues show swelling of the proximal 2/3 of the forearm diffusely which extends into the elbow region.   No radiopaque foreign body.     1. No evidence for acute fracture or dislocation in the right shoulder, humerus or forearm. 2. Apparent diffuse soft tissue swelling of the elbow and proximal 2/3 of the forearm. No radiopaque foreign body. Xr Humerus Right (min 2 Views)    Result Date: 11/17/2020  EXAMINATION: TWO XRAY VIEWS OF THE RIGHT FOREARM; THREE XRAY VIEWS OF THE RIGHT SHOULDER; TWO XRAY VIEWS OF THE RIGHT HUMERUS 11/17/2020 3:42 am COMPARISON: None. HISTORY: ORDERING SYSTEM PROVIDED HISTORY: Swelling and pain TECHNOLOGIST PROVIDED HISTORY: Swelling and pain Reason for Exam: Swelling throughout right arm, no known injury, no complaints of pain. FINDINGS: Normal glenohumeral and acromioclavicular alignment. No acute fracture or dislocation in the shoulder. No lytic or blastic lesions. Humeral shaft shows no evidence for fracture. No abnormal periosteal reaction. Normal alignment at the elbow and wrist.  No evidence for elbow joint effusion. No acute fracture of the radius or ulna. Evaluation of the soft tissues show swelling of the proximal 2/3 of the forearm diffusely which extends into the elbow region. No radiopaque foreign body. 1. No evidence for acute fracture or dislocation in the right shoulder, humerus or forearm. 2. Apparent diffuse soft tissue swelling of the elbow and proximal 2/3 of the forearm. No radiopaque foreign body. Xr Radius Ulna Right (2 Views)    Result Date: 11/17/2020  EXAMINATION: TWO XRAY VIEWS OF THE RIGHT FOREARM; THREE XRAY VIEWS OF THE RIGHT SHOULDER; TWO XRAY VIEWS OF THE RIGHT HUMERUS 11/17/2020 3:42 am COMPARISON: None. HISTORY: ORDERING SYSTEM PROVIDED HISTORY: Swelling and pain TECHNOLOGIST PROVIDED HISTORY: Swelling and pain Reason for Exam: Swelling throughout right arm, no known injury, no complaints of pain. FINDINGS: Normal glenohumeral and acromioclavicular alignment. No acute fracture or dislocation in the shoulder. No lytic or blastic lesions.   Humeral shaft shows no left proximal hip in imaging. Thank you. Rule out left tab fracture. Reason for Exam: Left Leg/Groin pain - Rule out left tab fracture. Acuity: Acute Type of Exam: Initial FINDINGS: Bones demonstrate no destructive sclerotic lesions involving the partially imaged L3 vertebral body and posterior elements, bilateral iliac bones, left greater than right, and sacrum with associated destructive changes of the osseous structures. Findings consistent with metastatic disease. Lesion within the left iliac bone also extends to involve the acetabulum and superior pubic ramus on the left proximally. Sclerotic lesion also identified at the left ischial tuberosity compatible with metastatic lesion. Acute pathologic fracture of the left acetabulum with fracture lines centered at the superior acetabulum and involving both the anterior and posterior columns. The fracture is mildly displaced. No additional fractures are identified. Mild-to-moderate osteoarthritic changes of the bilateral hips. Moderate to severe degenerative changes of the imaged lower lumbar spine. Visualized intrapelvic structures demonstrate retroperitoneal and intra-abdominal lymphadenopathy most consistent with metastatic disease. Largest node measures approximately 2.3 cm in short axis dimension (image 17 series 2). Severe atherosclerotic disease. Soft tissues demonstrate anasarca. 1. Pathologic fracture of the left acetabulum centered along the superior acetabulum with fracture lines involving both the anterior and posterior columns. 2. Osseous metastatic disease with destructive lesions involving the bilateral hemipelvis, sacrum, and partially imaged lumbar spine. 3. Metastatic lymphadenopathy within the imaged pelvis with the largest node measuring 2.3 cm in short axis dimension.      Ct Chest Abdomen Pelvis W Contrast    Result Date: 11/16/2020  EXAMINATION: CT OF THE CHEST, ABDOMEN, AND PELVIS WITH CONTRAST 11/16/2020 10:22 pm TECHNIQUE: CT of the chest, abdomen and pelvis was performed with the administration of intravenous contrast. Multiplanar reformatted images are provided for review. Dose modulation, iterative reconstruction, and/or weight based adjustment of the mA/kV was utilized to reduce the radiation dose to as low as reasonably achievable. COMPARISON: None HISTORY: ORDERING SYSTEM PROVIDED HISTORY: assess for cancer source vs mts TECHNOLOGIST PROVIDED HISTORY: assess for cancer source vs mts Reason for Exam: Assess for cancer source vs mts - Left hip fracture. Acuity: Acute Type of Exam: Initial FINDINGS: Chest: Mediastinum: The heart is mildly enlarged. No evidence of pericardial effusion is seen. No evidence of mediastinal or hilar lymphadenopathy or mass lesion is identified. Lungs/pleura: Multifocal bilateral lung ill-defined ground-glass opacification is noted, greatest within the left upper lung lobe. Trace right-sided layering posterior pleural effusion is visualized. Soft Tissues/Bones: Mixed sclerotic and lytic multifocal marrow changes are again seen throughout the visualized skeleton. Abdomen/Pelvis: Organs: Nodular contour of the liver is noted with coarse parenchymal appearance consistent with hepatic cirrhosis with associated evidence of portal venous hypertension with is severe varices at the splenic hilum with scattered varices also seen at the gastroesophageal junction, lesser curvature of the stomach and brittany hepatis. Gallbladder wall calcification is noted. The liver, gallbladder, spleen, pancreas, adrenal glands, kidneys, are otherwise unremarkable in appearance. GI/Bowel: Small hiatal hernia is present. The stomach is otherwise unremarkable without wall thickening or distention. Bowel loops are unremarkable in appearance without evidence of obstruction, distension or mucosal thickening. Pelvis: The urinary bladder is well distended and unremarkable in appearance. Mild pelvic free fluid is noted. Peritoneum/Retroperitoneum: No evidence of retroperitoneal or intraperitoneal lymphadenopathy is identified. No further evidence of intraperitoneal free fluid is seen. Bones/Soft Tissues: Moderate scattered subcutaneous fat stranding related to edema is seen. Left acetabular mildly distracted comminuted fracture is again noted. Mixed lytic and sclerotic multifocal marrow changes are again noted throughout the visualized skeleton. 1. Redemonstration of mixed sclerotic and lytic multifocal marrow changes throughout the visualized skeleton consistent with metastatic disease. 2. No definitive identification of primary tumor mass lesion. 3. Multifocal bilateral lung ground-glass opacification, greatest within the left upper lung lobe. Differential diagnostic considerations include association with viral pneumonia, opportunistic infection, hypersensitivity pneumonitis and drug toxicity. Recommend clinical correlation. 4. Trace right-sided layering posterior pleural effusion. 5. Mild cardiomegaly. 6. Severe hepatic cirrhosis with associated scattered marked varices related to portal venous hypertension. 7. Small hiatal hernia. 8. Mild pelvic free fluid. 9. Moderate diffuse abdominal and pelvic wall subcutaneous edema. 10. Suggestion porcelain gallbladder. Xr Hip W Pelvis Min 5 Vws Bilateral    Result Date: 11/16/2020  EXAMINATION: ONE XRAY VIEW OF THE PELVIS AND TWO XRAY VIEWS OF EACH OF THE BILATERAL HIPS 11/16/2020 5:10 pm COMPARISON: None. HISTORY: ORDERING SYSTEM PROVIDED HISTORY: AP pelvis, Inlet, Outlet, Cross-table lateral left hip TECHNOLOGIST PROVIDED HISTORY: Please and thank you. AP pelvis, Inlet, Outlet, Cross-table lateral left hip Reason for Exam: Pain left hip, s/p multiple falls. FINDINGS: There is no evidence of acute fracture. There is normal alignment. No acute joint abnormality. No focal osseous lesion. No focal soft tissue abnormality. No acute osseous abnormality. 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 XXXA] 11/16/2020    Cirrhosis ([facility] [K74.60] 05/23/2019    Pure hypercholesterolemia [E78.00] 10/08/2018    Anemia, iron deficiency [D50.9] 10/08/2018    Depression [F32.9] 09/26/2018    Osteoarthritis [M19.90] 09/26/2018    Asthma [J45.909] 09/06/2018    Hyperlipidemia [E78.5] 09/06/2018    ARLYN (obstructive sleep apnea) [G47.33] 08/09/2018    Morbid obesity ([facility] [E66.01] 08/09/2018    Essential hypertension [I10] 08/09/2018    Diabetes mellitus ([facility] [E11.9] 08/09/2018           RECOMMENDATIONS:  1. I personally reviewed results of lab work-up imaging studies and other relevant clinical data. 2. Reviewed previous medical record  3. Discussed results of path report from May 2019 which shows non-small cell carcinoma, CK7 positive. Weakly GATA3 positive. Breast primary is suggested. 4. Discussed with radiology department. Unfortunately we are not able to do mammogram or ultrasound of the breast at [place].  Given right breast abnormality and findings on the path report I am concerned about metastatic breast cancer. 5. I will request surgery team to evaluate the patient for the right breast swelling and to see if they are able to find any lesion which is unable to biopsy  6. Patient biopsy specimen was ER NM negative  7. Recommendations prognosis and treatment plan will depend upon side of the primary tumor  8. Another option would be to run next ration sequencing however we will likely need more tissue specimen  9. Provide supportive care. 10. We will continue to follow.   11. Pain control      Discussed with patient and Nurse. Thank you for asking us to see this patient. Zev Mcdonough MD          This note is created with the assistance of a speech recognition program.  While intending to generate a document that actually reflects the content of the visit, the document can still have some errors including those of syntax and sound a like substitutions which may escape proof reading. It such instances, actual meaning can be extrapolated by contextual diversion.

## 2020-11-17 NOTE — CARE COORDINATION
TRANSITIONAL CARE PLANNING/ 2 [facility]: 2    Reason for Admission: Pathological fracture due to metastatic bone disease [M84.50XA]     Treatment Plan of Care: hem/onc consulted , PT/OT, ortho consulted    Tests/Procedures still needed: XR pelvis    Barriers to Discharge: medical clearance     Readmission Risk              Risk of Unplanned Readmission:        18            Patient goals/Treatment Preferences/Transitional Plan: Patient is in agreement to SNF placement post hospital discharge. Patient concerned and wants to make sure facilities are within networl.  Calls placed to [facility] and [facility] , both facilities are reviewing chart and financials    Referrals Made: as above     Follow Up needed:     Medical clearance  Acceptance at [facility]

## 2020-11-18 ENCOUNTER — APPOINTMENT (OUTPATIENT)
Dept: GENERAL RADIOLOGY | Age: 65
DRG: 982 | End: 2020-11-18
Payer: MEDICARE

## 2020-11-18 LAB
GLUCOSE BLD-MCNC: 135 MG/DL (ref 65–105)
GLUCOSE BLD-MCNC: 139 MG/DL (ref 65–105)
GLUCOSE BLD-MCNC: 142 MG/DL (ref 65–105)
GLUCOSE BLD-MCNC: 78 MG/DL (ref 65–105)
GLUCOSE BLD-MCNC: 98 MG/DL (ref 65–105)

## 2020-11-18 PROCEDURE — 6370000000 HC RX 637 (ALT 250 FOR IP): Performed by: FAMILY MEDICINE

## 2020-11-18 PROCEDURE — 99232 SBSQ HOSP IP/OBS MODERATE 35: CPT | Performed by: INTERNAL MEDICINE

## 2020-11-18 PROCEDURE — 6360000002 HC RX W HCPCS: Performed by: NURSE PRACTITIONER

## 2020-11-18 PROCEDURE — 99221 1ST HOSP IP/OBS SF/LOW 40: CPT | Performed by: ORTHOPAEDIC SURGERY

## 2020-11-18 PROCEDURE — 2580000003 HC RX 258: Performed by: NURSE PRACTITIONER

## 2020-11-18 PROCEDURE — 72190 X-RAY EXAM OF PELVIS: CPT

## 2020-11-18 PROCEDURE — 99232 SBSQ HOSP IP/OBS MODERATE 35: CPT | Performed by: FAMILY MEDICINE

## 2020-11-18 PROCEDURE — 6370000000 HC RX 637 (ALT 250 FOR IP): Performed by: NURSE PRACTITIONER

## 2020-11-18 PROCEDURE — 82947 ASSAY GLUCOSE BLOOD QUANT: CPT

## 2020-11-18 PROCEDURE — 1200000000 HC SEMI PRIVATE

## 2020-11-18 RX ADMIN — FLUTICASONE PROPIONATE 1 SPRAY: 50 SPRAY, METERED NASAL at 09:45

## 2020-11-18 RX ADMIN — LEVOTHYROXINE SODIUM 25 MCG: 25 TABLET ORAL at 05:02

## 2020-11-18 RX ADMIN — OXYCODONE HYDROCHLORIDE AND ACETAMINOPHEN 2 TABLET: 5; 325 TABLET ORAL at 04:59

## 2020-11-18 RX ADMIN — SODIUM CHLORIDE, PRESERVATIVE FREE 10 ML: 5 INJECTION INTRAVENOUS at 09:32

## 2020-11-18 RX ADMIN — CITALOPRAM 40 MG: 20 TABLET, FILM COATED ORAL at 09:30

## 2020-11-18 RX ADMIN — ACETAMINOPHEN 650 MG: 325 TABLET ORAL at 03:13

## 2020-11-18 RX ADMIN — LACTULOSE 30 G: 20 SOLUTION ORAL at 09:31

## 2020-11-18 RX ADMIN — OXYCODONE HYDROCHLORIDE AND ACETAMINOPHEN 2 TABLET: 5; 325 TABLET ORAL at 11:18

## 2020-11-18 RX ADMIN — PANTOPRAZOLE SODIUM 40 MG: 40 TABLET, DELAYED RELEASE ORAL at 05:02

## 2020-11-18 RX ADMIN — OXYCODONE HYDROCHLORIDE AND ACETAMINOPHEN 2 TABLET: 5; 325 TABLET ORAL at 23:45

## 2020-11-18 RX ADMIN — OXYCODONE HYDROCHLORIDE AND ACETAMINOPHEN 2 TABLET: 5; 325 TABLET ORAL at 17:43

## 2020-11-18 RX ADMIN — SODIUM CHLORIDE, PRESERVATIVE FREE 10 ML: 5 INJECTION INTRAVENOUS at 21:21

## 2020-11-18 RX ADMIN — DICLOFENAC 2 G: 10 GEL TOPICAL at 10:41

## 2020-11-18 RX ADMIN — ACETAMINOPHEN 325 MG: 325 TABLET ORAL at 09:47

## 2020-11-18 RX ADMIN — DICLOFENAC 2 G: 10 GEL TOPICAL at 22:27

## 2020-11-18 RX ADMIN — DICLOFENAC 2 G: 10 GEL TOPICAL at 13:32

## 2020-11-18 RX ADMIN — METOPROLOL SUCCINATE 25 MG: 25 TABLET, FILM COATED, EXTENDED RELEASE ORAL at 09:30

## 2020-11-18 RX ADMIN — LISINOPRIL 20 MG: 20 TABLET ORAL at 09:30

## 2020-11-18 RX ADMIN — ENOXAPARIN SODIUM 40 MG: 40 INJECTION SUBCUTANEOUS at 09:31

## 2020-11-18 RX ADMIN — Medication 81 MG: at 09:30

## 2020-11-18 RX ADMIN — PRAMIPEXOLE DIHYDROCHLORIDE 0.12 MG: 0.25 TABLET ORAL at 21:21

## 2020-11-18 ASSESSMENT — PAIN DESCRIPTION - FREQUENCY: FREQUENCY: CONTINUOUS

## 2020-11-18 ASSESSMENT — PAIN SCALES - GENERAL
PAINLEVEL_OUTOF10: 8
PAINLEVEL_OUTOF10: 10
PAINLEVEL_OUTOF10: 7
PAINLEVEL_OUTOF10: 10

## 2020-11-18 ASSESSMENT — PAIN DESCRIPTION - DESCRIPTORS: DESCRIPTORS: DULL

## 2020-11-18 ASSESSMENT — PAIN - FUNCTIONAL ASSESSMENT: PAIN_FUNCTIONAL_ASSESSMENT: PREVENTS OR INTERFERES WITH ALL ACTIVE AND SOME PASSIVE ACTIVITIES

## 2020-11-18 ASSESSMENT — PAIN DESCRIPTION - ONSET: ONSET: ON-GOING

## 2020-11-18 ASSESSMENT — PAIN DESCRIPTION - LOCATION: LOCATION: BACK;LEG;HIP

## 2020-11-18 ASSESSMENT — PAIN DESCRIPTION - PROGRESSION: CLINICAL_PROGRESSION: NOT CHANGED

## 2020-11-18 ASSESSMENT — PAIN DESCRIPTION - PAIN TYPE: TYPE: CHRONIC PAIN;ACUTE PAIN

## 2020-11-18 ASSESSMENT — PAIN DESCRIPTION - ORIENTATION: ORIENTATION: LEFT

## 2020-11-18 NOTE — PROGRESS NOTES
Pt seen and examined this morning and discussed case with radiologist as well as Dr. Melody Kim. Based on physical exam findings and skin changes on CT, we feel it would be most beneficial to first start with obtaining tissue diagnosis by means of a deep punch biopsy which we will plan for at bedside under local. Having imaging would be ideal; but ultimately, the mass is palpable with skin changes and we really should obtain a biopsy to include skin. Will send for pathologic analysis and if needed can always obtain deeper core needle biopsy if punch negative for dermal lymphatic involvement. Suspect this is metastatic breast cancer; however medical treatment would likely precede any surgical intervention after tissue diagnosis obtained. Will follow along at this time.        Electronically signed by Antoinette Morales DO on 11/18/2020 at 5:55 PM

## 2020-11-18 NOTE — CONSULTS
 Cellulitis and abscess of trunk     Cellulitis of right breast 9/26/2018    Cellulitis of right lower extremity- resolving 8/9/2018    COPD (chronic obstructive pulmonary disease) (HCC)     patient denies COPD, only has asthma    CRP elevated     Depression     Diabetes mellitus ([address]     Disorder associated with type 2 diabetes mellitus ([address] 10/8/2018    Disorder of liver 9/6/2018    Displacement of lumbar intervertebral disc without myelopathy 10/8/2018    Essential hypertension 8/9/2018    Fibromyalgia 10/8/2018    Full thickness rotator cuff tear 10/8/2018    Gastroesophageal reflux disease 9/6/2018    GERD (gastroesophageal reflux disease)     Hammer toe of right foot     Hernia, abdominal 1998    Hyperglycemia due to type 2 diabetes mellitus ([address] 10/8/2018    Hyperlipidemia     Hypertension     Hypothyroid 10/8/2018    Iron deficiency anemia 10/8/2018    Lactic acidosis     Localized, primary osteoarthritis of shoulder region 10/8/2018    Mass of right breast 10/8/2018    Migraine 9/6/2018    Morbid obesity ([address] 8/9/2018    ARLYN (obstructive sleep apnea) 8/9/2018    Osteoarthritis 9/26/2018    Pure hypercholesterolemia 10/8/2018    Restless leg syndrome     Seasonal allergies     Septicemia ([address]     SIRS due to infectious process without acute organ dysfunction     Sleep apnea     doesn't use CPAP, sleeps in a recliner    Spondylosis     lower back    Thrombocytopenia ([address] 10/8/2018    Thrombocytopenia ([address] 10/8/2018       Past Surgical History:        Procedure Laterality Date    APPENDECTOMY      COLONOSCOPY      ROTATOR CUFF REPAIR Right     UPPER GASTROINTESTINAL ENDOSCOPY  05/29/2019    with biopsy by Dr. Lisbeth Carrera 5/29/2019    EGD BIOPSY performed by Karl Cui MD at [address] [place] [address]       Medications Prior to Admission:   Medications Prior to Admission: lactulose ([employer] 10 GM/15ML solution, TAKE 15 TO 30 ML BY MOUTH DAILY INCREASE TO 60 ML BY MOUTH IF NEEDED TO ACHIEVE 2 TO 3 BOWEL MOVENTS A DAY  baclofen (LIORESAL) 10 MG tablet,   DULoxetine (CYMBALTA) 30 MG extended release capsule,   pramipexole (MIRAPEX) 0.125 MG tablet, Take 1 tablet by mouth nightly  levothyroxine (SYNTHROID) 25 MCG tablet, Take 1 tablet by mouth Daily  lisinopril (PRINIVIL;ZESTRIL) 20 MG tablet, Take 20 mg by mouth daily  loratadine-pseudoephedrine (CLARITIN-D 24 HOUR)  MG per extended release tablet, Take 1 tablet by mouth daily  metoprolol succinate (TOPROL XL) 25 MG extended release tablet, Take 25 mg by mouth daily  oxyCODONE-acetaminophen (PERCOCET) 5-325 MG per tablet, Take 1 tablet by mouth 3 times daily as needed for Pain. aspirin 81 MG EC tablet, Take 81 mg by mouth daily. citalopram (CELEXA) 40 MG tablet, Take 40 mg by mouth daily. fluticasone (FLONASE) 50 MCG/ACT nasal spray, 1 spray by Nasal route 2 times daily as needed for Rhinitis. metFORMIN ER (GLUCOPHAGE-XR) 500 MG XR tablet, Take 1,000 mg by mouth 2 times daily (with meals) Indications: Take two 500mg tablets BID with meals Take two 500mg tablets BID with meals  omeprazole (PRILOSEC) 20 MG capsule, Take 20 mg by mouth daily. Allergies:  Nsaids; Sulfa antibiotics; and Tape Mary Jane  tape]    Social History:   Social History     Socioeconomic History    Marital status:       Spouse name: Not on file    Number of children: Not on file    Years of education: Not on file    Highest education level: Not on file   Occupational History    Not on file   Social Needs    Financial resource strain: Not on file    Food insecurity     Worry: Not on file     Inability: Not on file    Transportation needs     Medical: Not on file     Non-medical: Not on file   Tobacco Use    Smoking status: Never Smoker    Smokeless tobacco: Never Used   Substance and Sexual Activity    Alcohol use: No    Drug use: No    Sexual activity: Not on file   Lifestyle    Physical activity Days per week: Not on file     Minutes per session: Not on file    Stress: Not on file   Relationships    Social connections     Talks on phone: Not on file     Gets together: Not on file     Attends Rastafarian service: Not on file     Active member of club or organization: Not on file     Attends meetings of clubs or organizations: Not on file     Relationship status: Not on file    Intimate partner violence     Fear of current or ex partner: Not on file     Emotionally abused: Not on file     Physically abused: Not on file     Forced sexual activity: Not on file   Other Topics Concern    Not on file   Social History Narrative    Not on file       Family History:       Problem Relation Age of Onset    Heart Disease Mother     Pacemaker Mother     Hypertension Mother     Diabetes Father     Breast Cancer Neg Hx        REVIEW OF SYSTEMS:    CONSTITUTIONAL: Positive semi-intentional 100 lbs weight loss over last year  HEENT: Denies rhinorrhea, dysphagia, odynphagia. CARDIOVASCULAR: Denies history of MI, recent chest pain. RESPIRATORY: Denies recent history of shortness of breath or history of PE. GASTROINTESTINAL: Denies diarrhea or constipation  GENITOURINARY: Denies increased frequency or dysuria. HEMATOLOGIC/LYMPHATIC: Denies history of anemia or DVTs. ENDOCRINE: History of hypothyroid. NEURO: Denies history of CVA, TIA. Review of systems negative unless listed above. PHYSICAL EXAM:    VITALS:  BP (!) 104/46   Pulse 75   Temp 98.6 °F (37 °C) (Oral)   Resp 18   Ht 5' 7\" (1.702 m)   Wt 180 lb (81.6 kg)   SpO2 94%   BMI 28.19 kg/m²   INTAKE/OUTPUT:   No intake or output data in the 24 hours ending 11/18/20 0337    CONSTITUTIONAL:  awake, alert, not distressed and mildly obese  HEENT: Normocephalic/atraumatic, without obvious abnormality.    NECK:  Supple, symmetrical, trachea midline   CARDIOVASCULAR: Regular rate and rhythm   Breast: R breast noticeable larger then L with signs of peau d'orange/ pitting edema, mild retraction of R nipple, no active drainage noted, no gross lymphadenopathy, R breast firm  LUNGS: Clear to auscultation bilaterally without evidence of wheezing or tachypnea. ABDOMEN: soft, NT, ND, no rebound   MUSCULOSKELETAL:Right upper extremity has pitting edema, nontender to palpation  NEUROLOGIC: Gross motor intact without focal weakness. SKIN: No cyanosis, rashes,  Orientation:   oriented to person, place, and time    CBC with Differential:    Lab Results   Component Value Date    WBC 7.4 11/17/2020    RBC 3.95 11/17/2020    HGB 11.8 11/17/2020    HCT 37.3 11/17/2020     11/17/2020    MCV 94.4 11/17/2020    MCH 29.9 11/17/2020    MCHC 31.6 11/17/2020    RDW 14.3 11/17/2020    LYMPHOPCT 24 11/16/2020    MONOPCT 15 11/16/2020    BASOPCT 1 11/16/2020    MONOSABS 1.05 11/16/2020    LYMPHSABS 1.70 11/16/2020    EOSABS 0.34 11/16/2020    BASOSABS 0.05 11/16/2020    DIFFTYPE NOT REPORTED 11/16/2020     CMP:    Lab Results   Component Value Date     11/17/2020    K 4.1 11/17/2020     11/17/2020    CO2 21 11/17/2020    BUN 16 11/17/2020    CREATININE 0.52 11/17/2020    GFRAA >60 11/17/2020    LABGLOM >60 11/17/2020    GLUCOSE 78 11/17/2020    PROT 5.3 05/28/2019    LABALBU 2.7 05/28/2019    CALCIUM 9.1 11/17/2020    BILITOT 0.76 05/28/2019    ALKPHOS 119 05/28/2019    AST 40 05/28/2019    ALT 37 05/28/2019       Pertinent Radiology:   Xr Shoulder Right (min 2 Views)    Result Date: 11/17/2020  EXAMINATION: TWO XRAY VIEWS OF THE RIGHT FOREARM; THREE XRAY VIEWS OF THE RIGHT SHOULDER; TWO XRAY VIEWS OF THE RIGHT HUMERUS 11/17/2020 3:42 am COMPARISON: None. HISTORY: ORDERING SYSTEM PROVIDED HISTORY: Swelling and pain TECHNOLOGIST PROVIDED HISTORY: Swelling and pain Reason for Exam: Swelling throughout right arm, no known injury, no complaints of pain. FINDINGS: Normal glenohumeral and acromioclavicular alignment. No acute fracture or dislocation in the shoulder.   No lytic or blastic lesions. Humeral shaft shows no evidence for fracture. No abnormal periosteal reaction. Normal alignment at the elbow and wrist.  No evidence for elbow joint effusion. No acute fracture of the radius or ulna. Evaluation of the soft tissues show swelling of the proximal 2/3 of the forearm diffusely which extends into the elbow region. No radiopaque foreign body. 1. No evidence for acute fracture or dislocation in the right shoulder, humerus or forearm. 2. Apparent diffuse soft tissue swelling of the elbow and proximal 2/3 of the forearm. No radiopaque foreign body. Xr Humerus Right (min 2 Views)    Result Date: 11/17/2020  EXAMINATION: TWO XRAY VIEWS OF THE RIGHT FOREARM; THREE XRAY VIEWS OF THE RIGHT SHOULDER; TWO XRAY VIEWS OF THE RIGHT HUMERUS 11/17/2020 3:42 am COMPARISON: None. HISTORY: ORDERING SYSTEM PROVIDED HISTORY: Swelling and pain TECHNOLOGIST PROVIDED HISTORY: Swelling and pain Reason for Exam: Swelling throughout right arm, no known injury, no complaints of pain. FINDINGS: Normal glenohumeral and acromioclavicular alignment. No acute fracture or dislocation in the shoulder. No lytic or blastic lesions. Humeral shaft shows no evidence for fracture. No abnormal periosteal reaction. Normal alignment at the elbow and wrist.  No evidence for elbow joint effusion. No acute fracture of the radius or ulna. Evaluation of the soft tissues show swelling of the proximal 2/3 of the forearm diffusely which extends into the elbow region. No radiopaque foreign body. 1. No evidence for acute fracture or dislocation in the right shoulder, humerus or forearm. 2. Apparent diffuse soft tissue swelling of the elbow and proximal 2/3 of the forearm. No radiopaque foreign body.      Xr Radius Ulna Right (2 Views)    Result Date: 11/17/2020  EXAMINATION: TWO XRAY VIEWS OF THE RIGHT FOREARM; THREE XRAY VIEWS OF THE RIGHT SHOULDER; TWO XRAY VIEWS OF THE RIGHT HUMERUS 11/17/2020 3:42 am COMPARISON: None. HISTORY: ORDERING SYSTEM PROVIDED HISTORY: Swelling and pain TECHNOLOGIST PROVIDED HISTORY: Swelling and pain Reason for Exam: Swelling throughout right arm, no known injury, no complaints of pain. FINDINGS: Normal glenohumeral and acromioclavicular alignment. No acute fracture or dislocation in the shoulder. No lytic or blastic lesions. Humeral shaft shows no evidence for fracture. No abnormal periosteal reaction. Normal alignment at the elbow and wrist.  No evidence for elbow joint effusion. No acute fracture of the radius or ulna. Evaluation of the soft tissues show swelling of the proximal 2/3 of the forearm diffusely which extends into the elbow region. No radiopaque foreign body. 1. No evidence for acute fracture or dislocation in the right shoulder, humerus or forearm. 2. Apparent diffuse soft tissue swelling of the elbow and proximal 2/3 of the forearm. No radiopaque foreign body. Xr Hip Left (2-3 Views)    Result Date: 11/18/2020  EXAMINATION: TWO XRAY VIEWS OF THE LEFT FEMUR, TWO VIEWS LEFT HIP 11/16/2020 4:26 pm COMPARISON: None. HISTORY: ORDERING SYSTEM PROVIDED HISTORY: fall TECHNOLOGIST PROVIDED HISTORY: fall Reason for Exam: fall Acuity: Acute Type of Exam: Initial Acute left hip and femoral pain FINDINGS: Frontal and frog-leg lateral views of the left hip were performed. There are multiple curvilinear lucencies involving the left acetabulum, which could represent acute nondisplaced fractures of the left acetabulum in the right clinical setting. No additional fracture is identified. There is no evidence of dislocation. There is mild left hip osteoarthritis. No destructive osseous lesion is seen. Mild enthesopathic changes are evident. Frontal and lateral views of the proximal and distal aspects of the left femur were performed. There is no acute fracture or dislocation of the left femur. No periosteal reaction or osseous destruction is evident.   There is mild Multiplanar reformatted images are provided for review. Dose modulation, iterative reconstruction, and/or weight based adjustment of the mA/kV was utilized to reduce the radiation dose to as low as reasonably achievable. COMPARISON: Pelvis and left hip radiograph same day HISTORY: ORDERING SYSTEM PROVIDED HISTORY: Rule out left tab fracture. TECHNOLOGIST PROVIDED HISTORY: 2mm thin cuts. Please include left proximal hip in imaging. Thank you. Rule out left tab fracture. Reason for Exam: Left Leg/Groin pain - Rule out left tab fracture. Acuity: Acute Type of Exam: Initial FINDINGS: Bones demonstrate no destructive sclerotic lesions involving the partially imaged L3 vertebral body and posterior elements, bilateral iliac bones, left greater than right, and sacrum with associated destructive changes of the osseous structures. Findings consistent with metastatic disease. Lesion within the left iliac bone also extends to involve the acetabulum and superior pubic ramus on the left proximally. Sclerotic lesion also identified at the left ischial tuberosity compatible with metastatic lesion. Acute pathologic fracture of the left acetabulum with fracture lines centered at the superior acetabulum and involving both the anterior and posterior columns. The fracture is mildly displaced. No additional fractures are identified. Mild-to-moderate osteoarthritic changes of the bilateral hips. Moderate to severe degenerative changes of the imaged lower lumbar spine. Visualized intrapelvic structures demonstrate retroperitoneal and intra-abdominal lymphadenopathy most consistent with metastatic disease. Largest node measures approximately 2.3 cm in short axis dimension (image 17 series 2). Severe atherosclerotic disease. Soft tissues demonstrate anasarca. 1. Pathologic fracture of the left acetabulum centered along the superior acetabulum with fracture lines involving both the anterior and posterior columns.  2. Osseous metastatic disease with destructive lesions involving the bilateral hemipelvis, sacrum, and partially imaged lumbar spine. 3. Metastatic lymphadenopathy within the imaged pelvis with the largest node measuring 2.3 cm in short axis dimension. Vl Dup Upper Extremity Venous Right    Result Date: 11/17/2020    [facility]  Vascular Upper Extremities Veins Procedure   Patient Name    Jolene Mobley        Date of Study             11/17/2020                  Belen Villavicencio   Date of Birth   1955  Gender                    Female   Age             60 year(s)  Race                         Room Number     6169   Corporate ID #  [id number]   Patient Acct #  [de-identified]   MR #            [id number]     Sonographer               Gailen Crigler, RVT   Accession #     [id number]  Interpreting Physician    Mathew Santiago   Referring Nurse             Referring Physician  Practitioner  Procedure Type of Study:   Veins: Upper Extremities Veins, Venous Scan Upper Right. Indications for Study:Edema. Patient Status: In Patient. Conclusions   Summary   Simultaneous real time imaging utilizing B-Mode, color doppler and  spectral waveform analysis was performed on the right upper extremity for  venous examination of the deep and superficial systems. Findings are:   Right:  No evidence of deep or superficial venous thrombosis.    Signature   ----------------------------------------------------------------  Electronically signed by Gailen Crigler, RVT(Sonographer) on  11/17/2020 12:20 PM  ----------------------------------------------------------------   ----------------------------------------------------------------  Electronically signed by Aura Elizondo(Interpreting  physician) on 11/17/2020 10:12 PM  ----------------------------------------------------------------  Findings:   Right Impression:  Internal jugular, subclavian, axillary, brachial, ulnar, radial, cephalic  and basilic veins are compressible with normal doppler responses. Risk Factors History +-----------------------------------------------------------+----+---------+ ! Diagnosis                                                  ! Date! Comments ! +-----------------------------------------------------------+----+---------+ ! Chronic lung disease->COPD                                 !    !         ! +-----------------------------------------------------------+----+---------+ ! Other                                                      !    !cirrhosis! +-----------------------------------------------------------+----+---------+   - The patient's risk factor(s) include: diabetes mellitus, dyslipidemia     and obesity. Velocities are measured in cm/s ; Diameters are measured in cm Right UE Vein Measurements 2D Measurements +------------------------------------+----------+---------------+----------+ ! Location                            ! Visualized! Compressibility! Thrombosis! +------------------------------------+----------+---------------+----------+ ! Prox IJV                            ! Yes       ! Yes            ! None      ! +------------------------------------+----------+---------------+----------+ ! Dist IJV                            ! Yes       ! Yes            ! None      ! +------------------------------------+----------+---------------+----------+ ! Prox SCV                            ! Yes       ! Yes            ! None      ! +------------------------------------+----------+---------------+----------+ ! Dist SCV                            ! Yes       ! Yes            ! None      ! +------------------------------------+----------+---------------+----------+ ! Prox Axillary                       ! Yes       ! Yes            ! None      ! +------------------------------------+----------+---------------+----------+ ! Dist Axillary                       ! Yes       ! Yes            ! None      ! +------------------------------------+----------+---------------+----------+ ! Prox Brachial are again seen throughout the visualized skeleton. Abdomen/Pelvis: Organs: Nodular contour of the liver is noted with coarse parenchymal appearance consistent with hepatic cirrhosis with associated evidence of portal venous hypertension with is severe varices at the splenic hilum with scattered varices also seen at the gastroesophageal junction, lesser curvature of the stomach and brittany hepatis. Gallbladder wall calcification is noted. The liver, gallbladder, spleen, pancreas, adrenal glands, kidneys, are otherwise unremarkable in appearance. GI/Bowel: Small hiatal hernia is present. The stomach is otherwise unremarkable without wall thickening or distention. Bowel loops are unremarkable in appearance without evidence of obstruction, distension or mucosal thickening. Pelvis: The urinary bladder is well distended and unremarkable in appearance. Mild pelvic free fluid is noted. Peritoneum/Retroperitoneum: No evidence of retroperitoneal or intraperitoneal lymphadenopathy is identified. No further evidence of intraperitoneal free fluid is seen. Bones/Soft Tissues: Moderate scattered subcutaneous fat stranding related to edema is seen. Left acetabular mildly distracted comminuted fracture is again noted. Mixed lytic and sclerotic multifocal marrow changes are again noted throughout the visualized skeleton. 1. Redemonstration of mixed sclerotic and lytic multifocal marrow changes throughout the visualized skeleton consistent with metastatic disease. 2. No definitive identification of primary tumor mass lesion. 3. Multifocal bilateral lung ground-glass opacification, greatest within the left upper lung lobe. Differential diagnostic considerations include association with viral pneumonia, opportunistic infection, hypersensitivity pneumonitis and drug toxicity. Recommend clinical correlation. 4. Trace right-sided layering posterior pleural effusion. 5. Mild cardiomegaly.  6. Severe hepatic cirrhosis with associated scattered marked varices related to portal venous hypertension. 7. Small hiatal hernia. 8. Mild pelvic free fluid. 9. Moderate diffuse abdominal and pelvic wall subcutaneous edema. 10. Suggestion porcelain gallbladder. Xr Hip W Pelvis Min 5 Vws Bilateral    Result Date: 11/16/2020  EXAMINATION: ONE XRAY VIEW OF THE PELVIS AND TWO XRAY VIEWS OF EACH OF THE BILATERAL HIPS 11/16/2020 5:10 pm COMPARISON: None. HISTORY: ORDERING SYSTEM PROVIDED HISTORY: AP pelvis, Inlet, Outlet, Cross-table lateral left hip TECHNOLOGIST PROVIDED HISTORY: Please and thank you. AP pelvis, Inlet, Outlet, Cross-table lateral left hip Reason for Exam: Pain left hip, s/p multiple falls. FINDINGS: There is no evidence of acute fracture. There is normal alignment. No acute joint abnormality. No focal osseous lesion. No focal soft tissue abnormality. No acute osseous abnormality. ASSESSMENT:  Active Hospital Problems    Diagnosis Date Noted    Localized swelling of right upper extremity [R22.31] 11/17/2020    Malignant neoplasm of pelvic bone (HCC) [C41.4] 11/17/2020    Left hip pain [M25.552]     Pathological fracture due to metastatic bone disease [M84.50XA] 11/16/2020    Fall from standing [W19. XXXA] 11/16/2020    Cirrhosis ([address] [K74.60] 05/23/2019    Pure hypercholesterolemia [E78.00] 10/08/2018    Anemia, iron deficiency [D50.9] 10/08/2018    Depression [F32.9] 09/26/2018    Osteoarthritis [M19.90] 09/26/2018    Asthma [J45.909] 09/06/2018    Hyperlipidemia [E78.5] 09/06/2018    ARLYN (obstructive sleep apnea) [G47.33] 08/09/2018    Morbid obesity ([address] [E66.01] 08/09/2018    Essential hypertension [I10] 08/09/2018    Diabetes mellitus ([address] [E11.9] 08/09/2018     59 yo F with multiple medical co-morbidities, admitted status post pathological left acetabulum fracture due to metastatic bone disease, concerns for primary being from breast    Plan:  1.  Continue medical mgmt and supportive care per primary  2. Appreciate heme-onc input and recommendations  3. Will discuss ability to perform/obtain mammogram and/or ultrasound with subsequent biopsy for tissue diagnosis of breast findings  4. No acute surgical interventions planned at this time  5. Further recs to follow    Discussed with Dr. Mendel Marinelli    Thank you,    Electronically signed by Raiza Rossi DO  on 11/18/2020 at 3:37 AM   Attending Physician Statement  I have discussed the case with Dr Abdullahi Mckeon and the note was reviewed and imaging studies reviesed, including pertinent history and exam findings with the resident. I have seen and examined the patient and the key elements of the encounter have been performed by me. I agree with the assessment, plan and orders as documented by the resident.       Electronically signed by Annette Hough DO  on 11/29/2020 at 1:47 PM

## 2020-11-18 NOTE — PROGRESS NOTES
Found crying in room. Reports is talking to her insurance girl regarding getting a place to go for care. Informed her that our  has already sent out referrals to 2 extended care facilities. States no one cares about me. Questioning writer why we aren't taking  care of her cancer. She reports she doesn't want to get the hip fixed if her cancer can't be helped. Allowed time to ventilate feelings. Informed Dr Arbie Holstein.

## 2020-11-18 NOTE — PROGRESS NOTES
[facility]  Office: [phone]  Simon Velasquez DO, Cony Moore DO, Nestor Domingo DO, Brenda Green DO, Raya Knowles MD, Sandra Claire MD, Carly Pelaez MD, Lorie Gonzalez MD, Steph Ponce MD, Aidee Clement MD, Aron Valdez MD, Areli Gonzales MD, Mbonu Sonya Rodriguez MD, Gaston Munoz DO, Hakan Bradford MD, Micheal Ledesma MD, Jossie Butler DO, Marta Barragan MD,  Jodi Ramirez DO, Meseret Hanks MD, Sultana Hollins MD, Qamar Alarcon [facility], [facility], CNP, Talisha Fuller, CNP, Chance Zelaya, CNS, Lizette Lamar, CNP, Kassy Gutiérrez, CNP, Noris Tanner, CNP, Javier Lama, CNP, Keysha Finnegan, CNP, KURT RichC, Sunny Metzger, [facility], Tammie Collier, CNP, Martir Rosa, CNP, Marilu Orellana, CNP, Adry Alves, CNP, Rossana Crow, [facility]   [address] [facility]    Progress Note    11/18/2020    10:16 AM    Name:   Maryam Kimbrough  MRN:     [id number]     Acct:      [de-identified]   Room:   [address] [place] [address] Day:  2  Admit Date:  11/16/2020  3:05 PM    PCP:   Andrena Cockayne  Code Status:  Full Code    Subjective:     C/C:       Chief Complaint   Patient presents with    Leg Pain    Groin Pain      Interval History Status: not changed.      Pt was seen and examined this morning  Resting comfortably in bed at this time   Denies any complaints currently         Review of Systems:      12 point ROS performed today and negative for anything other than what was stated in subjective       Medications: Allergies:     Allergies   Allergen Reactions    Nsaids Other (See Comments)    Sulfa Antibiotics Other (See Comments) and Hives     Other reaction(s): Unknown  Patient unsure of the reaction    Tape David Gikari Tape] Rash     Other reaction(s): Unknown       Current Meds:   Scheduled Meds:    aspirin  81 mg Oral Daily    citalopram  40 mg Oral Daily    lactulose  30 g Oral TID    levothyroxine  25 mcg Oral Daily    lisinopril  20 mg Oral Daily    metoprolol succinate  25 mg Oral Daily    pantoprazole  40 mg Oral QAM AC    pramipexole  0.125 mg Oral Nightly    sodium chloride flush  10 mL Intravenous 2 times per day    enoxaparin  40 mg Subcutaneous Daily    insulin lispro  0-12 Units Subcutaneous TID WC    insulin lispro  0-6 Units Subcutaneous Nightly     Continuous Infusions:    dextrose       PRN Meds: diclofenac sodium, fluticasone, oxyCODONE-acetaminophen, sodium chloride flush, potassium chloride **OR** potassium alternative oral replacement **OR** potassium chloride, magnesium sulfate, acetaminophen **OR** acetaminophen, polyethylene glycol, promethazine **OR** ondansetron, nicotine, glucose, dextrose, glucagon (rDNA), dextrose    Data:     Past Medical History:   has a past medical history of Acute urinary tract infection, Anemia, Anemia, iron deficiency, Anxiety disorder, Arthritis, Asthma, Atopic rhinitis, Bandemia, Blood transfusion reaction, Cellulitis and abscess of trunk, Cellulitis of right breast, Cellulitis of right lower extremity- resolving, COPD (chronic obstructive pulmonary disease) ([address] CRP elevated, Depression, Diabetes mellitus ([address] Disorder associated with type 2 diabetes mellitus ([address] Disorder of liver, Displacement of lumbar intervertebral disc without myelopathy, Essential hypertension, Fibromyalgia, Full thickness rotator cuff tear, Gastroesophageal reflux disease, GERD (gastroesophageal reflux disease), Hammer toe of right foot, Hernia, abdominal, Hyperglycemia due to type 2 diabetes mellitus ([address] Hyperlipidemia, Hypertension, Hypothyroid, Iron deficiency anemia, Lactic acidosis, Localized, primary osteoarthritis of shoulder region, Mass of right breast, Migraine, Morbid obesity ([address] ARLYN (obstructive sleep apnea), Osteoarthritis, Pure hypercholesterolemia, Restless leg syndrome, Seasonal allergies, Septicemia ([address] SIRS due to infectious process without acute organ dysfunction, Sleep apnea, Spondylosis, Thrombocytopenia ([facility] and Thrombocytopenia ([facility] Social History:   reports that she has never smoked. She has never used smokeless tobacco. She reports that she does not drink alcohol or use drugs. Family History:   Family History   Problem Relation Age of Onset    Heart Disease Mother     Pacemaker Mother     Hypertension Mother     Diabetes Father     Breast Cancer Neg Hx        Vitals:  /63   Pulse 71   Temp 98.3 °F (36.8 °C) (Oral)   Resp 18   Ht 5' 7\" (1.702 m)   Wt 180 lb (81.6 kg)   SpO2 96%   BMI 28.19 kg/m²   Temp (24hrs), Av.4 °F (36.9 °C), Min:98.3 °F (36.8 °C), Max:98.6 °F (37 °C)    Recent Labs     20  0754 20  0944   POCGLU 131* 97 78 98       I/O (24Hr): Intake/Output Summary (Last 24 hours) at 2020 1016  Last data filed at 2020 0511  Gross per 24 hour   Intake 370 ml   Output 300 ml   Net 70 ml       Labs:  Hematology:  Recent Labs     20  0350   WBC 7.0 7.4   RBC 4.02 3.95   HGB 12.2 11.8*   HCT 37.4 37.3   MCV 93.0 94.4   MCH 30.3 29.9   MCHC 32.6 31.6   RDW 14.3 14.3    145   MPV 10.5 10.6   INR  --  1.4     Chemistry:  Recent Labs     20  0350    139   K 3.9 4.1    106   CO2 24 21   GLUCOSE 83 78   BUN 16 16   CREATININE 0.50 0.52   ANIONGAP 9 12   LABGLOM >60 >60   GFRAA >60 >60   CALCIUM 9.0 9.1     Recent Labs     20  0350  20  0815 20  1232 20  1755 20  0754 20  0944   LABA1C 5.5  --   --   --   --   --   --   --    POCGLU  --    < > 95 129* 131* 97 78 98    < > = values in this interval not displayed.      ABG:  Lab Results   Component Value Date    FIO2 RA 2018     Lab Results   Component Value Date/Time    SPECIAL NOT REPORTED 2019 08:09 PM     Lab Results   Component Value Date/Time    CULTURE NO GROWTH 6 DAYS 2019 08:09 PM       Radiology:  Lambert Parisian Shoulder Right (min 2 Views)    Result Date: 11/17/2020  1. No evidence for acute fracture or dislocation in the right shoulder, humerus or forearm. 2. Apparent diffuse soft tissue swelling of the elbow and proximal 2/3 of the forearm. No radiopaque foreign body. Xr Humerus Right (min 2 Views)    Result Date: 11/17/2020  1. No evidence for acute fracture or dislocation in the right shoulder, humerus or forearm. 2. Apparent diffuse soft tissue swelling of the elbow and proximal 2/3 of the forearm. No radiopaque foreign body. Xr Radius Ulna Right (2 Views)    Result Date: 11/17/2020  1. No evidence for acute fracture or dislocation in the right shoulder, humerus or forearm. 2. Apparent diffuse soft tissue swelling of the elbow and proximal 2/3 of the forearm. No radiopaque foreign body. Xr Hip Left (2-3 Views)    Result Date: 11/18/2020  1. Possible acute nondisplaced fracture of the left acetabulum. Suggest clinical correlation, and if concerning, consider further characterization with a follow-up left hip CT study. 2. No acute abnormality of the left femur. Xr Femur Left (min 2 Views)    Result Date: 11/18/2020  1. Possible acute nondisplaced fracture of the left acetabulum. Suggest clinical correlation, and if concerning, consider further characterization with a follow-up left hip CT study. 2. No acute abnormality of the left femur. Ct Pelvis Wo Contrast Additional Contrast? None    Result Date: 11/16/2020  1. Pathologic fracture of the left acetabulum centered along the superior acetabulum with fracture lines involving both the anterior and posterior columns. 2. Osseous metastatic disease with destructive lesions involving the bilateral hemipelvis, sacrum, and partially imaged lumbar spine. 3. Metastatic lymphadenopathy within the imaged pelvis with the largest node measuring 2.3 cm in short axis dimension.      Xr Pelvis (min 3 Views)    Result Date: 11/18/2020  Limited exam Left acetabular fracture is suspected. Repeat views of the left hip and/or CT scan of the pelvis is requested for further assessment     Ct Chest Abdomen Pelvis W Contrast    Result Date: 11/16/2020  1. Redemonstration of mixed sclerotic and lytic multifocal marrow changes throughout the visualized skeleton consistent with metastatic disease. 2. No definitive identification of primary tumor mass lesion. 3. Multifocal bilateral lung ground-glass opacification, greatest within the left upper lung lobe. Differential diagnostic considerations include association with viral pneumonia, opportunistic infection, hypersensitivity pneumonitis and drug toxicity. Recommend clinical correlation. 4. Trace right-sided layering posterior pleural effusion. 5. Mild cardiomegaly. 6. Severe hepatic cirrhosis with associated scattered marked varices related to portal venous hypertension. 7. Small hiatal hernia. 8. Mild pelvic free fluid. 9. Moderate diffuse abdominal and pelvic wall subcutaneous edema. 10. Suggestion porcelain gallbladder. Xr Hip W Pelvis Min 5 Vws Bilateral    Result Date: 11/16/2020  No acute osseous abnormality. Physical Examination:        General appearance:  alert, cooperative and no distress  Mental Status:  oriented to person, place and time and normal affect  Lungs:  clear to auscultation bilaterally, normal effort  Heart:  regular rate and rhythm, no murmur  Abdomen:  soft, nontender, nondistended, normal bowel sounds   Extremities:  no edema, redness, tenderness in the calves.  Noted tenderness in left hip   Skin:  no gross lesions, rashes, induration    Assessment:        Hospital Problems           Last Modified POA    * (Principal) Pathological fracture due to metastatic bone disease 11/16/2020 Yes    Diabetes mellitus ([address] 11/16/2020 Yes    ARLYN (obstructive sleep apnea) 11/16/2020 Yes    Morbid obesity ([address] 11/16/2020 Yes    Essential hypertension 11/16/2020 Yes    Depression 11/16/2020 Yes    Osteoarthritis 11/16/2020

## 2020-11-18 NOTE — PROGRESS NOTES
Today's Date: 11/18/2020  Patient Name: Linh Mane  Date of admission: 11/16/2020  3:05 PM  Patient's age: 60 y.o., 1955  Admission Dx: Pathological fracture due to metastatic bone disease [M84.50XA]    Reason for Consult: management recommendations  Requesting Physician: Bienvenido Herrera MD    CHIEF COMPLAINT: Pelvic pain. Back pain. Weight loss. Right breast swelling. History Obtained From:  patient, electronic medical record    Interval history:    Patient seen and examined. Labs and vitals reviewed  Denies new complaint or interval event  Continues to struggle with pain. Denies fever chills      HISTORY OF PRESENT ILLNESS:      The patient is a 60 y.o.  female who is Admitted to the hospital with chief complaints of leg pain and groin pain. The pain started after a fall about 2 days ago. Patient was walking and became unsteady and fell. Patient states that since the incident she has been having worsening pain to the point she could not take it anymore and presented to the hospital.  Denies any passing out episode complains of swelling of her right arm. Patient's work-up in the ER including CT scan showed pathological fracture of left acetabulum also shows osseous metastasis involving bilateral hemipelvis. She was metastatic lymphadenopathy CT chest shows multiple sclerotic and lytic lesions throughout the skeleton consistent metastatic disease. There is bilateral groundglass opacification of the lungs. There is severe hepatic cirrhosis associated with varices concerning for portal hypertension. Reviewed records reviewed patient was hospitalized back in May 2019 for hepatic encephalopathy. Patient left hospital AMA without completing work-up. Imaging studies at that time showed a lytic lesion involving ileum and L3. Biopsy came back positive for poorly differentiated non-small cell cancer. Stains showed weak positivity for GATA3 concerning for breast primary.   Patient during the hospitalization also had EGD done which was negative for malignancy. Patient was not seen by oncologist during the hospitalization and did not follow-up with an oncologist either. Past Medical History:   has a past medical history of Acute urinary tract infection, Anemia, Anemia, iron deficiency, Anxiety disorder, Arthritis, Asthma, Atopic rhinitis, Bandemia, Blood transfusion reaction, Cellulitis and abscess of trunk, Cellulitis of right breast, Cellulitis of right lower extremity- resolving, COPD (chronic obstructive pulmonary disease) ([address] CRP elevated, Depression, Diabetes mellitus ([address] Disorder associated with type 2 diabetes mellitus ([address] Disorder of liver, Displacement of lumbar intervertebral disc without myelopathy, Essential hypertension, Fibromyalgia, Full thickness rotator cuff tear, Gastroesophageal reflux disease, GERD (gastroesophageal reflux disease), Hammer toe of right foot, Hernia, abdominal, Hyperglycemia due to type 2 diabetes mellitus ([address] Hyperlipidemia, Hypertension, Hypothyroid, Iron deficiency anemia, Lactic acidosis, Localized, primary osteoarthritis of shoulder region, Mass of right breast, Migraine, Morbid obesity ([address] ARLYN (obstructive sleep apnea), Osteoarthritis, Pure hypercholesterolemia, Restless leg syndrome, Seasonal allergies, Septicemia ([address] SIRS due to infectious process without acute organ dysfunction, Sleep apnea, Spondylosis, Thrombocytopenia ([address] and Thrombocytopenia ([address] Past Surgical History:   has a past surgical history that includes Appendectomy; Rotator cuff repair (Right); Colonoscopy; Upper gastrointestinal endoscopy (05/29/2019); and Upper gastrointestinal endoscopy (N/A, 5/29/2019). Medications:    Prior to Admission medications    Medication Sig Start Date End Date Taking?  Authorizing Provider   lactulose (CHRONULAC) 10 GM/15ML solution TAKE 15 TO 30 ML BY MOUTH DAILY INCREASE TO 60 ML BY MOUTH IF NEEDED TO ACHIEVE 2 TO 3 BOWEL MOVENTS A DAY 11/11/20   Sanjuana Zavala MD   baclofen (LIORESAL) 10 MG tablet  5/27/20   Historical Provider, MD   DULoxetine (CYMBALTA) 30 MG extended release capsule  7/30/20   Historical Provider, MD   pramipexole (MIRAPEX) 0.125 MG tablet Take 1 tablet by mouth nightly 5/29/19   Nikita Eason MD   levothyroxine (SYNTHROID) 25 MCG tablet Take 1 tablet by mouth Daily 5/30/19   Nikita Eason MD   lisinopril (PRINIVIL;ZESTRIL) 20 MG tablet Take 20 mg by mouth daily    Historical Provider, MD   loratadine-pseudoephedrine (CLARITIN-D 24 HOUR)  MG per extended release tablet Take 1 tablet by mouth daily    Historical Provider, MD   metoprolol succinate (TOPROL XL) 25 MG extended release tablet Take 25 mg by mouth daily    Historical Provider, MD   oxyCODONE-acetaminophen (PERCOCET) 5-325 MG per tablet Take 1 tablet by mouth 3 times daily as needed for Pain. Historical Provider, MD   aspirin 81 MG EC tablet Take 81 mg by mouth daily. Historical Provider, MD   citalopram (CELEXA) 40 MG tablet Take 40 mg by mouth daily. Historical Provider, MD   fluticasone (FLONASE) 50 MCG/ACT nasal spray 1 spray by Nasal route 2 times daily as needed for Rhinitis. Historical Provider, MD   metFORMIN ER (GLUCOPHAGE-XR) 500 MG XR tablet Take 1,000 mg by mouth 2 times daily (with meals) Indications: Take two 500mg tablets BID with meals Take two 500mg tablets BID with meals    Historical Provider, MD   omeprazole (PRILOSEC) 20 MG capsule Take 20 mg by mouth daily.     Historical Provider, MD     Current Facility-Administered Medications   Medication Dose Route Frequency Provider Last Rate Last Dose    diclofenac sodium (VOLTAREN) 1 % gel 2 g  2 g Topical 4x Daily PRN Svitlana Hernandez MD   2 g at 11/18/20 1332    aspirin EC tablet 81 mg  81 mg Oral Daily ZACARIAS Tolbert CNP   81 mg at 11/18/20 0930    citalopram (CELEXA) tablet 40 mg  40 mg Oral Daily ZACARIAS Tolbert CNP   40 mg at 11/18/20 0930    fluticasone (FLONASE) 50 MCG/ACT nasal spray 1 spray  1 spray Nasal BID PRN Lorrine Noun, APRN - CNP   1 spray at 11/18/20 0945    lactulose (CHRONULAC) 10 GM/15ML solution 30 g  30 g Oral TID Lorrine Noun, APRN - CNP   30 g at 11/18/20 0931    levothyroxine (SYNTHROID) tablet 25 mcg  25 mcg Oral Daily Lorrine Noun, APRN - CNP   25 mcg at 11/18/20 0502    lisinopril (PRINIVIL;ZESTRIL) tablet 20 mg  20 mg Oral Daily Lorrine Noun, APRN - CNP   20 mg at 11/18/20 0930    metoprolol succinate (TOPROL XL) extended release tablet 25 mg  25 mg Oral Daily Lorrine Noun, APRN - CNP   25 mg at 11/18/20 0930    pantoprazole (PROTONIX) tablet 40 mg  40 mg Oral QAM AC Lorrine Noun, APRN - CNP   40 mg at 11/18/20 0502    pramipexole (MIRAPEX) tablet 0.125 mg  0.125 mg Oral Nightly Lorrine Noun, APRN - CNP   0.125 mg at 11/17/20 2222    oxyCODONE-acetaminophen (PERCOCET) 5-325 MG per tablet 2 tablet  2 tablet Oral Q6H PRN Lorrine Noun, APRN - CNP   2 tablet at 11/18/20 1118    sodium chloride flush 0.9 % injection 10 mL  10 mL Intravenous 2 times per day Lorrine Noun, APRN - CNP   10 mL at 11/18/20 0932    sodium chloride flush 0.9 % injection 10 mL  10 mL Intravenous PRN Lorrine Noun, APRN - CNP        potassium chloride (KLOR-CON M) extended release tablet 40 mEq  40 mEq Oral PRN Lorrine Noun, APRN - CNP        Or    potassium bicarb-citric acid (EFFER-K) effervescent tablet 40 mEq  40 mEq Oral PRN Lorrine Noun, APRN - CNP        Or    potassium chloride 10 mEq/100 mL IVPB (Peripheral Line)  10 mEq Intravenous PRN Lorrine Noun, APRN - CNP        magnesium sulfate 1 g in dextrose 5% 100 mL IVPB  1 g Intravenous PRN Lorrine Noun, APRN - CNP        acetaminophen (TYLENOL) tablet 650 mg  650 mg Oral Q6H PRN Lorrine ZACARIAS Segundo - CNP   325 mg at 11/18/20 performed in the presence of female nurse. Patient has asymmetry of the 2 breasts. Right breast is edematous with pitting edema. There is also swelling of the axilla. However no palpable masses appreciated. No lymphadenopathy is appreciated. DATA:      Labs:     Results for orders placed or performed during the hospital encounter of 11/16/20   COVID-19    Specimen: Other   Result Value Ref Range    SARS-CoV-2          SARS-CoV-2, Rapid Not Detected Not Detected    Source . NASOPHARYNGEAL SWAB     SARS-CoV-2         CBC WITH AUTO DIFFERENTIAL   Result Value Ref Range    WBC 7.0 3.5 - 11.3 k/uL    RBC 4.02 3.95 - 5.11 m/uL    Hemoglobin 12.2 11.9 - 15.1 g/dL    Hematocrit 37.4 36.3 - 47.1 %    MCV 93.0 82.6 - 102.9 fL    MCH 30.3 25.2 - 33.5 pg    MCHC 32.6 28.4 - 34.8 g/dL    RDW 14.3 11.8 - 14.4 %    Platelets 037 637 - 160 k/uL    MPV 10.5 8.1 - 13.5 fL    NRBC Automated 0.0 0.0 per 100 WBC    Differential Type NOT REPORTED     Seg Neutrophils 55 36 - 65 %    Lymphocytes 24 24 - 43 %    Monocytes 15 (H) 3 - 12 %    Eosinophils % 5 (H) 1 - 4 %    Basophils 1 0 - 2 %    Immature Granulocytes 0 0 %    Segs Absolute 3.80 1.50 - 8.10 k/uL    Absolute Lymph # 1.70 1.10 - 3.70 k/uL    Absolute Mono # 1.05 0.10 - 1.20 k/uL    Absolute Eos # 0.34 0.00 - 0.44 k/uL    Basophils Absolute 0.05 0.00 - 0.20 k/uL    Absolute Immature Granulocyte 0.03 0.00 - 0.30 k/uL    WBC Morphology NOT REPORTED     RBC Morphology NOT REPORTED     Platelet Estimate NOT REPORTED    BASIC METABOLIC PANEL   Result Value Ref Range    Glucose 83 70 - 99 mg/dL    BUN 16 8 - 23 mg/dL    CREATININE 0.50 0.50 - 0.90 mg/dL    Bun/Cre Ratio NOT REPORTED 9 - 20    Calcium 9.0 8.6 - 10.4 mg/dL    Sodium 136 135 - 144 mmol/L    Potassium 3.9 3.7 - 5.3 mmol/L    Chloride 103 98 - 107 mmol/L    CO2 24 20 - 31 mmol/L    Anion Gap 9 9 - 17 mmol/L    GFR Non-African American >60 >60 mL/min    GFR African American >60 >60 mL/min    GFR Comment          GFR Staging NOT REPORTED    Basic Metabolic Panel w/ Reflex to MG   Result Value Ref Range    Glucose 78 70 - 99 mg/dL    BUN 16 8 - 23 mg/dL    CREATININE 0.52 0.50 - 0.90 mg/dL    Bun/Cre Ratio NOT REPORTED 9 - 20    Calcium 9.1 8.6 - 10.4 mg/dL    Sodium 139 135 - 144 mmol/L    Potassium 4.1 3.7 - 5.3 mmol/L    Chloride 106 98 - 107 mmol/L    CO2 21 20 - 31 mmol/L    Anion Gap 12 9 - 17 mmol/L    GFR Non-African American >60 >60 mL/min    GFR African American >60 >60 mL/min    GFR Comment          GFR Staging NOT REPORTED    CBC   Result Value Ref Range    WBC 7.4 3.5 - 11.3 k/uL    RBC 3.95 3.95 - 5.11 m/uL    Hemoglobin 11.8 (L) 11.9 - 15.1 g/dL    Hematocrit 37.3 36.3 - 47.1 %    MCV 94.4 82.6 - 102.9 fL    MCH 29.9 25.2 - 33.5 pg    MCHC 31.6 28.4 - 34.8 g/dL    RDW 14.3 11.8 - 14.4 %    Platelets 342 632 - 039 k/uL    MPV 10.6 8.1 - 13.5 fL    NRBC Automated 0.0 0.0 per 100 WBC   Protime-INR   Result Value Ref Range    Protime 14.1 (H) 9.0 - 12.0 sec    INR 1.4    Hemoglobin A1C   Result Value Ref Range    Hemoglobin A1C 5.5 4.0 - 6.0 %    Estimated Avg Glucose 111 mg/dL   POC Glucose Fingerstick   Result Value Ref Range    POC Glucose 74 65 - 105 mg/dL   POC Glucose Fingerstick   Result Value Ref Range    POC Glucose 121 (H) 65 - 105 mg/dL   POC Glucose Fingerstick   Result Value Ref Range    POC Glucose 95 65 - 105 mg/dL   POC Glucose Fingerstick   Result Value Ref Range    POC Glucose 129 (H) 65 - 105 mg/dL   POC Glucose Fingerstick   Result Value Ref Range    POC Glucose 131 (H) 65 - 105 mg/dL   POC Glucose Fingerstick   Result Value Ref Range    POC Glucose 97 65 - 105 mg/dL   POC Glucose Fingerstick   Result Value Ref Range    POC Glucose 78 65 - 105 mg/dL   POC Glucose Fingerstick   Result Value Ref Range    POC Glucose 98 65 - 105 mg/dL   POC Glucose Fingerstick   Result Value Ref Range    POC Glucose 142 (H) 65 - 105 mg/dL   POC Glucose Fingerstick   Result Value Ref Range    POC Glucose 139 (H) 65 - 105 mg/dL         IMAGING DATA:    Xr Shoulder Right (min 2 Views)    Result Date: 11/17/2020  EXAMINATION: TWO XRAY VIEWS OF THE RIGHT FOREARM; THREE XRAY VIEWS OF THE RIGHT SHOULDER; TWO XRAY VIEWS OF THE RIGHT HUMERUS 11/17/2020 3:42 am COMPARISON: None. HISTORY: ORDERING SYSTEM PROVIDED HISTORY: Swelling and pain TECHNOLOGIST PROVIDED HISTORY: Swelling and pain Reason for Exam: Swelling throughout right arm, no known injury, no complaints of pain. FINDINGS: Normal glenohumeral and acromioclavicular alignment. No acute fracture or dislocation in the shoulder. No lytic or blastic lesions. Humeral shaft shows no evidence for fracture. No abnormal periosteal reaction. Normal alignment at the elbow and wrist.  No evidence for elbow joint effusion. No acute fracture of the radius or ulna. Evaluation of the soft tissues show swelling of the proximal 2/3 of the forearm diffusely which extends into the elbow region. No radiopaque foreign body. 1. No evidence for acute fracture or dislocation in the right shoulder, humerus or forearm. 2. Apparent diffuse soft tissue swelling of the elbow and proximal 2/3 of the forearm. No radiopaque foreign body. Xr Humerus Right (min 2 Views)    Result Date: 11/17/2020  EXAMINATION: TWO XRAY VIEWS OF THE RIGHT FOREARM; THREE XRAY VIEWS OF THE RIGHT SHOULDER; TWO XRAY VIEWS OF THE RIGHT HUMERUS 11/17/2020 3:42 am COMPARISON: None. HISTORY: ORDERING SYSTEM PROVIDED HISTORY: Swelling and pain TECHNOLOGIST PROVIDED HISTORY: Swelling and pain Reason for Exam: Swelling throughout right arm, no known injury, no complaints of pain. FINDINGS: Normal glenohumeral and acromioclavicular alignment. No acute fracture or dislocation in the shoulder. No lytic or blastic lesions. Humeral shaft shows no evidence for fracture. No abnormal periosteal reaction. Normal alignment at the elbow and wrist.  No evidence for elbow joint effusion. No acute fracture of the radius or ulna. Evaluation of the soft tissues show swelling of the proximal 2/3 of the forearm diffusely which extends into the elbow region. No radiopaque foreign body. 1. No evidence for acute fracture or dislocation in the right shoulder, humerus or forearm. 2. Apparent diffuse soft tissue swelling of the elbow and proximal 2/3 of the forearm. No radiopaque foreign body. Xr Radius Ulna Right (2 Views)    Result Date: 11/17/2020  EXAMINATION: TWO XRAY VIEWS OF THE RIGHT FOREARM; THREE XRAY VIEWS OF THE RIGHT SHOULDER; TWO XRAY VIEWS OF THE RIGHT HUMERUS 11/17/2020 3:42 am COMPARISON: None. HISTORY: ORDERING SYSTEM PROVIDED HISTORY: Swelling and pain TECHNOLOGIST PROVIDED HISTORY: Swelling and pain Reason for Exam: Swelling throughout right arm, no known injury, no complaints of pain. FINDINGS: Normal glenohumeral and acromioclavicular alignment. No acute fracture or dislocation in the shoulder. No lytic or blastic lesions. Humeral shaft shows no evidence for fracture. No abnormal periosteal reaction. Normal alignment at the elbow and wrist.  No evidence for elbow joint effusion. No acute fracture of the radius or ulna. Evaluation of the soft tissues show swelling of the proximal 2/3 of the forearm diffusely which extends into the elbow region. No radiopaque foreign body. 1. No evidence for acute fracture or dislocation in the right shoulder, humerus or forearm. 2. Apparent diffuse soft tissue swelling of the elbow and proximal 2/3 of the forearm. No radiopaque foreign body. Xr Hip Left (2-3 Views)    Result Date: 11/16/2020  EXAMINATION: TWO XRAY VIEWS OF THE LEFT FEMUR, TWO VIEWS LEFT HIP 11/16/2020 4:26 pm COMPARISON: None. HISTORY: ORDERING SYSTEM PROVIDED HISTORY: fall TECHNOLOGIST PROVIDED HISTORY: fall Reason for Exam: fall Acuity: Acute Type of Exam: Initial Acute left hip and femoral pain FINDINGS: Frontal and frog-leg lateral views of the left hip were performed.   Multiple curvilinear lucencies involving the left acetabulum, which could represent acute nondisplaced fractures of the left acetabulum in the right clinical setting. No additional fracture is identified. There is no evidence of dislocation. There is mild left hip osteoarthritis. No destructive osseous lesion is seen. Mild enthesopathic changes are evident. Frontal and lateral views of the proximal and distal aspects of the left femur were performed. There is no acute fracture or dislocation of the left femur. No periosteal reaction or osseous destruction is evident. There is mild osteoarthritis at the left knee joint. No soft tissue abnormality or radiopaque foreign body is evident. 1. Possible acute nondisplaced fracture of the left acetabulum. Suggest clinical correlation, and if concerning, consider further characterization with a follow-up left hip CT study. 2. No acute abnormality of the left femur. Xr Femur Left (min 2 Views)    Result Date: 11/16/2020  EXAMINATION: TWO XRAY VIEWS OF THE LEFT FEMUR, TWO VIEWS LEFT HIP 11/16/2020 4:26 pm COMPARISON: None. HISTORY: ORDERING SYSTEM PROVIDED HISTORY: fall TECHNOLOGIST PROVIDED HISTORY: fall Reason for Exam: fall Acuity: Acute Type of Exam: Initial Acute left hip and femoral pain FINDINGS: Frontal and frog-leg lateral views of the left hip were performed. Multiple curvilinear lucencies involving the left acetabulum, which could represent acute nondisplaced fractures of the left acetabulum in the right clinical setting. No additional fracture is identified. There is no evidence of dislocation. There is mild left hip osteoarthritis. No destructive osseous lesion is seen. Mild enthesopathic changes are evident. Frontal and lateral views of the proximal and distal aspects of the left femur were performed. There is no acute fracture or dislocation of the left femur. No periosteal reaction or osseous destruction is evident.   There is mild osteoarthritis at the left knee joint. No soft tissue abnormality or radiopaque foreign body is evident. 1. Possible acute nondisplaced fracture of the left acetabulum. Suggest clinical correlation, and if concerning, consider further characterization with a follow-up left hip CT study. 2. No acute abnormality of the left femur. Ct Pelvis Wo Contrast Additional Contrast? None    Result Date: 11/16/2020  EXAMINATION: CT OF THE PELVIS WITHOUT CONTRAST 11/16/2020 7:52 pm TECHNIQUE: CT of the pelvis was performed without the administration of intravenous contrast.  Multiplanar reformatted images are provided for review. Dose modulation, iterative reconstruction, and/or weight based adjustment of the mA/kV was utilized to reduce the radiation dose to as low as reasonably achievable. COMPARISON: Pelvis and left hip radiograph same day HISTORY: ORDERING SYSTEM PROVIDED HISTORY: Rule out left tab fracture. TECHNOLOGIST PROVIDED HISTORY: 2mm thin cuts. Please include left proximal hip in imaging. Thank you. Rule out left tab fracture. Reason for Exam: Left Leg/Groin pain - Rule out left tab fracture. Acuity: Acute Type of Exam: Initial FINDINGS: Bones demonstrate no destructive sclerotic lesions involving the partially imaged L3 vertebral body and posterior elements, bilateral iliac bones, left greater than right, and sacrum with associated destructive changes of the osseous structures. Findings consistent with metastatic disease. Lesion within the left iliac bone also extends to involve the acetabulum and superior pubic ramus on the left proximally. Sclerotic lesion also identified at the left ischial tuberosity compatible with metastatic lesion. Acute pathologic fracture of the left acetabulum with fracture lines centered at the superior acetabulum and involving both the anterior and posterior columns. The fracture is mildly displaced. No additional fractures are identified. Mild-to-moderate osteoarthritic changes of the bilateral hips. Moderate to severe degenerative changes of the imaged lower lumbar spine. Visualized intrapelvic structures demonstrate retroperitoneal and intra-abdominal lymphadenopathy most consistent with metastatic disease. Largest node measures approximately 2.3 cm in short axis dimension (image 17 series 2). Severe atherosclerotic disease. Soft tissues demonstrate anasarca. 1. Pathologic fracture of the left acetabulum centered along the superior acetabulum with fracture lines involving both the anterior and posterior columns. 2. Osseous metastatic disease with destructive lesions involving the bilateral hemipelvis, sacrum, and partially imaged lumbar spine. 3. Metastatic lymphadenopathy within the imaged pelvis with the largest node measuring 2.3 cm in short axis dimension. Ct Chest Abdomen Pelvis W Contrast    Result Date: 11/16/2020  EXAMINATION: CT OF THE CHEST, ABDOMEN, AND PELVIS WITH CONTRAST 11/16/2020 10:22 pm TECHNIQUE: CT of the chest, abdomen and pelvis was performed with the administration of intravenous contrast. Multiplanar reformatted images are provided for review. Dose modulation, iterative reconstruction, and/or weight based adjustment of the mA/kV was utilized to reduce the radiation dose to as low as reasonably achievable. COMPARISON: None HISTORY: ORDERING SYSTEM PROVIDED HISTORY: assess for cancer source vs mts TECHNOLOGIST PROVIDED HISTORY: assess for cancer source vs mts Reason for Exam: Assess for cancer source vs mts - Left hip fracture. Acuity: Acute Type of Exam: Initial FINDINGS: Chest: Mediastinum: The heart is mildly enlarged. No evidence of pericardial effusion is seen. No evidence of mediastinal or hilar lymphadenopathy or mass lesion is identified. Lungs/pleura: Multifocal bilateral lung ill-defined ground-glass opacification is noted, greatest within the left upper lung lobe. Trace right-sided layering posterior pleural effusion is visualized.  Soft Tissues/Bones: Mixed sclerotic and lytic multifocal marrow changes are again seen throughout the visualized skeleton. Abdomen/Pelvis: Organs: Nodular contour of the liver is noted with coarse parenchymal appearance consistent with hepatic cirrhosis with associated evidence of portal venous hypertension with is severe varices at the splenic hilum with scattered varices also seen at the gastroesophageal junction, lesser curvature of the stomach and brittany hepatis. Gallbladder wall calcification is noted. The liver, gallbladder, spleen, pancreas, adrenal glands, kidneys, are otherwise unremarkable in appearance. GI/Bowel: Small hiatal hernia is present. The stomach is otherwise unremarkable without wall thickening or distention. Bowel loops are unremarkable in appearance without evidence of obstruction, distension or mucosal thickening. Pelvis: The urinary bladder is well distended and unremarkable in appearance. Mild pelvic free fluid is noted. Peritoneum/Retroperitoneum: No evidence of retroperitoneal or intraperitoneal lymphadenopathy is identified. No further evidence of intraperitoneal free fluid is seen. Bones/Soft Tissues: Moderate scattered subcutaneous fat stranding related to edema is seen. Left acetabular mildly distracted comminuted fracture is again noted. Mixed lytic and sclerotic multifocal marrow changes are again noted throughout the visualized skeleton. 1. Redemonstration of mixed sclerotic and lytic multifocal marrow changes throughout the visualized skeleton consistent with metastatic disease. 2. No definitive identification of primary tumor mass lesion. 3. Multifocal bilateral lung ground-glass opacification, greatest within the left upper lung lobe. Differential diagnostic considerations include association with viral pneumonia, opportunistic infection, hypersensitivity pneumonitis and drug toxicity. Recommend clinical correlation. 4. Trace right-sided layering posterior pleural effusion.  5. Mild cardiomegaly. 6. Severe hepatic cirrhosis with associated scattered marked varices related to portal venous hypertension. 7. Small hiatal hernia. 8. Mild pelvic free fluid. 9. Moderate diffuse abdominal and pelvic wall subcutaneous edema. 10. Suggestion porcelain gallbladder. Xr Hip W Pelvis Min 5 Vws Bilateral    Result Date: 11/16/2020  EXAMINATION: ONE XRAY VIEW OF THE PELVIS AND TWO XRAY VIEWS OF EACH OF THE BILATERAL HIPS 11/16/2020 5:10 pm COMPARISON: None. HISTORY: ORDERING SYSTEM PROVIDED HISTORY: AP pelvis, Inlet, Outlet, Cross-table lateral left hip TECHNOLOGIST PROVIDED HISTORY: Please and thank you. AP pelvis, Inlet, Outlet, Cross-table lateral left hip Reason for Exam: Pain left hip, s/p multiple falls. FINDINGS: There is no evidence of acute fracture. There is normal alignment. No acute joint abnormality. No focal osseous lesion. No focal soft tissue abnormality. No acute osseous abnormality. 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 XXXA] 11/16/2020    Cirrhosis ([address] [K74.60] 05/23/2019    Pure hypercholesterolemia [E78.00] 10/08/2018    Anemia, iron deficiency [D50.9] 10/08/2018    Depression [F32.9] 09/26/2018    Osteoarthritis [M19.90] 09/26/2018    Asthma [J45.909] 09/06/2018    Hyperlipidemia [E78.5] 09/06/2018    ARLYN (obstructive sleep apnea) [G47.33] 08/09/2018    Morbid obesity ([address] [E66.01] 08/09/2018    Essential hypertension [I10] 08/09/2018    Diabetes mellitus ([address] [E11.9] 08/09/2018           RECOMMENDATIONS:  1. I personally reviewed results of lab work-up imaging studies and other relevant clinical data.  2. Reviewed previous medical record  3. Discussed results of path report from May 2019 which shows non-small cell carcinoma, CK7 positive. Weakly GATA3 positive. Breast primary is suggested. 4. Discussed with radiology department. Unfortunately we are not able to do mammogram or ultrasound of the breast at [place].  Given right breast abnormality and findings on the path report I am concerned about metastatic breast cancer. I informed the patient about my discussion with radiology that they do not perform imaging studies in-house and it will likely be done as an outpatient. Patient got very upset. Stating that nobody cares for her. Stated that she does not have the ability to go to different appointments. Also stated that she came to the [facility] [place] so her cancer can be taken care of. I explained to the patient that we have to move forward in a stepwise fashion however patient was not reassured by my ounces and asked me to leave. Ultimately patient will need work-up to establish primary site of tumor to give further recommendation from medical oncology standpoint. Given findings on physical exam as well as staining results of the path report patient may have a metastatic breast cancer. However ER/MO was negative meaning if patient has a breast cancer it may be HER-2 positive triple negative for which she would require systemic IV therapy. Surgery team planning for further work-up. We will follow-up on the recommendations. Discussed with patient's nurse. 5. Provide supportive care. 6. We will continue to follow. 7. Pain control      Discussed with patient and Nurse. Thank you for asking us to see this patient.           Nancy Mcdonough MD          This note is created with the assistance of a speech recognition program.  While intending to generate a document that actually reflects the content of the visit, the document can still have some errors including those of syntax and sound a like substitutions which may escape proof reading. It such instances, actual meaning can be extrapolated by contextual diversion.

## 2020-11-18 NOTE — PROGRESS NOTES
Orthopedic Progress Note    Patient:  Sallieema Code  YOB: 1955     60 y.o. female    Subjective:  Patient seen and examined  No complaints or concerns  No issue overnight  Pain controlled on percocet  Denies fever, HA, CP, SOB, N/V, dysuria    Vitals reviewed, afebrile    Objective:   Vitals:    11/17/20 1948   BP: (!) 104/46   Pulse: 75   Resp: 18   Temp: 98.6 °F (37 °C)   SpO2: 94%     Gen: NAD, cooperative    Cardiovascular: Regular rate no dependent edema  Respiratory: No acute respiratory distress  MSK:  Left lower extremity: Severe pain with passive internal/external rotation. Patient able to tolerate up to 20 degrees of passive hip flexion. Sensation intact to light touch L2-S1. EHL/FHL/TA/GS motor complex intact. Dorsalis pedis pulse 2+    Recent Labs     11/17/20  0350   WBC 7.4   HGB 11.8*   HCT 37.3      INR 1.4      K 4.1   BUN 16   CREATININE 0.52   GLUCOSE 78      Meds: Lovenox, ASA   See rec for complete list    Impression 60 y.o. female   1. Left pathologic acetabulum fracture     Plan  - Patient does appear to have signicant pain with passive motion of the hip. At rest, it is controlled but patient is uncomfortable. Our team will discuss findings to develop a surgical plan.   - Chart review demonstrates patient had iliac bone biopsy which demonstrated metastatic poorly differentiated non small cell carcinoma on 5/24/19   - WB status: non-weightbearing left lower extremity   - DVT ppx: at primary discretion  - Ice as tolerated  - PT/OT  - Please page ortho with any questions    Jamia Mares DO  Orthopedic Surgery Resident, PGY-2  [address] [place] [id number], [facility]

## 2020-11-18 NOTE — PROGRESS NOTES
Occupational [address]  Occupational Therapy Not Seen Note    DATE: 2020  Name: Maya Harper  : 1955  MRN: [id number]    Patient not available for Occupational Therapy due to:    [x] Testing: Dopplers came back negative for DVT. Pt's XR impression report suspected left acetabular fracture. Reports request for further assessment of left hip through repeat views and/or CT scan. Will check back as able.         Next Scheduled Treatment: Will check back as able   Nevaeh IBARRA/JUSTIN

## 2020-11-18 NOTE — PROGRESS NOTES
Physical Therapy  DATE: 2020    NAME: Parul Horne  MRN: [id number]   : 1955    Patient not seen this date for Physical Therapy due to:  [] Blood transfusion in progress  [] Hemodialysis  [] Patient Declined  [] Spine Precautions  [] Strict Bedrest  [] Surgery/ Procedure  [] Testing      [x] Other- Dopplers came back negative for DVT. Pt's XR impression report suspected left acetabular fracture. Reports request for further assessment of left hip through repeat views and/or CT scan. Will check back as able.             [] PT is being discontinued at this time. Patient independent. No further needs. [] PT is being discontinued at this time due to declining physical/ medical status. Therapy is not appropriate at this time.     Skylar Morrison, PT

## 2020-11-19 ENCOUNTER — APPOINTMENT (OUTPATIENT)
Dept: GENERAL RADIOLOGY | Age: 65
DRG: 982 | End: 2020-11-19
Payer: MEDICARE

## 2020-11-19 LAB
ALBUMIN SERPL-MCNC: 2.4 G/DL (ref 3.5–5.2)
ALBUMIN/GLOBULIN RATIO: 0.8 (ref 1–2.5)
ALP BLD-CCNC: 390 U/L (ref 35–104)
ALT SERPL-CCNC: 44 U/L (ref 5–33)
ANION GAP SERPL CALCULATED.3IONS-SCNC: 10 MMOL/L (ref 9–17)
AST SERPL-CCNC: 96 U/L
BILIRUB SERPL-MCNC: 1.15 MG/DL (ref 0.3–1.2)
BILIRUBIN DIRECT: 0.48 MG/DL
BILIRUBIN, INDIRECT: 0.67 MG/DL (ref 0–1)
BLOOD BANK SPECIMEN: NORMAL
BUN BLDV-MCNC: 16 MG/DL (ref 8–23)
CA 27-29: 48 U/ML (ref 0–38)
CALCIUM SERPL-MCNC: 9.2 MG/DL (ref 8.6–10.4)
CHLORIDE BLD-SCNC: 107 MMOL/L (ref 98–107)
CO2: 24 MMOL/L (ref 20–31)
CREAT SERPL-MCNC: 0.43 MG/DL (ref 0.5–0.9)
GFR AFRICAN AMERICAN: >60 ML/MIN
GFR NON-AFRICAN AMERICAN: >60 ML/MIN
GFR SERPL CREATININE-BSD FRML MDRD: ABNORMAL ML/MIN/{1.73_M2}
GFR SERPL CREATININE-BSD FRML MDRD: ABNORMAL ML/MIN/{1.73_M2}
GLUCOSE BLD-MCNC: 116 MG/DL (ref 65–105)
GLUCOSE BLD-MCNC: 126 MG/DL (ref 65–105)
GLUCOSE BLD-MCNC: 137 MG/DL (ref 65–105)
GLUCOSE BLD-MCNC: 168 MG/DL (ref 65–105)
GLUCOSE BLD-MCNC: 87 MG/DL (ref 70–99)
HCT VFR BLD CALC: 38.3 % (ref 36.3–47.1)
HEMOGLOBIN: 12 G/DL (ref 11.9–15.1)
MCH RBC QN AUTO: 30.1 PG (ref 25.2–33.5)
MCHC RBC AUTO-ENTMCNC: 31.3 G/DL (ref 28.4–34.8)
MCV RBC AUTO: 96 FL (ref 82.6–102.9)
NRBC AUTOMATED: 0 PER 100 WBC
PDW BLD-RTO: 14.1 % (ref 11.8–14.4)
PLATELET # BLD: 140 K/UL (ref 138–453)
PMV BLD AUTO: 10.8 FL (ref 8.1–13.5)
POTASSIUM SERPL-SCNC: 4.5 MMOL/L (ref 3.7–5.3)
RBC # BLD: 3.99 M/UL (ref 3.95–5.11)
SODIUM BLD-SCNC: 141 MMOL/L (ref 135–144)
TOTAL PROTEIN: 5.4 G/DL (ref 6.4–8.3)
WBC # BLD: 7.2 K/UL (ref 3.5–11.3)

## 2020-11-19 PROCEDURE — 86920 COMPATIBILITY TEST SPIN: CPT

## 2020-11-19 PROCEDURE — 86900 BLOOD TYPING SEROLOGIC ABO: CPT

## 2020-11-19 PROCEDURE — 88305 TISSUE EXAM BY PATHOLOGIST: CPT

## 2020-11-19 PROCEDURE — 6370000000 HC RX 637 (ALT 250 FOR IP): Performed by: NURSE PRACTITIONER

## 2020-11-19 PROCEDURE — 2580000003 HC RX 258: Performed by: NURSE PRACTITIONER

## 2020-11-19 PROCEDURE — 1200000000 HC SEMI PRIVATE

## 2020-11-19 PROCEDURE — 6360000002 HC RX W HCPCS: Performed by: NURSE PRACTITIONER

## 2020-11-19 PROCEDURE — 86300 IMMUNOASSAY TUMOR CA 15-3: CPT

## 2020-11-19 PROCEDURE — 99232 SBSQ HOSP IP/OBS MODERATE 35: CPT | Performed by: INTERNAL MEDICINE

## 2020-11-19 PROCEDURE — 88341 IMHCHEM/IMCYTCHM EA ADD ANTB: CPT

## 2020-11-19 PROCEDURE — 80053 COMPREHEN METABOLIC PANEL: CPT

## 2020-11-19 PROCEDURE — 6360000002 HC RX W HCPCS: Performed by: STUDENT IN AN ORGANIZED HEALTH CARE EDUCATION/TRAINING PROGRAM

## 2020-11-19 PROCEDURE — 0HB5XZX EXCISION OF CHEST SKIN, EXTERNAL APPROACH, DIAGNOSTIC: ICD-10-PCS | Performed by: FAMILY MEDICINE

## 2020-11-19 PROCEDURE — 2500000003 HC RX 250 WO HCPCS: Performed by: STUDENT IN AN ORGANIZED HEALTH CARE EDUCATION/TRAINING PROGRAM

## 2020-11-19 PROCEDURE — 36415 COLL VENOUS BLD VENIPUNCTURE: CPT

## 2020-11-19 PROCEDURE — 72170 X-RAY EXAM OF PELVIS: CPT

## 2020-11-19 PROCEDURE — 85027 COMPLETE CBC AUTOMATED: CPT

## 2020-11-19 PROCEDURE — 86901 BLOOD TYPING SEROLOGIC RH(D): CPT

## 2020-11-19 PROCEDURE — 82248 BILIRUBIN DIRECT: CPT

## 2020-11-19 PROCEDURE — 99232 SBSQ HOSP IP/OBS MODERATE 35: CPT | Performed by: FAMILY MEDICINE

## 2020-11-19 PROCEDURE — 82947 ASSAY GLUCOSE BLOOD QUANT: CPT

## 2020-11-19 PROCEDURE — 86850 RBC ANTIBODY SCREEN: CPT

## 2020-11-19 PROCEDURE — 88342 IMHCHEM/IMCYTCHM 1ST ANTB: CPT

## 2020-11-19 RX ORDER — LIDOCAINE HYDROCHLORIDE AND EPINEPHRINE 10; 10 MG/ML; UG/ML
20 INJECTION, SOLUTION INFILTRATION; PERINEURAL ONCE
Status: COMPLETED | OUTPATIENT
Start: 2020-11-19 | End: 2020-11-19

## 2020-11-19 RX ORDER — FENTANYL CITRATE 50 UG/ML
50 INJECTION, SOLUTION INTRAMUSCULAR; INTRAVENOUS ONCE
Status: COMPLETED | OUTPATIENT
Start: 2020-11-19 | End: 2020-11-19

## 2020-11-19 RX ADMIN — OXYCODONE HYDROCHLORIDE AND ACETAMINOPHEN 2 TABLET: 5; 325 TABLET ORAL at 13:29

## 2020-11-19 RX ADMIN — LIDOCAINE HYDROCHLORIDE AND EPINEPHRINE 20 ML: 10; 10 INJECTION, SOLUTION INFILTRATION; PERINEURAL at 07:54

## 2020-11-19 RX ADMIN — PANTOPRAZOLE SODIUM 40 MG: 40 TABLET, DELAYED RELEASE ORAL at 06:32

## 2020-11-19 RX ADMIN — OXYCODONE HYDROCHLORIDE AND ACETAMINOPHEN 2 TABLET: 5; 325 TABLET ORAL at 18:58

## 2020-11-19 RX ADMIN — SODIUM CHLORIDE, PRESERVATIVE FREE 10 ML: 5 INJECTION INTRAVENOUS at 22:30

## 2020-11-19 RX ADMIN — FLUTICASONE PROPIONATE 1 SPRAY: 50 SPRAY, METERED NASAL at 21:07

## 2020-11-19 RX ADMIN — OXYCODONE HYDROCHLORIDE AND ACETAMINOPHEN 2 TABLET: 5; 325 TABLET ORAL at 07:53

## 2020-11-19 RX ADMIN — LEVOTHYROXINE SODIUM 25 MCG: 25 TABLET ORAL at 06:32

## 2020-11-19 RX ADMIN — CITALOPRAM 40 MG: 20 TABLET, FILM COATED ORAL at 08:52

## 2020-11-19 RX ADMIN — SODIUM CHLORIDE, PRESERVATIVE FREE 10 ML: 5 INJECTION INTRAVENOUS at 08:54

## 2020-11-19 RX ADMIN — DICLOFENAC 2 G: 10 GEL TOPICAL at 13:29

## 2020-11-19 RX ADMIN — Medication 81 MG: at 08:53

## 2020-11-19 RX ADMIN — DICLOFENAC 2 G: 10 GEL TOPICAL at 21:12

## 2020-11-19 RX ADMIN — PRAMIPEXOLE DIHYDROCHLORIDE 0.12 MG: 0.25 TABLET ORAL at 21:06

## 2020-11-19 RX ADMIN — FENTANYL CITRATE 50 MCG: 50 INJECTION, SOLUTION INTRAMUSCULAR; INTRAVENOUS at 07:08

## 2020-11-19 RX ADMIN — ENOXAPARIN SODIUM 40 MG: 40 INJECTION SUBCUTANEOUS at 08:53

## 2020-11-19 ASSESSMENT — PAIN SCALES - GENERAL
PAINLEVEL_OUTOF10: 9
PAINLEVEL_OUTOF10: 8
PAINLEVEL_OUTOF10: 7

## 2020-11-19 NOTE — PROGRESS NOTES
[facility]  Occupational Therapy Not Seen Note    DATE: 2020  Name: Rita Calhoun  : 1955  MRN: [id number]    Patient not available for Occupational Therapy due to:    [] Testing:    [] Hemodialysis    [] Blood Transfusion in Progress    []Refusal by Patient:    [x] Surgery/Procedure: pt scheduled for OR  for L acetabular fx    [] Strict Bedrest    [] Sedation    [] Spine Precautions     [] Pt being transferred to palliative care at this time. Spoke with pt/family and OT services to be defered. [] Pt independent with functional mobility and functional tasks.  Pt with no OT acute care needs at this time, will defer OT eval.    [] Other    Next Scheduled Treatment: check     Annalisa Maxwell

## 2020-11-19 NOTE — PROGRESS NOTES
Orthopedic Progress Note    Patient:  Karli Sage  YOB: 1955     60 y.o. female    Subjective:  Patient seen and examined  No complaints or concerns  No issue overnight  Denies fever, HA, CP, SOB, N/V, dysuria    Vitals reviewed, afebrile    Objective:   Vitals:    11/18/20 2000   BP: 121/63   Pulse: 84   Resp: 16   Temp: 97.9 °F (36.6 °C)   SpO2: 94%     Gen: NAD, cooperative    Cardiovascular: Regular rate no dependent edema  Respiratory: No acute respiratory distress  MSK:  Left lower extremity: Skin intact. Pain with passive ROM at hip. Sensation intact to light touch L2-S1. EHL/FHL/TA/GS motor complex intact. Dorsalis pedis pulse 2+    Recent Labs     11/17/20  0350   WBC 7.4   HGB 11.8*   HCT 37.3      INR 1.4      K 4.1   BUN 16   CREATININE 0.52   GLUCOSE 78      Meds: Lovenox, ASA   See rec for complete list    Impression 60 y.o. female   1. Left pathologic acetabulum fracture     Plan  - Upon discussion with the patient, she is agreeable to pursue surgical intervention consisting of closed reduction percutaneous screw of the left acetabulum.    - Consent obtained, extremity marked  - NPO MN  - Will require surgical clearance from respective primary/consulted teams   - Ancef OCTOR  - WB status: non-weightbearing left lower extremity   - DVT ppx: at primary discretion  - Ice as tolerated  - PT/OT  - Please page ortho with any questions    Linda Ashton DO  Orthopedic Surgery Resident, PGY-2  [address]

## 2020-11-19 NOTE — CARE COORDINATION
Transitional planning. Pt having closed reduction percutaneous screw of the left acetabulum tomorrow. Follow progression and needs. [facility] and [facility] both reviewing.

## 2020-11-19 NOTE — PROCEDURES
PROCEDURE NOTE    PATIENT: Sj Mckenzie MRN: [id number]    DATE OF PROCEDURE: 11/19/2020     PERFORMED BY:  Philip Browne, PGY 5, Benita Wallace PGY 1    PREOPERATIVE DIAGNOSIS: breast CA    POSTOPERATIVE DIAGNOSIS: Same     OPERATION: Punch biopsy of right breast    ANALGESIA: Local, 1% Lidocaine with Epinephrine    ESTIMATED BLOOD LOSS:  less than 50     COMPLICATIONS: None     SPECIMENS:  Two 5mm punch biopsies from right breast     HISTORY: The patient is a 60y.o. year old female with history of DM2, HTN, asthma, cirrhosis, metastatic bone disease, CA pelvic bone. Risks, benefits, expected outcome, and alternatives to the procedure were explained and all questions answered. Patient understands and signed a consent for procedure. PROCEDURE: The procedure was performed at bedside. The patient was positioned, prepped in sterile fashion and draped. Skin was sterilized and analgesia applied with 1% lidocaine with epinephrine. Biopsy punch was placed over the skin below R nipple and rotated until adequate tissue penetration was obtained. The tissue was grasped using a forceps and base was cut using scissors. Similar procedure was repeated at the second site that was lateral to the R nipple. Both samples were placed in the formalin jar and sent for pathologic examination. Skin approximated and sutures applied at the biopsy sites. There was minimal blood loss during the procedure.       Electronically signed by Benita Wallace MD on 11/19/2020 at 11:21 AM

## 2020-11-19 NOTE — PROGRESS NOTES
General Surgery:  Daily Progress Note                  PATIENT NAME: Walda Fothergill     TODAY'S DATE: 11/19/2020, 7:58 AM  CC:  Hip pain    SUBJECTIVE:     Pt seen and examined at bedside. No overnight events. Patient tolerating general diet, reducing adequate urine. Reports continued left hip pain, scheduled OR with Ortho for tomorrow. VSS, afebrile, T-max 98.3. Denies N/V, C/F, S OB, CP.      OBJECTIVE:   VITALS:  /63   Pulse 84   Temp 97.9 °F (36.6 °C) (Oral)   Resp 16   Ht 5' 7\" (1.702 m)   Wt 180 lb (81.6 kg)   SpO2 94%   BMI 28.19 kg/m²      INTAKE/OUTPUT:      Intake/Output Summary (Last 24 hours) at 11/19/2020 0757  Last data filed at 11/19/2020 0224  Gross per 24 hour   Intake --   Output 250 ml   Net -250 ml       PHYSICAL EXAM:  CONSTITUTIONAL:  awake, alert, not distressed and mildly obese  HEENT: Normocephalic/atraumatic, without obvious abnormality. NECK:  Supple, symmetrical, trachea midline   CARDIOVASCULAR: Regular rate and rhythm   Breast: R breast noticeable larger then L with signs of pitting edema, no active drainage noted, no gross lymphadenopathy, R breast firm  LUNGS: Clear to auscultation bilaterally without evidence of wheezing or tachypnea. ABDOMEN: soft, NT, ND, no rebound   MUSCULOSKELETAL:Right upper extremity has pitting edema, nontender to palpation  NEUROLOGIC: Gross motor intact without focal weakness.   SKIN: No cyanosis, rashes,  Orientation:   oriented to person, place, and time    Data:  CBC with Differential:    Lab Results   Component Value Date    WBC 7.2 11/19/2020    RBC 3.99 11/19/2020    HGB 12.0 11/19/2020    HCT 38.3 11/19/2020     11/19/2020    MCV 96.0 11/19/2020    MCH 30.1 11/19/2020    MCHC 31.3 11/19/2020    RDW 14.1 11/19/2020    LYMPHOPCT 24 11/16/2020    MONOPCT 15 11/16/2020    BASOPCT 1 11/16/2020    MONOSABS 1.05 11/16/2020    LYMPHSABS 1.70 11/16/2020    EOSABS 0.34 11/16/2020    BASOSABS 0.05 11/16/2020    DIFFTYPE NOT REPORTED 11/16/2020     CMP:    Lab Results   Component Value Date     11/19/2020    K 4.5 11/19/2020     11/19/2020    CO2 24 11/19/2020    BUN 16 11/19/2020    CREATININE 0.43 11/19/2020    GFRAA >60 11/19/2020    LABGLOM >60 11/19/2020    GLUCOSE 87 11/19/2020    PROT 5.4 11/19/2020    LABALBU 2.4 11/19/2020    CALCIUM 9.2 11/19/2020    BILITOT 1.15 11/19/2020    ALKPHOS 390 11/19/2020    AST 96 11/19/2020    ALT 44 11/19/2020     BMP:    Lab Results   Component Value Date     11/19/2020    K 4.5 11/19/2020     11/19/2020    CO2 24 11/19/2020    BUN 16 11/19/2020    LABALBU 2.4 11/19/2020    CREATININE 0.43 11/19/2020    CALCIUM 9.2 11/19/2020    GFRAA >60 11/19/2020    LABGLOM >60 11/19/2020    GLUCOSE 87 11/19/2020       Radiology Review:    Xr Pelvis (min 3 Views)    Result Date: 11/18/2020  Limited exam Left acetabular fracture is suspected. 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 XXXA] 11/16/2020    Cirrhosis ([address] [K74.60] 05/23/2019    Pure hypercholesterolemia [E78.00] 10/08/2018    Anemia, iron deficiency [D50.9] 10/08/2018    Depression [F32.9] 09/26/2018    Osteoarthritis [M19.90] 09/26/2018    Asthma [J45.909] 09/06/2018    Hyperlipidemia [E78.5] 09/06/2018    ARLYN (obstructive sleep apnea) [G47.33] 08/09/2018    Morbid obesity ([facility] [E66.01] 08/09/2018    Essential hypertension [I10] 08/09/2018    Diabetes mellitus ([facility] [E11.9] 08/09/2018       60 y.o. female with left acetabular fracture, with right breast mass on palpation  -Bedside punch biopsy of right breast  - Pathology: Pending    Plan:  1. Diet: General  2. Analgesia: MMT, local for procedure  3. Punch biopsy done to right breast this morning, pathology pending  4. Activity:  Continue to encourage work w/ PT/OT, NWB left lower extremity neuro  5. Wound care: Leave sutures in for 7 to 10 days. Covered in C/D/I dressing, can be removed later today  6.  Continue medical management per primary    Electronically signed by Lorenzo Finnegan DO  on 11/19/2020 at 7:58 AM

## 2020-11-19 NOTE — PROGRESS NOTES
[facility]  Office: [phone]  Osei Nichols, DO, Rashmi Lema, DO, Kalyan Rodrigues, DO, Kristie Green, DO, Leobardo Coburn MD, Sultana Licona MD, Blanche Meredith MD, Nicole Watkins MD, Ángela Fleming MD, Kit Pelletier MD, Jane Magallon MD, Shoaib Mcfadden MD, Courtney Manning MD, Harlan Rahman, DO, Radha Ferris MD, Mariela Hewitt MD, Trinity Cochran DO, Amy Pineda MD,  Gee Boateng, DO, Esau Louie MD, Jim Braga MD, Patrick Vides, Boom Brian, CNP, Sharmaine Closs, CNP, Susie Rick, CNS, Jesenia Hendrix, CNP, Nay Mosley, CNP, Robby Paz, CNP, Sergei Henderson, CNP, Bassam Ryan, CNP, KURT NevarezC, Abdias Clinton, DNP, Wilbert Genao, CNP, Chris Brady, CNP, Alyssa Ames, CNP, Anisha Newberry, CNP, Bentley Orellana, CNP         [facility]   [address] [facility]    Progress Note    11/19/2020    11:13 AM    Name:   Farhan Rodriguez  MRN:     [id number]     Acct:      [de-identified]   Room:   [address] [place], [place] [id number] Day:  3  Admit Date:  11/16/2020  3:05 PM    PCP:   Flavia Licona  Code Status:  Full Code    Subjective:     C/C:   Chief Complaint   Patient presents with    Leg Pain    Groin Pain      Interval History Status: not changed. Pt was seen and examined this morning  Resting comfortably in bed at this time   She is happy to be able to get her hip repaired as it is causing her much discomfort   No new complaints or concerns at this time       Review of Systems:     12 point ROS performed today and negative for anything other than what was stated in subjective     Medications: Allergies:     Allergies   Allergen Reactions    Nsaids Other (See Comments)    Sulfa Antibiotics Other (See Comments) and Hives     Other reaction(s): Unknown  Patient unsure of the reaction    Tape [Adhesive Tape] Rash     Other reaction(s): Unknown       Current Meds:   Scheduled Meds:    ceFAZolin  2 g Intravenous On Call to OR    aspirin  81 mg Oral Daily    citalopram  40 mg Oral Daily    lactulose  30 g Oral TID    levothyroxine  25 mcg Oral Daily    lisinopril  20 mg Oral Daily    metoprolol succinate  25 mg Oral Daily    pantoprazole  40 mg Oral QAM AC    pramipexole  0.125 mg Oral Nightly    sodium chloride flush  10 mL Intravenous 2 times per day    enoxaparin  40 mg Subcutaneous Daily    insulin lispro  0-12 Units Subcutaneous TID WC    insulin lispro  0-6 Units Subcutaneous Nightly     Continuous Infusions:    dextrose       PRN Meds: diclofenac sodium, fluticasone, oxyCODONE-acetaminophen, sodium chloride flush, potassium chloride **OR** potassium alternative oral replacement **OR** potassium chloride, magnesium sulfate, acetaminophen **OR** acetaminophen, polyethylene glycol, promethazine **OR** ondansetron, nicotine, glucose, dextrose, glucagon (rDNA), dextrose    Data:     Past Medical History:   has a past medical history of Acute urinary tract infection, Anemia, Anemia, iron deficiency, Anxiety disorder, Arthritis, Asthma, Atopic rhinitis, Bandemia, Blood transfusion reaction, Cellulitis and abscess of trunk, Cellulitis of right breast, Cellulitis of right lower extremity- resolving, COPD (chronic obstructive pulmonary disease) (HCC), CRP elevated, Depression, Diabetes mellitus ([facility] [address] Disorder associated with type 2 diabetes mellitus ([address] Disorder of liver, Displacement of lumbar intervertebral disc without myelopathy, Essential hypertension, Fibromyalgia, Full thickness rotator cuff tear, Gastroesophageal reflux disease, GERD (gastroesophageal reflux disease), Hammer toe of right foot, Hernia, abdominal, Hyperglycemia due to type 2 diabetes mellitus ([address] Hyperlipidemia, Hypertension, Hypothyroid, Iron deficiency anemia, Lactic acidosis, Localized, primary osteoarthritis of shoulder region, Mass of right breast, Migraine, Morbid obesity ([address] ARLYN (obstructive sleep apnea), Osteoarthritis, Pure hypercholesterolemia, Restless leg syndrome, Seasonal allergies, Septicemia ([facility] [address] SIRS due to infectious process without acute organ dysfunction, Sleep apnea, Spondylosis, Thrombocytopenia ([facility] [address] and Thrombocytopenia ([facility] [address] Social History:   reports that she has never smoked. She has never used smokeless tobacco. She reports that she does not drink alcohol or use drugs. Family History:   Family History   Problem Relation Age of Onset    Heart Disease Mother     Pacemaker Mother     Hypertension Mother     Diabetes Father     Breast Cancer Neg Hx        Vitals:  BP (!) 108/54   Pulse 84   Temp 98.6 °F (37 °C) (Oral)   Resp 14   Ht 5' 7\" (1.702 m)   Wt 180 lb (81.6 kg)   SpO2 92%   BMI 28.19 kg/m²   Temp (24hrs), Av.3 °F (36.8 °C), Min:97.9 °F (36.6 °C), Max:98.6 °F (37 °C)    Recent Labs     20  1147 20  1654 20  2112 20  0852   POCGLU 142* 139* 135* 126*       I/O (24Hr):     Intake/Output Summary (Last 24 hours) at 2020 1113  Last data filed at 2020 0224  Gross per 24 hour   Intake --   Output 250 ml   Net -250 ml       Labs:  Hematology:  Recent Labs     20  0350 20  0709   WBC 7.0 7.4 7.2   RBC 4.02 3.95 3.99   HGB 12.2 11.8* 12.0   HCT 37.4 37.3 38.3   MCV 93.0 94.4 96.0   MCH 30.3 29.9 30.1   MCHC 32.6 31.6 31.3   RDW 14.3 14.3 14.1    145 140   MPV 10.5 10.6 10.8   INR  --  1.4  --      Chemistry:  Recent Labs     20  0350 20  0709    139 141   K 3.9 4.1 4.5    106 107   CO2 24 21 24   GLUCOSE 83 78 87   BUN 16 16 16   CREATININE 0.50 0.52 0.43*   ANIONGAP 9 12 10   LABGLOM >60 >60 >60   GFRAA >60 >60 >60   CALCIUM 9.0 9.1 9.2     Recent Labs     20  0350  20  0754 20  0944 20  1147 20  1654 20  2112 20  0709 20  0852   PROT  --   --   --   --   --   --   --  5.4*  --    LABALBU  --   --   --   --   --   --   --  2.4*  --    LABA1C 5.5  --   --   --   --   --   -- follow-up left hip CT study. 2. No acute abnormality of the left femur. Ct Pelvis Wo Contrast Additional Contrast? None    Result Date: 11/16/2020  1. Pathologic fracture of the left acetabulum centered along the superior acetabulum with fracture lines involving both the anterior and posterior columns. 2. Osseous metastatic disease with destructive lesions involving the bilateral hemipelvis, sacrum, and partially imaged lumbar spine. 3. Metastatic lymphadenopathy within the imaged pelvis with the largest node measuring 2.3 cm in short axis dimension. Xr Pelvis (min 3 Views)    Result Date: 11/18/2020  Limited exam Left acetabular fracture is suspected. Repeat views of the left hip and/or CT scan of the pelvis is requested for further assessment     Ct Chest Abdomen Pelvis W Contrast    Result Date: 11/16/2020  1. Redemonstration of mixed sclerotic and lytic multifocal marrow changes throughout the visualized skeleton consistent with metastatic disease. 2. No definitive identification of primary tumor mass lesion. 3. Multifocal bilateral lung ground-glass opacification, greatest within the left upper lung lobe. Differential diagnostic considerations include association with viral pneumonia, opportunistic infection, hypersensitivity pneumonitis and drug toxicity. Recommend clinical correlation. 4. Trace right-sided layering posterior pleural effusion. 5. Mild cardiomegaly. 6. Severe hepatic cirrhosis with associated scattered marked varices related to portal venous hypertension. 7. Small hiatal hernia. 8. Mild pelvic free fluid. 9. Moderate diffuse abdominal and pelvic wall subcutaneous edema. 10. Suggestion porcelain gallbladder. Xr Hip W Pelvis Min 5 Vws Bilateral    Result Date: 11/16/2020  No acute osseous abnormality.        Physical Examination:        General appearance:  alert, cooperative and no distress  Mental Status:  oriented to person, place and time and normal affect  Lungs:  clear to auscultation bilaterally, normal effort  Heart:  regular rate and rhythm, no murmur  Abdomen:  soft, nontender, nondistended, normal bowel sounds   Extremities:  no edema, redness, tenderness in the calves. Noted tenderness in left hip   Skin:  no gross lesions, rashes, induration    Assessment:        Hospital Problems           Last Modified POA    * (Principal) Pathological fracture due to metastatic bone disease 11/16/2020 Yes    Diabetes mellitus ([address] 11/16/2020 Yes    ARLYN (obstructive sleep apnea) 11/16/2020 Yes    Morbid obesity ([address] 11/16/2020 Yes    Essential hypertension 11/16/2020 Yes    Depression 11/16/2020 Yes    Osteoarthritis 11/16/2020 Yes    Anemia, iron deficiency 11/16/2020 Yes    Asthma 11/16/2020 Yes    Hyperlipidemia 11/16/2020 Yes    Pure hypercholesterolemia 11/16/2020 Yes    Cirrhosis ([address] 11/17/2020 Yes    Fall from standing 11/16/2020 Yes    Localized swelling of right upper extremity 11/17/2020 Yes    Malignant neoplasm of pelvic bone ([address] 11/17/2020 Yes    Left hip pain 11/17/2020 Yes    Closed nondisplaced fracture of left acetabulum ([address] 11/19/2020 Yes          Plan:        1. Pathological fracture of left acetabulum due to metastatic bone disease   2. 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 NPO after midnight for procedure tomorrow   - pt is cleared from medical perspective for surgery   - Poorly differentiated non small cell carcinoma on BX bone biopsy 5/24/2019, patient did not follow-up with heme-onc because she did not have the money. - heme/onc consulted  - NWB LLE   - continue pain control   - general surgery consulted by heme/onc. Punch biopsy done this morning of right breast with pathology pending      3. DMII   - continue accu checks ac/hs with ISS      4. ARLYN  - cpap at night      5.  HTN   - v/s per unit protocol   - continue lisinopril and toprol xl      6. Hx of asthma   - stable   - breathing treatments prn      7. Liver cirrhosis with hyperammonemia   8. Elevated LFTS   - resume home lactulose   - continue to monitor      9. Hypothyroidism   - continue synthroid        10.  DVT prophylaxis   - lovenox      Sultana Vogel MD  11/19/2020  11:13 AM

## 2020-11-19 NOTE — PLAN OF CARE
Problem: Falls - Risk of:  Goal: Will remain free from falls  Description: Will remain free from falls  11/19/2020 0041 by Mary De  Outcome: Ongoing  11/18/2020 1819 by Grant Poon RN  Outcome: Ongoing  Goal: Absence of physical injury  Description: Absence of physical injury  11/19/2020 0041 by Mary De  Outcome: Ongoing  11/18/2020 1819 by Grant Poon RN  Outcome: Ongoing     Problem: Skin Integrity:  Goal: Will show no infection signs and symptoms  Description: Will show no infection signs and symptoms  11/19/2020 0041 by Mary De  Outcome: Ongoing  11/18/2020 1819 by Grant Poon RN  Outcome: Ongoing  Goal: Absence of new skin breakdown  Description: Absence of new skin breakdown  11/19/2020 0041 by Mary De  Outcome: Ongoing  11/18/2020 1819 by Grant Poon RN  Outcome: Ongoing     Problem: Pain:  Goal: Pain level will decrease  Description: Pain level will decrease  11/19/2020 0041 by Mary De  Outcome: Ongoing  11/18/2020 1819 by Grant Poon RN  Outcome: Ongoing  Goal: Control of acute pain  Description: Control of acute pain  11/19/2020 0041 by Mary De  Outcome: Ongoing  11/18/2020 1819 by Grant Poon RN  Outcome: Ongoing  Goal: Control of chronic pain  Description: Control of chronic pain  11/19/2020 0041 by Mary De  Outcome: Ongoing  11/18/2020 1819 by Grant Poon RN  Outcome: Ongoing

## 2020-11-20 ENCOUNTER — ANESTHESIA EVENT (OUTPATIENT)
Dept: OPERATING ROOM | Age: 65
DRG: 982 | End: 2020-11-20
Payer: MEDICARE

## 2020-11-20 ENCOUNTER — APPOINTMENT (OUTPATIENT)
Dept: GENERAL RADIOLOGY | Age: 65
DRG: 982 | End: 2020-11-20
Payer: MEDICARE

## 2020-11-20 ENCOUNTER — ANESTHESIA (OUTPATIENT)
Dept: OPERATING ROOM | Age: 65
DRG: 982 | End: 2020-11-20
Payer: MEDICARE

## 2020-11-20 VITALS
RESPIRATION RATE: 17 BRPM | SYSTOLIC BLOOD PRESSURE: 77 MMHG | DIASTOLIC BLOOD PRESSURE: 46 MMHG | TEMPERATURE: 95.4 F | OXYGEN SATURATION: 100 %

## 2020-11-20 LAB
ALBUMIN SERPL-MCNC: 2.2 G/DL (ref 3.5–5.2)
ALBUMIN/GLOBULIN RATIO: 0.8 (ref 1–2.5)
ALLEN TEST: ABNORMAL
ALP BLD-CCNC: 363 U/L (ref 35–104)
ALT SERPL-CCNC: 42 U/L (ref 5–33)
ANION GAP SERPL CALCULATED.3IONS-SCNC: 10 MMOL/L (ref 9–17)
AST SERPL-CCNC: 85 U/L
BILIRUB SERPL-MCNC: 1 MG/DL (ref 0.3–1.2)
BUN BLDV-MCNC: 18 MG/DL (ref 8–23)
BUN/CREAT BLD: ABNORMAL (ref 9–20)
CA 15-3: 58 U/ML (ref 0–31)
CALCIUM IONIZED: 1.28 MMOL/L (ref 1.13–1.33)
CALCIUM SERPL-MCNC: 8.9 MG/DL (ref 8.6–10.4)
CARBOXYHEMOGLOBIN: 1.3 % (ref 0–5)
CHLORIDE BLD-SCNC: 104 MMOL/L (ref 98–107)
CHLORIDE, WHOLE BLOOD: 111 MMOL/L (ref 98–110)
CO2: 23 MMOL/L (ref 20–31)
CREAT SERPL-MCNC: 0.51 MG/DL (ref 0.5–0.9)
DERMATOLOGY PATHOLOGY REPORT: NORMAL
FIO2: ABNORMAL
GFR AFRICAN AMERICAN: >60 ML/MIN
GFR NON-AFRICAN AMERICAN: >60 ML/MIN
GFR SERPL CREATININE-BSD FRML MDRD: ABNORMAL ML/MIN/{1.73_M2}
GFR SERPL CREATININE-BSD FRML MDRD: ABNORMAL ML/MIN/{1.73_M2}
GLUCOSE BLD-MCNC: 106 MG/DL (ref 65–105)
GLUCOSE BLD-MCNC: 144 MG/DL (ref 65–105)
GLUCOSE BLD-MCNC: 69 MG/DL (ref 65–105)
GLUCOSE BLD-MCNC: 79 MG/DL (ref 65–105)
GLUCOSE BLD-MCNC: 79 MG/DL (ref 65–105)
GLUCOSE BLD-MCNC: 87 MG/DL (ref 65–105)
GLUCOSE BLD-MCNC: 93 MG/DL (ref 65–105)
GLUCOSE BLD-MCNC: 93 MG/DL (ref 70–99)
GLUCOSE BLD-MCNC: 96 MG/DL (ref 65–105)
HCO3 VENOUS: 24 MMOL/L (ref 24–30)
HCT VFR BLD CALC: 29.6 %
HCT VFR BLD CALC: 35.4 % (ref 36.3–47.1)
HEMOGLOBIN: 11.4 G/DL (ref 11.9–15.1)
HEMOGLOBIN: 9.6 GM/DL
MCH RBC QN AUTO: 30.2 PG (ref 25.2–33.5)
MCHC RBC AUTO-ENTMCNC: 32.2 G/DL (ref 28.4–34.8)
MCV RBC AUTO: 93.9 FL (ref 82.6–102.9)
METHEMOGLOBIN: ABNORMAL % (ref 0–1.5)
MODE: ABNORMAL
NEGATIVE BASE EXCESS, VEN: ABNORMAL MMOL/L (ref 0–2)
NOTIFICATION TIME: ABNORMAL
NOTIFICATION: ABNORMAL
NRBC AUTOMATED: 0 PER 100 WBC
O2 DEVICE/FLOW/%: ABNORMAL
O2 SAT, VEN: 99.8 % (ref 60–85)
OXYHEMOGLOBIN: ABNORMAL % (ref 95–98)
PATIENT TEMP: 37
PCO2, VEN, TEMP ADJ: ABNORMAL MMHG (ref 39–55)
PCO2, VEN: 31.3 (ref 39–55)
PDW BLD-RTO: 14.2 % (ref 11.8–14.4)
PEEP/CPAP: ABNORMAL
PH VENOUS: 7.5 (ref 7.32–7.42)
PH, VEN, TEMP ADJ: ABNORMAL (ref 7.32–7.42)
PLATELET # BLD: 125 K/UL (ref 138–453)
PMV BLD AUTO: 10.7 FL (ref 8.1–13.5)
PO2, VEN, TEMP ADJ: ABNORMAL MMHG (ref 30–50)
PO2, VEN: 246 (ref 30–50)
POSITIVE BASE EXCESS, VEN: 1.5 MMOL/L (ref 0–2)
POTASSIUM SERPL-SCNC: 4.3 MMOL/L (ref 3.7–5.3)
POTASSIUM, WHOLE BLOOD: 4.1 MMOL/L (ref 3.6–5)
PSV: ABNORMAL
PT. POSITION: ABNORMAL
RBC # BLD: 3.77 M/UL (ref 3.95–5.11)
RESPIRATORY RATE: ABNORMAL
SAMPLE SITE: ABNORMAL
SET RATE: ABNORMAL
SODIUM BLD-SCNC: 137 MMOL/L (ref 135–144)
SODIUM, WHOLE BLOOD: 141 MMOL/L (ref 136–145)
TEXT FOR RESPIRATORY: ABNORMAL
TOTAL HB: ABNORMAL G/DL (ref 12–16)
TOTAL PROTEIN: 5.1 G/DL (ref 6.4–8.3)
TOTAL RATE: ABNORMAL
VT: ABNORMAL
WBC # BLD: 7.8 K/UL (ref 3.5–11.3)

## 2020-11-20 PROCEDURE — 6360000002 HC RX W HCPCS: Performed by: SPECIALIST

## 2020-11-20 PROCEDURE — 82435 ASSAY OF BLOOD CHLORIDE: CPT

## 2020-11-20 PROCEDURE — 2500000003 HC RX 250 WO HCPCS: Performed by: SPECIALIST

## 2020-11-20 PROCEDURE — 36415 COLL VENOUS BLD VENIPUNCTURE: CPT

## 2020-11-20 PROCEDURE — 2580000003 HC RX 258: Performed by: SPECIALIST

## 2020-11-20 PROCEDURE — 85027 COMPLETE CBC AUTOMATED: CPT

## 2020-11-20 PROCEDURE — 82962 GLUCOSE BLOOD TEST: CPT

## 2020-11-20 PROCEDURE — 3600000014 HC SURGERY LEVEL 4 ADDTL 15MIN: Performed by: ORTHOPAEDIC SURGERY

## 2020-11-20 PROCEDURE — 2709999900 HC NON-CHARGEABLE SUPPLY: Performed by: ORTHOPAEDIC SURGERY

## 2020-11-20 PROCEDURE — 80053 COMPREHEN METABOLIC PANEL: CPT

## 2020-11-20 PROCEDURE — 99232 SBSQ HOSP IP/OBS MODERATE 35: CPT | Performed by: INTERNAL MEDICINE

## 2020-11-20 PROCEDURE — 2580000003 HC RX 258: Performed by: ORTHOPAEDIC SURGERY

## 2020-11-20 PROCEDURE — 1200000000 HC SEMI PRIVATE

## 2020-11-20 PROCEDURE — 99232 SBSQ HOSP IP/OBS MODERATE 35: CPT | Performed by: FAMILY MEDICINE

## 2020-11-20 PROCEDURE — 6370000000 HC RX 637 (ALT 250 FOR IP): Performed by: NURSE PRACTITIONER

## 2020-11-20 PROCEDURE — 6370000000 HC RX 637 (ALT 250 FOR IP): Performed by: STUDENT IN AN ORGANIZED HEALTH CARE EDUCATION/TRAINING PROGRAM

## 2020-11-20 PROCEDURE — 3209999900 FLUORO FOR SURGICAL PROCEDURES

## 2020-11-20 PROCEDURE — 82947 ASSAY GLUCOSE BLOOD QUANT: CPT

## 2020-11-20 PROCEDURE — 6360000002 HC RX W HCPCS: Performed by: STUDENT IN AN ORGANIZED HEALTH CARE EDUCATION/TRAINING PROGRAM

## 2020-11-20 PROCEDURE — 7100000001 HC PACU RECOVERY - ADDTL 15 MIN: Performed by: ORTHOPAEDIC SURGERY

## 2020-11-20 PROCEDURE — 82330 ASSAY OF CALCIUM: CPT

## 2020-11-20 PROCEDURE — 3700000000 HC ANESTHESIA ATTENDED CARE: Performed by: ORTHOPAEDIC SURGERY

## 2020-11-20 PROCEDURE — 84132 ASSAY OF SERUM POTASSIUM: CPT

## 2020-11-20 PROCEDURE — 2720000010 HC SURG SUPPLY STERILE: Performed by: ORTHOPAEDIC SURGERY

## 2020-11-20 PROCEDURE — 7100000000 HC PACU RECOVERY - FIRST 15 MIN: Performed by: ORTHOPAEDIC SURGERY

## 2020-11-20 PROCEDURE — 2580000003 HC RX 258: Performed by: ANESTHESIOLOGY

## 2020-11-20 PROCEDURE — 85014 HEMATOCRIT: CPT

## 2020-11-20 PROCEDURE — 27227 TREAT HIP FRACTURE(S): CPT | Performed by: ORTHOPAEDIC SURGERY

## 2020-11-20 PROCEDURE — C1769 GUIDE WIRE: HCPCS | Performed by: ORTHOPAEDIC SURGERY

## 2020-11-20 PROCEDURE — 2580000003 HC RX 258: Performed by: NURSE PRACTITIONER

## 2020-11-20 PROCEDURE — 3600000004 HC SURGERY LEVEL 4 BASE: Performed by: ORTHOPAEDIC SURGERY

## 2020-11-20 PROCEDURE — 3700000001 HC ADD 15 MINUTES (ANESTHESIA): Performed by: ORTHOPAEDIC SURGERY

## 2020-11-20 PROCEDURE — 82805 BLOOD GASES W/O2 SATURATION: CPT

## 2020-11-20 PROCEDURE — 6360000002 HC RX W HCPCS: Performed by: NURSE PRACTITIONER

## 2020-11-20 PROCEDURE — 85018 HEMOGLOBIN: CPT

## 2020-11-20 PROCEDURE — 0QJY0ZZ INSPECTION OF LOWER BONE, OPEN APPROACH: ICD-10-PCS | Performed by: ORTHOPAEDIC SURGERY

## 2020-11-20 PROCEDURE — 84295 ASSAY OF SERUM SODIUM: CPT

## 2020-11-20 RX ORDER — DEXTROSE MONOHYDRATE 50 MG/ML
INJECTION, SOLUTION INTRAVENOUS CONTINUOUS
Status: DISCONTINUED | OUTPATIENT
Start: 2020-11-20 | End: 2020-11-20

## 2020-11-20 RX ORDER — SODIUM CHLORIDE, SODIUM LACTATE, POTASSIUM CHLORIDE, CALCIUM CHLORIDE 600; 310; 30; 20 MG/100ML; MG/100ML; MG/100ML; MG/100ML
INJECTION, SOLUTION INTRAVENOUS CONTINUOUS PRN
Status: DISCONTINUED | OUTPATIENT
Start: 2020-11-20 | End: 2020-11-20 | Stop reason: SDUPTHER

## 2020-11-20 RX ORDER — LIDOCAINE HYDROCHLORIDE 10 MG/ML
INJECTION, SOLUTION EPIDURAL; INFILTRATION; INTRACAUDAL; PERINEURAL PRN
Status: DISCONTINUED | OUTPATIENT
Start: 2020-11-20 | End: 2020-11-20 | Stop reason: SDUPTHER

## 2020-11-20 RX ORDER — PHENYLEPHRINE HCL IN 0.9% NACL 1 MG/10 ML
SYRINGE (ML) INTRAVENOUS PRN
Status: DISCONTINUED | OUTPATIENT
Start: 2020-11-20 | End: 2020-11-20 | Stop reason: SDUPTHER

## 2020-11-20 RX ORDER — ROCURONIUM BROMIDE 10 MG/ML
INJECTION, SOLUTION INTRAVENOUS PRN
Status: DISCONTINUED | OUTPATIENT
Start: 2020-11-20 | End: 2020-11-20 | Stop reason: SDUPTHER

## 2020-11-20 RX ORDER — SODIUM CHLORIDE 9 MG/ML
INJECTION, SOLUTION INTRAVENOUS CONTINUOUS
Status: DISCONTINUED | OUTPATIENT
Start: 2020-11-20 | End: 2020-11-24 | Stop reason: HOSPADM

## 2020-11-20 RX ORDER — MAGNESIUM HYDROXIDE 1200 MG/15ML
LIQUID ORAL CONTINUOUS PRN
Status: COMPLETED | OUTPATIENT
Start: 2020-11-20 | End: 2020-11-20

## 2020-11-20 RX ORDER — MORPHINE SULFATE 2 MG/ML
2 INJECTION, SOLUTION INTRAMUSCULAR; INTRAVENOUS EVERY 4 HOURS PRN
Status: DISCONTINUED | OUTPATIENT
Start: 2020-11-20 | End: 2020-11-22

## 2020-11-20 RX ORDER — MIDAZOLAM HYDROCHLORIDE 1 MG/ML
INJECTION INTRAMUSCULAR; INTRAVENOUS PRN
Status: DISCONTINUED | OUTPATIENT
Start: 2020-11-20 | End: 2020-11-20 | Stop reason: SDUPTHER

## 2020-11-20 RX ORDER — PROPOFOL 10 MG/ML
INJECTION, EMULSION INTRAVENOUS PRN
Status: DISCONTINUED | OUTPATIENT
Start: 2020-11-20 | End: 2020-11-20 | Stop reason: SDUPTHER

## 2020-11-20 RX ORDER — FENTANYL CITRATE 50 UG/ML
INJECTION, SOLUTION INTRAMUSCULAR; INTRAVENOUS PRN
Status: DISCONTINUED | OUTPATIENT
Start: 2020-11-20 | End: 2020-11-20 | Stop reason: SDUPTHER

## 2020-11-20 RX ADMIN — Medication 100 MCG: at 14:29

## 2020-11-20 RX ADMIN — FENTANYL CITRATE 50 MCG: 50 INJECTION, SOLUTION INTRAMUSCULAR; INTRAVENOUS at 15:59

## 2020-11-20 RX ADMIN — LIDOCAINE HYDROCHLORIDE 50 MG: 10 INJECTION, SOLUTION EPIDURAL; INFILTRATION; INTRACAUDAL; PERINEURAL at 14:11

## 2020-11-20 RX ADMIN — FENTANYL CITRATE 50 MCG: 50 INJECTION, SOLUTION INTRAMUSCULAR; INTRAVENOUS at 15:52

## 2020-11-20 RX ADMIN — FENTANYL CITRATE 100 MCG: 50 INJECTION, SOLUTION INTRAMUSCULAR; INTRAVENOUS at 14:11

## 2020-11-20 RX ADMIN — FLUTICASONE PROPIONATE 1 SPRAY: 50 SPRAY, METERED NASAL at 20:46

## 2020-11-20 RX ADMIN — MORPHINE SULFATE 2 MG: 2 INJECTION, SOLUTION INTRAMUSCULAR; INTRAVENOUS at 04:53

## 2020-11-20 RX ADMIN — Medication 100 MCG: at 15:06

## 2020-11-20 RX ADMIN — MIDAZOLAM HYDROCHLORIDE 2 MG: 1 INJECTION, SOLUTION INTRAMUSCULAR; INTRAVENOUS at 14:08

## 2020-11-20 RX ADMIN — SUGAMMADEX 400 MG: 100 INJECTION, SOLUTION INTRAVENOUS at 16:10

## 2020-11-20 RX ADMIN — SODIUM CHLORIDE, POTASSIUM CHLORIDE, SODIUM LACTATE AND CALCIUM CHLORIDE: 600; 310; 30; 20 INJECTION, SOLUTION INTRAVENOUS at 13:55

## 2020-11-20 RX ADMIN — ROCURONIUM BROMIDE 50 MG: 10 INJECTION, SOLUTION INTRAVENOUS at 14:11

## 2020-11-20 RX ADMIN — MORPHINE SULFATE 2 MG: 2 INJECTION, SOLUTION INTRAMUSCULAR; INTRAVENOUS at 18:42

## 2020-11-20 RX ADMIN — LEVOTHYROXINE SODIUM 25 MCG: 25 TABLET ORAL at 06:55

## 2020-11-20 RX ADMIN — OXYCODONE HYDROCHLORIDE AND ACETAMINOPHEN 2 TABLET: 5; 325 TABLET ORAL at 20:49

## 2020-11-20 RX ADMIN — DEXTROSE MONOHYDRATE: 50 INJECTION, SOLUTION INTRAVENOUS at 17:15

## 2020-11-20 RX ADMIN — ROCURONIUM BROMIDE 20 MG: 10 INJECTION, SOLUTION INTRAVENOUS at 15:14

## 2020-11-20 RX ADMIN — ROCURONIUM BROMIDE 20 MG: 10 INJECTION, SOLUTION INTRAVENOUS at 16:02

## 2020-11-20 RX ADMIN — OXYCODONE HYDROCHLORIDE AND ACETAMINOPHEN 2 TABLET: 5; 325 TABLET ORAL at 00:36

## 2020-11-20 RX ADMIN — SODIUM CHLORIDE: 9 INJECTION, SOLUTION INTRAVENOUS at 03:11

## 2020-11-20 RX ADMIN — CEFAZOLIN 2 G: 10 INJECTION, POWDER, FOR SOLUTION INTRAVENOUS at 14:51

## 2020-11-20 RX ADMIN — Medication 100 MCG: at 14:50

## 2020-11-20 RX ADMIN — PROPOFOL 200 MG: 10 INJECTION, EMULSION INTRAVENOUS at 14:11

## 2020-11-20 RX ADMIN — FENTANYL CITRATE 50 MCG: 50 INJECTION, SOLUTION INTRAMUSCULAR; INTRAVENOUS at 16:26

## 2020-11-20 RX ADMIN — PANTOPRAZOLE SODIUM 40 MG: 40 TABLET, DELAYED RELEASE ORAL at 06:55

## 2020-11-20 RX ADMIN — PRAMIPEXOLE DIHYDROCHLORIDE 0.12 MG: 0.25 TABLET ORAL at 20:46

## 2020-11-20 RX ADMIN — DEXTROSE MONOHYDRATE: 50 INJECTION, SOLUTION INTRAVENOUS at 12:17

## 2020-11-20 RX ADMIN — LACTULOSE 30 G: 20 SOLUTION ORAL at 20:41

## 2020-11-20 RX ADMIN — Medication 100 MCG: at 15:13

## 2020-11-20 ASSESSMENT — PULMONARY FUNCTION TESTS
PIF_VALUE: 24
PIF_VALUE: 19
PIF_VALUE: 2
PIF_VALUE: 23
PIF_VALUE: 28
PIF_VALUE: 27
PIF_VALUE: 24
PIF_VALUE: 19
PIF_VALUE: 21
PIF_VALUE: 3
PIF_VALUE: 25
PIF_VALUE: 0
PIF_VALUE: 21
PIF_VALUE: 25
PIF_VALUE: 27
PIF_VALUE: 24
PIF_VALUE: 28
PIF_VALUE: 27
PIF_VALUE: 28
PIF_VALUE: 27
PIF_VALUE: 1
PIF_VALUE: 24
PIF_VALUE: 28
PIF_VALUE: 19
PIF_VALUE: 26
PIF_VALUE: 21
PIF_VALUE: 27
PIF_VALUE: 41
PIF_VALUE: 24
PIF_VALUE: 1
PIF_VALUE: 30
PIF_VALUE: 24
PIF_VALUE: 28
PIF_VALUE: 3
PIF_VALUE: 19
PIF_VALUE: 22
PIF_VALUE: 28
PIF_VALUE: 27
PIF_VALUE: 26
PIF_VALUE: 24
PIF_VALUE: 29
PIF_VALUE: 23
PIF_VALUE: 28
PIF_VALUE: 24
PIF_VALUE: 26
PIF_VALUE: 28
PIF_VALUE: 26
PIF_VALUE: 22
PIF_VALUE: 27
PIF_VALUE: 1
PIF_VALUE: 26
PIF_VALUE: 27
PIF_VALUE: 0
PIF_VALUE: 21
PIF_VALUE: 23
PIF_VALUE: 28
PIF_VALUE: 26
PIF_VALUE: 24
PIF_VALUE: 24
PIF_VALUE: 27
PIF_VALUE: 0
PIF_VALUE: 24
PIF_VALUE: 31
PIF_VALUE: 22
PIF_VALUE: 0
PIF_VALUE: 21
PIF_VALUE: 22
PIF_VALUE: 27
PIF_VALUE: 24
PIF_VALUE: 37
PIF_VALUE: 28
PIF_VALUE: 34
PIF_VALUE: 24
PIF_VALUE: 28
PIF_VALUE: 23
PIF_VALUE: 25
PIF_VALUE: 28
PIF_VALUE: 27
PIF_VALUE: 30
PIF_VALUE: 24
PIF_VALUE: 22
PIF_VALUE: 22
PIF_VALUE: 28
PIF_VALUE: 2
PIF_VALUE: 27
PIF_VALUE: 28
PIF_VALUE: 3
PIF_VALUE: 1
PIF_VALUE: 27
PIF_VALUE: 28
PIF_VALUE: 21
PIF_VALUE: 26
PIF_VALUE: 24
PIF_VALUE: 23
PIF_VALUE: 18
PIF_VALUE: 28
PIF_VALUE: 26
PIF_VALUE: 22
PIF_VALUE: 29
PIF_VALUE: 26
PIF_VALUE: 18
PIF_VALUE: 21
PIF_VALUE: 28
PIF_VALUE: 25
PIF_VALUE: 25
PIF_VALUE: 27
PIF_VALUE: 25
PIF_VALUE: 25
PIF_VALUE: 20
PIF_VALUE: 24
PIF_VALUE: 24
PIF_VALUE: 27
PIF_VALUE: 27
PIF_VALUE: 0
PIF_VALUE: 19
PIF_VALUE: 24
PIF_VALUE: 26
PIF_VALUE: 28
PIF_VALUE: 24
PIF_VALUE: 28
PIF_VALUE: 24
PIF_VALUE: 26
PIF_VALUE: 28
PIF_VALUE: 24
PIF_VALUE: 19
PIF_VALUE: 24
PIF_VALUE: 19
PIF_VALUE: 28
PIF_VALUE: 32
PIF_VALUE: 27
PIF_VALUE: 26
PIF_VALUE: 28
PIF_VALUE: 24
PIF_VALUE: 23
PIF_VALUE: 18
PIF_VALUE: 3
PIF_VALUE: 26
PIF_VALUE: 2
PIF_VALUE: 27
PIF_VALUE: 24
PIF_VALUE: 20

## 2020-11-20 ASSESSMENT — PAIN SCALES - GENERAL
PAINLEVEL_OUTOF10: 7
PAINLEVEL_OUTOF10: 10
PAINLEVEL_OUTOF10: 8

## 2020-11-20 ASSESSMENT — PAIN - FUNCTIONAL ASSESSMENT: PAIN_FUNCTIONAL_ASSESSMENT: 0-10

## 2020-11-20 NOTE — PROGRESS NOTES
General Surgery:  Daily Progress Note                  PATIENT NAME: Yary Maurer     TODAY'S DATE: 11/20/2020, 7:00 AM  CC:  Hip pain    SUBJECTIVE:     Pt seen and examined at bedside. No overnight events. Patient n.p.o. since midnight for OR with Ortho for repair of left acetabular fracture today, adequate UOP yesterday, pending overnight recording. Reports left hip pain controlled on MMT. Denies right breast pain, and reports that her right breast seems less edematous this morning. VSS, afebrile, T-max 98.6. Denies N/V, C/F, SOB, CP.      OBJECTIVE:   VITALS:  BP (!) 107/53   Pulse 81   Temp 97.5 °F (36.4 °C) (Oral)   Resp 16   Ht 5' 7\" (1.702 m)   Wt 180 lb (81.6 kg)   SpO2 95%   BMI 28.19 kg/m²      INTAKE/OUTPUT:      Intake/Output Summary (Last 24 hours) at 11/20/2020 0700  Last data filed at 11/19/2020 1904  Gross per 24 hour   Intake --   Output 650 ml   Net -650 ml       PHYSICAL EXAM:  CONSTITUTIONAL:  awake, alert, not distressed and mildly obese  HEENT: Normocephalic/atraumatic, without obvious abnormality. NECK:  Supple, symmetrical, trachea midline   CARDIOVASCULAR: Regular rate and rhythm   Breast: R breast noticeable larger then L with signs of pitting edema, no active drainage noted, no gross lymphadenopathy, R breast firm  LUNGS: Clear to auscultation bilaterally without evidence of wheezing or tachypnea. ABDOMEN: soft, NT, ND, no rebound   MUSCULOSKELETAL:Right upper extremity has pitting edema, nontender to palpation  NEUROLOGIC: Gross motor intact without focal weakness.   SKIN: No cyanosis, rashes,  Orientation:   oriented to person, place, and time    Data:  CBC with Differential:    Lab Results   Component Value Date    WBC 7.8 11/20/2020    RBC 3.77 11/20/2020    HGB 11.4 11/20/2020    HCT 35.4 11/20/2020     11/20/2020    MCV 93.9 11/20/2020    MCH 30.2 11/20/2020    MCHC 32.2 11/20/2020    RDW 14.2 11/20/2020    LYMPHOPCT 24 11/16/2020    MONOPCT 15 11/16/2020 BASOPCT 1 11/16/2020    MONOSABS 1.05 11/16/2020    LYMPHSABS 1.70 11/16/2020    EOSABS 0.34 11/16/2020    BASOSABS 0.05 11/16/2020    DIFFTYPE NOT REPORTED 11/16/2020     CMP:    Lab Results   Component Value Date     11/20/2020    K 4.3 11/20/2020     11/20/2020    CO2 23 11/20/2020    BUN 18 11/20/2020    CREATININE 0.51 11/20/2020    GFRAA >60 11/20/2020    LABGLOM >60 11/20/2020    GLUCOSE 93 11/20/2020    PROT 5.1 11/20/2020    LABALBU 2.2 11/20/2020    CALCIUM 8.9 11/20/2020    BILITOT 1.00 11/20/2020    ALKPHOS 363 11/20/2020    AST 85 11/20/2020    ALT 42 11/20/2020     BMP:    Lab Results   Component Value Date     11/20/2020    K 4.3 11/20/2020     11/20/2020    CO2 23 11/20/2020    BUN 18 11/20/2020    LABALBU 2.2 11/20/2020    CREATININE 0.51 11/20/2020    CALCIUM 8.9 11/20/2020    GFRAA >60 11/20/2020    LABGLOM >60 11/20/2020    GLUCOSE 93 11/20/2020       Radiology Review:    Xr Pelvis (min 3 Views)    Result Date: 11/18/2020  Limited exam Left acetabular fracture is suspected. 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 XXXA] 11/16/2020    Cirrhosis ([address] [K74.60] 05/23/2019    Pure hypercholesterolemia [E78.00] 10/08/2018    Anemia, iron deficiency [D50.9] 10/08/2018    Depression [F32.9] 09/26/2018    Osteoarthritis [M19.90] 09/26/2018    Asthma [J45.909] 09/06/2018    Hyperlipidemia [E78.5] 09/06/2018    ARLYN (obstructive sleep apnea) [G47.33] 08/09/2018    Morbid obesity ([address] [E66.01] 08/09/2018    Essential hypertension [I10] 08/09/2018    Diabetes mellitus ([facility] [address] [E11.9] 08/09/2018       60 y.o. female with left acetabular fracture, with right breast edema on palpation  -Bedside punch biopsy of right breast  - Pathology: Pending    Plan:  · No obvious mass felt within the right breast, and given chronic nature and waxing and waning characteristics appears to be anasarca of the breast and arm. Punch biopsy done to right breast to rule out inflammatory process, pathology pending. Patient will need diagnostic mammogram as an outpatient. · OR today with Ortho for repair of Left acetabular fx  · Diet: NPO since midnight  · Analgesia: MMT, local for procedure  · Activity:  Continue to encourage work w/ PT/OT, NWB left lower extremity neuro  · Wound care: Leave sutures in for 7 to 10 days.   Covered in C/D/I dressing, can be removed later today  · Continue medical management per primary      Electronically signed by Keke Landin DO  on 11/20/2020 at 7:00 AM

## 2020-11-20 NOTE — ANESTHESIA PRE PROCEDURE
Department of Anesthesiology  Preprocedure Note       Name:  Gloria Thomason   Age:  60 y.o.  :  1955                                          MRN:  [id number]         Date:  2020      Surgeon: Jamal Enriquez):  Neil Quinones DO    Procedure: Procedure(s):  **ADD ON WANTS TO FOLLOW SELF** PERCUTANEOUS PINNING PELVIC RING, PRONE, FLAT PAWAN, PERC PELVIS TRAY, SYNTHES 6.5 AND 7.5 CANNULATED, 4.5 NON SELF TAPPING SCREWS    Medications prior to admission:   Prior to Admission medications    Medication Sig Start Date End Date Taking? Authorizing Provider   lactulose (CHRONULAC) 10 GM/15ML solution TAKE 15 TO 30 ML BY MOUTH DAILY INCREASE TO 60 ML BY MOUTH IF NEEDED TO ACHIEVE 2 TO 3 BOWEL MOVENTS A DAY 20   Jaqueline Mcclain MD   baclofen (LIORESAL) 10 MG tablet  20   Historical Provider, MD   DULoxetine (CYMBALTA) 30 MG extended release capsule  20   Historical Provider, MD   pramipexole (MIRAPEX) 0.125 MG tablet Take 1 tablet by mouth nightly 19   Clayton Louis MD   levothyroxine (SYNTHROID) 25 MCG tablet Take 1 tablet by mouth Daily 19   Clayton Louis MD   lisinopril (PRINIVIL;ZESTRIL) 20 MG tablet Take 20 mg by mouth daily    Historical Provider, MD   loratadine-pseudoephedrine (CLARITIN-D 24 HOUR)  MG per extended release tablet Take 1 tablet by mouth daily    Historical Provider, MD   metoprolol succinate (TOPROL XL) 25 MG extended release tablet Take 25 mg by mouth daily    Historical Provider, MD   oxyCODONE-acetaminophen (PERCOCET) 5-325 MG per tablet Take 1 tablet by mouth 3 times daily as needed for Pain. Historical Provider, MD   aspirin 81 MG EC tablet Take 81 mg by mouth daily. Historical Provider, MD   citalopram (CELEXA) 40 MG tablet Take 40 mg by mouth daily. Historical Provider, MD   fluticasone (FLONASE) 50 MCG/ACT nasal spray 1 spray by Nasal route 2 times daily as needed for Rhinitis.     Historical Provider, MD   metFORMIN ER (Gretchen Bird) 500 MG XR tablet Take 1,000 mg by mouth 2 times daily (with meals) Indications: Take two 500mg tablets BID with meals Take two 500mg tablets BID with meals    Historical Provider, MD   omeprazole (PRILOSEC) 20 MG capsule Take 20 mg by mouth daily.     Historical Provider, MD       Current medications:    Current Facility-Administered Medications   Medication Dose Route Frequency Provider Last Rate Last Dose    [MAR Hold] 0.9 % sodium chloride infusion   Intravenous Continuous ZACARIAS Jovel -  mL/hr at 11/20/20 0311      [MAR Hold] morphine (PF) injection 2 mg  2 mg Intravenous Q4H PRN Monica Villa APRN - CNP   2 mg at 11/20/20 0453    dextrose 5 % solution   Intravenous Continuous Wang Zimmerman MD 50 mL/hr at 11/20/20 1217      [MAR Hold] diclofenac sodium (VOLTAREN) 1 % gel 2 g  2 g Topical 4x Daily PRN Alonzo Cobb MD   2 g at 11/19/20 2112    [MAR Hold] aspirin EC tablet 81 mg  81 mg Oral Daily Karena Norse, APRN - CNP   81 mg at 11/19/20 0853    [MAR Hold] citalopram (CELEXA) tablet 40 mg  40 mg Oral Daily Karena Norse, APRN - CNP   40 mg at 11/19/20 0852    [MAR Hold] fluticasone (FLONASE) 50 MCG/ACT nasal spray 1 spray  1 spray Nasal BID PRN Karena Norse, APRN - CNP   1 spray at 11/19/20 2107    [MAR Hold] lactulose (CHRONULAC) 10 GM/15ML solution 30 g  30 g Oral TID Karena Norse, APRN - CNP   30 g at 11/18/20 0931    [MAR Hold] levothyroxine (SYNTHROID) tablet 25 mcg  25 mcg Oral Daily Karena Norse, APRN - CNP   25 mcg at 11/20/20 0655    [MAR Hold] lisinopril (PRINIVIL;ZESTRIL) tablet 20 mg  20 mg Oral Daily Karena Norse, APRN - CNP   20 mg at 11/18/20 0930    [MAR Hold] metoprolol succinate (TOPROL XL) extended release tablet 25 mg  25 mg Oral Daily Karena Norse, APRN - CNP   25 mg at 11/18/20 0930    [MAR Hold] pantoprazole (PROTONIX) tablet 40 mg  40 mg Oral QAM SHRUTHI Phoenix APRN - CNP   40 mg at 11/20/20 0655    [MAR Hold] pramipexole (MIRAPEX) tablet 0.125 mg  0.125 mg Oral Nightly Lisa Burow, APRN - CNP   0.125 mg at 11/19/20 2106    [MAR Hold] oxyCODONE-acetaminophen (PERCOCET) 5-325 MG per tablet 2 tablet  2 tablet Oral Q6H PRN Lisa Burow, APRN - CNP   2 tablet at 11/20/20 0036    [MAR Hold] sodium chloride flush 0.9 % injection 10 mL  10 mL Intravenous 2 times per day Lisa Burow, APRN - CNP   10 mL at 11/19/20 2230    [MAR Hold] sodium chloride flush 0.9 % injection 10 mL  10 mL Intravenous PRN Lisa Burow, APRN - CNP        [facility] Hold] potassium chloride (KLOR-CON M) extended release tablet 40 mEq  40 mEq Oral PRN Lisa Burow, APRN - CNP        Or    [facility] Hold] potassium bicarb-citric acid (EFFER-K) effervescent tablet 40 mEq  40 mEq Oral PRN Lisa Burow, APRN - CNP        Or    [facility] Hold] potassium chloride 10 mEq/100 mL IVPB (Peripheral Line)  10 mEq Intravenous PRN Lisa Burow, APRN - CNP        [facility] Hold] magnesium sulfate 1 g in dextrose 5% 100 mL IVPB  1 g Intravenous PRN Lisa Burow, APRN - CNP        [MAR Hold] acetaminophen (TYLENOL) tablet 650 mg  650 mg Oral Q6H PRN Lisa Burow, APRN - CNP   325 mg at 11/18/20 0947    Or    [MAR Hold] acetaminophen (TYLENOL) suppository 650 mg  650 mg Rectal Q6H PRN Lisa Burow, APRN - CNP        [MAR Hold] polyethylene glycol (GLYCOLAX) packet 17 g  17 g Oral Daily PRN Lisa Burow, APRN - CNP        [MAR Hold] promethazine (PHENERGAN) tablet 12.5 mg  12.5 mg Oral Q6H PRN Lisa Burow, APRN - CNP        Or    [MAR Hold] ondansetron (ZOFRAN) injection 4 mg  4 mg Intravenous Q6H PRN Lisa Burow, APRN - CNP        [MAR Hold] nicotine (NICODERM CQ) 21 MG/24HR 1 patch  1 patch Transdermal Daily PRN LisaZACARIAS Hook CNP        [MAR Hold] enoxaparin (LOVENOX) injection 40 mg  40 mg Subcutaneous Daily Dorene Grammes, APRN - CNP   40 mg at 11/19/20 0853    [MAR Hold] glucose (GLUTOSE) 40 % oral gel 15 g  15 g Oral PRN Dorene Grammes, APRN - CNP        [facility] Hold] dextrose 50 % IV solution  12.5 g Intravenous PRN Dorene Grammes, APRN - CNP        [MAR Hold] glucagon (rDNA) injection 1 mg  1 mg Intramuscular PRN Dorene Grammes, APRN - CNP        [MAR Hold] dextrose 5 % solution  100 mL/hr Intravenous PRN Dorene Grammes, APRN - CNP        [facility] Hold] insulin lispro (HUMALOG) injection vial 0-12 Units  0-12 Units Subcutaneous TID WC Dorene Grammes, APRN - CNP        [facility] Hold] insulin lispro (HUMALOG) injection vial 0-6 Units  0-6 Units Subcutaneous Nightly Dorene Grammes, APRN - CNP           Allergies:     Allergies   Allergen Reactions    Nsaids Other (See Comments)    Sulfa Antibiotics Other (See Comments) and Hives     Other reaction(s): Unknown  Patient unsure of the reaction    Tape Ressie Men Tape] Rash     Other reaction(s): Unknown       Problem List:    Patient Active Problem List   Diagnosis Code    Diabetes mellitus ([facility] [address] E11.9    ARLYN (obstructive sleep apnea) G47.33    Morbid obesity ([facility] [address] E66.01    Essential hypertension I10    Depression F32.9    Osteoarthritis M19.90    Anemia D64.9    Anemia, iron deficiency D50.9    Asthma J45.909    Atopic rhinitis J30.9    Disorder associated with type 2 diabetes mellitus (HCC) E11.8    Disorder of liver K76.9    Displacement of lumbar intervertebral disc without myelopathy M51.26    Fibromyalgia M79.7    Full thickness rotator cuff tear M75.120    Gastroesophageal reflux disease K21.9    Hyperglycemia due to type 2 diabetes mellitus (HCC) E11.65    Hyperlipidemia E78.5    Hypothyroid E03.9    Iron deficiency anemia D50.9    Localized, primary osteoarthritis of shoulder region M19.019    Mass of right breast N63.10    Migraine G43.909    Pure hypercholesterolemia E78.00    Altered mental status R41.82    Cirrhosis ([address] K74.60    Elevated LFTs R79.89    Hypernatremia E87.0    Elevated amylase R74.8    Pathological fracture due to metastatic bone disease M84.50XA    Fall from standing W19. XXXA    Localized swelling of right upper extremity R22.31    Malignant neoplasm of pelvic bone (HCC) C41.4    Left hip pain M25.552    Closed nondisplaced fracture of left acetabulum ([address] S32.402A       Past Medical History:        Diagnosis Date    Acute urinary tract infection 9/6/2018    Anemia     h/o anemia,transfused 2/2014    Anemia, iron deficiency 10/8/2018    Anxiety disorder     can get SOB with an anxiety attack    Arthritis     Asthma     Atopic rhinitis 9/6/2018    Bandemia     Blood transfusion reaction     x 2 unit prbc's 2/2014    Cellulitis and abscess of trunk     Cellulitis of right breast 9/26/2018    Cellulitis of right lower extremity- resolving 8/9/2018    COPD (chronic obstructive pulmonary disease) ([address]     patient denies COPD, only has asthma    CRP elevated     Depression     Diabetes mellitus ([address]     Disorder associated with type 2 diabetes mellitus ([address] 10/8/2018    Disorder of liver 9/6/2018    Displacement of lumbar intervertebral disc without myelopathy 10/8/2018    Essential hypertension 8/9/2018    Fibromyalgia 10/8/2018    Full thickness rotator cuff tear 10/8/2018    Gastroesophageal reflux disease 9/6/2018    GERD (gastroesophageal reflux disease)     Hammer toe of right foot     Hernia, abdominal 1998    Hyperglycemia due to type 2 diabetes mellitus ([address] 10/8/2018    Hyperlipidemia     Hypertension     Hypothyroid 10/8/2018    Iron deficiency anemia 10/8/2018    Lactic acidosis     Localized, primary osteoarthritis of shoulder region 10/8/2018    Mass of right breast 10/8/2018    Migraine 9/6/2018    Morbid obesity ([address] 8/9/2018    ARLYN (obstructive sleep apnea) 8/9/2018    Osteoarthritis 9/26/2018    Pure hypercholesterolemia 10/8/2018    Restless leg syndrome     Seasonal allergies     Septicemia ([facility] [address]     SIRS due to infectious process without acute organ dysfunction     Sleep apnea     doesn't use CPAP, sleeps in a recliner    Spondylosis     lower back    Thrombocytopenia ([facility] [address] 10/8/2018    Thrombocytopenia ([facility] [address] 10/8/2018       Past Surgical History:        Procedure Laterality Date    APPENDECTOMY      COLONOSCOPY      ROTATOR CUFF REPAIR Right     UPPER GASTROINTESTINAL ENDOSCOPY  05/29/2019    with biopsy by Dr. Bessie Sierra N/A 5/29/2019    EGD BIOPSY performed by Nelida Veras MD at [place] [id number] History:    Social History     Tobacco Use    Smoking status: Never Smoker    Smokeless tobacco: Never Used   Substance Use Topics    Alcohol use: No                                Counseling given: Not Answered      Vital Signs (Current):   Vitals:    11/19/20 0848 11/19/20 1930 11/20/20 0745 11/20/20 1156   BP: (!) 108/54 (!) 107/53 (!) 132/58 125/74   Pulse: 84 81 87 96   Resp: 14 16 16 16   Temp: 98.6 °F (37 °C) 97.5 °F (36.4 °C) 98.3 °F (36.8 °C) 97.7 °F (36.5 °C)   TempSrc: Oral Oral Oral Temporal   SpO2: 92% 95% 93% 93%   Weight:    180 lb (81.6 kg)   Height:    5' 7\" (1.702 m)                                              BP Readings from Last 3 Encounters:   11/20/20 125/74   05/29/19 (!) 142/58   05/29/19 (!) 152/67       NPO Status: Time of last liquid consumption: 2300                        Time of last solid consumption: 2300                        Date of last liquid consumption: 11/19/20                        Date of last solid food consumption: 11/19/20    BMI:   Wt Readings from Last 3 Encounters:   11/20/20 180 lb (81.6 kg)   05/29/19 188 lb 14.4 oz (85.7 kg)   11/07/18 206 lb (93.4 kg)     Body mass index is 28.19 kg/m².     CBC:   Lab Results   Component Value Date    WBC 7.8 11/20/2020    RBC 3.77 11/20/2020 HGB 11.4 11/20/2020    HCT 35.4 11/20/2020    MCV 93.9 11/20/2020    RDW 14.2 11/20/2020     11/20/2020       CMP:   Lab Results   Component Value Date     11/20/2020    K 4.3 11/20/2020     11/20/2020    CO2 23 11/20/2020    BUN 18 11/20/2020    CREATININE 0.51 11/20/2020    GFRAA >60 11/20/2020    LABGLOM >60 11/20/2020    GLUCOSE 93 11/20/2020    PROT 5.1 11/20/2020    CALCIUM 8.9 11/20/2020    BILITOT 1.00 11/20/2020    ALKPHOS 363 11/20/2020    AST 85 11/20/2020    ALT 42 11/20/2020       POC Tests:   Recent Labs     11/20/20  1205   POCGLU 79       Coags:   Lab Results   Component Value Date    PROTIME 14.1 11/17/2020    INR 1.4 11/17/2020    APTT 32.2 09/26/2018       HCG (If Applicable): No results found for: PREGTESTUR, PREGSERUM, HCG, HCGQUANT     ABGs: No results found for: PHART, PO2ART, VYP2RUB, BAY5RIV, BEART, R3DDXMAC     Type & Screen (If Applicable):  No results found for: LABABO, LABRH    Drug/Infectious Status (If Applicable):  Lab Results   Component Value Date    HEPCAB NONREACTIVE 05/24/2019       COVID-19 Screening (If Applicable):   Lab Results   Component Value Date    COVID19 Not Detected 11/16/2020         Anesthesia Evaluation  Patient summary reviewed and Nursing notes reviewed no history of anesthetic complications:   Airway: Mallampati: III  TM distance: >3 FB   Neck ROM: full  Mouth opening: > = 3 FB Dental:    (+) edentulous      Pulmonary:normal exam    (+) COPD:  sleep apnea:  asthma:                            Cardiovascular:    (+) hypertension:,       ECG reviewed  Rhythm: regular    Echocardiogram reviewed                  Neuro/Psych:   (+) neuromuscular disease:, headaches:, psychiatric history:            GI/Hepatic/Renal:   (+) GERD:, liver disease:, morbid obesity          Endo/Other:    (+) DiabetesType II DM, using insulin, hypothyroidism, blood dyscrasia: thrombocytopenia:., .                 Abdominal:           Vascular: Anesthesia Plan      general     ASA 3       Induction: intravenous. arterial line    Anesthetic plan and risks discussed with patient. Use of blood products discussed with patient whom consented to blood products.    Plan discussed with CRNA and surgical team.                  Bernadette Bowden MD   11/20/2020

## 2020-11-20 NOTE — PROGRESS NOTES
Occupational Therapy    Occupational Therapy Not Seen Note    DATE: 2020  Name: Scottie Velasco  : 1955  MRN: [id number]    Patient not available for Occupational Therapy due to:     Other: Per ortho, plan for OR today for surgical intervention consisting of closed reduction percutaneous screw of the left acetabulum    Next Scheduled Treatment: 2020    Electronically signed by RONY Wilson on 2020 at 9:51 AM

## 2020-11-20 NOTE — PROGRESS NOTES
Today's Date: 11/19/2020  Patient Name: Jabari Stephens  Date of admission: 11/16/2020  3:05 PM  Patient's age: 60 y.o., 1955  Admission Dx: Pathological fracture due to metastatic bone disease [M84.50XA]    Reason for Consult: management recommendations  Requesting Physician: Donita Camara MD    CHIEF COMPLAINT: Pelvic pain. Back pain. Weight loss. Right breast swelling. History Obtained From:  patient, electronic medical record    Interval history:    Patient seen and examined. Labs and vitals reviewed  Patient status post biopsy of right breast mass  Denies fever chills  Continues to struggle with pelvic pain  Anticipating surgery by orthopedic team tomorrow. HISTORY OF PRESENT ILLNESS:      The patient is a 60 y.o.  female who is Admitted to the hospital with chief complaints of leg pain and groin pain. The pain started after a fall about 2 days ago. Patient was walking and became unsteady and fell. Patient states that since the incident she has been having worsening pain to the point she could not take it anymore and presented to the hospital.  Denies any passing out episode complains of swelling of her right arm. Patient's work-up in the ER including CT scan showed pathological fracture of left acetabulum also shows osseous metastasis involving bilateral hemipelvis. She was metastatic lymphadenopathy CT chest shows multiple sclerotic and lytic lesions throughout the skeleton consistent metastatic disease. There is bilateral groundglass opacification of the lungs. There is severe hepatic cirrhosis associated with varices concerning for portal hypertension. Reviewed records reviewed patient was hospitalized back in May 2019 for hepatic encephalopathy. Patient left hospital AMA without completing work-up. Imaging studies at that time showed a lytic lesion involving ileum and L3. Biopsy came back positive for poorly differentiated non-small cell cancer.   Stains showed weak positivity for GATA3 concerning for breast primary. Patient during the hospitalization also had EGD done which was negative for malignancy. Patient was not seen by oncologist during the hospitalization and did not follow-up with an oncologist either. Past Medical History:   has a past medical history of Acute urinary tract infection, Anemia, Anemia, iron deficiency, Anxiety disorder, Arthritis, Asthma, Atopic rhinitis, Bandemia, Blood transfusion reaction, Cellulitis and abscess of trunk, Cellulitis of right breast, Cellulitis of right lower extremity- resolving, COPD (chronic obstructive pulmonary disease) ([address] CRP elevated, Depression, Diabetes mellitus ([address] Disorder associated with type 2 diabetes mellitus ([address] Disorder of liver, Displacement of lumbar intervertebral disc without myelopathy, Essential hypertension, Fibromyalgia, Full thickness rotator cuff tear, Gastroesophageal reflux disease, GERD (gastroesophageal reflux disease), Hammer toe of right foot, Hernia, abdominal, Hyperglycemia due to type 2 diabetes mellitus ([address] Hyperlipidemia, Hypertension, Hypothyroid, Iron deficiency anemia, Lactic acidosis, Localized, primary osteoarthritis of shoulder region, Mass of right breast, Migraine, Morbid obesity ([address] ARLYN (obstructive sleep apnea), Osteoarthritis, Pure hypercholesterolemia, Restless leg syndrome, Seasonal allergies, Septicemia ([address] SIRS due to infectious process without acute organ dysfunction, Sleep apnea, Spondylosis, Thrombocytopenia ([address] and Thrombocytopenia ([address] Past Surgical History:   has a past surgical history that includes Appendectomy; Rotator cuff repair (Right); Colonoscopy; Upper gastrointestinal endoscopy (05/29/2019); and Upper gastrointestinal endoscopy (N/A, 5/29/2019). Medications:    Prior to Admission medications    Medication Sig Start Date End Date Taking?  Authorizing Provider   lactulose (CHRONULAC) 10 GM/15ML solution TAKE 15 TO 30 ML BY MOUTH mg Oral Daily Lisa Padilla APRN - CNP   40 mg at 11/19/20 0852    fluticasone (FLONASE) 50 MCG/ACT nasal spray 1 spray  1 spray Nasal BID PRN ZACARIAS Mcknight - CNP   1 spray at 11/19/20 2107    lactulose (CHRONULAC) 10 GM/15ML solution 30 g  30 g Oral TID Lisa Padilla APRN - CNP   30 g at 11/18/20 0931    levothyroxine (SYNTHROID) tablet 25 mcg  25 mcg Oral Daily Lisa Padilla APRN - CNP   25 mcg at 11/19/20 7210    lisinopril (PRINIVIL;ZESTRIL) tablet 20 mg  20 mg Oral Daily ZACARIAS Mcknight - CNP   20 mg at 11/18/20 0930    metoprolol succinate (TOPROL XL) extended release tablet 25 mg  25 mg Oral Daily ZACARIAS Mcknight - CNP   25 mg at 11/18/20 0930    pantoprazole (PROTONIX) tablet 40 mg  40 mg Oral QAM AC Lisa Padilla APRN - CNP   40 mg at 11/19/20 0857    pramipexole (MIRAPEX) tablet 0.125 mg  0.125 mg Oral Nightly ZACARIAS Mcknight - CNP   0.125 mg at 11/19/20 2106    oxyCODONE-acetaminophen (PERCOCET) 5-325 MG per tablet 2 tablet  2 tablet Oral Q6H PRN ZACARIAS Mcknight - CNP   2 tablet at 11/19/20 1858    sodium chloride flush 0.9 % injection 10 mL  10 mL Intravenous 2 times per day Lisa Padilla APRN - CNP   10 mL at 11/19/20 2230    sodium chloride flush 0.9 % injection 10 mL  10 mL Intravenous PRN ZACARIAS Mcknight - CNP        potassium chloride (KLOR-CON M) extended release tablet 40 mEq  40 mEq Oral PRN ZACARIAS Mcknight - CNP        Or    potassium bicarb-citric acid (EFFER-K) effervescent tablet 40 mEq  40 mEq Oral PRN ZACARIAS Mcknight - CNP        Or    potassium chloride 10 mEq/100 mL IVPB (Peripheral Line)  10 mEq Intravenous PRN Kathren Bunde, APRN - CNP        magnesium sulfate 1 g in dextrose 5% 100 mL IVPB  1 g Intravenous PRN ZACARIAS Mcknight - CNP        acetaminophen (TYLENOL) tablet 650 mg  650 mg Oral Q6H PRN Lisa Bernal, APRN - CNP   325 mg at 11/18/20 4576    Or    acetaminophen (TYLENOL) suppository 650 mg  650 mg Rectal Q6H PRN Lisa Bernal, APRN - CNP        polyethylene glycol (GLYCOLAX) packet 17 g  17 g Oral Daily PRN Lisa Bernal, APRN - CNP        promethazine (PHENERGAN) tablet 12.5 mg  12.5 mg Oral Q6H PRN Lisa Bernal, APRN - CNP        Or    ondansetron (ZOFRAN) injection 4 mg  4 mg Intravenous Q6H PRN Lisa Bernal, APRN - CNP        nicotine (NICODERM CQ) 21 MG/24HR 1 patch  1 patch Transdermal Daily PRN Lisa Bernal, APRN - CNP        enoxaparin (LOVENOX) injection 40 mg  40 mg Subcutaneous Daily Lisa Bernal, APRN - CNP   40 mg at 11/19/20 0853    glucose (GLUTOSE) 40 % oral gel 15 g  15 g Oral PRN Lisa Bernal, APRN - CNP        dextrose 50 % IV solution  12.5 g Intravenous PRN Lisa Bernal, APRN - CNP        glucagon (rDNA) injection 1 mg  1 mg Intramuscular PRN Lisa Bernal, APRN - CNP        dextrose 5 % solution  100 mL/hr Intravenous PRN Lisa Bernal, APRN - CNP        insulin lispro (HUMALOG) injection vial 0-12 Units  0-12 Units Subcutaneous TID WC Lisa Bernal, APRN - CNP        insulin lispro (HUMALOG) injection vial 0-6 Units  0-6 Units Subcutaneous Nightly Lisa Bernal, APRN - CNP           Allergies:  Nsaids; Sulfa antibiotics; and Tape [adhesive tape]    Social History:   reports that she has never smoked. She has never used smokeless tobacco. She reports that she does not drink alcohol or use drugs. Family History: family history includes Diabetes in her father; Heart Disease in her mother; Hypertension in her mother; Pacemaker in her mother. REVIEW OF SYSTEMS:      Constitutional: No fever or chills.  No night sweats, no weight loss   Eyes: No eye discharge, double vision, or eye pain   HEENT: negative for sore mouth, sore throat, hoarseness and voice change   Respiratory: negative for cough , sputum, dyspnea, wheezing, hemoptysis, chest pain   Cardiovascular: negative for chest pain, dyspnea, palpitations, orthopnea, PND   Gastrointestinal: negative for nausea, vomiting, diarrhea, constipation, abdominal pain, Dysphagia, hematemesis and hematochezia   Genitourinary: negative for frequency, dysuria, nocturia, urinary incontinence, and hematuria   Integument: negative for rash, skin lesions, bruises. Hematologic/Lymphatic: negative for easy bruising, bleeding, lymphadenopathy, or petechiae   Endocrine: negative for heat or cold intolerance,weight changes, change in bowel habits and hair loss   Musculoskeletal: n severe pelvic pain.   Neurological: negative for headaches, dizziness, seizures, weakness, numbness    PHYSICAL EXAM:        BP (!) 107/53   Pulse 81   Temp 97.5 °F (36.4 °C) (Oral)   Resp 16   Ht 5' 7\" (1.702 m)   Wt 180 lb (81.6 kg)   SpO2 95%   BMI 28.19 kg/m²    Temp (24hrs), Av.1 °F (36.7 °C), Min:97.5 °F (36.4 °C), Max:98.6 °F (37 °C)      General appearance - well appearing, no in pain or distress   Mental status - alert and cooperative   Eyes - pupils equal and reactive, extraocular eye movements intact   Ears - bilateral TM's and external ear canals normal   Mouth - mucous membranes moist, pharynx normal without lesions   Neck - supple, no significant adenopathy   Lymphatics - no palpable lymphadenopathy, no hepatosplenomegaly   Chest - clear to auscultation, no wheezes, rales or rhonchi, symmetric air entry   Heart - normal rate, regular rhythm, normal S1, S2, no murmurs  Abdomen - soft, nontender, nondistended, no masses or organomegaly   Neurological - alert, oriented, normal speech, no focal findings or movement disorder noted   Musculoskeletal - no joint tenderness, deformity or swelling   Extremities - peripheral pulses normal, no pedal edema, no clubbing or cyanosis   Skin - normal coloration and turgor, no rashes, no suspicious skin lesions noted ,  . DATA:      Labs:     Results for orders placed or performed during the hospital encounter of 11/16/20   COVID-19    Specimen: Other   Result Value Ref Range    SARS-CoV-2          SARS-CoV-2, Rapid Not Detected Not Detected    Source . NASOPHARYNGEAL SWAB     SARS-CoV-2         CBC WITH AUTO DIFFERENTIAL   Result Value Ref Range    WBC 7.0 3.5 - 11.3 k/uL    RBC 4.02 3.95 - 5.11 m/uL    Hemoglobin 12.2 11.9 - 15.1 g/dL    Hematocrit 37.4 36.3 - 47.1 %    MCV 93.0 82.6 - 102.9 fL    MCH 30.3 25.2 - 33.5 pg    MCHC 32.6 28.4 - 34.8 g/dL    RDW 14.3 11.8 - 14.4 %    Platelets 950 075 - 350 k/uL    MPV 10.5 8.1 - 13.5 fL    NRBC Automated 0.0 0.0 per 100 WBC    Differential Type NOT REPORTED     Seg Neutrophils 55 36 - 65 %    Lymphocytes 24 24 - 43 %    Monocytes 15 (H) 3 - 12 %    Eosinophils % 5 (H) 1 - 4 %    Basophils 1 0 - 2 %    Immature Granulocytes 0 0 %    Segs Absolute 3.80 1.50 - 8.10 k/uL    Absolute Lymph # 1.70 1.10 - 3.70 k/uL    Absolute Mono # 1.05 0.10 - 1.20 k/uL    Absolute Eos # 0.34 0.00 - 0.44 k/uL    Basophils Absolute 0.05 0.00 - 0.20 k/uL    Absolute Immature Granulocyte 0.03 0.00 - 0.30 k/uL    WBC Morphology NOT REPORTED     RBC Morphology NOT REPORTED     Platelet Estimate NOT REPORTED    BASIC METABOLIC PANEL   Result Value Ref Range    Glucose 83 70 - 99 mg/dL    BUN 16 8 - 23 mg/dL    CREATININE 0.50 0.50 - 0.90 mg/dL    Bun/Cre Ratio NOT REPORTED 9 - 20    Calcium 9.0 8.6 - 10.4 mg/dL    Sodium 136 135 - 144 mmol/L    Potassium 3.9 3.7 - 5.3 mmol/L    Chloride 103 98 - 107 mmol/L    CO2 24 20 - 31 mmol/L    Anion Gap 9 9 - 17 mmol/L    GFR Non-African American >60 >60 mL/min    GFR African American >60 >60 mL/min    GFR Comment          GFR Staging NOT REPORTED    Basic Metabolic Panel w/ Reflex to MG   Result Value Ref Range    Glucose 78 70 - 99 mg/dL    BUN 16 8 - 23 mg/dL    CREATININE 0.52 0.50 - 0.90 mg/dL    Bun/Cre Ratio NOT REPORTED 9 - 20 Calcium 9.1 8.6 - 10.4 mg/dL    Sodium 139 135 - 144 mmol/L    Potassium 4.1 3.7 - 5.3 mmol/L    Chloride 106 98 - 107 mmol/L    CO2 21 20 - 31 mmol/L    Anion Gap 12 9 - 17 mmol/L    GFR Non-African American >60 >60 mL/min    GFR African American >60 >60 mL/min    GFR Comment          GFR Staging NOT REPORTED    CBC   Result Value Ref Range    WBC 7.4 3.5 - 11.3 k/uL    RBC 3.95 3.95 - 5.11 m/uL    Hemoglobin 11.8 (L) 11.9 - 15.1 g/dL    Hematocrit 37.3 36.3 - 47.1 %    MCV 94.4 82.6 - 102.9 fL    MCH 29.9 25.2 - 33.5 pg    MCHC 31.6 28.4 - 34.8 g/dL    RDW 14.3 11.8 - 14.4 %    Platelets 689 145 - 519 k/uL    MPV 10.6 8.1 - 13.5 fL    NRBC Automated 0.0 0.0 per 100 WBC   Protime-INR   Result Value Ref Range    Protime 14.1 (H) 9.0 - 12.0 sec    INR 1.4    Hemoglobin A1C   Result Value Ref Range    Hemoglobin A1C 5.5 4.0 - 6.0 %    Estimated Avg Glucose 111 mg/dL   CBC   Result Value Ref Range    WBC 7.2 3.5 - 11.3 k/uL    RBC 3.99 3.95 - 5.11 m/uL    Hemoglobin 12.0 11.9 - 15.1 g/dL    Hematocrit 38.3 36.3 - 47.1 %    MCV 96.0 82.6 - 102.9 fL    MCH 30.1 25.2 - 33.5 pg    MCHC 31.3 28.4 - 34.8 g/dL    RDW 14.1 11.8 - 14.4 %    Platelets 120 901 - 809 k/uL    MPV 10.8 8.1 - 13.5 fL    NRBC Automated 0.0 0.0 per 100 WBC   Comp Metabolic w Bili Profile   Result Value Ref Range    Alb 2.4 (L) 3.5 - 5.2 g/dL    Albumin/Globulin Ratio 0.8 (L) 1.0 - 2.5    Alkaline Phosphatase 390 (H) 35 - 104 U/L    ALT 44 (H) 5 - 33 U/L    AST 96 (H) <32 U/L    Total Bilirubin 1.15 0.3 - 1.2 mg/dL    Bilirubin, Direct 0.48 (H) <0.31 mg/dL    Bilirubin, Indirect 0.67 0.00 - 1.00 mg/dL    BUN 16 8 - 23 mg/dL    Calcium 9.2 8.6 - 10.4 mg/dL    CREATININE 0.43 (L) 0.50 - 0.90 mg/dL    Glucose 87 70 - 99 mg/dL    Total Protein 5.4 (L) 6.4 - 8.3 g/dL    Sodium 141 135 - 144 mmol/L    Potassium 4.5 3.7 - 5.3 mmol/L    Chloride 107 98 - 107 mmol/L    CO2 24 20 - 31 mmol/L    Anion Gap 10 9 - 17 mmol/L    GFR Non-African American >60 >60 fracture. No abnormal periosteal reaction. Normal alignment at the elbow and wrist.  No evidence for elbow joint effusion. No acute fracture of the radius or ulna. Evaluation of the soft tissues show swelling of the proximal 2/3 of the forearm diffusely which extends into the elbow region. No radiopaque foreign body. 1. No evidence for acute fracture or dislocation in the right shoulder, humerus or forearm. 2. Apparent diffuse soft tissue swelling of the elbow and proximal 2/3 of the forearm. No radiopaque foreign body. Xr Radius Ulna Right (2 Views)    Result Date: 11/17/2020  EXAMINATION: TWO XRAY VIEWS OF THE RIGHT FOREARM; THREE XRAY VIEWS OF THE RIGHT SHOULDER; TWO XRAY VIEWS OF THE RIGHT HUMERUS 11/17/2020 3:42 am COMPARISON: None. HISTORY: ORDERING SYSTEM PROVIDED HISTORY: Swelling and pain TECHNOLOGIST PROVIDED HISTORY: Swelling and pain Reason for Exam: Swelling throughout right arm, no known injury, no complaints of pain. FINDINGS: Normal glenohumeral and acromioclavicular alignment. No acute fracture or dislocation in the shoulder. No lytic or blastic lesions. Humeral shaft shows no evidence for fracture. No abnormal periosteal reaction. Normal alignment at the elbow and wrist.  No evidence for elbow joint effusion. No acute fracture of the radius or ulna. Evaluation of the soft tissues show swelling of the proximal 2/3 of the forearm diffusely which extends into the elbow region. No radiopaque foreign body. 1. No evidence for acute fracture or dislocation in the right shoulder, humerus or forearm. 2. Apparent diffuse soft tissue swelling of the elbow and proximal 2/3 of the forearm. No radiopaque foreign body. Xr Hip Left (2-3 Views)    Result Date: 11/16/2020  EXAMINATION: TWO XRAY VIEWS OF THE LEFT FEMUR, TWO VIEWS LEFT HIP 11/16/2020 4:26 pm COMPARISON: None.  HISTORY: ORDERING SYSTEM PROVIDED HISTORY: fall TECHNOLOGIST PROVIDED HISTORY: fall Reason for Exam: fall Acuity: Acute Type of Exam: Initial Acute left hip and femoral pain FINDINGS: Frontal and frog-leg lateral views of the left hip were performed. Multiple curvilinear lucencies involving the left acetabulum, which could represent acute nondisplaced fractures of the left acetabulum in the right clinical setting. No additional fracture is identified. There is no evidence of dislocation. There is mild left hip osteoarthritis. No destructive osseous lesion is seen. Mild enthesopathic changes are evident. Frontal and lateral views of the proximal and distal aspects of the left femur were performed. There is no acute fracture or dislocation of the left femur. No periosteal reaction or osseous destruction is evident. There is mild osteoarthritis at the left knee joint. No soft tissue abnormality or radiopaque foreign body is evident. 1. Possible acute nondisplaced fracture of the left acetabulum. Suggest clinical correlation, and if concerning, consider further characterization with a follow-up left hip CT study. 2. No acute abnormality of the left femur. Xr Femur Left (min 2 Views)    Result Date: 11/16/2020  EXAMINATION: TWO XRAY VIEWS OF THE LEFT FEMUR, TWO VIEWS LEFT HIP 11/16/2020 4:26 pm COMPARISON: None. HISTORY: ORDERING SYSTEM PROVIDED HISTORY: fall TECHNOLOGIST PROVIDED HISTORY: fall Reason for Exam: fall Acuity: Acute Type of Exam: Initial Acute left hip and femoral pain FINDINGS: Frontal and frog-leg lateral views of the left hip were performed. Multiple curvilinear lucencies involving the left acetabulum, which could represent acute nondisplaced fractures of the left acetabulum in the right clinical setting. No additional fracture is identified. There is no evidence of dislocation. There is mild left hip osteoarthritis. No destructive osseous lesion is seen. Mild enthesopathic changes are evident. Frontal and lateral views of the proximal and distal aspects of the left femur were performed. There is no acute fracture or dislocation of the left femur. No periosteal reaction or osseous destruction is evident. There is mild osteoarthritis at the left knee joint. No soft tissue abnormality or radiopaque foreign body is evident. 1. Possible acute nondisplaced fracture of the left acetabulum. Suggest clinical correlation, and if concerning, consider further characterization with a follow-up left hip CT study. 2. No acute abnormality of the left femur. Ct Pelvis Wo Contrast Additional Contrast? None    Result Date: 11/16/2020  EXAMINATION: CT OF THE PELVIS WITHOUT CONTRAST 11/16/2020 7:52 pm TECHNIQUE: CT of the pelvis was performed without the administration of intravenous contrast.  Multiplanar reformatted images are provided for review. Dose modulation, iterative reconstruction, and/or weight based adjustment of the mA/kV was utilized to reduce the radiation dose to as low as reasonably achievable. COMPARISON: Pelvis and left hip radiograph same day HISTORY: ORDERING SYSTEM PROVIDED HISTORY: Rule out left tab fracture. TECHNOLOGIST PROVIDED HISTORY: 2mm thin cuts. Please include left proximal hip in imaging. Thank you. Rule out left tab fracture. Reason for Exam: Left Leg/Groin pain - Rule out left tab fracture. Acuity: Acute Type of Exam: Initial FINDINGS: Bones demonstrate no destructive sclerotic lesions involving the partially imaged L3 vertebral body and posterior elements, bilateral iliac bones, left greater than right, and sacrum with associated destructive changes of the osseous structures. Findings consistent with metastatic disease. Lesion within the left iliac bone also extends to involve the acetabulum and superior pubic ramus on the left proximally. Sclerotic lesion also identified at the left ischial tuberosity compatible with metastatic lesion.  Acute pathologic fracture of the left acetabulum with fracture lines centered at the superior acetabulum and involving both the anterior and posterior columns. The fracture is mildly displaced. No additional fractures are identified. Mild-to-moderate osteoarthritic changes of the bilateral hips. Moderate to severe degenerative changes of the imaged lower lumbar spine. Visualized intrapelvic structures demonstrate retroperitoneal and intra-abdominal lymphadenopathy most consistent with metastatic disease. Largest node measures approximately 2.3 cm in short axis dimension (image 17 series 2). Severe atherosclerotic disease. Soft tissues demonstrate anasarca. 1. Pathologic fracture of the left acetabulum centered along the superior acetabulum with fracture lines involving both the anterior and posterior columns. 2. Osseous metastatic disease with destructive lesions involving the bilateral hemipelvis, sacrum, and partially imaged lumbar spine. 3. Metastatic lymphadenopathy within the imaged pelvis with the largest node measuring 2.3 cm in short axis dimension. Ct Chest Abdomen Pelvis W Contrast    Result Date: 11/16/2020  EXAMINATION: CT OF THE CHEST, ABDOMEN, AND PELVIS WITH CONTRAST 11/16/2020 10:22 pm TECHNIQUE: CT of the chest, abdomen and pelvis was performed with the administration of intravenous contrast. Multiplanar reformatted images are provided for review. Dose modulation, iterative reconstruction, and/or weight based adjustment of the mA/kV was utilized to reduce the radiation dose to as low as reasonably achievable. COMPARISON: None HISTORY: ORDERING SYSTEM PROVIDED HISTORY: assess for cancer source vs mts TECHNOLOGIST PROVIDED HISTORY: assess for cancer source vs mts Reason for Exam: Assess for cancer source vs mts - Left hip fracture. Acuity: Acute Type of Exam: Initial FINDINGS: Chest: Mediastinum: The heart is mildly enlarged. No evidence of pericardial effusion is seen. No evidence of mediastinal or hilar lymphadenopathy or mass lesion is identified.  Lungs/pleura: Multifocal bilateral lung ill-defined ground-glass opacification is noted, greatest within the left upper lung lobe. Trace right-sided layering posterior pleural effusion is visualized. Soft Tissues/Bones: Mixed sclerotic and lytic multifocal marrow changes are again seen throughout the visualized skeleton. Abdomen/Pelvis: Organs: Nodular contour of the liver is noted with coarse parenchymal appearance consistent with hepatic cirrhosis with associated evidence of portal venous hypertension with is severe varices at the splenic hilum with scattered varices also seen at the gastroesophageal junction, lesser curvature of the stomach and brittany hepatis. Gallbladder wall calcification is noted. The liver, gallbladder, spleen, pancreas, adrenal glands, kidneys, are otherwise unremarkable in appearance. GI/Bowel: Small hiatal hernia is present. The stomach is otherwise unremarkable without wall thickening or distention. Bowel loops are unremarkable in appearance without evidence of obstruction, distension or mucosal thickening. Pelvis: The urinary bladder is well distended and unremarkable in appearance. Mild pelvic free fluid is noted. Peritoneum/Retroperitoneum: No evidence of retroperitoneal or intraperitoneal lymphadenopathy is identified. No further evidence of intraperitoneal free fluid is seen. Bones/Soft Tissues: Moderate scattered subcutaneous fat stranding related to edema is seen. Left acetabular mildly distracted comminuted fracture is again noted. Mixed lytic and sclerotic multifocal marrow changes are again noted throughout the visualized skeleton. 1. Redemonstration of mixed sclerotic and lytic multifocal marrow changes throughout the visualized skeleton consistent with metastatic disease. 2. No definitive identification of primary tumor mass lesion. 3. Multifocal bilateral lung ground-glass opacification, greatest within the left upper lung lobe.   Differential diagnostic considerations include association with viral pneumonia, opportunistic infection, hypersensitivity pneumonitis and drug toxicity. Recommend clinical correlation. 4. Trace right-sided layering posterior pleural effusion. 5. Mild cardiomegaly. 6. Severe hepatic cirrhosis with associated scattered marked varices related to portal venous hypertension. 7. Small hiatal hernia. 8. Mild pelvic free fluid. 9. Moderate diffuse abdominal and pelvic wall subcutaneous edema. 10. Suggestion porcelain gallbladder. Xr Hip W Pelvis Min 5 Vws Bilateral    Result Date: 11/16/2020  EXAMINATION: ONE XRAY VIEW OF THE PELVIS AND TWO XRAY VIEWS OF EACH OF THE BILATERAL HIPS 11/16/2020 5:10 pm COMPARISON: None. HISTORY: ORDERING SYSTEM PROVIDED HISTORY: AP pelvis, Inlet, Outlet, Cross-table lateral left hip TECHNOLOGIST PROVIDED HISTORY: Please and thank you. AP pelvis, Inlet, Outlet, Cross-table lateral left hip Reason for Exam: Pain left hip, s/p multiple falls. FINDINGS: There is no evidence of acute fracture. There is normal alignment. No acute joint abnormality. No focal osseous lesion. No focal soft tissue abnormality. No acute osseous abnormality. 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 XXXA] 11/16/2020    Cirrhosis ([address] [K74.60] 05/23/2019    Pure hypercholesterolemia [E78.00] 10/08/2018    Anemia, iron deficiency [D50.9] 10/08/2018    Depression [F32.9] 09/26/2018    Osteoarthritis [M19.90] 09/26/2018    Asthma [J45.909] 09/06/2018    Hyperlipidemia [E78.5] 09/06/2018    ARLYN (obstructive sleep apnea) [G47.33] 08/09/2018    Morbid obesity ([facility] [E66.01] 08/09/2018    Essential hypertension [I10] 08/09/2018    Diabetes mellitus ([facility] [E11.9] 08/09/2018           RECOMMENDATIONS:  1. I personally reviewed results of lab work-up imaging studies and other relevant clinical data. 2. Reviewed previous medical record  3. Discussed results of path report from May 2019 which shows non-small cell carcinoma, CK7 positive. Weakly GATA3 positive. Breast primary is suggested. 4. S/p breast biopsy. Follow-up on path report  5. Is awaiting hip surgery tomorrow  6. Continue symptomatic supportive care  7. We will continue to follow. 8. Pain control      Discussed with patient and Nurse. Thank you for asking us to see this patient. Cynthia Mcdonough MD          This note is created with the assistance of a speech recognition program.  While intending to generate a document that actually reflects the content of the visit, the document can still have some errors including those of syntax and sound a like substitutions which may escape proof reading. It such instances, actual meaning can be extrapolated by contextual diversion.

## 2020-11-20 NOTE — PROGRESS NOTES
Orthopedic Progress Note    Patient:  Rita Calhoun  YOB: 1955     60 y.o. female    Subjective:  Patient seen and examined  No complaints or concerns  No issue overnight  Denies fever, HA, CP, SOB, N/V, dysuria    Vitals reviewed, afebrile    Objective:   Vitals:    11/19/20 1930   BP: (!) 107/53   Pulse: 81   Resp: 16   Temp: 97.5 °F (36.4 °C)   SpO2: 95%     Gen: NAD, cooperative    Cardiovascular: Regular rate no dependent edema  Respiratory: No acute respiratory distress  MSK:  Left lower extremity: Skin intact. Pain with passive ROM at hip. Sensation intact to light touch L2-S1. EHL/FHL/TA/GS motor complex intact. Dorsalis pedis pulse 2+    Recent Labs     11/19/20  0709   WBC 7.2   HGB 12.0   HCT 38.3         K 4.5   BUN 16   CREATININE 0.43*   GLUCOSE 87      Meds: Lovenox, ASA   See rec for complete list    Impression 60 y.o. female   1. Left pathologic acetabulum fracture     Plan  - Plan for OR today for surgical intervention consisting of closed reduction percutaneous screw of the left acetabulum. - Consent obtained, extremity marked  - NPO MN  - Patient cleared by primary team  - Ancef OCTOR  - WB status: non-weightbearing left lower extremity   - DVT ppx: hold for surgery please  - Ice as tolerated  - PT/OT  - Please page ortho with any questions    Allan Thomas MD  Orthopedic Surgery   Resident Physician, PGY-1  [place] [address], [place]    PGY-2 Addendum    Patient seen and examined. Agree with subjective and objective portions from Dr. Jayme Edwards. The patient is a 60 y.o. female going to the [address] [place] [address] today for closed reduction percutaneous screw of left acetabulum. Patient is cleared. NPO. Consent in chart and extremity marked. Please prepare 2u pRBC for surgery.  Non-weightbearing left lower extremity     [facility], DO PGY-2  Orthopedic Surgery Resident  [facility], [facility], [facility]

## 2020-11-20 NOTE — BRIEF OP NOTE
Brief Postoperative Note      Patient: Mare Chester  YOB: 1955  MRN: [id number]   [facility]: [id number]     Date of Procedure: 11/20/2020    Pre-Op Diagnosis: Left pathologic transverse acetabulum fracture     Post-Op Diagnosis: Same       Procedure(s):  Open tx of left transverse acetabulum fx; CPT 48419    Surgeon(s):  Donna Smith DO    Assistant:  Resident:  Jeremiah Murphy DO; Shubham Cedeno DO    Anesthesia: General    Estimated Blood Loss (mL): 25 mL    Complications: None    Specimens:   * No specimens in log *    Implants:  * No implants in log *      Drains:   Open Drain Right Other (Comment) (Active)       Findings: LEFT ACETABULUM FRACTURE    Electronically signed by Donna Smith DO on 11/20/2020 at 4:15 PM

## 2020-11-20 NOTE — FLOWSHEET NOTE
Skin tear to right upper extremity noted on arrival to pacu. Dry and transparent dressing applied.  Skin extremely edematous

## 2020-11-20 NOTE — PROGRESS NOTES
[facility]  Office: [phone]  Alyssa Sweet DO, Manav Jha DO, Marisa Betts DO, Wyatt Green, DO, Julieta Carpenter MD, Dov Adam MD, Hema Pandya MD, Evelyn Esqueda MD, Elgin Camejo MD, Anastasiya Reyes MD, Leopold Pita, MD, Chuck Green MD, Courtney Acharya MD, Navin Bird DO, Curly Guardado MD, Matheus Sin MD, Ever Right, DO, Suzie Dalal MD,  Jose Luis Crum DO, Manjeet Meneses MD, Chris Alegre MD, Karlyn Leventhal, Ezra Zazueta, CNP, Jannie Christine, CNP, Dorothy Arellano, CNS, Sarahi Barnett, CNP, Boris Macias, CNP, Rachael Lopez, CNP, Derrick Roach, CNP, Dylan Celis, CNP, Princess Staley PA-C, Rusty Fernandez, DNP, Juli Tripp, CNP, Severo Spore, CNP, Donna Webster, CNP, Gudelia Diaz, CNP, Jm Bhatti, CNP         [facility]   [address] [facility]    Progress Note    11/20/2020    9:19 AM    Name:   Brittany Flor  MRN:     [id number]     Acct:      [de-identified]   Room:   [address] [place] [address] Day:  4  Admit Date:  11/16/2020  3:05 PM    PCP:   Noe Avalos  Code Status:  Full Code    Subjective:     C/C:   Chief Complaint   Patient presents with    Leg Pain    Groin Pain      Interval History Status: not changed. Pt was seen and examined this morning  Resting comfortably in bed at this time   Continues to have pain at site of hip fracture   No other complaints at this time       Review of Systems:     12 point ROS performed today and negative for anything other than what was stated in subjective     Medications: Allergies:     Allergies   Allergen Reactions    Nsaids Other (See Comments)    Sulfa Antibiotics Other (See Comments) and Hives     Other reaction(s): Unknown  Patient unsure of the reaction    Tape Viral Makayla Tape] Rash     Other reaction(s): Unknown       Current Meds:   Scheduled Meds:    aspirin  81 mg Oral Daily    citalopram  40 mg Oral Daily    lactulose  30 g Oral TID    levothyroxine  25 the superior acetabulum with fracture lines involving both the anterior and posterior columns. 2. Osseous metastatic disease with destructive lesions involving the bilateral hemipelvis, sacrum, and partially imaged lumbar spine. 3. Metastatic lymphadenopathy within the imaged pelvis with the largest node measuring 2.3 cm in short axis dimension. Xr Pelvis (min 3 Views)    Result Date: 11/18/2020  Limited exam Left acetabular fracture is suspected. Repeat views of the left hip and/or CT scan of the pelvis is requested for further assessment     Ct Chest Abdomen Pelvis W Contrast    Result Date: 11/16/2020  1. Redemonstration of mixed sclerotic and lytic multifocal marrow changes throughout the visualized skeleton consistent with metastatic disease. 2. No definitive identification of primary tumor mass lesion. 3. Multifocal bilateral lung ground-glass opacification, greatest within the left upper lung lobe. Differential diagnostic considerations include association with viral pneumonia, opportunistic infection, hypersensitivity pneumonitis and drug toxicity. Recommend clinical correlation. 4. Trace right-sided layering posterior pleural effusion. 5. Mild cardiomegaly. 6. Severe hepatic cirrhosis with associated scattered marked varices related to portal venous hypertension. 7. Small hiatal hernia. 8. Mild pelvic free fluid. 9. Moderate diffuse abdominal and pelvic wall subcutaneous edema. 10. Suggestion porcelain gallbladder. Xr Hip W Pelvis Min 5 Vws Bilateral    Result Date: 11/16/2020  No acute osseous abnormality. Physical Examination:        General appearance:  alert, cooperative and no distress  Mental Status:  oriented to person, place and time and normal affect  Lungs:  clear to auscultation bilaterally, normal effort  Heart:  regular rate and rhythm, no murmur  Abdomen:  soft, nontender, nondistended, normal bowel sounds   Extremities:  no edema, redness, tenderness in the calves.  Noted toprol xl      6. Hx of asthma   - stable   - breathing treatments prn      7. Liver cirrhosis with hyperammonemia   8. Elevated LFTS   - resume home lactulose   - continue to monitor      9. Hypothyroidism   - continue synthroid        10.  DVT prophylaxis   - lovenox      Nancy Sampson MD  11/20/2020  9:19 AM

## 2020-11-20 NOTE — PLAN OF CARE
Problem: Falls - Risk of:  Goal: Will remain free from falls  Description: Will remain free from falls  11/20/2020 0358 by Kristen Chu RN  Outcome: Met This Shift  11/19/2020 1443 by Deirdre Sal RN  Outcome: Ongoing  Goal: Absence of physical injury  Description: Absence of physical injury  11/20/2020 0358 by Kristen Chu RN  Outcome: Met This Shift  11/19/2020 1443 by Deirdre Sal RN  Outcome: Ongoing     Problem: Skin Integrity:  Goal: Will show no infection signs and symptoms  Description: Will show no infection signs and symptoms  11/20/2020 0358 by Kristen hCu RN  Outcome: Ongoing  11/19/2020 1443 by Deirdre Sal RN  Outcome: Ongoing  Goal: Absence of new skin breakdown  Description: Absence of new skin breakdown  11/20/2020 0358 by Kristen Chu RN  Outcome: Ongoing  11/19/2020 1443 by Deirdre Sal RN  Outcome: Ongoing     Problem: Pain:  Goal: Pain level will decrease  Description: Pain level will decrease  11/20/2020 0358 by Kristen Chu RN  Outcome: Ongoing  11/19/2020 1443 by Deirdre Sal RN  Outcome: Ongoing  Goal: Control of acute pain  Description: Control of acute pain  11/20/2020 0358 by Kristen Chu RN  Outcome: Ongoing  11/19/2020 1443 by Deirdre Sal RN  Outcome: Ongoing  Goal: Control of chronic pain  Description: Control of chronic pain  11/20/2020 0358 by Kristen Chu RN  Outcome: Ongoing  11/19/2020 1443 by Deirdre Sal RN  Outcome: Ongoing

## 2020-11-20 NOTE — PROGRESS NOTES
Physical Therapy  DATE: 2020    NAME: Shellie Lima  MRN: [id number]   : 1955    Patient not seen this date for Physical Therapy due to:  [] Blood transfusion in progress  [] Hemodialysis  [] Patient Declined  [] Spine Precautions   [] Strict Bedrest  [x] Surgery/ Procedure: Per Ortho note and RN pt \"plan for OR today for surgical intervention consisting of closed reduction percutaneous screw of the left acetabulum\". PT will check back . [] Testing      [] Other        [] PT is being discontinued at this time. Patient independent. No further needs. [] PT is being discontinued at this time due to declining physical/ medical status. Therapy is not appropriate at this time.     Shane Feng, SPT

## 2020-11-20 NOTE — ANESTHESIA PROCEDURE NOTES
Arterial Line:    An arterial line was placed using surface landmarks, in the pre-op for the following indication(s): continuous blood pressure monitoring. A 20 gauge (size), 1 and 3/4 inch (length), Arrow (type) catheter was placed, Seldinger technique used, into the left radial artery, secured by Tegaderm and tape. Anesthesia type: General    Events:  patient tolerated procedure well with no complications.   11/20/2020 2:16 PM11/20/2020 2:20 PM  Resident/CRNA: Lisbeth Eisenmenger, APRN - CRNA  Performed: Resident/CRNA   Preanesthetic Checklist  Completed: patient identified, site marked, surgical consent, pre-op evaluation, timeout performed, IV checked, risks and benefits discussed, monitors and equipment checked, anesthesia consent given, oxygen available and patient being monitored

## 2020-11-21 LAB
ABO/RH: NORMAL
ALBUMIN SERPL-MCNC: 2.3 G/DL (ref 3.5–5.2)
ALBUMIN/GLOBULIN RATIO: 0.8 (ref 1–2.5)
ALP BLD-CCNC: 355 U/L (ref 35–104)
ALT SERPL-CCNC: 42 U/L (ref 5–33)
ANION GAP SERPL CALCULATED.3IONS-SCNC: 10 MMOL/L (ref 9–17)
ANTIBODY SCREEN: NEGATIVE
ARM BAND NUMBER: NORMAL
AST SERPL-CCNC: 91 U/L
BILIRUB SERPL-MCNC: 1.11 MG/DL (ref 0.3–1.2)
BLD PROD TYP BPU: NORMAL
BLD PROD TYP BPU: NORMAL
BUN BLDV-MCNC: 17 MG/DL (ref 8–23)
BUN/CREAT BLD: ABNORMAL (ref 9–20)
CALCIUM SERPL-MCNC: 8.6 MG/DL (ref 8.6–10.4)
CHLORIDE BLD-SCNC: 106 MMOL/L (ref 98–107)
CO2: 22 MMOL/L (ref 20–31)
CREAT SERPL-MCNC: 0.44 MG/DL (ref 0.5–0.9)
CROSSMATCH RESULT: NORMAL
CROSSMATCH RESULT: NORMAL
DISPENSE STATUS BLOOD BANK: NORMAL
DISPENSE STATUS BLOOD BANK: NORMAL
EXPIRATION DATE: NORMAL
GFR AFRICAN AMERICAN: >60 ML/MIN
GFR NON-AFRICAN AMERICAN: >60 ML/MIN
GFR SERPL CREATININE-BSD FRML MDRD: ABNORMAL ML/MIN/{1.73_M2}
GFR SERPL CREATININE-BSD FRML MDRD: ABNORMAL ML/MIN/{1.73_M2}
GLUCOSE BLD-MCNC: 102 MG/DL (ref 65–105)
GLUCOSE BLD-MCNC: 110 MG/DL (ref 65–105)
GLUCOSE BLD-MCNC: 126 MG/DL (ref 70–99)
GLUCOSE BLD-MCNC: 165 MG/DL (ref 65–105)
GLUCOSE BLD-MCNC: 94 MG/DL (ref 65–105)
HCT VFR BLD CALC: 34.3 % (ref 36.3–47.1)
HEMOGLOBIN: 11 G/DL (ref 11.9–15.1)
MCH RBC QN AUTO: 30.1 PG (ref 25.2–33.5)
MCHC RBC AUTO-ENTMCNC: 32.1 G/DL (ref 28.4–34.8)
MCV RBC AUTO: 94 FL (ref 82.6–102.9)
NRBC AUTOMATED: 0 PER 100 WBC
PDW BLD-RTO: 14.2 % (ref 11.8–14.4)
PLATELET # BLD: 153 K/UL (ref 138–453)
PMV BLD AUTO: 10.4 FL (ref 8.1–13.5)
POTASSIUM SERPL-SCNC: 3.9 MMOL/L (ref 3.7–5.3)
RBC # BLD: 3.65 M/UL (ref 3.95–5.11)
SODIUM BLD-SCNC: 138 MMOL/L (ref 135–144)
TOTAL PROTEIN: 5.1 G/DL (ref 6.4–8.3)
TRANSFUSION STATUS: NORMAL
TRANSFUSION STATUS: NORMAL
UNIT DIVISION: 0
UNIT DIVISION: 0
UNIT NUMBER: NORMAL
UNIT NUMBER: NORMAL
WBC # BLD: 10.6 K/UL (ref 3.5–11.3)

## 2020-11-21 PROCEDURE — 80053 COMPREHEN METABOLIC PANEL: CPT

## 2020-11-21 PROCEDURE — 6370000000 HC RX 637 (ALT 250 FOR IP): Performed by: STUDENT IN AN ORGANIZED HEALTH CARE EDUCATION/TRAINING PROGRAM

## 2020-11-21 PROCEDURE — 6360000002 HC RX W HCPCS: Performed by: STUDENT IN AN ORGANIZED HEALTH CARE EDUCATION/TRAINING PROGRAM

## 2020-11-21 PROCEDURE — 36415 COLL VENOUS BLD VENIPUNCTURE: CPT

## 2020-11-21 PROCEDURE — 99232 SBSQ HOSP IP/OBS MODERATE 35: CPT | Performed by: FAMILY MEDICINE

## 2020-11-21 PROCEDURE — 1200000000 HC SEMI PRIVATE

## 2020-11-21 PROCEDURE — 82947 ASSAY GLUCOSE BLOOD QUANT: CPT

## 2020-11-21 PROCEDURE — 99232 SBSQ HOSP IP/OBS MODERATE 35: CPT | Performed by: INTERNAL MEDICINE

## 2020-11-21 PROCEDURE — 85027 COMPLETE CBC AUTOMATED: CPT

## 2020-11-21 PROCEDURE — 2580000003 HC RX 258: Performed by: STUDENT IN AN ORGANIZED HEALTH CARE EDUCATION/TRAINING PROGRAM

## 2020-11-21 PROCEDURE — 2700000000 HC OXYGEN THERAPY PER DAY

## 2020-11-21 PROCEDURE — 2580000003 HC RX 258: Performed by: NURSE PRACTITIONER

## 2020-11-21 RX ORDER — 0.9 % SODIUM CHLORIDE 0.9 %
500 INTRAVENOUS SOLUTION INTRAVENOUS ONCE
Status: COMPLETED | OUTPATIENT
Start: 2020-11-21 | End: 2020-11-21

## 2020-11-21 RX ADMIN — PRAMIPEXOLE DIHYDROCHLORIDE 0.12 MG: 0.25 TABLET ORAL at 21:25

## 2020-11-21 RX ADMIN — INSULIN LISPRO 2 UNITS: 100 INJECTION, SOLUTION INTRAVENOUS; SUBCUTANEOUS at 17:33

## 2020-11-21 RX ADMIN — CITALOPRAM 40 MG: 20 TABLET, FILM COATED ORAL at 08:48

## 2020-11-21 RX ADMIN — Medication 81 MG: at 08:48

## 2020-11-21 RX ADMIN — SODIUM CHLORIDE: 9 INJECTION, SOLUTION INTRAVENOUS at 05:28

## 2020-11-21 RX ADMIN — FLUTICASONE PROPIONATE 1 SPRAY: 50 SPRAY, METERED NASAL at 06:19

## 2020-11-21 RX ADMIN — LACTULOSE 30 G: 20 SOLUTION ORAL at 21:24

## 2020-11-21 RX ADMIN — METOPROLOL SUCCINATE 25 MG: 25 TABLET, FILM COATED, EXTENDED RELEASE ORAL at 08:50

## 2020-11-21 RX ADMIN — SODIUM CHLORIDE 500 ML: 0.9 INJECTION, SOLUTION INTRAVENOUS at 00:50

## 2020-11-21 RX ADMIN — ENOXAPARIN SODIUM 40 MG: 40 INJECTION SUBCUTANEOUS at 08:50

## 2020-11-21 RX ADMIN — DICLOFENAC 2 G: 10 GEL TOPICAL at 10:22

## 2020-11-21 RX ADMIN — OXYCODONE HYDROCHLORIDE AND ACETAMINOPHEN 2 TABLET: 5; 325 TABLET ORAL at 18:55

## 2020-11-21 RX ADMIN — LACTULOSE 30 G: 20 SOLUTION ORAL at 08:43

## 2020-11-21 RX ADMIN — LISINOPRIL 20 MG: 20 TABLET ORAL at 08:50

## 2020-11-21 RX ADMIN — SODIUM CHLORIDE: 9 INJECTION, SOLUTION INTRAVENOUS at 16:03

## 2020-11-21 RX ADMIN — MORPHINE SULFATE 2 MG: 2 INJECTION, SOLUTION INTRAMUSCULAR; INTRAVENOUS at 10:14

## 2020-11-21 RX ADMIN — LEVOTHYROXINE SODIUM 25 MCG: 25 TABLET ORAL at 06:17

## 2020-11-21 RX ADMIN — MORPHINE SULFATE 2 MG: 2 INJECTION, SOLUTION INTRAMUSCULAR; INTRAVENOUS at 16:03

## 2020-11-21 RX ADMIN — SODIUM CHLORIDE, PRESERVATIVE FREE 10 ML: 5 INJECTION INTRAVENOUS at 08:50

## 2020-11-21 RX ADMIN — MORPHINE SULFATE 2 MG: 2 INJECTION, SOLUTION INTRAMUSCULAR; INTRAVENOUS at 21:22

## 2020-11-21 RX ADMIN — OXYCODONE HYDROCHLORIDE AND ACETAMINOPHEN 2 TABLET: 5; 325 TABLET ORAL at 13:04

## 2020-11-21 RX ADMIN — PANTOPRAZOLE SODIUM 40 MG: 40 TABLET, DELAYED RELEASE ORAL at 06:17

## 2020-11-21 RX ADMIN — OXYCODONE HYDROCHLORIDE AND ACETAMINOPHEN 2 TABLET: 5; 325 TABLET ORAL at 04:16

## 2020-11-21 ASSESSMENT — PAIN SCALES - GENERAL
PAINLEVEL_OUTOF10: 9
PAINLEVEL_OUTOF10: 9
PAINLEVEL_OUTOF10: 10
PAINLEVEL_OUTOF10: 10
PAINLEVEL_OUTOF10: 6

## 2020-11-21 ASSESSMENT — PAIN DESCRIPTION - ORIENTATION: ORIENTATION: LEFT

## 2020-11-21 ASSESSMENT — PAIN DESCRIPTION - LOCATION: LOCATION: RIB CAGE

## 2020-11-21 NOTE — FLOWSHEET NOTE
DATE: 2020    NAME: Lula Santa  MRN: [id number]   : 1955    Patient not seen this date for Physical Therapy due to:  [] Blood transfusion in progress  [] Hemodialysis  [] Patient Declined  [] Spine Precautions   [] Strict Bedrest  [] Surgery/ Procedure  [] Testing      [x] Other- Extensive time spend in room with patient explaining purpose of therapy prior to attempting mobility. Upon lowering LE portion of bed, pt began yelling out in pain stating \"Help me! \". PT and OT in room attempted to calm pt and inquire how to assist patient. Pt only wanting to speak to nurse. RN arrived and distributed pain meds. Pt resting when PT left. [] PT is being discontinued at this time. Patient independent. No further needs. [] PT is being discontinued at this time due to declining physical/ medical status. Therapy is not appropriate at this time.     Rafa Stein, PT

## 2020-11-21 NOTE — PLAN OF CARE
Problem: Falls - Risk of:  Goal: Will remain free from falls  Description: Will remain free from falls  11/21/2020 0555 by Mariela Yang RN  Outcome: Met This Shift  11/20/2020 1804 by Eddie Carrera RN  Outcome: Ongoing  Goal: Absence of physical injury  Description: Absence of physical injury  11/21/2020 0555 by Mariela Yang RN  Outcome: Met This Shift  11/20/2020 1804 by Eddie Carrera RN  Outcome: Ongoing     Problem: Pain:  Goal: Pain level will decrease  Description: Pain level will decrease  11/21/2020 0555 by Mariela Yang RN  Outcome: Met This Shift  11/20/2020 1804 by Eddie Carrera RN  Outcome: Ongoing  Goal: Control of acute pain  Description: Control of acute pain  11/21/2020 0555 by Mariela Yang RN  Outcome: Met This Shift  11/20/2020 1804 by Eddie Carrera RN  Outcome: Ongoing     Problem: Skin Integrity:  Goal: Will show no infection signs and symptoms  Description: Will show no infection signs and symptoms  11/21/2020 0555 by Mariela Yang RN  Outcome: Ongoing  11/20/2020 1804 by Eddie Carrera RN  Outcome: Ongoing  Goal: Absence of new skin breakdown  Description: Absence of new skin breakdown  11/21/2020 0555 by Mariela Yang RN  Outcome: Ongoing  11/20/2020 1804 by Eddie Carrera RN  Outcome: Ongoing     Problem: Pain:  Goal: Control of chronic pain  Description: Control of chronic pain  11/21/2020 0555 by Mariela Yang RN  Outcome: Ongoing  11/20/2020 1804 by Eddie Carrera RN  Outcome: Ongoing

## 2020-11-21 NOTE — PROGRESS NOTES
[facility]  Office: [address], , Clau Oliver, DO, Shun Maker, DO, Lobito Jackson Blood, DO, Aylin Serrano MD, Lázaro Topete MD, Leigh Ann Dunham MD, Ynes Livingston MD, Devante Mendoza MD, Miller Jaramillo MD, Shane Beal MD, Veena White MD, Courtney Martinez MD, Alberto Ennis DO, David Patricia MD, Matt Scales MD, Viviana Aguilar DO, Rj Romero MD,  Bennie Land DO, Ravin Martin MD, Cristal Torres MD, Shaila Wilson CNP, [facility], CNP, Yadira Mayfield CNP, Dandre Nguyen, CNS, Lisa Olson, CNP, Shravan Farrell, CNP, Anthony Vazquez, CNP, Jordan Reed, CNP, Zenaida Feliciano, CNP, Mohsen Denise PA-C, Rell Alberto, [facility], Gertrude Swan, CNP, Abdias Tapia, CNP, Joan Bassett, CNP, Lisa Land, CNP, Herbert Wakefield, [facility]   [id number] [facility]    Progress Note    11/21/2020    11:02 AM    Name:   Keyla Spencer  MRN:     [id number]     Acct:      [de-identified]   Room:   [address] [place] [address] Day:  5  Admit Date:  11/16/2020  3:05 PM    PCP:   Rachel Gipson  Code Status:  Full Code    Subjective:     C/C:   Chief Complaint   Patient presents with    Leg Pain    Groin Pain      Interval History Status: improved. Pt was seen and examined this morning  Resting comfortably in bed this morning and eating breakfast   She is AAO x 3  Denies having any  New complaints or concerns    Review of Systems:     12 point ROS performed today and negative for anything other than what was stated in subjective     Medications: Allergies:     Allergies   Allergen Reactions    Nsaids Other (See Comments)    Sulfa Antibiotics Other (See Comments) and Hives     Other reaction(s): Unknown  Patient unsure of the reaction    Tape Khadijah Balsaedith Tape] Rash     Other reaction(s): Unknown       Current Meds:   Scheduled Meds:    aspirin  81 mg Oral Daily    citalopram  40 mg Oral Daily    lactulose  30 g Oral TID    levothyroxine  25 mcg Oral Daily    lisinopril  20 mg Oral Daily    metoprolol succinate  25 mg Oral Daily    pantoprazole  40 mg Oral QAM AC    pramipexole  0.125 mg Oral Nightly    sodium chloride flush  10 mL Intravenous 2 times per day    enoxaparin  40 mg Subcutaneous Daily    insulin lispro  0-12 Units Subcutaneous TID WC    insulin lispro  0-6 Units Subcutaneous Nightly     Continuous Infusions:    sodium chloride 100 mL/hr at 11/21/20 0528    dextrose       PRN Meds: morphine, diclofenac sodium, fluticasone, oxyCODONE-acetaminophen, sodium chloride flush, potassium chloride **OR** potassium alternative oral replacement **OR** potassium chloride, magnesium sulfate, acetaminophen **OR** acetaminophen, polyethylene glycol, promethazine **OR** ondansetron, nicotine, glucose, dextrose, glucagon (rDNA), dextrose    Data:     Past Medical History:   has a past medical history of Acute urinary tract infection, Anemia, Anemia, iron deficiency, Anxiety disorder, Arthritis, Asthma, Atopic rhinitis, Bandemia, Blood transfusion reaction, Cellulitis and abscess of trunk, Cellulitis of right breast, Cellulitis of right lower extremity- resolving, COPD (chronic obstructive pulmonary disease) ([facility] [address] CRP elevated, Depression, Diabetes mellitus ([facility] [address] Disorder associated with type 2 diabetes mellitus ([address] Disorder of liver, Displacement of lumbar intervertebral disc without myelopathy, Essential hypertension, Fibromyalgia, Full thickness rotator cuff tear, Gastroesophageal reflux disease, GERD (gastroesophageal reflux disease), Hammer toe of right foot, Hernia, abdominal, Hyperglycemia due to type 2 diabetes mellitus ([address] Hyperlipidemia, Hypertension, Hypothyroid, Iron deficiency anemia, Lactic acidosis, Localized, primary osteoarthritis of shoulder region, Mass of right breast, Migraine, Morbid obesity ([facility] [address] ARLYN (obstructive sleep apnea), Osteoarthritis, Pure hypercholesterolemia, Restless leg syndrome, Seasonal allergies, Septicemia ([facility] [address] SIRS due to infectious process without acute organ dysfunction, Sleep apnea, Spondylosis, Thrombocytopenia ([facility] [address] and Thrombocytopenia ([facility] [address] Social History:   reports that she has never smoked. She has never used smokeless tobacco. She reports that she does not drink alcohol or use drugs. Family History:   Family History   Problem Relation Age of Onset    Heart Disease Mother     Pacemaker Mother     Hypertension Mother     Diabetes Father     Breast Cancer Neg Hx        Vitals:  BP (!) 145/86   Pulse 103   Temp 97.5 °F (36.4 °C) (Oral)   Resp 17   Ht 5' 7\" (1.702 m)   Wt 180 lb (81.6 kg)   SpO2 96%   BMI 28.19 kg/m²   Temp (24hrs), Av.9 °F (36.1 °C), Min:93.2 °F (34 °C), Max:98.5 °F (36.9 °C)    Recent Labs     20  1639 20  1735 20  0814   POCGLU 69 79 144* 94       I/O (24Hr):     Intake/Output Summary (Last 24 hours) at 2020 1102  Last data filed at 2020 0626  Gross per 24 hour   Intake 2507 ml   Output 445 ml   Net 2062 ml       Labs:  Hematology:  Recent Labs     20  0709 20  0439 20  1512 20  0604   WBC 7.2 7.8  --  10.6   RBC 3.99 3.77*  --  3.65*   HGB 12.0 11.4* 9.6 11.0*   HCT 38.3 35.4* 29.6 34.3*   MCV 96.0 93.9  --  94.0   MCH 30.1 30.2  --  30.1   MCHC 31.3 32.2  --  32.1   RDW 14.1 14.2  --  14.2    125*  --  153   MPV 10.8 10.7  --  10.4     Chemistry:  Recent Labs     20  0709 20  0439 20  1512 20  0604    137 141 138   K 4.5 4.3 4.1 3.9    104  --  106   CO2 24 23  --  22   GLUCOSE 87 93  --  126*   BUN 16 18  --  17   CREATININE 0.43* 0.51  --  0.44*   ANIONGAP 10 10  --  10   LABGLOM >60 >60  --  >60   GFRAA >60 >60  --  >60   CALCIUM 9.2 8.9  --  8.6   CAION  --   --  1.28  --      Recent Labs     20  0709  20  0439  20  1353 20  1512 20  1639 20  1735 20  2022 20  0604 20  0814   PROT 5.4* --  5.1*  --   --   --   --   --   --  5.1*  --    LABALBU 2.4*  --  2.2*  --   --   --   --   --   --  2.3*  --    AST 96*  --  85*  --   --   --   --   --   --  91*  --    ALT 44*  --  42*  --   --   --   --   --   --  42*  --    ALKPHOS 390*  --  363*  --   --   --   --   --   --  355*  --    BILITOT 1.15  --  1.00  --   --   --   --   --   --  1.11  --    BILIDIR 0.48*  --   --   --   --   --   --   --   --   --   --    POCGLU  --    < >  --    < > 96 106* 69 79 144*  --  94    < > = values in this interval not displayed. ABG:  Lab Results   Component Value Date    FIO2 55% 11/20/2020     Lab Results   Component Value Date/Time    SPECIAL NOT REPORTED 05/22/2019 08:09 PM     Lab Results   Component Value Date/Time    CULTURE NO GROWTH 6 DAYS 05/22/2019 08:09 PM       Radiology:  [facility] Pelvis (1-2 Views)    Result Date: 11/19/2020  Mildly displaced fracture left acetabulum     Xr Shoulder Right (min 2 Views)    Result Date: 11/17/2020  1. No evidence for acute fracture or dislocation in the right shoulder, humerus or forearm. 2. Apparent diffuse soft tissue swelling of the elbow and proximal 2/3 of the forearm. No radiopaque foreign body. Xr Humerus Right (min 2 Views)    Result Date: 11/17/2020  1. No evidence for acute fracture or dislocation in the right shoulder, humerus or forearm. 2. Apparent diffuse soft tissue swelling of the elbow and proximal 2/3 of the forearm. No radiopaque foreign body. Xr Radius Ulna Right (2 Views)    Result Date: 11/17/2020  1. No evidence for acute fracture or dislocation in the right shoulder, humerus or forearm. 2. Apparent diffuse soft tissue swelling of the elbow and proximal 2/3 of the forearm. No radiopaque foreign body. Xr Hip Left (2-3 Views)    Result Date: 11/18/2020  1. Possible acute nondisplaced fracture of the left acetabulum. Suggest clinical correlation, and if concerning, consider further characterization with a follow-up left hip CT study.  2. No acute abnormality of the left femur. Xr Femur Left (min 2 Views)    Result Date: 11/18/2020  1. Possible acute nondisplaced fracture of the left acetabulum. Suggest clinical correlation, and if concerning, consider further characterization with a follow-up left hip CT study. 2. No acute abnormality of the left femur. Ct Pelvis Wo Contrast Additional Contrast? None    Result Date: 11/16/2020  1. Pathologic fracture of the left acetabulum centered along the superior acetabulum with fracture lines involving both the anterior and posterior columns. 2. Osseous metastatic disease with destructive lesions involving the bilateral hemipelvis, sacrum, and partially imaged lumbar spine. 3. Metastatic lymphadenopathy within the imaged pelvis with the largest node measuring 2.3 cm in short axis dimension. Xr Pelvis (min 3 Views)    Result Date: 11/18/2020  Limited exam Left acetabular fracture is suspected. Repeat views of the left hip and/or CT scan of the pelvis is requested for further assessment     Ct Chest Abdomen Pelvis W Contrast    Result Date: 11/16/2020  1. Redemonstration of mixed sclerotic and lytic multifocal marrow changes throughout the visualized skeleton consistent with metastatic disease. 2. No definitive identification of primary tumor mass lesion. 3. Multifocal bilateral lung ground-glass opacification, greatest within the left upper lung lobe. Differential diagnostic considerations include association with viral pneumonia, opportunistic infection, hypersensitivity pneumonitis and drug toxicity. Recommend clinical correlation. 4. Trace right-sided layering posterior pleural effusion. 5. Mild cardiomegaly. 6. Severe hepatic cirrhosis with associated scattered marked varices related to portal venous hypertension. 7. Small hiatal hernia. 8. Mild pelvic free fluid. 9. Moderate diffuse abdominal and pelvic wall subcutaneous edema. 10. Suggestion porcelain gallbladder.      Xr Hip W Pelvis Min 5 Vws Bilateral    Result Date: 11/16/2020  No acute osseous abnormality. Physical Examination:        General appearance:  alert, cooperative and no distress  Mental Status:  oriented to person, place and time and normal affect  Lungs:  clear to auscultation bilaterally, normal effort  Heart:  regular rate and rhythm, no murmur  Abdomen:  soft, nontender, nondistended, normal bowel sounds   Extremities:  no edema, redness, tenderness in the calves. Noted tenderness in left hip   Skin:  no gross lesions, rashes, induration      Assessment:        Hospital Problems           Last Modified POA    * (Principal) Pathological fracture due to metastatic bone disease 11/16/2020 Yes    Diabetes mellitus ([address] 11/16/2020 Yes    ARLYN (obstructive sleep apnea) 11/16/2020 Yes    Morbid obesity ([address] 11/16/2020 Yes    Essential hypertension 11/16/2020 Yes    Depression 11/16/2020 Yes    Osteoarthritis 11/16/2020 Yes    Anemia, iron deficiency 11/16/2020 Yes    Asthma 11/16/2020 Yes    Hyperlipidemia 11/16/2020 Yes    Pure hypercholesterolemia 11/16/2020 Yes    Cirrhosis ([address] 11/17/2020 Yes    Fall from standing 11/16/2020 Yes    Localized swelling of right upper extremity 11/17/2020 Yes    Malignant neoplasm of pelvic bone ([address] 11/17/2020 Yes    Left hip pain 11/17/2020 Yes    Closed nondisplaced fracture of left acetabulum ([address] 11/19/2020 Yes          Plan:        1. Pathological fracture of left acetabulum due to metastatic bone disease   2. 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   - heme/onc on board   - NWB LLE   - continue pain control   - general surgery consulted by heme/onc. Punch biopsy done   - pathology came back as intralymphatic adenocarcinoma with dermal invasion consistent with intralymphatic spread of mamillary cancer.   - Pt will need diagnostic mammogram done as an outpatient      3. DMII   - continue accu checks ac/hs with ISS      4. ARLYN  - cpap at night      5. HTN   - v/s per unit protocol   - continue lisinopril and toprol xl      6. Hx of asthma   - stable   - breathing treatments prn      7. Liver cirrhosis with hyperammonemia   8. Elevated LFTS   - resume home lactulose   - continue to monitor      9. Hypothyroidism   - continue synthroid        10.  DVT prophylaxis   - lovenox      Jarod Rashid MD  11/21/2020  11:02 AM

## 2020-11-21 NOTE — PROGRESS NOTES
Today's Date: 11/20/2020  Patient Name: Walda Fothergill  Date of admission: 11/16/2020  3:05 PM  Patient's age: 60 y.o., 1955  Admission Dx: Pathological fracture due to metastatic bone disease [M84.50XA]    Reason for Consult: management recommendations  Requesting Physician: Gagan Oconnor MD    CHIEF COMPLAINT: Pelvic pain. Back pain. Weight loss. Right breast swelling. History Obtained From:  patient, electronic medical record    Interval history:    Patient seen and examined. Labs and vitals reviewed  Patient status post attempted pelvic fixation  Patient just came back from the OR very drowsy. Also just received pain medication  Resting comfortably. HISTORY OF PRESENT ILLNESS:      The patient is a 60 y.o.  female who is Admitted to the hospital with chief complaints of leg pain and groin pain. The pain started after a fall about 2 days ago. Patient was walking and became unsteady and fell. Patient states that since the incident she has been having worsening pain to the point she could not take it anymore and presented to the hospital.  Denies any passing out episode complains of swelling of her right arm. Patient's work-up in the ER including CT scan showed pathological fracture of left acetabulum also shows osseous metastasis involving bilateral hemipelvis. She was metastatic lymphadenopathy CT chest shows multiple sclerotic and lytic lesions throughout the skeleton consistent metastatic disease. There is bilateral groundglass opacification of the lungs. There is severe hepatic cirrhosis associated with varices concerning for portal hypertension. Reviewed records reviewed patient was hospitalized back in May 2019 for hepatic encephalopathy. Patient left hospital AMA without completing work-up. Imaging studies at that time showed a lytic lesion involving ileum and L3. Biopsy came back positive for poorly differentiated non-small cell cancer.   Stains showed weak positivity for GATA3 concerning for breast primary. Patient during the hospitalization also had EGD done which was negative for malignancy. Patient was not seen by oncologist during the hospitalization and did not follow-up with an oncologist either. Past Medical History:   has a past medical history of Acute urinary tract infection, Anemia, Anemia, iron deficiency, Anxiety disorder, Arthritis, Asthma, Atopic rhinitis, Bandemia, Blood transfusion reaction, Cellulitis and abscess of trunk, Cellulitis of right breast, Cellulitis of right lower extremity- resolving, COPD (chronic obstructive pulmonary disease) ([address] CRP elevated, Depression, Diabetes mellitus ([address] Disorder associated with type 2 diabetes mellitus ([address] Disorder of liver, Displacement of lumbar intervertebral disc without myelopathy, Essential hypertension, Fibromyalgia, Full thickness rotator cuff tear, Gastroesophageal reflux disease, GERD (gastroesophageal reflux disease), Hammer toe of right foot, Hernia, abdominal, Hyperglycemia due to type 2 diabetes mellitus ([address] Hyperlipidemia, Hypertension, Hypothyroid, Iron deficiency anemia, Lactic acidosis, Localized, primary osteoarthritis of shoulder region, Mass of right breast, Migraine, Morbid obesity ([address] ARLYN (obstructive sleep apnea), Osteoarthritis, Pure hypercholesterolemia, Restless leg syndrome, Seasonal allergies, Septicemia ([address] SIRS due to infectious process without acute organ dysfunction, Sleep apnea, Spondylosis, Thrombocytopenia ([address] and Thrombocytopenia ([address] Past Surgical History:   has a past surgical history that includes Appendectomy; Rotator cuff repair (Right); Colonoscopy; Upper gastrointestinal endoscopy (05/29/2019); Upper gastrointestinal endoscopy (N/A, 5/29/2019); and Pelvic fracture surgery (Left, 11/20/2020). Medications:    Prior to Admission medications    Medication Sig Start Date End Date Taking?  Authorizing Provider   lact[facility] 10 GM/15ML solution TAKE 15 TO 30 ML BY MOUTH DAILY INCREASE TO 60 ML BY MOUTH IF NEEDED TO ACHIEVE 2 TO 3 BOWEL MOVENTS A DAY 11/11/20   Ce Lowry MD   baclofen (LIORESAL) 10 MG tablet  5/27/20   Historical Provider, MD   DULoxetine (CYMBALTA) 30 MG extended release capsule  7/30/20   Historical Provider, MD   pramipexole (MIRAPEX) 0.125 MG tablet Take 1 tablet by mouth nightly 5/29/19   Antoinette Hence, MD   levothyroxine (SYNTHROID) 25 MCG tablet Take 1 tablet by mouth Daily 5/30/19   Antoinette Hence, MD   lisinopril (PRINIVIL;ZESTRIL) 20 MG tablet Take 20 mg by mouth daily    Historical Provider, MD   loratadine-pseudoephedrine (CLARITIN-D 24 HOUR)  MG per extended release tablet Take 1 tablet by mouth daily    Historical Provider, MD   metoprolol succinate (TOPROL XL) 25 MG extended release tablet Take 25 mg by mouth daily    Historical Provider, MD   oxyCODONE-acetaminophen (PERCOCET) 5-325 MG per tablet Take 1 tablet by mouth 3 times daily as needed for Pain. Historical Provider, MD   aspirin 81 MG EC tablet Take 81 mg by mouth daily. Historical Provider, MD   citalopram (CELEXA) 40 MG tablet Take 40 mg by mouth daily. Historical Provider, MD   fluticasone (FLONASE) 50 MCG/ACT nasal spray 1 spray by Nasal route 2 times daily as needed for Rhinitis. Historical Provider, MD   metFORMIN ER (GLUCOPHAGE-XR) 500 MG XR tablet Take 1,000 mg by mouth 2 times daily (with meals) Indications: Take two 500mg tablets BID with meals Take two 500mg tablets BID with meals    Historical Provider, MD   omeprazole (PRILOSEC) 20 MG capsule Take 20 mg by mouth daily.     Historical Provider, MD     Current Facility-Administered Medications   Medication Dose Route Frequency Provider Last Rate Last Dose    0.9 % sodium chloride infusion   Intravenous Continuous Shubham Louis,  mL/hr at 11/20/20 0311      morphine (PF) injection 2 mg  2 mg Intravenous Q4H PRN Shubham Louis, DO   2 mg at 11/20/20 1842    phenylephrine (DYLON-SYNEPHRINE) 50 mg in dextrose 5 % 250 mL infusion  100 mcg/min Intravenous Continuous Shubham Nitin, DO        diclofenac sodium (VOLTAREN) 1 % gel 2 g  2 g Topical 4x Daily PRN Shubham Nitin, DO   2 g at 11/19/20 2112    aspirin EC tablet 81 mg  81 mg Oral Daily Shubham Louis, DO   81 mg at 11/19/20 0853    citalopram (CELEXA) tablet 40 mg  40 mg Oral Daily Shubham Taveras, DO   40 mg at 11/19/20 1591    fluticasone (FLONASE) 50 MCG/ACT nasal spray 1 spray  1 spray Nasal BID PRN Shubham Nitin, DO   1 spray at 11/19/20 2107    lactulose (CHRONULAC) 10 GM/15ML solution 30 g  30 g Oral TID Shubham Louis, DO   30 g at 11/18/20 0931    levothyroxine (SYNTHROID) tablet 25 mcg  25 mcg Oral Daily Shubham Taveras, DO   25 mcg at 11/20/20 0655    lisinopril (PRINIVIL;ZESTRIL) tablet 20 mg  20 mg Oral Daily Shubham Louis, DO   20 mg at 11/18/20 0930    metoprolol succinate (TOPROL XL) extended release tablet 25 mg  25 mg Oral Daily Shubham Louis, DO   25 mg at 11/18/20 0930    pantoprazole (PROTONIX) tablet 40 mg  40 mg Oral QAM AC Shubham Taveras, DO   40 mg at 11/20/20 0655    pramipexole (MIRAPEX) tablet 0.125 mg  0.125 mg Oral Nightly Shubham Louis, DO   0.125 mg at 11/19/20 2106    oxyCODONE-acetaminophen (PERCOCET) 5-325 MG per tablet 2 tablet  2 tablet Oral Q6H PRN Shubham Nitin, DO   2 tablet at 11/20/20 0036    sodium chloride flush 0.9 % injection 10 mL  10 mL Intravenous 2 times per day Shubham Louis, DO   10 mL at 11/19/20 2230    sodium chloride flush 0.9 % injection 10 mL  10 mL Intravenous PRN Shubham Louis, DO        potassium chloride (KLOR-CON M) extended release tablet 40 mEq  40 mEq Oral PRN Shubham Louis DO        Or    potassium bicarb-citric acid (EFFER-K) effervescent tablet 40 mEq  40 mEq Oral PRN Shubham Missy, DO        Or    potassium chloride 10 mEq/100 mL IVPB (Peripheral Line)  10 mEq Intravenous PRN Shubham Missy, DO        magnesium sulfate 1 g in dextrose 5% 100 mL IVPB  1 g Intravenous PRN Shubham Missy, DO        acetaminophen (TYLENOL) tablet 650 mg  650 mg Oral Q6H PRN Shubham Nitin, DO   325 mg at 11/18/20 9100    Or    acetaminophen (TYLENOL) suppository 650 mg  650 mg Rectal Q6H PRN Shubham Missy, DO        polyethylene glycol (GLYCOLAX) packet 17 g  17 g Oral Daily PRN Shubham Missy, DO        promethazine (PHENERGAN) tablet 12.5 mg  12.5 mg Oral Q6H PRN Shubham Missy, DO        Or    ondansetron (ZOFRAN) injection 4 mg  4 mg Intravenous Q6H PRN Shuhbam Missy, DO        nicotine (NICODERM CQ) 21 MG/24HR 1 patch  1 patch Transdermal Daily PRN Shubham Missy, DO        enoxaparin (LOVENOX) injection 40 mg  40 mg Subcutaneous Daily Shubham Missy, DO   40 mg at 11/19/20 0853    glucose (GLUTOSE) 40 % oral gel 15 g  15 g Oral PRN Shubham Missy, DO        dextrose 50 % IV solution  12.5 g Intravenous PRN Shubham Missy, DO        glucagon (rDNA) injection 1 mg  1 mg Intramuscular PRN Shubham Missy, DO        dextrose 5 % solution  100 mL/hr Intravenous PRN Shubham Missy, DO        insulin lispro (HUMALOG) injection vial 0-12 Units  0-12 Units Subcutaneous TID WC Shubham Missy, DO        insulin lispro (HUMALOG) injection vial 0-6 Units  0-6 Units Subcutaneous Nightly Shubham Nitin, DO           Allergies:  Nsaids; Sulfa antibiotics; and Tape [adhesive tape]    Social History:   reports that she has never smoked. She has never used smokeless tobacco. She reports that she does not drink alcohol or use drugs. Family History: family history includes Diabetes in her father; Heart Disease in her mother; Hypertension in her mother; Pacemaker in her mother. REVIEW OF SYSTEMS:      Constitutional: No fever or chills.  No night sweats, no weight loss   Eyes: No eye discharge, double vision, or eye pain   HEENT: negative for sore mouth, sore throat, hoarseness and voice change   Respiratory: negative for cough , sputum, dyspnea, wheezing, hemoptysis, chest pain Cardiovascular: negative for chest pain, dyspnea, palpitations, orthopnea, PND   Gastrointestinal: negative for nausea, vomiting, diarrhea, constipation, abdominal pain, Dysphagia, hematemesis and hematochezia   Genitourinary: negative for frequency, dysuria, nocturia, urinary incontinence, and hematuria   Integument: negative for rash, skin lesions, bruises. Hematologic/Lymphatic: negative for easy bruising, bleeding, lymphadenopathy, or petechiae   Endocrine: negative for heat or cold intolerance,weight changes, change in bowel habits and hair loss   Musculoskeletal: n severe pelvic pain. Neurological: negative for headaches, dizziness, seizures, weakness, numbness    PHYSICAL EXAM:        /72   Pulse 101   Temp 98.2 °F (36.8 °C) (Axillary)   Resp 24   Ht 5' 7\" (1.702 m)   Wt 180 lb (81.6 kg)   SpO2 96%   BMI 28.19 kg/m²    Temp (24hrs), Av.9 °F (36.1 °C), Min:93.2 °F (34 °C), Max:98.3 °F (36.8 °C)      General appearance - well appearing, no in pain or distress   Mental status - alert and cooperative   Eyes - pupils equal and reactive, extraocular eye movements intact   Ears - bilateral TM's and external ear canals normal   Mouth - mucous membranes moist, pharynx normal without lesions   Neck - supple, no significant adenopathy   Lymphatics - no palpable lymphadenopathy, no hepatosplenomegaly   Chest - clear to auscultation, no wheezes, rales or rhonchi, symmetric air entry   Heart - normal rate, regular rhythm, normal S1, S2, no murmurs  Abdomen - soft, nontender, nondistended, no masses or organomegaly   Neurological - alert, oriented, normal speech, no focal findings or movement disorder noted   Musculoskeletal - no joint tenderness, deformity or swelling   Extremities - peripheral pulses normal, no pedal edema, no clubbing or cyanosis   Skin - normal coloration and turgor, no rashes, no suspicious skin lesions noted ,  .     DATA:      Labs:     Results for orders placed or performed during the hospital encounter of 11/16/20   COVID-19    Specimen: Other   Result Value Ref Range    SARS-CoV-2          SARS-CoV-2, Rapid Not Detected Not Detected    Source . NASOPHARYNGEAL SWAB     SARS-CoV-2         CBC WITH AUTO DIFFERENTIAL   Result Value Ref Range    WBC 7.0 3.5 - 11.3 k/uL    RBC 4.02 3.95 - 5.11 m/uL    Hemoglobin 12.2 11.9 - 15.1 g/dL    Hematocrit 37.4 36.3 - 47.1 %    MCV 93.0 82.6 - 102.9 fL    MCH 30.3 25.2 - 33.5 pg    MCHC 32.6 28.4 - 34.8 g/dL    RDW 14.3 11.8 - 14.4 %    Platelets 435 313 - 694 k/uL    MPV 10.5 8.1 - 13.5 fL    NRBC Automated 0.0 0.0 per 100 WBC    Differential Type NOT REPORTED     Seg Neutrophils 55 36 - 65 %    Lymphocytes 24 24 - 43 %    Monocytes 15 (H) 3 - 12 %    Eosinophils % 5 (H) 1 - 4 %    Basophils 1 0 - 2 %    Immature Granulocytes 0 0 %    Segs Absolute 3.80 1.50 - 8.10 k/uL    Absolute Lymph # 1.70 1.10 - 3.70 k/uL    Absolute Mono # 1.05 0.10 - 1.20 k/uL    Absolute Eos # 0.34 0.00 - 0.44 k/uL    Basophils Absolute 0.05 0.00 - 0.20 k/uL    Absolute Immature Granulocyte 0.03 0.00 - 0.30 k/uL    WBC Morphology NOT REPORTED     RBC Morphology NOT REPORTED     Platelet Estimate NOT REPORTED    BASIC METABOLIC PANEL   Result Value Ref Range    Glucose 83 70 - 99 mg/dL    BUN 16 8 - 23 mg/dL    CREATININE 0.50 0.50 - 0.90 mg/dL    Bun/Cre Ratio NOT REPORTED 9 - 20    Calcium 9.0 8.6 - 10.4 mg/dL    Sodium 136 135 - 144 mmol/L    Potassium 3.9 3.7 - 5.3 mmol/L    Chloride 103 98 - 107 mmol/L    CO2 24 20 - 31 mmol/L    Anion Gap 9 9 - 17 mmol/L    GFR Non-African American >60 >60 mL/min    GFR African American >60 >60 mL/min    GFR Comment          GFR Staging NOT REPORTED    Basic Metabolic Panel w/ Reflex to MG   Result Value Ref Range    Glucose 78 70 - 99 mg/dL    BUN 16 8 - 23 mg/dL    CREATININE 0.52 0.50 - 0.90 mg/dL    Bun/Cre Ratio NOT REPORTED 9 - 20    Calcium 9.1 8.6 - 10.4 mg/dL    Sodium 139 135 - 144 mmol/L    Potassium 4.1 3.7 - 5.3 mmol/L Chloride 106 98 - 107 mmol/L    CO2 21 20 - 31 mmol/L    Anion Gap 12 9 - 17 mmol/L    GFR Non-African American >60 >60 mL/min    GFR African American >60 >60 mL/min    GFR Comment          GFR Staging NOT REPORTED    CBC   Result Value Ref Range    WBC 7.4 3.5 - 11.3 k/uL    RBC 3.95 3.95 - 5.11 m/uL    Hemoglobin 11.8 (L) 11.9 - 15.1 g/dL    Hematocrit 37.3 36.3 - 47.1 %    MCV 94.4 82.6 - 102.9 fL    MCH 29.9 25.2 - 33.5 pg    MCHC 31.6 28.4 - 34.8 g/dL    RDW 14.3 11.8 - 14.4 %    Platelets 536 578 - 976 k/uL    MPV 10.6 8.1 - 13.5 fL    NRBC Automated 0.0 0.0 per 100 WBC   Protime-INR   Result Value Ref Range    Protime 14.1 (H) 9.0 - 12.0 sec    INR 1.4    Hemoglobin A1C   Result Value Ref Range    Hemoglobin A1C 5.5 4.0 - 6.0 %    Estimated Avg Glucose 111 mg/dL   CBC   Result Value Ref Range    WBC 7.2 3.5 - 11.3 k/uL    RBC 3.99 3.95 - 5.11 m/uL    Hemoglobin 12.0 11.9 - 15.1 g/dL    Hematocrit 38.3 36.3 - 47.1 %    MCV 96.0 82.6 - 102.9 fL    MCH 30.1 25.2 - 33.5 pg    MCHC 31.3 28.4 - 34.8 g/dL    RDW 14.1 11.8 - 14.4 %    Platelets 963 853 - 927 k/uL    MPV 10.8 8.1 - 13.5 fL    NRBC Automated 0.0 0.0 per 100 WBC   Comp Metabolic w Bili Profile   Result Value Ref Range    Alb 2.4 (L) 3.5 - 5.2 g/dL    Albumin/Globulin Ratio 0.8 (L) 1.0 - 2.5    Alkaline Phosphatase 390 (H) 35 - 104 U/L    ALT 44 (H) 5 - 33 U/L    AST 96 (H) <32 U/L    Total Bilirubin 1.15 0.3 - 1.2 mg/dL    Bilirubin, Direct 0.48 (H) <0.31 mg/dL    Bilirubin, Indirect 0.67 0.00 - 1.00 mg/dL    BUN 16 8 - 23 mg/dL    Calcium 9.2 8.6 - 10.4 mg/dL    CREATININE 0.43 (L) 0.50 - 0.90 mg/dL    Glucose 87 70 - 99 mg/dL    Total Protein 5.4 (L) 6.4 - 8.3 g/dL    Sodium 141 135 - 144 mmol/L    Potassium 4.5 3.7 - 5.3 mmol/L    Chloride 107 98 - 107 mmol/L    CO2 24 20 - 31 mmol/L    Anion Gap 10 9 - 17 mmol/L    GFR Non-African American >60 >60 mL/min    GFR African American >60 >60 mL/min    GFR Comment          GFR Staging NOT REPORTED CANCER ANTIGEN 15-3   Result Value Ref Range    CA 15-3 58 (H) 0 - 31 U/mL   CANCER ANTIGEN 27.29   Result Value Ref Range    CA 27-29 48 (H) 0 - 38 U/mL   Dermatology Pathology   Result Value Ref Range    Dermatology Pathology Report       -- Diagnosis --       SKIN, RIGHT BREAST, PUNCH BIOPSIES:       -  INTRALYMPHATIC ADENOCARCINOMA WITH DERMAL TELANGIECTASIA AND  REACTIVE ANGIOMATOSIS, CONSISTENT WITH INTRALYMPHATIC SPREAD OF  MAMMARY CARCINOMA. Gabriel Frey M.D.  **Electronically Signed Out**  jet/11/20/2020       Clinical Information  Pre-op Diagnosis:  RULING OUT INFLAMMATORY BREAST CANCER, PT WITH  METASTATIC LYTIC LESIONS ON IMAGING AND PREVIOUS BONE BIOPSY  DEMONSTRATING CARCINOMA WITH CK7 AND GATA3 POSITIVITY   Operative Findings:  RIGHT BREAST   Operation Performed:  TWO 6 MM DIAMETER, DEEP PUNCH BIOPSIES OF  PEAU'DE ORANGE APPEARING SKIN    Source of Specimen  1: RIGHT BREAST    Gross Description  \"HONORIO ANIL, UNDESIGNATED\" Two tan punches, 1.2 cm long x 0.4 cm in  diameter and 1.3 cm long x 0.4 cm in diameter. Each is inked and  bisected. Entirely 1cs. tm      Microscopic Description  Two punch biopsies of skin to the subcutaneous fat show dilated dermal  lymphatics and sparse perivascular lymph ocytic inflammation. Surrounding the ectatic blood vessels, there are numerous clusters of  slitlike small vessels consistent with angiomatosis, suggestive of  secondary changes as a result of upstream obstruction. Glandular  breast tissue with not appreciated although focal eccrine coils are  present. Additional H&E levels show rare intralymphatic atypical  glands. A GATA3 and pankeratin immunostain label rare intralymphatic  atypical glands, consistent with intralymphatic spread of mammary  carcinoma. Controls stain appropriately.     SURGICAL PATHOLOGY CONSULTATION       Patient Name: Garima Perez  [facility] Rec: [id number]  Path Number: [id number]    [address] [facility],[address] [place] [address].   [facility], [address]  [phone]  Fax: [phone]     CBC   Result Value Ref Range    WBC 7.8 3.5 - 11.3 k/uL    RBC 3.77 (L) 3.95 - 5.11 m/uL    Hemoglobin 11.4 (L) 11.9 - 15.1 g/dL    Hematocrit 35.4 (L) 36.3 - 47.1 %    MCV 93.9 82.6 - 102.9 fL    MCH 30.2 25.2 - 33.5 pg    MCHC 32.2 28.4 - 34.8 g/dL    RDW 14.2 11.8 - 14.4 %    Platelets 565 (L) 667 - 453 k/uL    MPV 10.7 8.1 - 13.5 fL    NRBC Automated 0.0 0.0 per 100 WBC   COMPREHENSIVE METABOLIC PANEL   Result Value Ref Range    Glucose 93 70 - 99 mg/dL    BUN 18 8 - 23 mg/dL    CREATININE 0.51 0.50 - 0.90 mg/dL    Bun/Cre Ratio NOT REPORTED 9 - 20    Calcium 8.9 8.6 - 10.4 mg/dL    Sodium 137 135 - 144 mmol/L    Potassium 4.3 3.7 - 5.3 mmol/L    Chloride 104 98 - 107 mmol/L    CO2 23 20 - 31 mmol/L    Anion Gap 10 9 - 17 mmol/L    Alkaline Phosphatase 363 (H) 35 - 104 U/L    ALT 42 (H) 5 - 33 U/L    AST 85 (H) <32 U/L    Total Bilirubin 1.00 0.3 - 1.2 mg/dL    Total Protein 5.1 (L) 6.4 - 8.3 g/dL    Alb 2.2 (L) 3.5 - 5.2 g/dL    Albumin/Globulin Ratio 0.8 (L) 1.0 - 2.5    GFR Non-African American >60 >60 mL/min    GFR African American >60 >60 mL/min    GFR Comment          GFR Staging NOT REPORTED    CV HGB/HCT   Result Value Ref Range    Hemoglobin 9.6 gm/dL    Hematocrit 29.6 %   Glucose, Whole Blood   Result Value Ref Range    POC Glucose 106 (H) 65 - 105 mg/dL   Calcium, Ionized   Result Value Ref Range    Calcium, Ion 1.28 1.13 - 1.33 mmol/L   Chloride, Whole Blood   Result Value Ref Range    Chloride, Whole Blood 111 (H) 98 - 110 mmol/L   Potassium, Whole Blood   Result Value Ref Range    Potassium, Whole Blood 4.1 3.6 - 5.0 mmol/L   Sodium, Whole Blood   Result Value Ref Range    Sodium, Whole Blood 141 136 - 145 mmol/L   Blood Gas, Venous   Result Value Ref Range    pH, Arnoldo 7.497 (H) 7.320 - 7.420    pCO2, Arnoldo 31.3 (L) 39 - 55    pO2, Ran[place]o 246.0 (H) 30 - 50    HCO3, Venous 24.0 24 - 30 mmol/L    Positive Base Excess, Arnoldo 1.5 0.0 - 2.0 mmol/L    Negative Base Excess, Arnoldo NOT REPORTED 0.0 - 2.0 mmol/L    O2 Sat, Arnoldo 99.8 (H) 60.0 - 85.0 %    Total Hb NOT REPORTED 12.0 - 16.0 g/dl    Oxyhemoglobin NOT REPORTED 95.0 - 98.0 %    Carboxyhemoglobin 1.3 0 - 5 %    Methemoglobin NOT REPORTED 0.0 - 1.5 %    Pt Temp 37.0     pH, Arnoldo, Temp Adj NOT REPORTED 7.320 - 7.420    pCO2, Arnoldo, Temp Adj NOT REPORTED 39 - 55 mmHg    pO2, Arnoldo, Temp Adj NOT REPORTED 30 - 50 mmHg    O2 Device/Flow/% NOT REPORTED     Respiratory Rate NOT REPORTED     Rodo Test NOT REPORTED     Sample Site NOT REPORTED     Pt.  Position NOT REPORTED     Mode NOT REPORTED     Set Rate NOT REPORTED     Total Rate NOT REPORTED     VT NOT REPORTED     FIO2 55%     Peep/Cpap NOT REPORTED     PSV NOT REPORTED     Text for Respiratory NOT REPORTED     NOTIFICATION NOT REPORTED     NOTIFICATION TIME NOT REPORTED    POC Glucose Fingerstick   Result Value Ref Range    POC Glucose 74 65 - 105 mg/dL   POC Glucose Fingerstick   Result Value Ref Range    POC Glucose 121 (H) 65 - 105 mg/dL   POC Glucose Fingerstick   Result Value Ref Range    POC Glucose 95 65 - 105 mg/dL   POC Glucose Fingerstick   Result Value Ref Range    POC Glucose 129 (H) 65 - 105 mg/dL   POC Glucose Fingerstick   Result Value Ref Range    POC Glucose 131 (H) 65 - 105 mg/dL   POC Glucose Fingerstick   Result Value Ref Range    POC Glucose 97 65 - 105 mg/dL   POC Glucose Fingerstick   Result Value Ref Range    POC Glucose 78 65 - 105 mg/dL   POC Glucose Fingerstick   Result Value Ref Range    POC Glucose 98 65 - 105 mg/dL   POC Glucose Fingerstick   Result Value Ref Range    POC Glucose 142 (H) 65 - 105 mg/dL   POC Glucose Fingerstick   Result Value Ref Range    POC Glucose 139 (H) 65 - 105 mg/dL   POC Glucose Fingerstick   Result Value Ref Range    POC Glucose 135 (H) 65 - 105 mg/dL   POC Glucose Fingerstick   Result Value Ref Range    POC Glucose 126 (H) 65 - 105 mg/dL POC Glucose Fingerstick   Result Value Ref Range    POC Glucose 137 (H) 65 - 105 mg/dL   POC Glucose Fingerstick   Result Value Ref Range    POC Glucose 168 (H) 65 - 105 mg/dL   POC Glucose Fingerstick   Result Value Ref Range    POC Glucose 116 (H) 65 - 105 mg/dL   POC Glucose Fingerstick   Result Value Ref Range    POC Glucose 87 65 - 105 mg/dL   POC Glucose Fingerstick   Result Value Ref Range    POC Glucose 79 65 - 105 mg/dL   POC Glucose Fingerstick   Result Value Ref Range    POC Glucose 93 65 - 105 mg/dL   POC Glucose Fingerstick   Result Value Ref Range    POC Glucose 96 65 - 105 mg/dL   POC Glucose Fingerstick   Result Value Ref Range    POC Glucose 69 65 - 105 mg/dL   POC Glucose Fingerstick   Result Value Ref Range    POC Glucose 79 65 - 105 mg/dL   POC Glucose Fingerstick   Result Value Ref Range    POC Glucose 144 (H) 65 - 105 mg/dL   TYPE AND SCREEN   Result Value Ref Range    Expiration Date 11/22/2020,2359     Arm Band Number [id number]     ABO/Rh A POSITIVE     Antibody Screen NEGATIVE     Unit Number [id number]     Product Code Leukocyte Reduced Red Cell     Unit Divison 00     Dispense Status ALLOCATED     Transfusion Status OK TO TRANSFUSE     Crossmatch Result COMPATIBLE     Unit Number [id number]     Product Code Leukocyte Reduced Red Cell     Unit Divison 00     Dispense Status ALLOCATED     Transfusion Status OK TO TRANSFUSE     Crossmatch Result COMPATIBLE    BLOOD BANK SPECIMEN   Result Value Ref Range    Blood Bank Specimen NOT REPORTED          IMAGING DATA:    Xr Shoulder Right (min 2 Views)    Result Date: 11/17/2020  EXAMINATION: TWO XRAY VIEWS OF THE RIGHT FOREARM; THREE XRAY VIEWS OF THE RIGHT SHOULDER; TWO XRAY VIEWS OF THE RIGHT HUMERUS 11/17/2020 3:42 am COMPARISON: None. HISTORY: ORDERING SYSTEM PROVIDED HISTORY: Swelling and pain TECHNOLOGIST PROVIDED HISTORY: Swelling and pain Reason for Exam: Swelling throughout right arm, no known injury, no complaints of pain. FINDINGS: Normal glenohumeral and acromioclavicular alignment. No acute fracture or dislocation in the shoulder. No lytic or blastic lesions. Humeral shaft shows no evidence for fracture. No abnormal periosteal reaction. Normal alignment at the elbow and wrist.  No evidence for elbow joint effusion. No acute fracture of the radius or ulna. Evaluation of the soft tissues show swelling of the proximal 2/3 of the forearm diffusely which extends into the elbow region. No radiopaque foreign body. 1. No evidence for acute fracture or dislocation in the right shoulder, humerus or forearm. 2. Apparent diffuse soft tissue swelling of the elbow and proximal 2/3 of the forearm. No radiopaque foreign body. Xr Humerus Right (min 2 Views)    Result Date: 11/17/2020  EXAMINATION: TWO XRAY VIEWS OF THE RIGHT FOREARM; THREE XRAY VIEWS OF THE RIGHT SHOULDER; TWO XRAY VIEWS OF THE RIGHT HUMERUS 11/17/2020 3:42 am COMPARISON: None. HISTORY: ORDERING SYSTEM PROVIDED HISTORY: Swelling and pain TECHNOLOGIST PROVIDED HISTORY: Swelling and pain Reason for Exam: Swelling throughout right arm, no known injury, no complaints of pain. FINDINGS: Normal glenohumeral and acromioclavicular alignment. No acute fracture or dislocation in the shoulder. No lytic or blastic lesions. Humeral shaft shows no evidence for fracture. No abnormal periosteal reaction. Normal alignment at the elbow and wrist.  No evidence for elbow joint effusion. No acute fracture of the radius or ulna. Evaluation of the soft tissues show swelling of the proximal 2/3 of the forearm diffusely which extends into the elbow region. No radiopaque foreign body. 1. No evidence for acute fracture or dislocation in the right shoulder, humerus or forearm. 2. Apparent diffuse soft tissue swelling of the elbow and proximal 2/3 of the forearm. No radiopaque foreign body.      Xr Radius Ulna Right (2 Views)    Result Date: 11/17/2020  EXAMINATION: TWO XRAY VIEWS OF dislocation of the left femur. No periosteal reaction or osseous destruction is evident. There is mild osteoarthritis at the left knee joint. No soft tissue abnormality or radiopaque foreign body is evident. 1. Possible acute nondisplaced fracture of the left acetabulum. Suggest clinical correlation, and if concerning, consider further characterization with a follow-up left hip CT study. 2. No acute abnormality of the left femur. Xr Femur Left (min 2 Views)    Result Date: 11/16/2020  EXAMINATION: TWO XRAY VIEWS OF THE LEFT FEMUR, TWO VIEWS LEFT HIP 11/16/2020 4:26 pm COMPARISON: None. HISTORY: ORDERING SYSTEM PROVIDED HISTORY: fall TECHNOLOGIST PROVIDED HISTORY: fall Reason for Exam: fall Acuity: Acute Type of Exam: Initial Acute left hip and femoral pain FINDINGS: Frontal and frog-leg lateral views of the left hip were performed. Multiple curvilinear lucencies involving the left acetabulum, which could represent acute nondisplaced fractures of the left acetabulum in the right clinical setting. No additional fracture is identified. There is no evidence of dislocation. There is mild left hip osteoarthritis. No destructive osseous lesion is seen. Mild enthesopathic changes are evident. Frontal and lateral views of the proximal and distal aspects of the left femur were performed. There is no acute fracture or dislocation of the left femur. No periosteal reaction or osseous destruction is evident. There is mild osteoarthritis at the left knee joint. No soft tissue abnormality or radiopaque foreign body is evident. 1. Possible acute nondisplaced fracture of the left acetabulum. Suggest clinical correlation, and if concerning, consider further characterization with a follow-up left hip CT study. 2. No acute abnormality of the left femur.      Ct Pelvis Wo Contrast Additional Contrast? None    Result Date: 11/16/2020  EXAMINATION: CT OF THE PELVIS WITHOUT CONTRAST 11/16/2020 7:52 pm TECHNIQUE: CT of the pelvis was performed without the administration of intravenous contrast.  Multiplanar reformatted images are provided for review. Dose modulation, iterative reconstruction, and/or weight based adjustment of the mA/kV was utilized to reduce the radiation dose to as low as reasonably achievable. COMPARISON: Pelvis and left hip radiograph same day HISTORY: ORDERING SYSTEM PROVIDED HISTORY: Rule out left tab fracture. TECHNOLOGIST PROVIDED HISTORY: 2mm thin cuts. Please include left proximal hip in imaging. Thank you. Rule out left tab fracture. Reason for Exam: Left Leg/Groin pain - Rule out left tab fracture. Acuity: Acute Type of Exam: Initial FINDINGS: Bones demonstrate no destructive sclerotic lesions involving the partially imaged L3 vertebral body and posterior elements, bilateral iliac bones, left greater than right, and sacrum with associated destructive changes of the osseous structures. Findings consistent with metastatic disease. Lesion within the left iliac bone also extends to involve the acetabulum and superior pubic ramus on the left proximally. Sclerotic lesion also identified at the left ischial tuberosity compatible with metastatic lesion. Acute pathologic fracture of the left acetabulum with fracture lines centered at the superior acetabulum and involving both the anterior and posterior columns. The fracture is mildly displaced. No additional fractures are identified. Mild-to-moderate osteoarthritic changes of the bilateral hips. Moderate to severe degenerative changes of the imaged lower lumbar spine. Visualized intrapelvic structures demonstrate retroperitoneal and intra-abdominal lymphadenopathy most consistent with metastatic disease. Largest node measures approximately 2.3 cm in short axis dimension (image 17 series 2). Severe atherosclerotic disease. Soft tissues demonstrate anasarca.      1. Pathologic fracture of the left acetabulum centered along the superior acetabulum with fracture lines involving both the anterior and posterior columns. 2. Osseous metastatic disease with destructive lesions involving the bilateral hemipelvis, sacrum, and partially imaged lumbar spine. 3. Metastatic lymphadenopathy within the imaged pelvis with the largest node measuring 2.3 cm in short axis dimension. Ct Chest Abdomen Pelvis W Contrast    Result Date: 11/16/2020  EXAMINATION: CT OF THE CHEST, ABDOMEN, AND PELVIS WITH CONTRAST 11/16/2020 10:22 pm TECHNIQUE: CT of the chest, abdomen and pelvis was performed with the administration of intravenous contrast. Multiplanar reformatted images are provided for review. Dose modulation, iterative reconstruction, and/or weight based adjustment of the mA/kV was utilized to reduce the radiation dose to as low as reasonably achievable. COMPARISON: None HISTORY: ORDERING SYSTEM PROVIDED HISTORY: assess for cancer source vs mts TECHNOLOGIST PROVIDED HISTORY: assess for cancer source vs mts Reason for Exam: Assess for cancer source vs mts - Left hip fracture. Acuity: Acute Type of Exam: Initial FINDINGS: Chest: Mediastinum: The heart is mildly enlarged. No evidence of pericardial effusion is seen. No evidence of mediastinal or hilar lymphadenopathy or mass lesion is identified. Lungs/pleura: Multifocal bilateral lung ill-defined ground-glass opacification is noted, greatest within the left upper lung lobe. Trace right-sided layering posterior pleural effusion is visualized. Soft Tissues/Bones: Mixed sclerotic and lytic multifocal marrow changes are again seen throughout the visualized skeleton. Abdomen/Pelvis: Organs: Nodular contour of the liver is noted with coarse parenchymal appearance consistent with hepatic cirrhosis with associated evidence of portal venous hypertension with is severe varices at the splenic hilum with scattered varices also seen at the gastroesophageal junction, lesser curvature of the stomach and brittany hepatis.   Gallbladder wall calcification is noted. The liver, gallbladder, spleen, pancreas, adrenal glands, kidneys, are otherwise unremarkable in appearance. GI/Bowel: Small hiatal hernia is present. The stomach is otherwise unremarkable without wall thickening or distention. Bowel loops are unremarkable in appearance without evidence of obstruction, distension or mucosal thickening. Pelvis: The urinary bladder is well distended and unremarkable in appearance. Mild pelvic free fluid is noted. Peritoneum/Retroperitoneum: No evidence of retroperitoneal or intraperitoneal lymphadenopathy is identified. No further evidence of intraperitoneal free fluid is seen. Bones/Soft Tissues: Moderate scattered subcutaneous fat stranding related to edema is seen. Left acetabular mildly distracted comminuted fracture is again noted. Mixed lytic and sclerotic multifocal marrow changes are again noted throughout the visualized skeleton. 1. Redemonstration of mixed sclerotic and lytic multifocal marrow changes throughout the visualized skeleton consistent with metastatic disease. 2. No definitive identification of primary tumor mass lesion. 3. Multifocal bilateral lung ground-glass opacification, greatest within the left upper lung lobe. Differential diagnostic considerations include association with viral pneumonia, opportunistic infection, hypersensitivity pneumonitis and drug toxicity. Recommend clinical correlation. 4. Trace right-sided layering posterior pleural effusion. 5. Mild cardiomegaly. 6. Severe hepatic cirrhosis with associated scattered marked varices related to portal venous hypertension. 7. Small hiatal hernia. 8. Mild pelvic free fluid. 9. Moderate diffuse abdominal and pelvic wall subcutaneous edema. 10. Suggestion porcelain gallbladder. Xr Hip W Pelvis Min 5 Vws Bilateral    Result Date: 11/16/2020  EXAMINATION: ONE XRAY VIEW OF THE PELVIS AND TWO XRAY VIEWS OF EACH OF THE BILATERAL HIPS 11/16/2020 5:10 pm COMPARISON: None. HISTORY: ORDERING SYSTEM PROVIDED HISTORY: AP pelvis, Inlet, Outlet, Cross-table lateral left hip TECHNOLOGIST PROVIDED HISTORY: Please and thank you. AP pelvis, Inlet, Outlet, Cross-table lateral left hip Reason for Exam: Pain left hip, s/p multiple falls. FINDINGS: There is no evidence of acute fracture. There is normal alignment. No acute joint abnormality. No focal osseous lesion. No focal soft tissue abnormality. No acute osseous abnormality. IMPRESSION:   Primary Problem  Pathological fracture due to metastatic bone disease    Active Hospital Problems    Diagnosis Date Noted    Closed nondisplaced fracture of left acetabulum (HCC) [S32.402A]     Localized swelling of right upper extremity [R22.31] 11/17/2020    Malignant neoplasm of pelvic bone ([facility] [address] [C41.4] 11/17/2020    Left hip pain [M25.552]     Pathological fracture due to metastatic bone disease [M84.50XA] 11/16/2020    Fall from standing [W19. XXXA] 11/16/2020    Cirrhosis ([facility] [address] [K74.60] 05/23/2019    Pure hypercholesterolemia [E78.00] 10/08/2018    Anemia, iron deficiency [D50.9] 10/08/2018    Depression [F32.9] 09/26/2018    Osteoarthritis [M19.90] 09/26/2018    Asthma [J45.909] 09/06/2018    Hyperlipidemia [E78.5] 09/06/2018    ARLYN (obstructive sleep apnea) [G47.33] 08/09/2018    Morbid obesity ([facility] [address] [E66.01] 08/09/2018    Essential hypertension [I10] 08/09/2018    Diabetes mellitus ([facility] [address] [E11.9] 08/09/2018           RECOMMENDATIONS:  1. I personally reviewed results of lab work-up imaging studies and other relevant clinical data. 2. Reviewed previous medical record  3. 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 Previous biopsy specimen from May 2019 was ER/N[place] negative  5. I discussed the biopsy report with the patient however she is very sleepy  6. Request for HER-2 testing  7.  Patient will likely need some form of IV therapy however given patient social situation treatment will be challenging especially due to logistics and also due to patient's health  8. CT scans showed cirrhotic liver putting limitations on traces of chemotherapy available  9. Continue symptomatic supportive care  10. We will continue to follow. 11. Pain control      Discussed with patient and Nurse. Thank you for asking us to see this patient. Paulina Mcdonough MD          This note is created with the assistance of a speech recognition program.  While intending to generate a document that actually reflects the content of the visit, the document can still have some errors including those of syntax and sound a like substitutions which may escape proof reading. It such instances, actual meaning can be extrapolated by contextual diversion.

## 2020-11-21 NOTE — PROGRESS NOTES
Orthopedic Progress Note    Patient:  Rosemary Rojas  YOB: 1955     60 y.o. female    Subjective:  Patient seen and examined  No complaints or concerns  No issue overnight  Denies fever, HA, CP, SOB, N/V, dysuria    Vitals reviewed, afebrile    Objective:   Vitals:    11/21/20 0015   BP: 122/65   Pulse: 104   Resp: 16   Temp: 98.5 °F (36.9 °C)   SpO2: 95%     Gen: NAD, cooperative    Cardiovascular: Regular rate no dependent edema  Respiratory: No acute respiratory distress  MSK:  Left lower extremity: Skin intact. Pain with passive ROM at hip. Sensation intact to light touch L2-S1. EHL/FHL/TA/GS motor complex intact. Dorsalis pedis pulse 2+    Recent Labs     11/20/20  0439 11/20/20  1512 11/21/20  0604   WBC 7.8  --  10.6   HGB 11.4* 9.6 11.0*   HCT 35.4* 29.6 34.3*   *  --  153    141  --    K 4.3 4.1  --    BUN 18  --   --    CREATININE 0.51  --   --    GLUCOSE 93  --   --       Meds: Lovenox, ASA   See rec for complete list    Impression 60 y.o. female   1. Left pathologic acetabulum fracture s/p Exam under anesthesia DOS 11/20/2020    Plan  - Will discuss with attending regarding further treatment plan.   - WB status: non-weightbearing left lower extremity   - DVT ppx: OK to start  - Ice as tolerated  - PT/OT  - Please page ortho with any questions    Dossie DO Marcio  Orthopedic Surgery Resident, PGY-2  [address]

## 2020-11-21 NOTE — PROGRESS NOTES
General Surgery:  Daily Progress Note                  PATIENT NAME: Martir Golden     TODAY'S DATE: 11/21/2020, 7:59 AM  CC:  Hip pain    SUBJECTIVE:     Pt seen and examined at bedside. No overnight events. Denies right breast pain, and reports that her right breast seems less edematous this morning.   VSS, afebrile,  Denies N/V, C/F, SOB, CP.      OBJECTIVE:   VITALS:  /65   Pulse 104   Temp 98.5 °F (36.9 °C) (Oral)   Resp 16   Ht 5' 7\" (1.702 m)   Wt 180 lb (81.6 kg)   SpO2 95%   BMI 28.19 kg/m²      INTAKE/OUTPUT:      Intake/Output Summary (Last 24 hours) at 11/21/2020 0759  Last data filed at 11/21/2020 0397  Gross per 24 hour   Intake 2507 ml   Output 445 ml   Net 2062 ml       PHYSICAL EXAM:  CONSTITUTIONAL:  awake, alert, not distressed and mildly obese  CARDIOVASCULAR: Regular rate and rhythm   Breast: R breast noticeable larger then L with signs of pitting edema, no active drainage noted, no gross lymphadenopathy, R breast firm; bx sites clean and dry  LUNGS: Clear to auscultation bilaterally  ABDOMEN: soft, NT, ND, no rebound   MUSCULOSKELETAL:Right upper extremity has pitting edema, nontender to palpation  Orientation:   oriented to person, place, and time    Data:  CBC with Differential:    Lab Results   Component Value Date    WBC 10.6 11/21/2020    RBC 3.65 11/21/2020    HGB 11.0 11/21/2020    HCT 34.3 11/21/2020     11/21/2020    MCV 94.0 11/21/2020    MCH 30.1 11/21/2020    MCHC 32.1 11/21/2020    RDW 14.2 11/21/2020    LYMPHOPCT 24 11/16/2020    MONOPCT 15 11/16/2020    BASOPCT 1 11/16/2020    MONOSABS 1.05 11/16/2020    LYMPHSABS 1.70 11/16/2020    EOSABS 0.34 11/16/2020    BASOSABS 0.05 11/16/2020    DIFFTYPE NOT REPORTED 11/16/2020     CMP:    Lab Results   Component Value Date     11/21/2020    K 3.9 11/21/2020     11/21/2020    CO2 22 11/21/2020    BUN 17 11/21/2020    CREATININE 0.44 11/21/2020    GFRAA >60 11/21/2020    LABGLOM >60 11/21/2020    GLUCOSE 126 11/21/2020    PROT 5.1 11/21/2020    LABALBU 2.3 11/21/2020    CALCIUM 8.6 11/21/2020    BILITOT 1.11 11/21/2020    ALKPHOS 355 11/21/2020    AST 91 11/21/2020    ALT 42 11/21/2020     BMP:    Lab Results   Component Value Date     11/21/2020    K 3.9 11/21/2020     11/21/2020    CO2 22 11/21/2020    BUN 17 11/21/2020    LABALBU 2.3 11/21/2020    CREATININE 0.44 11/21/2020    CALCIUM 8.6 11/21/2020    GFRAA >60 11/21/2020    LABGLOM >60 11/21/2020    GLUCOSE 126 11/21/2020       Radiology Review:    Xr Pelvis (min 3 Views)    Result Date: 11/18/2020  Limited exam Left acetabular fracture is suspected. Repeat views of the left hip and/or CT scan of the pelvis is requested for further assessment       ASSESSMENT:  EVELYN[facility] [address] Problems    Diagnosis Date Noted    Closed nondisplaced fracture of left acetabulum (HCC) [S32.402A]     Localized swelling of right upper extremity [R22.31] 11/17/2020    Malignant neoplasm of pelvic bone ([facility] [address] [C41.4] 11/17/2020    Left hip pain [M25.552]     Pathological fracture due to metastatic bone disease [M84.50XA] 11/16/2020    Fall from standing [W19. XXXA] 11/16/2020    Cirrhosis ([facility] [address] [K74.60] 05/23/2019    Pure hypercholesterolemia [E78.00] 10/08/2018    Anemia, iron deficiency [D50.9] 10/08/2018    Depression [F32.9] 09/26/2018    Osteoarthritis [M19.90] 09/26/2018    Asthma [J45.909] 09/06/2018    Hyperlipidemia [E78.5] 09/06/2018    ARLYN (obstructive sleep apnea) [G47.33] 08/09/2018    Morbid obesity ([address] [E66.01] 08/09/2018    Essential hypertension [I10] 08/09/2018    Diabetes mellitus ([address] [E11.9] 08/09/2018       60 y.o. female with left acetabular fracture, with right breast edema on palpation  -Bedside punch biopsy of right breast  - Pathology: Pending    Plan:  · Wound care: Leave sutures in for 7 to 10 days.   Covered in C/D/I dressing, can be removed later today  · Continue medical management per primary  · Will follow from a distance for pathology report, please call with questions      Electronically signed by Linette Hutton DO  on 11/21/2020 at 7:59 AM     I have reviewed the resident's note and I have reviewed patient's chart. I have discussed the findings, established the care plan and recommendations with Resident.     Electronically signed by Daniel Williamson IV, DO on 11/24/20 at 1:22 PM EST DISPLAY PLAN FREE TEXT

## 2020-11-22 LAB
GLUCOSE BLD-MCNC: 107 MG/DL (ref 65–105)
GLUCOSE BLD-MCNC: 113 MG/DL (ref 65–105)
GLUCOSE BLD-MCNC: 123 MG/DL (ref 65–105)
GLUCOSE BLD-MCNC: 140 MG/DL (ref 65–105)
HCT VFR BLD CALC: 32.4 % (ref 36.3–47.1)
HEMOGLOBIN: 10.1 G/DL (ref 11.9–15.1)
MCH RBC QN AUTO: 30.2 PG (ref 25.2–33.5)
MCHC RBC AUTO-ENTMCNC: 31.2 G/DL (ref 28.4–34.8)
MCV RBC AUTO: 97 FL (ref 82.6–102.9)
NRBC AUTOMATED: 0 PER 100 WBC
PDW BLD-RTO: 14.5 % (ref 11.8–14.4)
PLATELET # BLD: 148 K/UL (ref 138–453)
PMV BLD AUTO: 10.8 FL (ref 8.1–13.5)
RBC # BLD: 3.34 M/UL (ref 3.95–5.11)
WBC # BLD: 8.4 K/UL (ref 3.5–11.3)

## 2020-11-22 PROCEDURE — 6370000000 HC RX 637 (ALT 250 FOR IP): Performed by: FAMILY MEDICINE

## 2020-11-22 PROCEDURE — 2580000003 HC RX 258: Performed by: STUDENT IN AN ORGANIZED HEALTH CARE EDUCATION/TRAINING PROGRAM

## 2020-11-22 PROCEDURE — 6360000002 HC RX W HCPCS: Performed by: STUDENT IN AN ORGANIZED HEALTH CARE EDUCATION/TRAINING PROGRAM

## 2020-11-22 PROCEDURE — 36415 COLL VENOUS BLD VENIPUNCTURE: CPT

## 2020-11-22 PROCEDURE — 1200000000 HC SEMI PRIVATE

## 2020-11-22 PROCEDURE — 6370000000 HC RX 637 (ALT 250 FOR IP): Performed by: STUDENT IN AN ORGANIZED HEALTH CARE EDUCATION/TRAINING PROGRAM

## 2020-11-22 PROCEDURE — 82947 ASSAY GLUCOSE BLOOD QUANT: CPT

## 2020-11-22 PROCEDURE — 99232 SBSQ HOSP IP/OBS MODERATE 35: CPT | Performed by: FAMILY MEDICINE

## 2020-11-22 PROCEDURE — 99232 SBSQ HOSP IP/OBS MODERATE 35: CPT | Performed by: INTERNAL MEDICINE

## 2020-11-22 PROCEDURE — 85027 COMPLETE CBC AUTOMATED: CPT

## 2020-11-22 RX ORDER — MORPHINE SULFATE 15 MG/1
15 TABLET, FILM COATED, EXTENDED RELEASE ORAL EVERY 12 HOURS SCHEDULED
Status: DISCONTINUED | OUTPATIENT
Start: 2020-11-22 | End: 2020-11-24 | Stop reason: HOSPADM

## 2020-11-22 RX ORDER — OXYCODONE HYDROCHLORIDE AND ACETAMINOPHEN 5; 325 MG/1; MG/1
2 TABLET ORAL EVERY 8 HOURS PRN
Status: DISCONTINUED | OUTPATIENT
Start: 2020-11-22 | End: 2020-11-24 | Stop reason: HOSPADM

## 2020-11-22 RX ADMIN — CITALOPRAM 40 MG: 20 TABLET, FILM COATED ORAL at 08:34

## 2020-11-22 RX ADMIN — SODIUM CHLORIDE, PRESERVATIVE FREE 10 ML: 5 INJECTION INTRAVENOUS at 20:52

## 2020-11-22 RX ADMIN — DICLOFENAC 2 G: 10 GEL TOPICAL at 10:57

## 2020-11-22 RX ADMIN — LEVOTHYROXINE SODIUM 25 MCG: 25 TABLET ORAL at 08:34

## 2020-11-22 RX ADMIN — LISINOPRIL 20 MG: 20 TABLET ORAL at 10:50

## 2020-11-22 RX ADMIN — Medication 81 MG: at 08:34

## 2020-11-22 RX ADMIN — OXYCODONE HYDROCHLORIDE AND ACETAMINOPHEN 2 TABLET: 5; 325 TABLET ORAL at 17:45

## 2020-11-22 RX ADMIN — OXYCODONE HYDROCHLORIDE AND ACETAMINOPHEN 2 TABLET: 5; 325 TABLET ORAL at 08:34

## 2020-11-22 RX ADMIN — MORPHINE SULFATE 15 MG: 15 TABLET, EXTENDED RELEASE ORAL at 10:50

## 2020-11-22 RX ADMIN — PRAMIPEXOLE DIHYDROCHLORIDE 0.12 MG: 0.25 TABLET ORAL at 20:51

## 2020-11-22 RX ADMIN — LACTULOSE 30 G: 20 SOLUTION ORAL at 20:51

## 2020-11-22 RX ADMIN — LACTULOSE 30 G: 20 SOLUTION ORAL at 08:34

## 2020-11-22 RX ADMIN — MORPHINE SULFATE 15 MG: 15 TABLET, EXTENDED RELEASE ORAL at 22:37

## 2020-11-22 RX ADMIN — METOPROLOL SUCCINATE 25 MG: 25 TABLET, FILM COATED, EXTENDED RELEASE ORAL at 10:50

## 2020-11-22 RX ADMIN — PANTOPRAZOLE SODIUM 40 MG: 40 TABLET, DELAYED RELEASE ORAL at 08:34

## 2020-11-22 RX ADMIN — FLUTICASONE PROPIONATE 1 SPRAY: 50 SPRAY, METERED NASAL at 10:57

## 2020-11-22 RX ADMIN — ENOXAPARIN SODIUM 40 MG: 40 INJECTION SUBCUTANEOUS at 08:34

## 2020-11-22 ASSESSMENT — ENCOUNTER SYMPTOMS
NAUSEA: 0
SHORTNESS OF BREATH: 0
COUGH: 0
DIARRHEA: 0
CHEST TIGHTNESS: 0
ABDOMINAL PAIN: 0
CONSTIPATION: 0
VOMITING: 0
WHEEZING: 0
BACK PAIN: 1
BLOOD IN STOOL: 0

## 2020-11-22 ASSESSMENT — PAIN SCALES - GENERAL
PAINLEVEL_OUTOF10: 7
PAINLEVEL_OUTOF10: 8
PAINLEVEL_OUTOF10: 4
PAINLEVEL_OUTOF10: 7

## 2020-11-22 ASSESSMENT — PAIN DESCRIPTION - ONSET: ONSET: ON-GOING

## 2020-11-22 ASSESSMENT — PAIN DESCRIPTION - PROGRESSION
CLINICAL_PROGRESSION: NOT CHANGED

## 2020-11-22 ASSESSMENT — PAIN DESCRIPTION - FREQUENCY
FREQUENCY: CONTINUOUS
FREQUENCY: CONTINUOUS

## 2020-11-22 ASSESSMENT — PAIN DESCRIPTION - DESCRIPTORS: DESCRIPTORS: PRESSURE;STABBING

## 2020-11-22 ASSESSMENT — PAIN DESCRIPTION - LOCATION: LOCATION: RIB CAGE

## 2020-11-22 ASSESSMENT — PAIN - FUNCTIONAL ASSESSMENT: PAIN_FUNCTIONAL_ASSESSMENT: PREVENTS OR INTERFERES SOME ACTIVE ACTIVITIES AND ADLS

## 2020-11-22 ASSESSMENT — PAIN DESCRIPTION - PAIN TYPE
TYPE: CHRONIC PAIN
TYPE: CHRONIC PAIN

## 2020-11-22 NOTE — PLAN OF CARE
Problem: Falls - Risk of:  Goal: Will remain free from falls  Description: Will remain free from falls  11/22/2020 1612 by Litzy Medrano RN  Outcome: Ongoing  11/22/2020 0623 by Charles Willard RN  Outcome: Ongoing  Goal: Absence of physical injury  Description: Absence of physical injury  11/22/2020 1612 by Litzy Medrano RN  Outcome: Ongoing  11/22/2020 0623 by Charles Willard RN  Outcome: Ongoing     Problem: Skin Integrity:  Goal: Will show no infection signs and symptoms  Description: Will show no infection signs and symptoms  11/22/2020 1612 by Litzy Medrano RN  Outcome: Ongoing  11/22/2020 0623 by Charles Willard RN  Outcome: Ongoing  Goal: Absence of new skin breakdown  Description: Absence of new skin breakdown  11/22/2020 1612 by Litzy Medrano RN  Outcome: Ongoing  11/22/2020 0623 by Charles Willard RN  Outcome: Ongoing     Problem: Pain:  Goal: Pain level will decrease  Description: Pain level will decrease  11/22/2020 1612 by Litzy Medrano RN  Outcome: Ongoing  11/22/2020 0623 by Charles Willard RN  Outcome: Ongoing  Goal: Control of acute pain  Description: Control of acute pain  11/22/2020 1612 by Litzy Medrano RN  Outcome: Ongoing  11/22/2020 0623 by Charles Willard RN  Outcome: Ongoing  Goal: Control of chronic pain  Description: Control of chronic pain  11/22/2020 1612 by Litzy Medrano RN  Outcome: Ongoing  11/22/2020 0623 by Charles Willard RN  Outcome: Ongoing

## 2020-11-22 NOTE — PROGRESS NOTES
Reached out to primary team today after we had thorough discussion with patient regarding treatment plan. At this point, surgical intervention would require an orthopedic oncology specialist from an outlying facility for extensive implant and surgical planning. After discussing this with the patient, we recommend that she be transferred to [facility] to be evaluated and treated by one of their orthopedic oncology surgeons. Primary team is aware of this plan. No additional orthopedic intervention will be done while inpatient at [place] [address]. Please feel free to contact orthopedic resident on call for further questions.     Macie Wahl,   PGY-2 Orthopedic Surgery  8:32 PM 11/21/2020

## 2020-11-22 NOTE — PROGRESS NOTES
[facility]  Office: [phone]  Griselda Hawley, DO, Magdi Reyes DO, Eugenia Kulkarni DO, Cas Green, DO, Jose Newton MD, Lashonda Martinez MD, Sarah Alvarez MD, Declan Kidd MD, Jaya Caro MD, Rolando León MD, Nicki Tristan MD, Sudhakar Stahl MD, Courtney Cordero MD, Ismael Valdivia DO, Kesha Buchanan MD, Lyla Castleman, MD, Sanford Prado DO, Tisha Ocampo MD,  Veronique Bautista DO, Jarod Rashid MD, Yvon Nichols MD, Ree Rose, Trudi Shone, CNP, Rebecca Lan, CNP, Mckenna Dixon, CNS, India Gasca, CNP, Jaron Alexander, CNP, Joselito Montoya, CNP, Te Pina, CNP, Loy Abbasi, CNP, Sharri Chavis PA-C, Nereyda Ashby, [facility], Zack Pelayo, CNP, Dia Olivera, CNP, Bonnie Larios, CNP, Erick Beltran, CNP, Zuly Jenkins, CNP         [facility]   [address] [facility]    Progress Note    11/22/2020    9:17 AM    Name:   Rosemary Rojas  MRN:     [id number]     Acct:      [de-identified]   Room:   [address] [place] [address] Day:  6  Admit Date:  11/16/2020  3:05 PM    PCP:   Stas Canas  Code Status:  Full Code    Subjective:     C/C:   Chief Complaint   Patient presents with    Leg Pain    Groin Pain     Interval History Status: not changed. Patient seen and examined at bedside, no acute events overnight. Crying from pain ? as she is wanting her IV pain meds   Very anxious   She is still not believing that she has any possible malignancy  Noted Ortho recommendations  VSS overnight   Patient vitals, labs and all providers notes were reviewed,from overnight shift and morning updates were noted and discussed with the nurse    Brief History:     Per chart    The patient is a 60 y.o.  female who is Admitted to the hospital with chief complaints of leg pain and groin pain. The pain started after a fall about 2 days ago. Patient was walking and became unsteady and fell.   Patient states that since the incident she has been having worsening pain to the point she could not take it anymore and presented to the hospital.  Denies any passing out episode complains of swelling of her right arm. Patient's work-up in the ER including CT scan showed pathological fracture of left acetabulum also shows osseous metastasis involving bilateral hemipelvis. She was metastatic lymphadenopathy CT chest shows multiple sclerotic and lytic lesions throughout the skeleton consistent metastatic disease. There is bilateral groundglass opacification of the lungs. There is severe hepatic cirrhosis associated with varices concerning for portal hypertension.     Reviewed records reviewed patient was hospitalized back in May 2019 for hepatic encephalopathy. Patient left hospital AMA without completing work-up. Imaging studies at that time showed a lytic lesion involving ileum and L3. Biopsy came back positive for poorly differentiated non-small cell cancer. Stains showed weak positivity for GATA3 concerning for breast primary. Patient during the hospitalization also had EGD done which was negative for malignancy. Review of Systems:     Review of Systems   Constitutional: Positive for activity change. Negative for chills, diaphoresis and fever. HENT: Negative for congestion. Eyes: Negative for visual disturbance. Respiratory: Negative for cough, chest tightness, shortness of breath and wheezing. Cardiovascular: Negative for chest pain, palpitations and leg swelling. Gastrointestinal: Negative for abdominal pain, blood in stool, constipation, diarrhea, nausea and vomiting. Genitourinary: Negative for difficulty urinating. Musculoskeletal: Positive for back pain and myalgias. Hip pain    Neurological: Negative for dizziness, weakness, light-headedness, numbness and headaches. Psychiatric/Behavioral: The patient is nervous/anxious. All other systems reviewed and are negative. Medications: Allergies:     Allergies   Allergen Reactions  Nsaids Other (See Comments)    Sulfa Antibiotics Other (See Comments) and Hives     Other reaction(s): Unknown  Patient unsure of the reaction    Tape [Adhesive Tape] Rash     Other reaction(s): Unknown       Current Meds:   Scheduled Meds:    aspirin  81 mg Oral Daily    citalopram  40 mg Oral Daily    lactulose  30 g Oral TID    levothyroxine  25 mcg Oral Daily    lisinopril  20 mg Oral Daily    metoprolol succinate  25 mg Oral Daily    pantoprazole  40 mg Oral QAM AC    pramipexole  0.125 mg Oral Nightly    sodium chloride flush  10 mL Intravenous 2 times per day    enoxaparin  40 mg Subcutaneous Daily    insulin lispro  0-12 Units Subcutaneous TID WC    insulin lispro  0-6 Units Subcutaneous Nightly     Continuous Infusions:    sodium chloride 100 mL/hr at 11/21/20 1603    dextrose       PRN Meds: morphine, diclofenac sodium, fluticasone, oxyCODONE-acetaminophen, sodium chloride flush, potassium chloride **OR** potassium alternative oral replacement **OR** potassium chloride, magnesium sulfate, acetaminophen **OR** acetaminophen, polyethylene glycol, promethazine **OR** ondansetron, nicotine, glucose, dextrose, glucagon (rDNA), dextrose    Data:     Past Medical History:   has a past medical history of Acute urinary tract infection, Anemia, Anemia, iron deficiency, Anxiety disorder, Arthritis, Asthma, Atopic rhinitis, Bandemia, Blood transfusion reaction, Cellulitis and abscess of trunk, Cellulitis of right breast, Cellulitis of right lower extremity- resolving, COPD (chronic obstructive pulmonary disease) ([facility] [address] CRP elevated, Depression, Diabetes mellitus ([facility] [address] Disorder associated with type 2 diabetes mellitus ([facility] [address] Disorder of liver, Displacement of lumbar intervertebral disc without myelopathy, Essential hypertension, Fibromyalgia, Full thickness rotator cuff tear, Gastroesophageal reflux disease, GERD (gastroesophageal reflux disease), Hammer toe of right foot, Hernia, abdominal, 8.9  --  8.6   CAION  --  1.28  --      Recent Labs     11/20/20  0439  11/20/20 2022 11/21/20  0604 11/21/20  0814 11/21/20  1250 11/21/20  1723 11/21/20  2133 11/22/20  0819   PROT 5.1*  --   --  5.1*  --   --   --   --   --    LABALBU 2.2*  --   --  2.3*  --   --   --   --   --    AST 85*  --   --  91*  --   --   --   --   --    ALT 42*  --   --  42*  --   --   --   --   --    ALKPHOS 363*  --   --  355*  --   --   --   --   --    BILITOT 1.00  --   --  1.11  --   --   --   --   --    POCGLU  --    < > 144*  --  94 110* 165* 102 107*    < > = values in this interval not displayed. ABG:  Lab Results   Component Value Date    FIO2 55% 11/20/2020     Lab Results   Component Value Date/Time    SPECIAL NOT REPORTED 05/22/2019 08:09 PM     Lab Results   Component Value Date/Time    CULTURE NO GROWTH 6 DAYS 05/22/2019 08:09 PM       Radiology:  Mary [facility] Peers Pelvis (1-2 Views)    Result Date: 11/19/2020  Mildly displaced fracture left acetabulum     Xr Shoulder Right (min 2 Views)    Result Date: 11/17/2020  1. No evidence for acute fracture or dislocation in the right shoulder, humerus or forearm. 2. Apparent diffuse soft tissue swelling of the elbow and proximal 2/3 of the forearm. No radiopaque foreign body. Xr Humerus Right (min 2 Views)    Result Date: 11/17/2020  1. No evidence for acute fracture or dislocation in the right shoulder, humerus or forearm. 2. Apparent diffuse soft tissue swelling of the elbow and proximal 2/3 of the forearm. No radiopaque foreign body. Xr Radius Ulna Right (2 Views)    Result Date: 11/17/2020  1. No evidence for acute fracture or dislocation in the right shoulder, humerus or forearm. 2. Apparent diffuse soft tissue swelling of the elbow and proximal 2/3 of the forearm. No radiopaque foreign body. Xr Hip Left (2-3 Views)    Result Date: 11/18/2020  1. Possible acute nondisplaced fracture of the left acetabulum.   Suggest clinical correlation, and if concerning, consider further characterization with a follow-up left hip CT study. 2. No acute abnormality of the left femur. Xr Femur Left (min 2 Views)    Result Date: 11/18/2020  1. Possible acute nondisplaced fracture of the left acetabulum. Suggest clinical correlation, and if concerning, consider further characterization with a follow-up left hip CT study. 2. No acute abnormality of the left femur. Ct Pelvis Wo Contrast Additional Contrast? None    Result Date: 11/16/2020  1. Pathologic fracture of the left acetabulum centered along the superior acetabulum with fracture lines involving both the anterior and posterior columns. 2. Osseous metastatic disease with destructive lesions involving the bilateral hemipelvis, sacrum, and partially imaged lumbar spine. 3. Metastatic lymphadenopathy within the imaged pelvis with the largest node measuring 2.3 cm in short axis dimension. Xr Pelvis (min 3 Views)    Result Date: 11/18/2020  Limited exam Left acetabular fracture is suspected. Repeat views of the left hip and/or CT scan of the pelvis is requested for further assessment     Ct Chest Abdomen Pelvis W Contrast    Result Date: 11/16/2020  1. Redemonstration of mixed sclerotic and lytic multifocal marrow changes throughout the visualized skeleton consistent with metastatic disease. 2. No definitive identification of primary tumor mass lesion. 3. Multifocal bilateral lung ground-glass opacification, greatest within the left upper lung lobe. Differential diagnostic considerations include association with viral pneumonia, opportunistic infection, hypersensitivity pneumonitis and drug toxicity. Recommend clinical correlation. 4. Trace right-sided layering posterior pleural effusion. 5. Mild cardiomegaly. 6. Severe hepatic cirrhosis with associated scattered marked varices related to portal venous hypertension. 7. Small hiatal hernia. 8. Mild pelvic free fluid. 9. Moderate diffuse abdominal and pelvic wall subcutaneous edema. 10. Suggestion porcelain gallbladder. Xr Hip W Pelvis Min 5 Vws Bilateral    Result Date: 11/16/2020  No acute osseous abnormality. Physical Examination:        Physical Exam  Vitals signs and nursing note reviewed. Constitutional:       General: She is not in acute distress. HENT:      Head: Normocephalic and atraumatic. Eyes:      Conjunctiva/sclera: Conjunctivae normal.      Pupils: Pupils are equal, round, and reactive to light. Cardiovascular:      Rate and Rhythm: Normal rate and regular rhythm. Heart sounds: No murmur. Pulmonary:      Effort: Pulmonary effort is normal. No accessory muscle usage or respiratory distress. Breath sounds: No stridor. No decreased breath sounds, wheezing, rhonchi or rales. Abdominal:      General: Bowel sounds are normal. There is no distension. Palpations: Abdomen is soft. Abdomen is not rigid. Tenderness: There is no abdominal tenderness. There is no guarding. Musculoskeletal:         General: No tenderness. Skin:     General: Skin is warm and dry. Findings: No erythema, lesion or rash. Neurological:      Mental Status: She is alert and oriented to person, place, and time. Cranial Nerves: No cranial nerve deficit. Motor: No seizure activity. Psychiatric:         Speech: Speech normal.         Behavior: Behavior normal. Behavior is cooperative.          Assessment:        Hospital Problems           Last Modified POA    * (Principal) Pathological fracture due to metastatic bone disease 11/16/2020 Yes    Diabetes mellitus ([address] 11/16/2020 Yes    ARLYN (obstructive sleep apnea) 11/16/2020 Yes    Morbid obesity ([address] 11/16/2020 Yes    Essential hypertension 11/16/2020 Yes    Depression 11/16/2020 Yes    Osteoarthritis 11/16/2020 Yes    Anemia, iron deficiency 11/16/2020 Yes    Asthma 11/16/2020 Yes    Hyperlipidemia 11/16/2020 Yes    Pure hypercholesterolemia 11/16/2020 Yes    Cirrhosis ([address] 11/17/2020 Yes    Fall from standing 11/16/2020 Yes    Localized swelling of right upper extremity 11/17/2020 Yes    Malignant neoplasm of pelvic bone ([address] 11/17/2020 Yes    Left hip pain 11/17/2020 Yes    Closed nondisplaced fracture of left acetabulum ([address] 11/19/2020 Yes          Plan:          Pathologic fracture of left acetabulum due to metastatic bone disease: Status post attempted percutaneous screw fixation pelvic ring, failed as the the fracture had undergone further displacement and given significant and extensive osteolytic lesions the surgery carries high risk and orthopedic surgery recommending orthopedic oncologist if patient still want to pursue    Poorly differentiated non-small cell lung cancer on bone biopsy from May 2019 with recommendation of breast as primary, patient never followed up with oncology. 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 DM2 ; Continue diabetes home meds, insulin sliding scale, hypoglycemia protocol, will follow    ARLYN: CPAP @ night    DVT prophylaxis    Discussed with the patient and the nurse     Will need to discuss again further with the patient, I did request from her to discuss in detail with oncologist when he sees her later today to better understand her situation.   Patient overall prognosis is poor      Alva Choudhury MD  11/22/2020  9:17 AM

## 2020-11-22 NOTE — PROGRESS NOTES
Physical Therapy   DATE: 2020    NAME: Braeden Maria  MRN: [id number]   : 1955    Patient not seen this date for Physical Therapy due to:  [] Blood transfusion in progress  [] Hemodialysis  [x] Patient Declined will recheck on 20  [] Spine Precautions   [] Strict Bedrest  [] Surgery/ Procedure  [] Testing      [] Other        [] PT is being discontinued at this time. Patient independent. No further needs. [] PT is being discontinued at this time due to declining physical/ medical status. Therapy is not appropriate at this time.     Kristan Nettles, PT

## 2020-11-22 NOTE — PROGRESS NOTES
Occupational [address]  Occupational Therapy Not Seen Note    DATE: 2020  Name: Jabari Stephens  : 1955  MRN: [id number]    Patient not available for Occupational Therapy due to:    [x]Refusal by Patient: Pt refused evaluation again this date with max verbal encouragement and education provided. Discussed with pt the importance of moving with therapy. Pt became agitated and requested writer leave room. Will check back as able.      Next Scheduled Treatment: Will check back as able     Latonya IBARRA/JUSTIN

## 2020-11-22 NOTE — PROGRESS NOTES
Today's Date: 11/21/2020  Patient Name: Christina Canchola  Date of admission: 11/16/2020  3:05 PM  Patient's age: 60 y.o., 1955  Admission Dx: Pathological fracture due to metastatic bone disease [M84.50XA]    Reason for Consult: management recommendations  Requesting Physician: Kevin Swenson MD    CHIEF COMPLAINT: Pelvic pain. Back pain. Weight loss. Right breast swelling. History Obtained From:  patient, electronic medical record    Interval history:    Patient seen and examined. Labs and vitals reviewed  Ortho input appreciated, likely to refer to tertiary center     HISTORY OF PRESENT ILLNESS:      The patient is a 60 y.o.  female who is Admitted to the hospital with chief complaints of leg pain and groin pain. The pain started after a fall about 2 days ago. Patient was walking and became unsteady and fell. Patient states that since the incident she has been having worsening pain to the point she could not take it anymore and presented to the hospital.  Denies any passing out episode complains of swelling of her right arm. Patient's work-up in the ER including CT scan showed pathological fracture of left acetabulum also shows osseous metastasis involving bilateral hemipelvis. She was metastatic lymphadenopathy CT chest shows multiple sclerotic and lytic lesions throughout the skeleton consistent metastatic disease. There is bilateral groundglass opacification of the lungs. There is severe hepatic cirrhosis associated with varices concerning for portal hypertension. Reviewed records reviewed patient was hospitalized back in May 2019 for hepatic encephalopathy. Patient left hospital AMA without completing work-up. Imaging studies at that time showed a lytic lesion involving ileum and L3. Biopsy came back positive for poorly differentiated non-small cell cancer. Stains showed weak positivity for GATA3 concerning for breast primary.   Patient during the hospitalization also had EGD done which was negative for malignancy. Patient was not seen by oncologist during the hospitalization and did not follow-up with an oncologist either. Past Medical History:   has a past medical history of Acute urinary tract infection, Anemia, Anemia, iron deficiency, Anxiety disorder, Arthritis, Asthma, Atopic rhinitis, Bandemia, Blood transfusion reaction, Cellulitis and abscess of trunk, Cellulitis of right breast, Cellulitis of right lower extremity- resolving, COPD (chronic obstructive pulmonary disease) ([address] CRP elevated, Depression, Diabetes mellitus ([address] Disorder associated with type 2 diabetes mellitus ([address] Disorder of liver, Displacement of lumbar intervertebral disc without myelopathy, Essential hypertension, Fibromyalgia, Full thickness rotator cuff tear, Gastroesophageal reflux disease, GERD (gastroesophageal reflux disease), Hammer toe of right foot, Hernia, abdominal, Hyperglycemia due to type 2 diabetes mellitus ([address] Hyperlipidemia, Hypertension, Hypothyroid, Iron deficiency anemia, Lactic acidosis, Localized, primary osteoarthritis of shoulder region, Mass of right breast, Migraine, Morbid obesity ([address] ARLYN (obstructive sleep apnea), Osteoarthritis, Pure hypercholesterolemia, Restless leg syndrome, Seasonal allergies, Septicemia ([address] SIRS due to infectious process without acute organ dysfunction, Sleep apnea, Spondylosis, Thrombocytopenia ([address] and Thrombocytopenia ([address] Past Surgical History:   has a past surgical history that includes Appendectomy; Rotator cuff repair (Right); Colonoscopy; Upper gastrointestinal endoscopy (05/29/2019); Upper gastrointestinal endoscopy (N/A, 5/29/2019); and Pelvic fracture surgery (Left, 11/20/2020). Medications:    Prior to Admission medications    Medication Sig Start Date End Date Taking?  Authorizing Provider   lactulose (CHRONULAC) 10 GM/15ML solution TAKE 15 TO 30 ML BY MOUTH DAILY INCREASE TO 60 ML BY MOUTH IF NEEDED TO ACHIEVE 2 TO 3 BOWEL MOVENTS A DAY 11/11/20   Mayito Albert MD   baclofen (LIORESAL) 10 MG tablet  5/27/20   Historical Provider, MD   DULoxetine (CYMBALTA) 30 MG extended release capsule  7/30/20   Historical Provider, MD   pramipexole (MIRAPEX) 0.125 MG tablet Take 1 tablet by mouth nightly 5/29/19   Michelle Stewart MD   levothyroxine (SYNTHROID) 25 MCG tablet Take 1 tablet by mouth Daily 5/30/19   Michelle Stewart MD   lisinopril (PRINIVIL;ZESTRIL) 20 MG tablet Take 20 mg by mouth daily    Historical Provider, MD   loratadine-pseudoephedrine (CLARITIN-D 24 HOUR)  MG per extended release tablet Take 1 tablet by mouth daily    Historical Provider, MD   metoprolol succinate (TOPROL XL) 25 MG extended release tablet Take 25 mg by mouth daily    Historical Provider, MD   oxyCODONE-acetaminophen (PERCOCET) 5-325 MG per tablet Take 1 tablet by mouth 3 times daily as needed for Pain. Historical Provider, MD   aspirin 81 MG EC tablet Take 81 mg by mouth daily. Historical Provider, MD   citalopram (CELEXA) 40 MG tablet Take 40 mg by mouth daily. Historical Provider, MD   fluticasone (FLONASE) 50 MCG/ACT nasal spray 1 spray by Nasal route 2 times daily as needed for Rhinitis. Historical Provider, MD   metFORMIN ER (GLUCOPHAGE-XR) 500 MG XR tablet Take 1,000 mg by mouth 2 times daily (with meals) Indications: Take two 500mg tablets BID with meals Take two 500mg tablets BID with meals    Historical Provider, MD   omeprazole (PRILOSEC) 20 MG capsule Take 20 mg by mouth daily.     Historical Provider, MD     Current Facility-Administered Medications   Medication Dose Route Frequency Provider Last Rate Last Dose    0.9 % sodium chloride infusion   Intravenous Continuous Shubham Louis,  mL/hr at 11/21/20 1603      morphine (PF) injection 2 mg  2 mg Intravenous Q4H PRN Shubham Louis, DO   2 mg at 11/21/20 2122    diclofenac sodium (VOLTAREN) 1 % gel 2 g  2 g Topical 4x Daily PRN Shubham Louis, DO   2 g at 11/21/20 1022    aspirin EC tablet 81 mg  81 mg Oral Daily Shubham Navarretehmani, DO   81 mg at 11/21/20 0848    citalopram (CELEXA) tablet 40 mg  40 mg Oral Daily Shubham Nitin, DO   40 mg at 11/21/20 0848    fluticasone (FLONASE) 50 MCG/ACT nasal spray 1 spray  1 spray Nasal BID PRN Shubham Nitin, DO   1 spray at 11/21/20 0619    lactulose (CHRONULAC) 10 GM/15ML solution 30 g  30 g Oral TID Shubham Navarretehmani, DO   30 g at 11/21/20 2124    levothyroxine (SYNTHROID) tablet 25 mcg  25 mcg Oral Daily Shubham [place], [place]   25 mcg at 11/21/20 0617    lisinopril (PRINIVIL;ZESTRIL) tablet 20 mg  20 mg Oral Daily Shubham Nitin, DO   20 mg at 11/21/20 2850    metoprolol succinate (TOPROL XL) extended release tablet 25 mg  25 mg Oral Daily Shubham Louis, DO   25 mg at 11/21/20 0850    pantoprazole (PROTONIX) tablet 40 mg  40 mg Oral QAM AC Shubham Nitin, DO   40 mg at 11/21/20 0617    pramipexole (MIRAPEX) tablet 0.125 mg  0.125 mg Oral Nightly Shubham Navarretehmani, DO   0.125 mg at 11/21/20 2125    oxyCODONE-acetaminophen (PERCOCET) 5-325 MG per tablet 2 tablet  2 tablet Oral Q6H PRN Shubham Nitin, DO   2 tablet at 11/21/20 1855    sodium chloride flush 0.9 % injection 10 mL  10 mL Intravenous 2 times per day Shubham Missy, DO   10 mL at 11/21/20 0850    sodium chloride flush 0.9 % injection 10 mL  10 mL Intravenous PRN Shubham Missy, DO        potassium chloride (KLOR-CON M) extended release tablet 40 mEq  40 mEq Oral PRN Shubham Missy, DO        Or    potassium bicarb-citric acid (EFFER-K) effervescent tablet 40 mEq  40 mEq Oral PRN Shubham Missy, DO        Or    potassium chloride 10 mEq/100 mL IVPB (Peripheral Line)  10 mEq Intravenous PRN Shubham Missy, DO        magnesium sulfate 1 g in dextrose 5% 100 mL IVPB  1 g Intravenous PRN Suhbham Missy, DO        acetaminophen (TYLENOL) tablet 650 mg  650 mg Oral Q6H PRN Shubham Missy, DO   325 mg at 11/18/20 0947    Or    acetaminophen (TYLENOL) suppository 650 mg  650 mg Rectal Q6H PRN Shubham Nitin, DO        polyethylene glycol (GLYCOLAX) packet 17 g  17 g Oral Daily PRN Shubham Missy, DO        promethazine (PHENERGAN) tablet 12.5 mg  12.5 mg Oral Q6H PRN Shubham Missy, DO        Or    ondansetron (ZOFRAN) injection 4 mg  4 mg Intravenous Q6H PRN Shubham Missy, DO        nicotine (NICODERM CQ) 21 MG/24HR 1 patch  1 patch Transdermal Daily PRN Shubham Missy, DO        enoxaparin (LOVENOX) injection 40 mg  40 mg Subcutaneous Daily Shubham Missy, DO   40 mg at 11/21/20 0850    glucose (GLUTOSE) 40 % oral gel 15 g  15 g Oral PRN Shubham Missy, DO        dextrose 50 % IV solution  12.5 g Intravenous PRN Shubham Missy, DO        glucagon (rDNA) injection 1 mg  1 mg Intramuscular PRN Shubham Missy, DO        dextrose 5 % solution  100 mL/hr Intravenous PRN Shubham Missy, DO        insulin lispro (HUMALOG) injection vial 0-12 Units  0-12 Units Subcutaneous TID WC Shubham Nitin, DO   2 Units at 11/21/20 1733    insulin lispro (HUMALOG) injection vial 0-6 Units  0-6 Units Subcutaneous Nightly Shubham Nitin, DO           Allergies:  Nsaids; Sulfa antibiotics; and Tape [adhesive tape]    Social History:   reports that she has never smoked. She has never used smokeless tobacco. She reports that she does not drink alcohol or use drugs. Family History: family history includes Diabetes in her father; Heart Disease in her mother; Hypertension in her mother; Pacemaker in her mother. REVIEW OF SYSTEMS:      Constitutional: No fever or chills.  No night sweats, no weight loss   Eyes: No eye discharge, double vision, or eye pain   HEENT: negative for sore mouth, sore throat, hoarseness and voice change   Respiratory: negative for cough , sputum, dyspnea, wheezing, hemoptysis, chest pain   Cardiovascular: negative for chest pain, dyspnea, palpitations, orthopnea, PND   Gastrointestinal: negative for nausea, vomiting, diarrhea, constipation, abdominal pain, Dysphagia, hematemesis and hematochezia   Genitourinary: negative for frequency, dysuria, nocturia, urinary incontinence, and hematuria   Integument: negative for rash, skin lesions, bruises. Hematologic/Lymphatic: negative for easy bruising, bleeding, lymphadenopathy, or petechiae   Endocrine: negative for heat or cold intolerance,weight changes, change in bowel habits and hair loss   Musculoskeletal: n severe pelvic pain. Neurological: negative for headaches, dizziness, seizures, weakness, numbness    PHYSICAL EXAM:        BP (!) 126/58   Pulse 89   Temp 98.6 °F (37 °C) (Oral)   Resp 18   Ht 5' 7\" (1.702 m)   Wt 180 lb (81.6 kg)   SpO2 97%   BMI 28.19 kg/m²    Temp (24hrs), Av.2 °F (36.8 °C), Min:97.5 °F (36.4 °C), Max:98.6 °F (37 °C)      General appearance - well appearing, no in pain or distress   Mental status - alert and cooperative   Eyes - pupils equal and reactive, extraocular eye movements intact   Ears - bilateral TM's and external ear canals normal   Mouth - mucous membranes moist, pharynx normal without lesions   Neck - supple, no significant adenopathy   Lymphatics - no palpable lymphadenopathy, no hepatosplenomegaly   Chest - clear to auscultation, no wheezes, rales or rhonchi, symmetric air entry   Heart - normal rate, regular rhythm, normal S1, S2, no murmurs  Abdomen - soft, nontender, nondistended, no masses or organomegaly   Neurological - alert, oriented, normal speech, no focal findings or movement disorder noted   Musculoskeletal - no joint tenderness, deformity or swelling   Extremities - peripheral pulses normal, no pedal edema, no clubbing or cyanosis   Skin - normal coloration and turgor, no rashes, no suspicious skin lesions noted ,  . DATA:      Labs:     Results for orders placed or performed during the hospital encounter of 20   COVID-19    Specimen: Other   Result Value Ref Range    SARS-CoV-2          SARS-CoV-2, Rapid Not Detected Not Detected    Source . NASOPHARYNGEAL SWAB Staging NOT REPORTED    CBC   Result Value Ref Range    WBC 7.4 3.5 - 11.3 k/uL    RBC 3.95 3.95 - 5.11 m/uL    Hemoglobin 11.8 (L) 11.9 - 15.1 g/dL    Hematocrit 37.3 36.3 - 47.1 %    MCV 94.4 82.6 - 102.9 fL    MCH 29.9 25.2 - 33.5 pg    MCHC 31.6 28.4 - 34.8 g/dL    RDW 14.3 11.8 - 14.4 %    Platelets 844 717 - 866 k/uL    MPV 10.6 8.1 - 13.5 fL    NRBC Automated 0.0 0.0 per 100 WBC   Protime-INR   Result Value Ref Range    Protime 14.1 (H) 9.0 - 12.0 sec    INR 1.4    Hemoglobin A1C   Result Value Ref Range    Hemoglobin A1C 5.5 4.0 - 6.0 %    Estimated Avg Glucose 111 mg/dL   CBC   Result Value Ref Range    WBC 7.2 3.5 - 11.3 k/uL    RBC 3.99 3.95 - 5.11 m/uL    Hemoglobin 12.0 11.9 - 15.1 g/dL    Hematocrit 38.3 36.3 - 47.1 %    MCV 96.0 82.6 - 102.9 fL    MCH 30.1 25.2 - 33.5 pg    MCHC 31.3 28.4 - 34.8 g/dL    RDW 14.1 11.8 - 14.4 %    Platelets 363 028 - 271 k/uL    MPV 10.8 8.1 - 13.5 fL    NRBC Automated 0.0 0.0 per 100 WBC   Comp Metabolic w Bili Profile   Result Value Ref Range    Alb 2.4 (L) 3.5 - 5.2 g/dL    Albumin/Globulin Ratio 0.8 (L) 1.0 - 2.5    Alkaline Phosphatase 390 (H) 35 - 104 U/L    ALT 44 (H) 5 - 33 U/L    AST 96 (H) <32 U/L    Total Bilirubin 1.15 0.3 - 1.2 mg/dL    Bilirubin, Direct 0.48 (H) <0.31 mg/dL    Bilirubin, Indirect 0.67 0.00 - 1.00 mg/dL    BUN 16 8 - 23 mg/dL    Calcium 9.2 8.6 - 10.4 mg/dL    CREATININE 0.43 (L) 0.50 - 0.90 mg/dL    Glucose 87 70 - 99 mg/dL    Total Protein 5.4 (L) 6.4 - 8.3 g/dL    Sodium 141 135 - 144 mmol/L    Potassium 4.5 3.7 - 5.3 mmol/L    Chloride 107 98 - 107 mmol/L    CO2 24 20 - 31 mmol/L    Anion Gap 10 9 - 17 mmol/L    GFR Non-African American >60 >60 mL/min    GFR African American >60 >60 mL/min    GFR Comment          GFR Staging NOT REPORTED    CANCER ANTIGEN 15-3   Result Value Ref Range    CA 15-3 58 (H) 0 - 31 U/mL   CANCER ANTIGEN 27.29   Result Value Ref Range    CA 27-29 48 (H) 0 - 38 U/mL   Dermatology Pathology   Result Value Ref Range    Dermatology Pathology Report       -- Diagnosis --       SKIN, RIGHT BREAST, PUNCH BIOPSIES:       -  INTRALYMPHATIC ADENOCARCINOMA WITH DERMAL TELANGIECTASIA AND  REACTIVE ANGIOMATOSIS, CONSISTENT WITH INTRALYMPHATIC SPREAD OF  MAMMARY CARCINOMA. Jaycee Notice Jordan Garcia M.D.  **Electronically Signed Out**  jet/11/20/2020       Clinical Information  Pre-op Diagnosis:  RULING OUT INFLAMMATORY BREAST CANCER, PT WITH  METASTATIC LYTIC LESIONS ON IMAGING AND PREVIOUS BONE BIOPSY  DEMONSTRATING CARCINOMA WITH CK7 AND GATA3 POSITIVITY   Operative Findings:  RIGHT BREAST   Operation Performed:  TWO 6 MM DIAMETER, DEEP PUNCH BIOPSIES OF  PEAU'DE ORANGE APPEARING SKIN    Source of Specimen  1: RIGHT BREAST    Gross Description  \"HONORIO ANIL, UNDESIGNATED\" Two tan punches, 1.2 cm long x 0.4 cm in  diameter and 1.3 cm long x 0.4 cm in diameter. Each is inked and  bisected. Entirely 1cs. tm      Microscopic Description  Two punch biopsies of skin to the subcutaneous fat show dilated dermal  lymphatics and sparse perivascular lymph ocytic inflammation. Surrounding the ectatic blood vessels, there are numerous clusters of  slitlike small vessels consistent with angiomatosis, suggestive of  secondary changes as a result of upstream obstruction. Glandular  breast tissue with not appreciated although focal eccrine coils are  present. Additional H&E levels show rare intralymphatic atypical  glands. A GATA3 and pankeratin immunostain label rare intralymphatic  atypical glands, consistent with intralymphatic spread of mammary  carcinoma. Controls stain appropriately. SURGICAL PATHOLOGY CONSULTATION       Patient Name: Vinny Love Rec: [id number]  Path Number: [id number]    [facility] [address]   [place], [address]  [phone]  Fax: [phone]     CBC   Result Value Ref Range    WBC 7.8 3.5 - 11.3 k/uL    RBC 3.77 (L) 3.95 - 5.11 m/uL    Hemoglobin 11.4 (L) 11.9 - 15.1 g/dL    Hematocrit 35.4 (L) 36.3 - 47.1 %    MCV 93.9 82.6 - 102.9 fL    MCH 30.2 25.2 - 33.5 pg    MCHC 32.2 28.4 - 34.8 g/dL    RDW 14.2 11.8 - 14.4 %    Platelets 408 (L) 393 - 453 k/uL    MPV 10.7 8.1 - 13.5 fL    NRBC Automated 0.0 0.0 per 100 WBC   COMPREHENSIVE METABOLIC PANEL   Result Value Ref Range    Glucose 93 70 - 99 mg/dL    BUN 18 8 - 23 mg/dL    CREATININE 0.51 0.50 - 0.90 mg/dL    Bun/Cre Ratio NOT REPORTED 9 - 20    Calcium 8.9 8.6 - 10.4 mg/dL    Sodium 137 135 - 144 mmol/L    Potassium 4.3 3.7 - 5.3 mmol/L    Chloride 104 98 - 107 mmol/L    CO2 23 20 - 31 mmol/L    Anion Gap 10 9 - 17 mmol/L    Alkaline Phosphatase 363 (H) 35 - 104 U/L    ALT 42 (H) 5 - 33 U/L    AST 85 (H) <32 U/L    Total Bilirubin 1.00 0.3 - 1.2 mg/dL    Total Protein 5.1 (L) 6.4 - 8.3 g/dL    Alb 2.2 (L) 3.5 - 5.2 g/dL    Albumin/Globulin Ratio 0.8 (L) 1.0 - 2.5    GFR Non-African American >60 >60 mL/min    GFR African American >60 >60 mL/min    GFR Comment          GFR Staging NOT REPORTED    CV HGB/HCT   Result Value Ref Range    Hemoglobin 9.6 gm/dL    Hematocrit 29.6 %   Glucose, Whole Blood   Result Value Ref Range    POC Glucose 106 (H) 65 - 105 mg/dL   Calcium, Ionized   Result Value Ref Range    Calcium, Ion 1.28 1.13 - 1.33 mmol/L   Chloride, Whole Blood   Result Value Ref Range    Chloride, Whole Blood 111 (H) 98 - 110 mmol/L   Potassium, Whole Blood   Result Value Ref Range    Potassium, Whole Blood 4.1 3.6 - 5.0 mmol/L   Sodium, Whole Blood   Result Value Ref Range    Sodium, Whole Blood 141 136 - 145 mmol/L   Blood Gas, Venous   Result Value Ref Range    pH, Arnoldo 7.497 (H) 7.320 - 7.420    pCO2, Arnoldo 31.3 (L) 39 - 55    pO2, Arnoldo 246.0 (H) 30 - 50    HCO3, Venous 24.0 24 - 30 mmol/L    Positive Base Excess, Arnoldo 1.5 0.0 - 2.0 mmol/L    Negative Base Excess, Arnoldo NOT REPORTED 0.0 - 2.0 mmol/L    O2 Sat, Arnoldo 99.8 (H) 60.0 - 85.0 %    Total Hb NOT REPORTED 12.0 - 16.0 g/dl Oxyhemoglobin NOT REPORTED 95.0 - 98.0 %    Carboxyhemoglobin 1.3 0 - 5 %    Methemoglobin NOT REPORTED 0.0 - 1.5 %    Pt Temp 37.0     pH, Arnoldo, Temp Adj NOT REPORTED 7.320 - 7.420    pCO2, Arnoldo, Temp Adj NOT REPORTED 39 - 55 mmHg    pO2, Arnoldo, Temp Adj NOT REPORTED 30 - 50 mmHg    O2 Device/Flow/% NOT REPORTED     Respiratory Rate NOT REPORTED     Rodo Test NOT REPORTED     Sample Site NOT REPORTED     Pt.  Position NOT REPORTED     Mode NOT REPORTED     Set Rate NOT REPORTED     Total Rate NOT REPORTED     VT NOT REPORTED     FIO2 55%     Peep/Cpap NOT REPORTED     PSV NOT REPORTED     Text for Respiratory NOT REPORTED     NOTIFICATION NOT REPORTED     NOTIFICATION TIME NOT REPORTED    CBC   Result Value Ref Range    WBC 10.6 3.5 - 11.3 k/uL    RBC 3.65 (L) 3.95 - 5.11 m/uL    Hemoglobin 11.0 (L) 11.9 - 15.1 g/dL    Hematocrit 34.3 (L) 36.3 - 47.1 %    MCV 94.0 82.6 - 102.9 fL    MCH 30.1 25.2 - 33.5 pg    MCHC 32.1 28.4 - 34.8 g/dL    RDW 14.2 11.8 - 14.4 %    Platelets 831 179 - 118 k/uL    MPV 10.4 8.1 - 13.5 fL    NRBC Automated 0.0 0.0 per 100 WBC   COMPREHENSIVE METABOLIC PANEL   Result Value Ref Range    Glucose 126 (H) 70 - 99 mg/dL    BUN 17 8 - 23 mg/dL    CREATININE 0.44 (L) 0.50 - 0.90 mg/dL    Bun/Cre Ratio NOT REPORTED 9 - 20    Calcium 8.6 8.6 - 10.4 mg/dL    Sodium 138 135 - 144 mmol/L    Potassium 3.9 3.7 - 5.3 mmol/L    Chloride 106 98 - 107 mmol/L    CO2 22 20 - 31 mmol/L    Anion Gap 10 9 - 17 mmol/L    Alkaline Phosphatase 355 (H) 35 - 104 U/L    ALT 42 (H) 5 - 33 U/L    AST 91 (H) <32 U/L    Total Bilirubin 1.11 0.3 - 1.2 mg/dL    Total Protein 5.1 (L) 6.4 - 8.3 g/dL    Alb 2.3 (L) 3.5 - 5.2 g/dL    Albumin/Globulin Ratio 0.8 (L) 1.0 - 2.5    GFR Non-African American >60 >60 mL/min    GFR African American >60 >60 mL/min    GFR Comment          GFR Staging NOT REPORTED    POC Glucose Fingerstick   Result Value Ref Range    POC Glucose 74 65 - 105 mg/dL   POC Glucose Fingerstick   Result Value mg/dL   POC Glucose Fingerstick   Result Value Ref Range    POC Glucose 165 (H) 65 - 105 mg/dL   POC Glucose Fingerstick   Result Value Ref Range    POC Glucose 102 65 - 105 mg/dL   TYPE AND SCREEN   Result Value Ref Range    Expiration Date 11/22/2020,2359     Arm Band Number [id number]     ABO/Rh A POSITIVE     Antibody Screen NEGATIVE     Unit Number [id number]     Product Code Leukocyte Reduced Red Cell     Unit Divison 00     Dispense Status REL FROM [facility]     Transfusion Status OK TO TRANSFUSE     Crossmatch Result COMPATIBLE     Unit Number [id number]     Product Code Leukocyte Reduced Red Cell     Unit Divison 00     Dispense Status REL FROM [facility]     Transfusion Status OK TO TRANSFUSE     Crossmatch Result COMPATIBLE    BLOOD BANK SPECIMEN   Result Value Ref Range    Blood Bank Specimen NOT REPORTED          IMAGING DATA:    Xr Shoulder Right (min 2 Views)    Result Date: 11/17/2020  EXAMINATION: TWO XRAY VIEWS OF THE RIGHT FOREARM; THREE XRAY VIEWS OF THE RIGHT SHOULDER; TWO XRAY VIEWS OF THE RIGHT HUMERUS 11/17/2020 3:42 am COMPARISON: None. HISTORY: ORDERING SYSTEM PROVIDED HISTORY: Swelling and pain TECHNOLOGIST PROVIDED HISTORY: Swelling and pain Reason for Exam: Swelling throughout right arm, no known injury, no complaints of pain. FINDINGS: Normal glenohumeral and acromioclavicular alignment. No acute fracture or dislocation in the shoulder. No lytic or blastic lesions. Humeral shaft shows no evidence for fracture. No abnormal periosteal reaction. Normal alignment at the elbow and wrist.  No evidence for elbow joint effusion. No acute fracture of the radius or ulna. Evaluation of the soft tissues show swelling of the proximal 2/3 of the forearm diffusely which extends into the elbow region. No radiopaque foreign body. 1. No evidence for acute fracture or dislocation in the right shoulder, humerus or forearm.  2. Apparent diffuse soft tissue swelling of the elbow and proximal 2/3 of the forearm. No radiopaque foreign body. Xr Humerus Right (min 2 Views)    Result Date: 11/17/2020  EXAMINATION: TWO XRAY VIEWS OF THE RIGHT FOREARM; THREE XRAY VIEWS OF THE RIGHT SHOULDER; TWO XRAY VIEWS OF THE RIGHT HUMERUS 11/17/2020 3:42 am COMPARISON: None. HISTORY: ORDERING SYSTEM PROVIDED HISTORY: Swelling and pain TECHNOLOGIST PROVIDED HISTORY: Swelling and pain Reason for Exam: Swelling throughout right arm, no known injury, no complaints of pain. FINDINGS: Normal glenohumeral and acromioclavicular alignment. No acute fracture or dislocation in the shoulder. No lytic or blastic lesions. Humeral shaft shows no evidence for fracture. No abnormal periosteal reaction. Normal alignment at the elbow and wrist.  No evidence for elbow joint effusion. No acute fracture of the radius or ulna. Evaluation of the soft tissues show swelling of the proximal 2/3 of the forearm diffusely which extends into the elbow region. No radiopaque foreign body. 1. No evidence for acute fracture or dislocation in the right shoulder, humerus or forearm. 2. Apparent diffuse soft tissue swelling of the elbow and proximal 2/3 of the forearm. No radiopaque foreign body. Xr Radius Ulna Right (2 Views)    Result Date: 11/17/2020  EXAMINATION: TWO XRAY VIEWS OF THE RIGHT FOREARM; THREE XRAY VIEWS OF THE RIGHT SHOULDER; TWO XRAY VIEWS OF THE RIGHT HUMERUS 11/17/2020 3:42 am COMPARISON: None. HISTORY: ORDERING SYSTEM PROVIDED HISTORY: Swelling and pain TECHNOLOGIST PROVIDED HISTORY: Swelling and pain Reason for Exam: Swelling throughout right arm, no known injury, no complaints of pain. FINDINGS: Normal glenohumeral and acromioclavicular alignment. No acute fracture or dislocation in the shoulder. No lytic or blastic lesions. Humeral shaft shows no evidence for fracture. No abnormal periosteal reaction. Normal alignment at the elbow and wrist.  No evidence for elbow joint effusion. No acute fracture of the radius or ulna. Evaluation of the soft tissues show swelling of the proximal 2/3 of the forearm diffusely which extends into the elbow region. No radiopaque foreign body. 1. No evidence for acute fracture or dislocation in the right shoulder, humerus or forearm. 2. Apparent diffuse soft tissue swelling of the elbow and proximal 2/3 of the forearm. No radiopaque foreign body. Xr Hip Left (2-3 Views)    Result Date: 11/16/2020  EXAMINATION: TWO XRAY VIEWS OF THE LEFT FEMUR, TWO VIEWS LEFT HIP 11/16/2020 4:26 pm COMPARISON: None. HISTORY: ORDERING SYSTEM PROVIDED HISTORY: fall TECHNOLOGIST PROVIDED HISTORY: fall Reason for Exam: fall Acuity: Acute Type of Exam: Initial Acute left hip and femoral pain FINDINGS: Frontal and frog-leg lateral views of the left hip were performed. Multiple curvilinear lucencies involving the left acetabulum, which could represent acute nondisplaced fractures of the left acetabulum in the right clinical setting. No additional fracture is identified. There is no evidence of dislocation. There is mild left hip osteoarthritis. No destructive osseous lesion is seen. Mild enthesopathic changes are evident. Frontal and lateral views of the proximal and distal aspects of the left femur were performed. There is no acute fracture or dislocation of the left femur. No periosteal reaction or osseous destruction is evident. There is mild osteoarthritis at the left knee joint. No soft tissue abnormality or radiopaque foreign body is evident. 1. Possible acute nondisplaced fracture of the left acetabulum. Suggest clinical correlation, and if concerning, consider further characterization with a follow-up left hip CT study. 2. No acute abnormality of the left femur. Xr Femur Left (min 2 Views)    Result Date: 11/16/2020  EXAMINATION: TWO XRAY VIEWS OF THE LEFT FEMUR, TWO VIEWS LEFT HIP 11/16/2020 4:26 pm COMPARISON: None.  HISTORY: ORDERING SYSTEM PROVIDED HISTORY: fall TECHNOLOGIST PROVIDED HISTORY: fall Reason for Exam: fall Acuity: Acute Type of Exam: Initial Acute left hip and femoral pain FINDINGS: Frontal and frog-leg lateral views of the left hip were performed. Multiple curvilinear lucencies involving the left acetabulum, which could represent acute nondisplaced fractures of the left acetabulum in the right clinical setting. No additional fracture is identified. There is no evidence of dislocation. There is mild left hip osteoarthritis. No destructive osseous lesion is seen. Mild enthesopathic changes are evident. Frontal and lateral views of the proximal and distal aspects of the left femur were performed. There is no acute fracture or dislocation of the left femur. No periosteal reaction or osseous destruction is evident. There is mild osteoarthritis at the left knee joint. No soft tissue abnormality or radiopaque foreign body is evident. 1. Possible acute nondisplaced fracture of the left acetabulum. Suggest clinical correlation, and if concerning, consider further characterization with a follow-up left hip CT study. 2. No acute abnormality of the left femur. Ct Pelvis Wo Contrast Additional Contrast? None    Result Date: 11/16/2020  EXAMINATION: CT OF THE PELVIS WITHOUT CONTRAST 11/16/2020 7:52 pm TECHNIQUE: CT of the pelvis was performed without the administration of intravenous contrast.  Multiplanar reformatted images are provided for review. Dose modulation, iterative reconstruction, and/or weight based adjustment of the mA/kV was utilized to reduce the radiation dose to as low as reasonably achievable. COMPARISON: Pelvis and left hip radiograph same day HISTORY: ORDERING SYSTEM PROVIDED HISTORY: Rule out left tab fracture. TECHNOLOGIST PROVIDED HISTORY: 2mm thin cuts. Please include left proximal hip in imaging. Thank you. Rule out left tab fracture. Reason for Exam: Left Leg/Groin pain - Rule out left tab fracture.  Acuity: Acute Type of Exam: Initial FINDINGS: Bones demonstrate no destructive sclerotic lesions involving the partially imaged L3 vertebral body and posterior elements, bilateral iliac bones, left greater than right, and sacrum with associated destructive changes of the osseous structures. Findings consistent with metastatic disease. Lesion within the left iliac bone also extends to involve the acetabulum and superior pubic ramus on the left proximally. Sclerotic lesion also identified at the left ischial tuberosity compatible with metastatic lesion. Acute pathologic fracture of the left acetabulum with fracture lines centered at the superior acetabulum and involving both the anterior and posterior columns. The fracture is mildly displaced. No additional fractures are identified. Mild-to-moderate osteoarthritic changes of the bilateral hips. Moderate to severe degenerative changes of the imaged lower lumbar spine. Visualized intrapelvic structures demonstrate retroperitoneal and intra-abdominal lymphadenopathy most consistent with metastatic disease. Largest node measures approximately 2.3 cm in short axis dimension (image 17 series 2). Severe atherosclerotic disease. Soft tissues demonstrate anasarca. 1. Pathologic fracture of the left acetabulum centered along the superior acetabulum with fracture lines involving both the anterior and posterior columns. 2. Osseous metastatic disease with destructive lesions involving the bilateral hemipelvis, sacrum, and partially imaged lumbar spine. 3. Metastatic lymphadenopathy within the imaged pelvis with the largest node measuring 2.3 cm in short axis dimension. Ct Chest Abdomen Pelvis W Contrast    Result Date: 11/16/2020  EXAMINATION: CT OF THE CHEST, ABDOMEN, AND PELVIS WITH CONTRAST 11/16/2020 10:22 pm TECHNIQUE: CT of the chest, abdomen and pelvis was performed with the administration of intravenous contrast. Multiplanar reformatted images are provided for review.  Dose modulation, iterative reconstruction, and/or weight based adjustment of the mA/kV was utilized to reduce the radiation dose to as low as reasonably achievable. COMPARISON: None HISTORY: ORDERING SYSTEM PROVIDED HISTORY: assess for cancer source vs mts TECHNOLOGIST PROVIDED HISTORY: assess for cancer source vs mts Reason for Exam: Assess for cancer source vs mts - Left hip fracture. Acuity: Acute Type of Exam: Initial FINDINGS: Chest: Mediastinum: The heart is mildly enlarged. No evidence of pericardial effusion is seen. No evidence of mediastinal or hilar lymphadenopathy or mass lesion is identified. Lungs/pleura: Multifocal bilateral lung ill-defined ground-glass opacification is noted, greatest within the left upper lung lobe. Trace right-sided layering posterior pleural effusion is visualized. Soft Tissues/Bones: Mixed sclerotic and lytic multifocal marrow changes are again seen throughout the visualized skeleton. Abdomen/Pelvis: Organs: Nodular contour of the liver is noted with coarse parenchymal appearance consistent with hepatic cirrhosis with associated evidence of portal venous hypertension with is severe varices at the splenic hilum with scattered varices also seen at the gastroesophageal junction, lesser curvature of the stomach and brittany hepatis. Gallbladder wall calcification is noted. The liver, gallbladder, spleen, pancreas, adrenal glands, kidneys, are otherwise unremarkable in appearance. GI/Bowel: Small hiatal hernia is present. The stomach is otherwise unremarkable without wall thickening or distention. Bowel loops are unremarkable in appearance without evidence of obstruction, distension or mucosal thickening. Pelvis: The urinary bladder is well distended and unremarkable in appearance. Mild pelvic free fluid is noted. Peritoneum/Retroperitoneum: No evidence of retroperitoneal or intraperitoneal lymphadenopathy is identified. No further evidence of intraperitoneal free fluid is seen.  Bones/Soft Tissues: Moderate scattered subcutaneous fat stranding related to edema is seen. Left acetabular mildly distracted comminuted fracture is again noted. Mixed lytic and sclerotic multifocal marrow changes are again noted throughout the visualized skeleton. 1. Redemonstration of mixed sclerotic and lytic multifocal marrow changes throughout the visualized skeleton consistent with metastatic disease. 2. No definitive identification of primary tumor mass lesion. 3. Multifocal bilateral lung ground-glass opacification, greatest within the left upper lung lobe. Differential diagnostic considerations include association with viral pneumonia, opportunistic infection, hypersensitivity pneumonitis and drug toxicity. Recommend clinical correlation. 4. Trace right-sided layering posterior pleural effusion. 5. Mild cardiomegaly. 6. Severe hepatic cirrhosis with associated scattered marked varices related to portal venous hypertension. 7. Small hiatal hernia. 8. Mild pelvic free fluid. 9. Moderate diffuse abdominal and pelvic wall subcutaneous edema. 10. Suggestion porcelain gallbladder. Xr Hip W Pelvis Min 5 Vws Bilateral    Result Date: 11/16/2020  EXAMINATION: ONE XRAY VIEW OF THE PELVIS AND TWO XRAY VIEWS OF EACH OF THE BILATERAL HIPS 11/16/2020 5:10 pm COMPARISON: None. HISTORY: ORDERING SYSTEM PROVIDED HISTORY: AP pelvis, Inlet, Outlet, Cross-table lateral left hip TECHNOLOGIST PROVIDED HISTORY: Please and thank you. AP pelvis, Inlet, Outlet, Cross-table lateral left hip Reason for Exam: Pain left hip, s/p multiple falls. FINDINGS: There is no evidence of acute fracture. There is normal alignment. No acute joint abnormality. No focal osseous lesion. No focal soft tissue abnormality. No acute osseous abnormality.          IMPRESSION:   Primary Problem  Pathological fracture due to metastatic bone disease    Active Hospital Problems    Diagnosis Date Noted    Closed nondisplaced fracture of left acetabulum ([address]

## 2020-11-23 LAB
GLUCOSE BLD-MCNC: 106 MG/DL (ref 65–105)
GLUCOSE BLD-MCNC: 132 MG/DL (ref 65–105)
GLUCOSE BLD-MCNC: 150 MG/DL (ref 65–105)
GLUCOSE BLD-MCNC: 90 MG/DL (ref 65–105)
HCT VFR BLD CALC: 33.2 % (ref 36.3–47.1)
HEMOGLOBIN: 10.3 G/DL (ref 11.9–15.1)
MCH RBC QN AUTO: 29.8 PG (ref 25.2–33.5)
MCHC RBC AUTO-ENTMCNC: 31 G/DL (ref 28.4–34.8)
MCV RBC AUTO: 96 FL (ref 82.6–102.9)
NRBC AUTOMATED: 0 PER 100 WBC
PDW BLD-RTO: 14.4 % (ref 11.8–14.4)
PLATELET # BLD: 159 K/UL (ref 138–453)
PMV BLD AUTO: 10.4 FL (ref 8.1–13.5)
RBC # BLD: 3.46 M/UL (ref 3.95–5.11)
WBC # BLD: 9 K/UL (ref 3.5–11.3)

## 2020-11-23 PROCEDURE — 97535 SELF CARE MNGMENT TRAINING: CPT

## 2020-11-23 PROCEDURE — 85027 COMPLETE CBC AUTOMATED: CPT

## 2020-11-23 PROCEDURE — 97530 THERAPEUTIC ACTIVITIES: CPT

## 2020-11-23 PROCEDURE — 2580000003 HC RX 258: Performed by: STUDENT IN AN ORGANIZED HEALTH CARE EDUCATION/TRAINING PROGRAM

## 2020-11-23 PROCEDURE — 97163 PT EVAL HIGH COMPLEX 45 MIN: CPT

## 2020-11-23 PROCEDURE — 6370000000 HC RX 637 (ALT 250 FOR IP): Performed by: FAMILY MEDICINE

## 2020-11-23 PROCEDURE — 6370000000 HC RX 637 (ALT 250 FOR IP): Performed by: STUDENT IN AN ORGANIZED HEALTH CARE EDUCATION/TRAINING PROGRAM

## 2020-11-23 PROCEDURE — 99232 SBSQ HOSP IP/OBS MODERATE 35: CPT | Performed by: FAMILY MEDICINE

## 2020-11-23 PROCEDURE — 36415 COLL VENOUS BLD VENIPUNCTURE: CPT

## 2020-11-23 PROCEDURE — 99232 SBSQ HOSP IP/OBS MODERATE 35: CPT | Performed by: INTERNAL MEDICINE

## 2020-11-23 PROCEDURE — 6360000002 HC RX W HCPCS: Performed by: STUDENT IN AN ORGANIZED HEALTH CARE EDUCATION/TRAINING PROGRAM

## 2020-11-23 PROCEDURE — 97167 OT EVAL HIGH COMPLEX 60 MIN: CPT

## 2020-11-23 PROCEDURE — 1200000000 HC SEMI PRIVATE

## 2020-11-23 PROCEDURE — 82947 ASSAY GLUCOSE BLOOD QUANT: CPT

## 2020-11-23 RX ADMIN — LISINOPRIL 20 MG: 20 TABLET ORAL at 09:52

## 2020-11-23 RX ADMIN — PANTOPRAZOLE SODIUM 40 MG: 40 TABLET, DELAYED RELEASE ORAL at 06:18

## 2020-11-23 RX ADMIN — INSULIN LISPRO 2 UNITS: 100 INJECTION, SOLUTION INTRAVENOUS; SUBCUTANEOUS at 12:48

## 2020-11-23 RX ADMIN — SODIUM CHLORIDE, PRESERVATIVE FREE 10 ML: 5 INJECTION INTRAVENOUS at 21:56

## 2020-11-23 RX ADMIN — MORPHINE SULFATE 15 MG: 15 TABLET, EXTENDED RELEASE ORAL at 10:04

## 2020-11-23 RX ADMIN — CITALOPRAM 40 MG: 20 TABLET, FILM COATED ORAL at 09:52

## 2020-11-23 RX ADMIN — Medication 81 MG: at 09:51

## 2020-11-23 RX ADMIN — LACTULOSE 20 G: 20 SOLUTION ORAL at 14:10

## 2020-11-23 RX ADMIN — MORPHINE SULFATE 15 MG: 15 TABLET, EXTENDED RELEASE ORAL at 21:56

## 2020-11-23 RX ADMIN — ENOXAPARIN SODIUM 40 MG: 40 INJECTION SUBCUTANEOUS at 09:52

## 2020-11-23 RX ADMIN — OXYCODONE HYDROCHLORIDE AND ACETAMINOPHEN 2 TABLET: 5; 325 TABLET ORAL at 02:06

## 2020-11-23 RX ADMIN — METOPROLOL SUCCINATE 25 MG: 25 TABLET, FILM COATED, EXTENDED RELEASE ORAL at 09:52

## 2020-11-23 RX ADMIN — LACTULOSE 20 G: 20 SOLUTION ORAL at 09:52

## 2020-11-23 RX ADMIN — PRAMIPEXOLE DIHYDROCHLORIDE 0.12 MG: 0.25 TABLET ORAL at 21:56

## 2020-11-23 RX ADMIN — SODIUM CHLORIDE, PRESERVATIVE FREE 10 ML: 5 INJECTION INTRAVENOUS at 10:03

## 2020-11-23 RX ADMIN — LEVOTHYROXINE SODIUM 25 MCG: 25 TABLET ORAL at 06:18

## 2020-11-23 ASSESSMENT — ENCOUNTER SYMPTOMS
CONSTIPATION: 0
COUGH: 0
BLOOD IN STOOL: 0
ABDOMINAL PAIN: 0
CHEST TIGHTNESS: 0
DIARRHEA: 0
VOMITING: 0
BACK PAIN: 1
SHORTNESS OF BREATH: 0
NAUSEA: 0
WHEEZING: 0

## 2020-11-23 ASSESSMENT — PAIN DESCRIPTION - FREQUENCY
FREQUENCY: CONTINUOUS
FREQUENCY: CONTINUOUS

## 2020-11-23 ASSESSMENT — PAIN DESCRIPTION - PAIN TYPE
TYPE: CHRONIC PAIN
TYPE: ACUTE PAIN;CHRONIC PAIN
TYPE: ACUTE PAIN;CHRONIC PAIN

## 2020-11-23 ASSESSMENT — PAIN SCALES - WONG BAKER
WONGBAKER_NUMERICALRESPONSE: 10
WONGBAKER_NUMERICALRESPONSE: 10

## 2020-11-23 ASSESSMENT — PAIN SCALES - GENERAL
PAINLEVEL_OUTOF10: 8
PAINLEVEL_OUTOF10: 8

## 2020-11-23 ASSESSMENT — PAIN DESCRIPTION - LOCATION
LOCATION: GENERALIZED
LOCATION: GENERALIZED;HIP;KNEE

## 2020-11-23 NOTE — PLAN OF CARE
Nutrition Problem #1: No nutrition diagnosis at this time  Intervention: Food and/or Nutrient Delivery: Continue Current Diet  Nutritional Goals: PO intake to meet % of estimated nutrition needs

## 2020-11-23 NOTE — PROGRESS NOTES
Physical Therapy    Facility/Department: [facility] ONC/MED SURG  Initial Assessment    NAME: Jay Regalado  : 1955  MRN: [id number]    Date of Service: 2020    Discharge Recommendations:  Patient would benefit from continued therapy after discharge   PT Equipment Recommendations  Other: CTA    Assessment   Body structures, Functions, Activity limitations: Decreased functional mobility ; Decreased ADL status; Decreased ROM; Decreased strength;Decreased safe awareness;Decreased cognition;Decreased endurance;Decreased balance; Increased pain;Decreased posture  Assessment: Pt perform bed mobility with MaxA, required additional time and frequent short rest break to complete d/t decreased endurance. Pt demo decreased strength, static/dynamic balance, and decreased cognition as pt became extremely agitated, emotional, and used demeaning language towards therapists which limit pt ability to participate in all functional mobility. Constant verbal/tactile cues to educate pt on purpose of PT eval and mobility during admission and for max encouragement. Pt is unsafe to return to prior living arrangement and will require 24hrs assistance d/t cogition with increased risk of falls. Pt will benefit from cont PT services to promote functional strength and mobility to maximize rehab potential.  Prognosis: Fair  Decision Making: High Complexity  PT Education: Goals;PT Role;Plan of Care;General Safety;Orientation;Transfer Training;Precautions;Weight-bearing Education; Functional Mobility Training;Equipment  Barriers to Learning: highly emotional, extremely frustrated/agitated with all mobility  REQUIRES PT FOLLOW UP: Yes  Activity Tolerance  Activity Tolerance: Treatment limited secondary to agitation;Patient limited by endurance; Patient limited by pain; Patient limited by fatigue       Patient Diagnosis(es): The primary encounter diagnosis was Left hip pain.  Diagnoses of Cancer ([address] and Closed nondisplaced fracture of left acetabulum, unspecified portion of acetabulum, initial encounter [facility]) were also pertinent to this visit. has a past medical history of Acute urinary tract infection, Anemia, Anemia, iron deficiency, Anxiety disorder, Arthritis, Asthma, Atopic rhinitis, Bandemia, Blood transfusion reaction, Cellulitis and abscess of trunk, Cellulitis of right breast, Cellulitis of right lower extremity- resolving, COPD (chronic obstructive pulmonary disease) ([address] CRP elevated, Depression, Diabetes mellitus ([address] Disorder associated with type 2 diabetes mellitus ([address] Disorder of liver, Displacement of lumbar intervertebral disc without myelopathy, Essential hypertension, Fibromyalgia, Full thickness rotator cuff tear, Gastroesophageal reflux disease, GERD (gastroesophageal reflux disease), Hammer toe of right foot, Hernia, abdominal, Hyperglycemia due to type 2 diabetes mellitus ([address] Hyperlipidemia, Hypertension, Hypothyroid, Iron deficiency anemia, Lactic acidosis, Localized, primary osteoarthritis of shoulder region, Mass of right breast, Migraine, Morbid obesity ([address] ARLYN (obstructive sleep apnea), Osteoarthritis, Pure hypercholesterolemia, Restless leg syndrome, Seasonal allergies, Septicemia ([address] SIRS due to infectious process without acute organ dysfunction, Sleep apnea, Spondylosis, Thrombocytopenia ([address] and Thrombocytopenia ([address] has a past surgical history that includes Appendectomy; Rotator cuff repair (Right); Colonoscopy; Upper gastrointestinal endoscopy (05/29/2019); Upper gastrointestinal endoscopy (N/A, 5/29/2019); Pelvic fracture surgery (Left, 11/20/2020); and Pelvic fracture surgery (Left, 11/20/2020).     Restrictions  Restrictions/Precautions  Restrictions/Precautions: Weight Bearing, Fall Risk  Lower Extremity Weight Bearing Restrictions  Left Lower Extremity Weight Bearing: Non Weight Bearing  Position Activity Restriction  Other position/activity restrictions: up with assistance  Vision/Hearing  Vision: Impaired  Vision Exceptions: Wears glasses at all times  Hearing: Within functional limits     Subjective  General  Chart Reviewed: Yes  Patient assessed for rehabilitation services?: Yes  Family / Caregiver Present: No  Follows Commands: Within Functional Limits  Subjective  Subjective: RN and pt agreeable to PT pacheco.  Pt supine with HOB elevated ~30deg upon arrival  Pain Screening  Patient Currently in Pain: Yes  Pain Assessment  Pain Assessment: 0-10  Pain Level: 8  Rubio-Malloy Pain Rating: Hurts worst  Pain Type: Acute pain;Chronic pain  Pain Location: Generalized(Left side)  Pain Orientation: Left  Pain Descriptors: Discomfort;Aching;Constant;Pressure; Stabbing  Non-Pharmaceutical Pain Intervention(s): Ambulation/Increased Activity; Emotional support;Prayer;Repositioned;Distraction  Response to Pain Intervention: Patient Satisfied  Vital Signs  Patient Currently in Pain: Yes       Orientation  Orientation  Overall Orientation Status: Within Functional Limits  Social/Functional History  Social/Functional History  Lives With: Alone  Type of Home: Apartment  Home Layout: One level(one level once in apt)  Home Access: Elevator, Level entry  Bathroom Shower/Tub: Tub/Shower unit  Bathroom Toilet: Standard  Bathroom Equipment: Commode, Grab bars in shower, Grab bars around toilet, Shower chair, Toilet raiser  Home Equipment: 4 wheeled walker(Use at all times)  Receives Help From: Family  ADL Assistance: Independent  Homemaking Assistance: Independent(Requires increased time)  Homemaking Responsibilities: Yes  Meal Prep Responsibility: Primary  Laundry Responsibility: Primary  Cleaning Responsibility: Primary  Shopping Responsibility: Primary  Ambulation Assistance: Needs assistance(uses 4WW at all times)  Transfer Assistance: Independent  Active : No  Mode of Transportation: Family  Additional Comments: home information obtained from pt chart; Pt demo high emotions throughout restriction. Pt unable to stand upright to Leva Mould from bed despite MaxAx2 and cont cues. Pt c/o extreme pain and significant overall weakness. Pt was very agitated, anxious, and started crying after attempted sit<>stand  Ambulation  Ambulation?: No     Balance  Posture: Fair(forward flexed sitting posture with increasd SOB and fatigue)  Sitting - Static: Fair  Sitting - Dynamic: Fair;-  Standing - Static: Poor;-  Comments: Unsafe to attempt standing balance with RW. Attempted to use Leva Mould to assist with standing balance. Pt unable to stand with Leva Mould support d/t increased pain, agitation, and fatigue. Pt extremely agitated and c/o chest pain after failed standing attempt with MaxAx2. Nurse notified and in room to assess vitals and assist writer to return pt back to bed.   Increased time and MaxAx2 to complete sit->supine as pt was extremely frustrated and agitated        Plan   Plan  Times per week: 4-5x per week  Current Treatment Recommendations: Strengthening, ROM, Balance Training, Functional Mobility Training, Transfer Training, Gait Training, Endurance Training, Wheelchair Mobility Training, Safety Education & Training, Patient/Caregiver Education & Training, Equipment Evaluation, Education, & procurement, Pain Management  Safety Devices  Type of devices: Left in bed, All fall risk precautions in place, Call light within reach, Bed alarm in place, Gait belt, Nurse notified  Restraints  Initially in place: Yes  Restraints: 4 bedrails in place upon arrival and exit           AM-PAC Score     AM-PAC Inpatient Mobility without Stair Climbing Raw Score : 9 (11/23/20 1157)  AM-PAC Inpatient without Stair Climbing T-Scale Score : 32.44 (11/23/20 1157)  Mobility Inpatient CMS 0-100% Score: 76.07 (11/23/20 1157)  Mobility Inpatient without Stair CMS G-Code Modifier : CL (11/23/20 1157)       Goals  Short term goals  Time Frame for Short term goals: 10 visits  Short term goal 1: Pt will ambulate 25ft with least restrictive AD with [place]  Short term goal 2: Pt will propel WC 150ft with BUE with SBA  Short term goal 3: Pt will perform functional transfers with [place]  Short term goal 4: Pt will perform bed mobility with CGA  Short term goal 5: Pt will demo fair static/dynamic standing balance to reduce risk of falls  Patient Goals   Patient goals : to get better       Therapy Time   Individual Concurrent Group Co-treatment   Time In [facility]         Time Out 0850         Minutes 57         Timed Code Treatment Minutes: 23 Minutes     1 of [facility], [facility]

## 2020-11-23 NOTE — PLAN OF CARE
Report called to [facility] at [phone]. All questions were answered at this time but unit number was provided if questions arise.  Transport is scheduled for 8:30-9 pm.

## 2020-11-23 NOTE — PROGRESS NOTES
Comprehensive Nutrition Assessment    Type and Reason for Visit:  Initial, RD Nutrition Re-Screen/LOS    Nutrition Recommendations/Plan: Continue current General Diet as tolerated. Monitor need for an oral nutrition supplement as appropriate. Monitor/encourage intakes. Nutrition Assessment:  Patient seen for length of stay. Patient reports that her appetite is \"pretty good,\" and that she was able to consume 75% of breakfast this morning. Patient states that she typically eats smaller portions and tries to \"eat sensibly. \" Patient reports a usual body weight of ~180 lbs. Patient has not had a BM since 11/17, per EHR; receiving Lactulose daily. Moderate to severe edema noted. Malnutrition Assessment:  Malnutrition Status:  No malnutrition    Context:  Acute Illness     Findings of the 6 clinical characteristics of malnutrition:  Energy Intake:  No significant decrease in energy intake  Weight Loss:  No significant weight loss     Body Fat Loss:  No significant body fat loss     Muscle Mass Loss:  No significant muscle mass loss    Fluid Accumulation:  7 - Moderate to Severe- Extremities, Generalized   Strength:  Not Performed    Estimated Daily Nutrient Needs:  Energy (kcal):  9596-8313 kcal/d (20-21 kcal/kg UBW); Weight Used for Energy Requirements: Usual (81.6 kg)     Protein (g):  82-98 g protein/d (1.0-1.2 g/kg UBW); Weight Used for Protein Requirements:  Usual          Nutrition Related Findings:  Labs reviewed. Meds reviewed include: Humalog, Lactulose. Last BM 11/17. Hypoactive bowel sounds noted. +3 pitting RUE, +2 RLE, +3 LLE edema. Wounds:  Skin Tears, Venous Stasis, Surgical Incision       Current Nutrition Therapies:    DIET GENERAL;     Anthropometric Measures:  · Height: 5' 7\" (170.2 cm)  · Current Body Weight: 180 lb (81.6 kg)   · Admission Body Weight: 180 lb (81.6 kg)(Stated)    · Usual Body Weight: 180 lb (81.6 kg)(Per patient)     · Ideal Body Weight: 135 lbs; % Ideal Body Weight 133.3%  · BMI: 28.2  · Adjusted Body Weight:  No Adjustment   · BMI Categories: Overweight (BMI 25.0-29. 9)       Nutrition Diagnosis:   No nutrition diagnosis at this time     Nutrition Interventions:   Food and/or Nutrient Delivery:  Continue Current Diet  Nutrition Education/Counseling:  No recommendation at this time, Education not indicated   Coordination of Nutrition Care:  Continue to monitor while inpatient    Goals:  PO intake to meet % of estimated nutrition needs       Nutrition Monitoring and Evaluation:   Behavioral-Environmental Outcomes:  None Identified   Food/Nutrient Intake Outcomes:  Food and Nutrient Intake  Physical Signs/Symptoms Outcomes:  Biochemical Data, Constipation, Fluid Status or Edema, Nutrition Focused Physical Findings, Weight, Skin     Discharge Planning:     Too soon to determine     Electronically signed by Brando Hartley RD, LD on 11/23/20 at 2:33 PM EST    Contact: [phone]

## 2020-11-23 NOTE — PROGRESS NOTES
Occupational Therapy   Occupational Therapy Initial Assessment  Date: 2020   Patient Name: Christina Canchola  MRN: [id number]     : 1955    Date of Service: 2020    Discharge Recommendations:  Patient would benefit from continued therapy after discharge  OT Equipment Recommendations  Other: CTA pending patient progress    Assessment   Performance deficits / Impairments: Decreased functional mobility ; Decreased endurance;Decreased ADL status; Decreased posture;Decreased balance;Decreased strength;Decreased safe awareness;Decreased high-level IADLs;Decreased cognition  Assessment: Patient demonstrates decreased endurance, strength and balance affecting performance and safety with ADLs and functional transfers. Pt demonstrates decreased cognition, becoming emotional, agitated and using demeaning language towards therapist and self affecting patient's participation in functional activity and limiting therapy d/t patient's emotional state. OT service are warranted to address functional deficits and educate on use of adaptive technqiues, use of AD and strengthening tasks through skilled intervention to promote functional outcomes. Prognosis: Good  Decision Making: High Complexity  Patient Education: OT role, OT POC, purpose of evaluation, importance of OOB activity, WB status, hand placement for transfers, use of Eric Clap, continuation of therapy - fair return  REQUIRES OT FOLLOW UP: Yes  Activity Tolerance  Activity Tolerance: Treatment limited secondary to agitation  Activity Tolerance: Anxiety  Safety Devices  Safety Devices in place: Yes  Type of devices: All fall risk precautions in place; Left in bed;Nurse notified;Call light within reach;Gait belt;Bed alarm in place  Restraints  Initially in place: No         Patient Diagnosis(es): The primary encounter diagnosis was Left hip pain.  Diagnoses of Cancer ([facility] [address] and Closed nondisplaced fracture of left acetabulum, unspecified portion of acetabulum, Present: No  Diagnosis: pathological fracture due to metastatic bone disease  General Comment  Comments: RN ok'd patient for OT/PT evaluation. Pt pleasant and cooperative throughout. Patient Currently in Pain: Yes  Pain Assessment  Pain Assessment: Faces  Pain Level: 8  Rubio-Malloy Pain Rating: Hurts worst  Pain Type: Acute pain;Chronic pain  Pain Location: (Left Side)  Pain Orientation: Left  Non-Pharmaceutical Pain Intervention(s): Prayer;Emotional support; Therapeutic presence;Distraction; Ambulation/Increased Activity;Repositioned  Vital Signs  Pulse: 79  Heart Rate Source: Monitor  Resp: 18  BP: (!) 113/56  BP Location: Left upper arm  BP Upper/Lower: Upper  MAP (mmHg): 74  Patient Position: Semi fowlers  Patient Currently in Pain: Yes  Oxygen Therapy  SpO2: 93 %    Social/Functional History  Social/Functional History  Lives With: Alone  Type of Home: Apartment  Home Layout: One level(one level once in apt)  Home Access: Elevator, Level entry  Bathroom Shower/Tub: Tub/Shower unit  Bathroom Toilet: Standard  Bathroom Equipment: Commode, Grab bars in shower, Grab bars around toilet, Shower chair, Toilet raiser  Home Equipment: 4 wheeled walker(Use at all times)  Receives Help From: Family  ADL Assistance: Independent  Homemaking Assistance: Independent(Requires increased time)  Homemaking Responsibilities: Yes  Meal Prep Responsibility: Primary  Laundry Responsibility: Primary  Cleaning Responsibility: Primary  Shopping Responsibility: Primary  Ambulation Assistance: Needs assistance(uses 4WW at all times)  Transfer Assistance: Independent  Active : No  Mode of Transportation: Family  Additional Comments: home information obtained from pt chart; Pt demo high emotions throughout session       Objective   Vision: Within Functional Limits  Hearing: Within functional limits          Balance  Sitting Balance: Contact guard assistance(heavy B UE support at times)  Standing Balance: Unable to assess(comment)(unable to acheive full stand)  ADL  Feeding: Modified independent ; Increased time to complete;Setup  Grooming: Modified independent ;Setup;Verbal cueing; Increased time to complete  UE Bathing: Moderate assistance;Setup; Increased time to complete  LE Bathing: Maximum assistance;Setup; Increased time to complete  UE Dressing: Minimal assistance;Setup; Increased time to complete  LE Dressing: Dependent/Total;Increased time to complete;Setup  Toileting: Dependent/Total;Increased time to complete;Setup  Tone RUE  RUE Tone: Normotonic  Tone LUE  LUE Tone: Normotonic  Coordination  Movements Are Fluid And Coordinated: No  Coordination and Movement description: Decreased accuracy; Decreased speed     Bed mobility  Sit to Supine: Maximum assistance;2 Person assistance  Scooting: Maximal assistance  Comment: Patient on EOB with PT upon arrival;  Transfers  Sit to stand: Dependent/Total  Stand to sit: Dependent/Total  Transfer Comments: unable to achieve full stand using geremias stedy; Patient becoming extremely emotional with difficulty redirecting self to promote calming behavior; Pt stated chest pain feeling like a heart attack; RN notified and assessed patient; Pt returned to supine with success of calming patient; Pt screaming when boosted up in bed although relaxed shortly; Pt began to speak of life difficulties with therapeutic use of self implemented and prayer with patient. Cognition  Overall Cognitive Status: Exceptions  Arousal/Alertness: Appropriate responses to stimuli  Following Commands:  Follows one step commands with repetition  Attention Span: Difficulty dividing attention  Safety Judgement: Decreased awareness of need for safety  Problem Solving: Decreased awareness of errors  Insights: Decreased awareness of deficits  Initiation: Requires cues for some  Sequencing: Requires cues for some                 LUE AROM (degrees)  LUE AROM : WFL  Left Hand AROM (degrees)  Left Hand AROM: WFL  RUE AROM (degrees)  RUE AROM : WFL  Right Hand AROM (degrees)  Right Hand AROM: WFL  LUE Strength  Gross LUE Strength: Exceptions to [facility] (not formally assess d/t emotional state)   L Shoulder Flex: 4/5  L Elbow Flex: 4/5  L Elbow Ext: 4/5  L Hand General: 4/5  RUE Strength  Gross RUE Strength: Exceptions to [facility]  (not formally assess d/t emotional state)   R Shoulder Flex: 4/5  R Elbow Flex: 4/5  R Elbow Ext: 4/5  R Hand General: 4/5              Plan   Plan  Times per week: 3-4x/wk  Current Treatment Recommendations: Strengthening, Patient/Caregiver Education & Training, Home Management Training, Equipment Evaluation, Education, & procurement, Balance Training, Functional Mobility Training, Endurance Training, Safety Education & Training, Self-Care / ADL    AM-PAC Score        AM-[facility] Inpatient Daily Activity Raw Score: 14 (11/23/20 0923)  AM-PAC Inpatient ADL T-Scale Score : 33.39 (11/23/20 0923)  ADL Inpatient CMS 0-100% Score: 59.67 (11/23/20 0923)  ADL Inpatient CMS G-Code Modifier : CK (11/23/20 0923)    Goals  Short term goals  Time Frame for Short term goals: patient will, by discharge  Short term goal 1: demo UB ADLs at [facility]  Short term goal 2: demo LB ADLs at [facility] using AE PRN  Short term goal 3: demo functional transfers at [facility] to engage in ADLs  Short term goal 4: demo 10+ min of dynamic sitting balance at Supervision to engage in ADL tasks safely  Short term goal 5: demo Mod I for B UE HEP using visual/written aids PRN to increase B UE strength to 4+/5       Therapy Time   Individual Concurrent Group Co-treatment   Time In 0804         Time Out 0846         Minutes 42         Timed Code Treatment Minutes: 24 Minutes       STACEY Huntley/L

## 2020-11-23 NOTE — PROGRESS NOTES
Today's Date: 11/22/2020  Patient Name: Balbina Blackwood  Date of admission: 11/16/2020  3:05 PM  Patient's age: 60 y.o., 1955  Admission Dx: Pathological fracture due to metastatic bone disease [M84.50XA]    Reason for Consult: management recommendations  Requesting Physician: Hali Hay MD    CHIEF COMPLAINT: Pelvic pain. Back pain. Weight loss. Right breast swelling. History Obtained From:  patient, electronic medical record    Interval history:    Patient seen and examined. Labs and vitals reviewed  Pt decided to go to /, looking for a bed. Pain is somewhat controlled   HISTORY OF PRESENT ILLNESS:      The patient is a 60 y.o.  female who is Admitted to the hospital with chief complaints of leg pain and groin pain. The pain started after a fall about 2 days ago. Patient was walking and became unsteady and fell. Patient states that since the incident she has been having worsening pain to the point she could not take it anymore and presented to the hospital.  Denies any passing out episode complains of swelling of her right arm. Patient's work-up in the ER including CT scan showed pathological fracture of left acetabulum also shows osseous metastasis involving bilateral hemipelvis. She was metastatic lymphadenopathy CT chest shows multiple sclerotic and lytic lesions throughout the skeleton consistent metastatic disease. There is bilateral groundglass opacification of the lungs. There is severe hepatic cirrhosis associated with varices concerning for portal hypertension. Reviewed records reviewed patient was hospitalized back in May 2019 for hepatic encephalopathy. Patient left hospital AMA without completing work-up. Imaging studies at that time showed a lytic lesion involving ileum and L3. Biopsy came back positive for poorly differentiated non-small cell cancer. Stains showed weak positivity for GATA3 concerning for breast primary.   Patient during the hospitalization also had EGD done which was negative for malignancy. Patient was not seen by oncologist during the hospitalization and did not follow-up with an oncologist either. Past Medical History:   has a past medical history of Acute urinary tract infection, Anemia, Anemia, iron deficiency, Anxiety disorder, Arthritis, Asthma, Atopic rhinitis, Bandemia, Blood transfusion reaction, Cellulitis and abscess of trunk, Cellulitis of right breast, Cellulitis of right lower extremity- resolving, COPD (chronic obstructive pulmonary disease) ([address] CRP elevated, Depression, Diabetes mellitus ([address] Disorder associated with type 2 diabetes mellitus ([address] Disorder of liver, Displacement of lumbar intervertebral disc without myelopathy, Essential hypertension, Fibromyalgia, Full thickness rotator cuff tear, Gastroesophageal reflux disease, GERD (gastroesophageal reflux disease), Hammer toe of right foot, Hernia, abdominal, Hyperglycemia due to type 2 diabetes mellitus ([address] Hyperlipidemia, Hypertension, Hypothyroid, Iron deficiency anemia, Lactic acidosis, Localized, primary osteoarthritis of shoulder region, Mass of right breast, Migraine, Morbid obesity ([address] ARLYN (obstructive sleep apnea), Osteoarthritis, Pure hypercholesterolemia, Restless leg syndrome, Seasonal allergies, Septicemia ([address] SIRS due to infectious process without acute organ dysfunction, Sleep apnea, Spondylosis, Thrombocytopenia ([address] and Thrombocytopenia ([address] Past Surgical History:   has a past surgical history that includes Appendectomy; Rotator cuff repair (Right); Colonoscopy; Upper gastrointestinal endoscopy (05/29/2019); Upper gastrointestinal endoscopy (N/A, 5/29/2019); and Pelvic fracture surgery (Left, 11/20/2020). Medications:    Prior to Admission medications    Medication Sig Start Date End Date Taking?  Authorizing Provider   lactulose (CHRONULAC) 10 GM/15ML solution TAKE 15 TO 30 ML BY MOUTH DAILY INCREASE TO 60 ML BY MOUTH IF NEEDED TO ACHIEVE 2 TO 3 BOWEL MOVENTS A DAY 11/11/20   Shelley Marshall MD   baclofen (LIORESAL) 10 MG tablet  5/27/20   Historical Provider, MD   DULoxetine (CYMBALTA) 30 MG extended release capsule  7/30/20   Historical Provider, MD   pramipexole (MIRAPEX) 0.125 MG tablet Take 1 tablet by mouth nightly 5/29/19   Angelina Murray MD   levothyroxine (SYNTHROID) 25 MCG tablet Take 1 tablet by mouth Daily 5/30/19   Angelina Murray MD   lisinopril (PRINIVIL;ZESTRIL) 20 MG tablet Take 20 mg by mouth daily    Historical Provider, MD   loratadine-pseudoephedrine (CLARITIN-D 24 HOUR)  MG per extended release tablet Take 1 tablet by mouth daily    Historical Provider, MD   metoprolol succinate (TOPROL XL) 25 MG extended release tablet Take 25 mg by mouth daily    Historical Provider, MD   oxyCODONE-acetaminophen (PERCOCET) 5-325 MG per tablet Take 1 tablet by mouth 3 times daily as needed for Pain. Historical Provider, MD   aspirin 81 MG EC tablet Take 81 mg by mouth daily. Historical Provider, MD   citalopram (CELEXA) 40 MG tablet Take 40 mg by mouth daily. Historical Provider, MD   fluticasone (FLONASE) 50 MCG/ACT nasal spray 1 spray by Nasal route 2 times daily as needed for Rhinitis. Historical Provider, MD   metFORMIN ER (GLUCOPHAGE-XR) 500 MG XR tablet Take 1,000 mg by mouth 2 times daily (with meals) Indications: Take two 500mg tablets BID with meals Take two 500mg tablets BID with meals    Historical Provider, MD   omeprazole (PRILOSEC) 20 MG capsule Take 20 mg by mouth daily.     Historical Provider, MD     Current Facility-Administered Medications   Medication Dose Route Frequency Provider Last Rate Last Dose    morphine (MS CONTIN) extended release tablet 15 mg  15 mg Oral 2 times per day Humberto Lagunas MD   15 mg at 11/22/20 1050    oxyCODONE-acetaminophen (PERCOCET) 5-325 MG per tablet 2 tablet  2 tablet Oral Q8H PRN Humberto Lagunas MD   2 tablet at 11/22/20 1295    0.9 % sodium chloride infusion suppository 650 mg  650 mg Rectal Q6H PRN Shubham Missy, DO        polyethylene glycol (GLYCOLAX) packet 17 g  17 g Oral Daily PRN Shubham Tobiasani, DO        promethazine (PHENERGAN) tablet 12.5 mg  12.5 mg Oral Q6H PRN Shubham Missy, DO        Or    ondansetron (ZOFRAN) injection 4 mg  4 mg Intravenous Q6H PRN Shubham Missy, DO        nicotine (NICODERM CQ) 21 MG/24HR 1 patch  1 patch Transdermal Daily PRN Shubham Missy, DO        enoxaparin (LOVENOX) injection 40 mg  40 mg Subcutaneous Daily Shubham Missy, DO   40 mg at 11/22/20 0834    glucose (GLUTOSE) 40 % oral gel 15 g  15 g Oral PRN Shubham Missy, DO        dextrose 50 % IV solution  12.5 g Intravenous PRN Shubham Tobiasani, DO        glucagon (rDNA) injection 1 mg  1 mg Intramuscular PRN Shubham Missy, DO        dextrose 5 % solution  100 mL/hr Intravenous PRN Shubham Missy, DO        insulin lispro (HUMALOG) injection vial 0-12 Units  0-12 Units Subcutaneous TID WC Shubham Nitin, DO   2 Units at 11/21/20 1733    insulin lispro (HUMALOG) injection vial 0-6 Units  0-6 Units Subcutaneous Nightly Shubham Nitin, DO           Allergies:  Nsaids; Sulfa antibiotics; and Tape [adhesive tape]    Social History:   reports that she has never smoked. She has never used smokeless tobacco. She reports that she does not drink alcohol or use drugs. Family History: family history includes Diabetes in her father; Heart Disease in her mother; Hypertension in her mother; Pacemaker in her mother. REVIEW OF SYSTEMS:      Constitutional: No fever or chills.  No night sweats, no weight loss   Eyes: No eye discharge, double vision, or eye pain   HEENT: negative for sore mouth, sore throat, hoarseness and voice change   Respiratory: negative for cough , sputum, dyspnea, wheezing, hemoptysis, chest pain   Cardiovascular: negative for chest pain, dyspnea, palpitations, orthopnea, PND   Gastrointestinal: negative for nausea, vomiting, diarrhea, constipation, abdominal pain, Dysphagia, hematemesis and hematochezia   Genitourinary: negative for frequency, dysuria, nocturia, urinary incontinence, and hematuria   Integument: negative for rash, skin lesions, bruises. Hematologic/Lymphatic: negative for easy bruising, bleeding, lymphadenopathy, or petechiae   Endocrine: negative for heat or cold intolerance,weight changes, change in bowel habits and hair loss   Musculoskeletal: n severe pelvic pain. Neurological: negative for headaches, dizziness, seizures, weakness, numbness    PHYSICAL EXAM:        BP (!) 115/39   Pulse 92   Temp 98.3 °F (36.8 °C) (Oral)   Resp 20   Ht 5' 7\" (1.702 m)   Wt 180 lb (81.6 kg)   SpO2 95%   BMI 28.19 kg/m²    Temp (24hrs), Av.9 °F (36.6 °C), Min:97.5 °F (36.4 °C), Max:98.3 °F (36.8 °C)      General appearance - well appearing, no in pain or distress   Mental status - alert and cooperative   Eyes - pupils equal and reactive, extraocular eye movements intact   Ears - bilateral TM's and external ear canals normal   Mouth - mucous membranes moist, pharynx normal without lesions   Neck - supple, no significant adenopathy   Lymphatics - no palpable lymphadenopathy, no hepatosplenomegaly   Chest - clear to auscultation, no wheezes, rales or rhonchi, symmetric air entry   Heart - normal rate, regular rhythm, normal S1, S2, no murmurs  Abdomen - soft, nontender, nondistended, no masses or organomegaly   Neurological - alert, oriented, normal speech, no focal findings or movement disorder noted   Musculoskeletal - no joint tenderness, deformity or swelling   Extremities - peripheral pulses normal, no pedal edema, no clubbing or cyanosis   Skin - normal coloration and turgor, no rashes, no suspicious skin lesions noted ,  .     DATA:      Labs:     Results for orders placed or performed during the hospital encounter of 20   COVID-19    Specimen: Other   Result Value Ref Range    SARS-CoV-2          SARS-CoV-2, Rapid Not Detected Not Detected    Source Jessica Profit NASOPHARYNGEAL SWAB     SARS-CoV-2         CBC WITH AUTO DIFFERENTIAL   Result Value Ref Range    WBC 7.0 3.5 - 11.3 k/uL    RBC 4.02 3.95 - 5.11 m/uL    Hemoglobin 12.2 11.9 - 15.1 g/dL    Hematocrit 37.4 36.3 - 47.1 %    MCV 93.0 82.6 - 102.9 fL    MCH 30.3 25.2 - 33.5 pg    MCHC 32.6 28.4 - 34.8 g/dL    RDW 14.3 11.8 - 14.4 %    Platelets 174 753 - 334 k/uL    MPV 10.5 8.1 - 13.5 fL    NRBC Automated 0.0 0.0 per 100 WBC    Differential Type NOT REPORTED     Seg Neutrophils 55 36 - 65 %    Lymphocytes 24 24 - 43 %    Monocytes 15 (H) 3 - 12 %    Eosinophils % 5 (H) 1 - 4 %    Basophils 1 0 - 2 %    Immature Granulocytes 0 0 %    Segs Absolute 3.80 1.50 - 8.10 k/uL    Absolute Lymph # 1.70 1.10 - 3.70 k/uL    Absolute Mono # 1.05 0.10 - 1.20 k/uL    Absolute Eos # 0.34 0.00 - 0.44 k/uL    Basophils Absolute 0.05 0.00 - 0.20 k/uL    Absolute Immature Granulocyte 0.03 0.00 - 0.30 k/uL    WBC Morphology NOT REPORTED     RBC Morphology NOT REPORTED     Platelet Estimate NOT REPORTED    BASIC METABOLIC PANEL   Result Value Ref Range    Glucose 83 70 - 99 mg/dL    BUN 16 8 - 23 mg/dL    CREATININE 0.50 0.50 - 0.90 mg/dL    Bun/Cre Ratio NOT REPORTED 9 - 20    Calcium 9.0 8.6 - 10.4 mg/dL    Sodium 136 135 - 144 mmol/L    Potassium 3.9 3.7 - 5.3 mmol/L    Chloride 103 98 - 107 mmol/L    CO2 24 20 - 31 mmol/L    Anion Gap 9 9 - 17 mmol/L    GFR Non-African American >60 >60 mL/min    GFR African American >60 >60 mL/min    GFR Comment          GFR Staging NOT REPORTED    Basic Metabolic Panel w/ Reflex to MG   Result Value Ref Range    Glucose 78 70 - 99 mg/dL    BUN 16 8 - 23 mg/dL    CREATININE 0.52 0.50 - 0.90 mg/dL    Bun/Cre Ratio NOT REPORTED 9 - 20    Calcium 9.1 8.6 - 10.4 mg/dL    Sodium 139 135 - 144 mmol/L    Potassium 4.1 3.7 - 5.3 mmol/L    Chloride 106 98 - 107 mmol/L    CO2 21 20 - 31 mmol/L    Anion Gap 12 9 - 17 mmol/L    GFR Non-African American >60 >60 mL/min    GFR African American >60 >60 mL/min    GFR Comment          GFR Staging NOT REPORTED    CBC   Result Value Ref Range    WBC 7.4 3.5 - 11.3 k/uL    RBC 3.95 3.95 - 5.11 m/uL    Hemoglobin 11.8 (L) 11.9 - 15.1 g/dL    Hematocrit 37.3 36.3 - 47.1 %    MCV 94.4 82.6 - 102.9 fL    MCH 29.9 25.2 - 33.5 pg    MCHC 31.6 28.4 - 34.8 g/dL    RDW 14.3 11.8 - 14.4 %    Platelets 264 550 - 388 k/uL    MPV 10.6 8.1 - 13.5 fL    NRBC Automated 0.0 0.0 per 100 WBC   Protime-INR   Result Value Ref Range    Protime 14.1 (H) 9.0 - 12.0 sec    INR 1.4    Hemoglobin A1C   Result Value Ref Range    Hemoglobin A1C 5.5 4.0 - 6.0 %    Estimated Avg Glucose 111 mg/dL   CBC   Result Value Ref Range    WBC 7.2 3.5 - 11.3 k/uL    RBC 3.99 3.95 - 5.11 m/uL    Hemoglobin 12.0 11.9 - 15.1 g/dL    Hematocrit 38.3 36.3 - 47.1 %    MCV 96.0 82.6 - 102.9 fL    MCH 30.1 25.2 - 33.5 pg    MCHC 31.3 28.4 - 34.8 g/dL    RDW 14.1 11.8 - 14.4 %    Platelets 112 579 - 199 k/uL    MPV 10.8 8.1 - 13.5 fL    NRBC Automated 0.0 0.0 per 100 WBC   Comp Metabolic w Bili Profile   Result Value Ref Range    Alb 2.4 (L) 3.5 - 5.2 g/dL    Albumin/Globulin Ratio 0.8 (L) 1.0 - 2.5    Alkaline Phosphatase 390 (H) 35 - 104 U/L    ALT 44 (H) 5 - 33 U/L    AST 96 (H) <32 U/L    Total Bilirubin 1.15 0.3 - 1.2 mg/dL    Bilirubin, Direct 0.48 (H) <0.31 mg/dL    Bilirubin, Indirect 0.67 0.00 - 1.00 mg/dL    BUN 16 8 - 23 mg/dL    Calcium 9.2 8.6 - 10.4 mg/dL    CREATININE 0.43 (L) 0.50 - 0.90 mg/dL    Glucose 87 70 - 99 mg/dL    Total Protein 5.4 (L) 6.4 - 8.3 g/dL    Sodium 141 135 - 144 mmol/L    Potassium 4.5 3.7 - 5.3 mmol/L    Chloride 107 98 - 107 mmol/L    CO2 24 20 - 31 mmol/L    Anion Gap 10 9 - 17 mmol/L    GFR Non-African American >60 >60 mL/min    GFR African American >60 >60 mL/min    GFR Comment          GFR Staging NOT REPORTED    CANCER ANTIGEN 15-3   Result Value Ref Range    CA 15-3 58 (H) 0 - 31 U/mL   CANCER ANTIGEN 27.29   Result Value Ref Range    CA 27-29 48 (H) 0 - 38 U/mL   Dermatology Pathology Result Value Ref Range    Dermatology Pathology Report       -- Diagnosis --       SKIN, RIGHT BREAST, PUNCH BIOPSIES:       -  INTRALYMPHATIC ADENOCARCINOMA WITH DERMAL TELANGIECTASIA AND  REACTIVE ANGIOMATOSIS, CONSISTENT WITH INTRALYMPHATIC SPREAD OF  MAMMARY CARCINOMA. Yisel hPoenix M.D.  **Electronically Signed Out**  jet/11/20/2020       Clinical Information  Pre-op Diagnosis:  RULING OUT INFLAMMATORY BREAST CANCER, PT WITH  METASTATIC LYTIC LESIONS ON IMAGING AND PREVIOUS BONE BIOPSY  DEMONSTRATING CARCINOMA WITH CK7 AND GATA3 POSITIVITY   Operative Findings:  RIGHT BREAST   Operation Performed:  TWO 6 MM DIAMETER, DEEP PUNCH BIOPSIES OF  PEAU'DE ORANGE APPEARING SKIN    Source of Specimen  1: RIGHT BREAST    Gross Description  \"HONORIO ANIL, UNDESIGNATED\" Two tan punches, 1.2 cm long x 0.4 cm in  diameter and 1.3 cm long x 0.4 cm in diameter. Each is inked and  bisected. Entirely 1cs. tm      Microscopic Description  Two punch biopsies of skin to the subcutaneous fat show dilated dermal  lymphatics and sparse perivascular lymph ocytic inflammation. Surrounding the ectatic blood vessels, there are numerous clusters of  slitlike small vessels consistent with angiomatosis, suggestive of  secondary changes as a result of upstream obstruction. Glandular  breast tissue with not appreciated although focal eccrine coils are  present. Additional H&E levels show rare intralymphatic atypical  glands. A GATA3 and pankeratin immunostain label rare intralymphatic  atypical glands, consistent with intralymphatic spread of mammary  carcinoma. Controls stain appropriately. SURGICAL PATHOLOGY CONSULTATION       Patient Name: Zuleika Freeman Rec: [id number]  Path Number: [id number]    [facility] [address]   [facility], [address]  [phone]  Fax: [phone]     CBC   Result Value Ref Range    WBC 7.8 3.5 - 11.3 k/uL RBC 3.77 (L) 3.95 - 5.11 m/uL    Hemoglobin 11.4 (L) 11.9 - 15.1 g/dL    Hematocrit 35.4 (L) 36.3 - 47.1 %    MCV 93.9 82.6 - 102.9 fL    MCH 30.2 25.2 - 33.5 pg    MCHC 32.2 28.4 - 34.8 g/dL    RDW 14.2 11.8 - 14.4 %    Platelets 692 (L) 766 - 453 k/uL    MPV 10.7 8.1 - 13.5 fL    NRBC Automated 0.0 0.0 per 100 WBC   COMPREHENSIVE METABOLIC PANEL   Result Value Ref Range    Glucose 93 70 - 99 mg/dL    BUN 18 8 - 23 mg/dL    CREATININE 0.51 0.50 - 0.90 mg/dL    Bun/Cre Ratio NOT REPORTED 9 - 20    Calcium 8.9 8.6 - 10.4 mg/dL    Sodium 137 135 - 144 mmol/L    Potassium 4.3 3.7 - 5.3 mmol/L    Chloride 104 98 - 107 mmol/L    CO2 23 20 - 31 mmol/L    Anion Gap 10 9 - 17 mmol/L    Alkaline Phosphatase 363 (H) 35 - 104 U/L    ALT 42 (H) 5 - 33 U/L    AST 85 (H) <32 U/L    Total Bilirubin 1.00 0.3 - 1.2 mg/dL    Total Protein 5.1 (L) 6.4 - 8.3 g/dL    Alb 2.2 (L) 3.5 - 5.2 g/dL    Albumin/Globulin Ratio 0.8 (L) 1.0 - 2.5    GFR Non-African American >60 >60 mL/min    GFR African American >60 >60 mL/min    GFR Comment          GFR Staging NOT REPORTED    CV HGB/HCT   Result Value Ref Range    Hemoglobin 9.6 gm/dL    Hematocrit 29.6 %   Glucose, Whole Blood   Result Value Ref Range    POC Glucose 106 (H) 65 - 105 mg/dL   Calcium, Ionized   Result Value Ref Range    Calcium, Ion 1.28 1.13 - 1.33 mmol/L   Chloride, Whole Blood   Result Value Ref Range    Chloride, Whole Blood 111 (H) 98 - 110 mmol/L   Potassium, Whole Blood   Result Value Ref Range    Potassium, Whole Blood 4.1 3.6 - 5.0 mmol/L   Sodium, Whole Blood   Result Value Ref Range    Sodium, Whole Blood 141 136 - 145 mmol/L   Blood Gas, Venous   Result Value Ref Range    pH, Arnoldo 7.497 (H) 7.320 - 7.420    pCO2, Arnoldo 31.3 (L) 39 - 55    pO2, Arnoldo 246.0 (H) 30 - 50    HCO3, Venous 24.0 24 - 30 mmol/L    Positive Base Excess, Arnoldo 1.5 0.0 - 2.0 mmol/L    Negative Base Excess, Arnoldo NOT REPORTED 0.0 - 2.0 mmol/L    O2 Sat, Arnoldo 99.8 (H) 60.0 - 85.0 %    Total Hb NOT REPORTED 12.0 - 16.0 g/dl    Oxyhemoglobin NOT REPORTED 95.0 - 98.0 %    Carboxyhemoglobin 1.3 0 - 5 %    Methemoglobin NOT REPORTED 0.0 - 1.5 %    Pt Temp 37.0     pH, Arnoldo, Temp Adj NOT REPORTED 7.320 - 7.420    pCO2, Arnoldo, Temp Adj NOT REPORTED 39 - 55 mmHg    pO2, Arnoldo, Temp Adj NOT REPORTED 30 - 50 mmHg    O2 Device/Flow/% NOT REPORTED     Respiratory Rate NOT REPORTED     Rodo Test NOT REPORTED     Sample Site NOT REPORTED     Pt.  Position NOT REPORTED     Mode NOT REPORTED     Set Rate NOT REPORTED     Total Rate NOT REPORTED     VT NOT REPORTED     FIO2 55%     Peep/Cpap NOT REPORTED     PSV NOT REPORTED     Text for Respiratory NOT REPORTED     NOTIFICATION NOT REPORTED     NOTIFICATION TIME NOT REPORTED    CBC   Result Value Ref Range    WBC 10.6 3.5 - 11.3 k/uL    RBC 3.65 (L) 3.95 - 5.11 m/uL    Hemoglobin 11.0 (L) 11.9 - 15.1 g/dL    Hematocrit 34.3 (L) 36.3 - 47.1 %    MCV 94.0 82.6 - 102.9 fL    MCH 30.1 25.2 - 33.5 pg    MCHC 32.1 28.4 - 34.8 g/dL    RDW 14.2 11.8 - 14.4 %    Platelets 747 497 - 991 k/uL    MPV 10.4 8.1 - 13.5 fL    NRBC Automated 0.0 0.0 per 100 WBC   COMPREHENSIVE METABOLIC PANEL   Result Value Ref Range    Glucose 126 (H) 70 - 99 mg/dL    BUN 17 8 - 23 mg/dL    CREATININE 0.44 (L) 0.50 - 0.90 mg/dL    Bun/Cre Ratio NOT REPORTED 9 - 20    Calcium 8.6 8.6 - 10.4 mg/dL    Sodium 138 135 - 144 mmol/L    Potassium 3.9 3.7 - 5.3 mmol/L    Chloride 106 98 - 107 mmol/L    CO2 22 20 - 31 mmol/L    Anion Gap 10 9 - 17 mmol/L    Alkaline Phosphatase 355 (H) 35 - 104 U/L    ALT 42 (H) 5 - 33 U/L    AST 91 (H) <32 U/L    Total Bilirubin 1.11 0.3 - 1.2 mg/dL    Total Protein 5.1 (L) 6.4 - 8.3 g/dL    Alb 2.3 (L) 3.5 - 5.2 g/dL    Albumin/Globulin Ratio 0.8 (L) 1.0 - 2.5    GFR Non-African American >60 >60 mL/min    GFR African American >60 >60 mL/min    GFR Comment          GFR Staging NOT REPORTED    CBC   Result Value Ref Range    WBC 8.4 3.5 - 11.3 k/uL    RBC 3.34 (L) 3.95 - 5.11 m/uL    Hemoglobin 10.1 (L) 11.9 - 15.1 g/dL    Hematocrit 32.4 (L) 36.3 - 47.1 %    MCV 97.0 82.6 - 102.9 fL    MCH 30.2 25.2 - 33.5 pg    MCHC 31.2 28.4 - 34.8 g/dL    RDW 14.5 (H) 11.8 - 14.4 %    Platelets 840 341 - 561 k/uL    MPV 10.8 8.1 - 13.5 fL    NRBC Automated 0.0 0.0 per 100 WBC   POC Glucose Fingerstick   Result Value Ref Range    POC Glucose 74 65 - 105 mg/dL   POC Glucose Fingerstick   Result Value Ref Range    POC Glucose 121 (H) 65 - 105 mg/dL   POC Glucose Fingerstick   Result Value Ref Range    POC Glucose 95 65 - 105 mg/dL   POC Glucose Fingerstick   Result Value Ref Range    POC Glucose 129 (H) 65 - 105 mg/dL   POC Glucose Fingerstick   Result Value Ref Range    POC Glucose 131 (H) 65 - 105 mg/dL   POC Glucose Fingerstick   Result Value Ref Range    POC Glucose 97 65 - 105 mg/dL   POC Glucose Fingerstick   Result Value Ref Range    POC Glucose 78 65 - 105 mg/dL   POC Glucose Fingerstick   Result Value Ref Range    POC Glucose 98 65 - 105 mg/dL   POC Glucose Fingerstick   Result Value Ref Range    POC Glucose 142 (H) 65 - 105 mg/dL   POC Glucose Fingerstick   Result Value Ref Range    POC Glucose 139 (H) 65 - 105 mg/dL   POC Glucose Fingerstick   Result Value Ref Range    POC Glucose 135 (H) 65 - 105 mg/dL   POC Glucose Fingerstick   Result Value Ref Range    POC Glucose 126 (H) 65 - 105 mg/dL   POC Glucose Fingerstick   Result Value Ref Range    POC Glucose 137 (H) 65 - 105 mg/dL   POC Glucose Fingerstick   Result Value Ref Range    POC Glucose 168 (H) 65 - 105 mg/dL   POC Glucose Fingerstick   Result Value Ref Range    POC Glucose 116 (H) 65 - 105 mg/dL   POC Glucose Fingerstick   Result Value Ref Range    POC Glucose 87 65 - 105 mg/dL   POC Glucose Fingerstick   Result Value Ref Range    POC Glucose 79 65 - 105 mg/dL   POC Glucose Fingerstick   Result Value Ref Range    POC Glucose 93 65 - 105 mg/dL   POC Glucose Fingerstick   Result Value Ref Range    POC Glucose 96 65 - 105 mg/dL   POC Glucose Fingerstick Result Value Ref Range    POC Glucose 69 65 - 105 mg/dL   POC Glucose Fingerstick   Result Value Ref Range    POC Glucose 79 65 - 105 mg/dL   POC Glucose Fingerstick   Result Value Ref Range    POC Glucose 144 (H) 65 - 105 mg/dL   POC Glucose Fingerstick   Result Value Ref Range    POC Glucose 94 65 - 105 mg/dL   POC Glucose Fingerstick   Result Value Ref Range    POC Glucose 110 (H) 65 - 105 mg/dL   POC Glucose Fingerstick   Result Value Ref Range    POC Glucose 165 (H) 65 - 105 mg/dL   POC Glucose Fingerstick   Result Value Ref Range    POC Glucose 102 65 - 105 mg/dL   POC Glucose Fingerstick   Result Value Ref Range    POC Glucose 107 (H) 65 - 105 mg/dL   POC Glucose Fingerstick   Result Value Ref Range    POC Glucose 140 (H) 65 - 105 mg/dL   POC Glucose Fingerstick   Result Value Ref Range    POC Glucose 113 (H) 65 - 105 mg/dL   POC Glucose Fingerstick   Result Value Ref Range    POC Glucose 123 (H) 65 - 105 mg/dL   TYPE AND SCREEN   Result Value Ref Range    Expiration Date 11/22/2020,2359     Arm Band Number [id number]     ABO/Rh A POSITIVE     Antibody Screen NEGATIVE     Unit Number [id number]     Product Code Leukocyte Reduced Red Cell     Unit Divison 00     Dispense Status REL FROM [facility]     Transfusion Status OK TO TRANSFUSE     Crossmatch Result COMPATIBLE     Unit Number [id number]     Product Code Leukocyte Reduced Red Cell     Unit Divison 00     Dispense Status REL FROM [facility]     Transfusion Status OK TO TRANSFUSE     Crossmatch Result COMPATIBLE    BLOOD BANK SPECIMEN   Result Value Ref Range    Blood Bank Specimen NOT REPORTED          IMAGING DATA:    Xr Shoulder Right (min 2 Views)    Result Date: 11/17/2020  EXAMINATION: TWO XRAY VIEWS OF THE RIGHT FOREARM; THREE XRAY VIEWS OF THE RIGHT SHOULDER; TWO XRAY VIEWS OF THE RIGHT HUMERUS 11/17/2020 3:42 am COMPARISON: None.  HISTORY: ORDERING SYSTEM PROVIDED HISTORY: Swelling and pain TECHNOLOGIST PROVIDED HISTORY: Swelling and pain Reason for Exam: Swelling throughout right arm, no known injury, no complaints of pain. FINDINGS: Normal glenohumeral and acromioclavicular alignment. No acute fracture or dislocation in the shoulder. No lytic or blastic lesions. Humeral shaft shows no evidence for fracture. No abnormal periosteal reaction. Normal alignment at the elbow and wrist.  No evidence for elbow joint effusion. No acute fracture of the radius or ulna. Evaluation of the soft tissues show swelling of the proximal 2/3 of the forearm diffusely which extends into the elbow region. No radiopaque foreign body. 1. No evidence for acute fracture or dislocation in the right shoulder, humerus or forearm. 2. Apparent diffuse soft tissue swelling of the elbow and proximal 2/3 of the forearm. No radiopaque foreign body. Xr Humerus Right (min 2 Views)    Result Date: 11/17/2020  EXAMINATION: TWO XRAY VIEWS OF THE RIGHT FOREARM; THREE XRAY VIEWS OF THE RIGHT SHOULDER; TWO XRAY VIEWS OF THE RIGHT HUMERUS 11/17/2020 3:42 am COMPARISON: None. HISTORY: ORDERING SYSTEM PROVIDED HISTORY: Swelling and pain TECHNOLOGIST PROVIDED HISTORY: Swelling and pain Reason for Exam: Swelling throughout right arm, no known injury, no complaints of pain. FINDINGS: Normal glenohumeral and acromioclavicular alignment. No acute fracture or dislocation in the shoulder. No lytic or blastic lesions. Humeral shaft shows no evidence for fracture. No abnormal periosteal reaction. Normal alignment at the elbow and wrist.  No evidence for elbow joint effusion. No acute fracture of the radius or ulna. Evaluation of the soft tissues show swelling of the proximal 2/3 of the forearm diffusely which extends into the elbow region. No radiopaque foreign body. 1. No evidence for acute fracture or dislocation in the right shoulder, humerus or forearm. 2. Apparent diffuse soft tissue swelling of the elbow and proximal 2/3 of the forearm. No radiopaque foreign body.      Xr Radius distal aspects of the left femur were performed. There is no acute fracture or dislocation of the left femur. No periosteal reaction or osseous destruction is evident. There is mild osteoarthritis at the left knee joint. No soft tissue abnormality or radiopaque foreign body is evident. 1. Possible acute nondisplaced fracture of the left acetabulum. Suggest clinical correlation, and if concerning, consider further characterization with a follow-up left hip CT study. 2. No acute abnormality of the left femur. Xr Femur Left (min 2 Views)    Result Date: 11/16/2020  EXAMINATION: TWO XRAY VIEWS OF THE LEFT FEMUR, TWO VIEWS LEFT HIP 11/16/2020 4:26 pm COMPARISON: None. HISTORY: ORDERING SYSTEM PROVIDED HISTORY: fall TECHNOLOGIST PROVIDED HISTORY: fall Reason for Exam: fall Acuity: Acute Type of Exam: Initial Acute left hip and femoral pain FINDINGS: Frontal and frog-leg lateral views of the left hip were performed. Multiple curvilinear lucencies involving the left acetabulum, which could represent acute nondisplaced fractures of the left acetabulum in the right clinical setting. No additional fracture is identified. There is no evidence of dislocation. There is mild left hip osteoarthritis. No destructive osseous lesion is seen. Mild enthesopathic changes are evident. Frontal and lateral views of the proximal and distal aspects of the left femur were performed. There is no acute fracture or dislocation of the left femur. No periosteal reaction or osseous destruction is evident. There is mild osteoarthritis at the left knee joint. No soft tissue abnormality or radiopaque foreign body is evident. 1. Possible acute nondisplaced fracture of the left acetabulum. Suggest clinical correlation, and if concerning, consider further characterization with a follow-up left hip CT study. 2. No acute abnormality of the left femur.      Ct Pelvis Wo Contrast Additional Contrast? None    Result Date: 11/16/2020  EXAMINATION: CT OF THE PELVIS WITHOUT CONTRAST 11/16/2020 7:52 pm TECHNIQUE: CT of the pelvis was performed without the administration of intravenous contrast.  Multiplanar reformatted images are provided for review. Dose modulation, iterative reconstruction, and/or weight based adjustment of the mA/kV was utilized to reduce the radiation dose to as low as reasonably achievable. COMPARISON: Pelvis and left hip radiograph same day HISTORY: ORDERING SYSTEM PROVIDED HISTORY: Rule out left tab fracture. TECHNOLOGIST PROVIDED HISTORY: 2mm thin cuts. Please include left proximal hip in imaging. Thank you. Rule out left tab fracture. Reason for Exam: Left Leg/Groin pain - Rule out left tab fracture. Acuity: Acute Type of Exam: Initial FINDINGS: Bones demonstrate no destructive sclerotic lesions involving the partially imaged L3 vertebral body and posterior elements, bilateral iliac bones, left greater than right, and sacrum with associated destructive changes of the osseous structures. Findings consistent with metastatic disease. Lesion within the left iliac bone also extends to involve the acetabulum and superior pubic ramus on the left proximally. Sclerotic lesion also identified at the left ischial tuberosity compatible with metastatic lesion. Acute pathologic fracture of the left acetabulum with fracture lines centered at the superior acetabulum and involving both the anterior and posterior columns. The fracture is mildly displaced. No additional fractures are identified. Mild-to-moderate osteoarthritic changes of the bilateral hips. Moderate to severe degenerative changes of the imaged lower lumbar spine. Visualized intrapelvic structures demonstrate retroperitoneal and intra-abdominal lymphadenopathy most consistent with metastatic disease. Largest node measures approximately 2.3 cm in short axis dimension (image 17 series 2). Severe atherosclerotic disease. Soft tissues demonstrate anasarca. 1. Pathologic fracture of the left acetabulum centered along the superior acetabulum with fracture lines involving both the anterior and posterior columns. 2. Osseous metastatic disease with destructive lesions involving the bilateral hemipelvis, sacrum, and partially imaged lumbar spine. 3. Metastatic lymphadenopathy within the imaged pelvis with the largest node measuring 2.3 cm in short axis dimension. Ct Chest Abdomen Pelvis W Contrast    Result Date: 11/16/2020  EXAMINATION: CT OF THE CHEST, ABDOMEN, AND PELVIS WITH CONTRAST 11/16/2020 10:22 pm TECHNIQUE: CT of the chest, abdomen and pelvis was performed with the administration of intravenous contrast. Multiplanar reformatted images are provided for review. Dose modulation, iterative reconstruction, and/or weight based adjustment of the mA/kV was utilized to reduce the radiation dose to as low as reasonably achievable. COMPARISON: None HISTORY: ORDERING SYSTEM PROVIDED HISTORY: assess for cancer source vs mts TECHNOLOGIST PROVIDED HISTORY: assess for cancer source vs mts Reason for Exam: Assess for cancer source vs mts - Left hip fracture. Acuity: Acute Type of Exam: Initial FINDINGS: Chest: Mediastinum: The heart is mildly enlarged. No evidence of pericardial effusion is seen. No evidence of mediastinal or hilar lymphadenopathy or mass lesion is identified. Lungs/pleura: Multifocal bilateral lung ill-defined ground-glass opacification is noted, greatest within the left upper lung lobe. Trace right-sided layering posterior pleural effusion is visualized. Soft Tissues/Bones: Mixed sclerotic and lytic multifocal marrow changes are again seen throughout the visualized skeleton.  Abdomen/Pelvis: Organs: Nodular contour of the liver is noted with coarse parenchymal appearance consistent with hepatic cirrhosis with associated evidence of portal venous hypertension with is severe varices at the splenic hilum with scattered varices also seen at the gastroesophageal junction, lesser curvature of the stomach and brittany hepatis. Gallbladder wall calcification is noted. The liver, gallbladder, spleen, pancreas, adrenal glands, kidneys, are otherwise unremarkable in appearance. GI/Bowel: Small hiatal hernia is present. The stomach is otherwise unremarkable without wall thickening or distention. Bowel loops are unremarkable in appearance without evidence of obstruction, distension or mucosal thickening. Pelvis: The urinary bladder is well distended and unremarkable in appearance. Mild pelvic free fluid is noted. Peritoneum/Retroperitoneum: No evidence of retroperitoneal or intraperitoneal lymphadenopathy is identified. No further evidence of intraperitoneal free fluid is seen. Bones/Soft Tissues: Moderate scattered subcutaneous fat stranding related to edema is seen. Left acetabular mildly distracted comminuted fracture is again noted. Mixed lytic and sclerotic multifocal marrow changes are again noted throughout the visualized skeleton. 1. Redemonstration of mixed sclerotic and lytic multifocal marrow changes throughout the visualized skeleton consistent with metastatic disease. 2. No definitive identification of primary tumor mass lesion. 3. Multifocal bilateral lung ground-glass opacification, greatest within the left upper lung lobe. Differential diagnostic considerations include association with viral pneumonia, opportunistic infection, hypersensitivity pneumonitis and drug toxicity. Recommend clinical correlation. 4. Trace right-sided layering posterior pleural effusion. 5. Mild cardiomegaly. 6. Severe hepatic cirrhosis with associated scattered marked varices related to portal venous hypertension. 7. Small hiatal hernia. 8. Mild pelvic free fluid. 9. Moderate diffuse abdominal and pelvic wall subcutaneous edema. 10. Suggestion porcelain gallbladder.      Xr Hip W Pelvis Min 5 Vws Bilateral    Result Date: 11/16/2020  EXAMINATION: ONE XRAY VIEW OF THE PELVIS AND TWO XRAY VIEWS OF EACH OF THE BILATERAL HIPS 11/16/2020 5:10 pm COMPARISON: None. HISTORY: ORDERING SYSTEM PROVIDED HISTORY: AP pelvis, Inlet, Outlet, Cross-table lateral left hip TECHNOLOGIST PROVIDED HISTORY: Please and thank you. AP pelvis, Inlet, Outlet, Cross-table lateral left hip Reason for Exam: Pain left hip, s/p multiple falls. FINDINGS: There is no evidence of acute fracture. There is normal alignment. No acute joint abnormality. No focal osseous lesion. No focal soft tissue abnormality. No acute osseous abnormality. 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 XXXA] 11/16/2020    Cirrhosis ([address] [K74.60] 05/23/2019    Pure hypercholesterolemia [E78.00] 10/08/2018    Anemia, iron deficiency [D50.9] 10/08/2018    Depression [F32.9] 09/26/2018    Osteoarthritis [M19.90] 09/26/2018    Asthma [J45.909] 09/06/2018    Hyperlipidemia [E78.5] 09/06/2018    ARLYN (obstructive sleep apnea) [G47.33] 08/09/2018    Morbid obesity ([address] [E66.01] 08/09/2018    Essential hypertension [I10] 08/09/2018    Diabetes mellitus ([address] [E11.9] 08/09/2018           RECOMMENDATIONS:  1. I personally reviewed results of lab work-up imaging studies and other relevant clinical data. 2. Reviewed previous medical record  3. Metastatic breast cancer. 4. Ortho plans to refer to u/M    5. Needs XRT and then systemic therapy   CT scans showed cirrhotic liver putting limitations on chemo options.  I think she can be treated with faslodex or AI plus CDK inhibitor with dose adjustment     Discussed with patient and Nurse. Thank you for asking us to see this patient. This note is created with the assistance of a speech recognition program.  While intending to generate a document that actually reflects the content of the visit, the document can still have some errors including those of syntax and sound a like substitutions which may escape proof reading. It such instances, actual meaning can be extrapolated by contextual diversion.

## 2020-11-23 NOTE — PROGRESS NOTES
[facility]  Office: [address], DO, Xochilt Nichols, DO, John Rivero, DO, Tunde Green, DO, Toshia Amos MD, Allan Keane MD, Sonny Brice MD, Blu Edwards MD, Nando Ivey MD, Peggy Abad MD, Mark Arreaga MD, Jyoti Capps MD, Courtney Larsen MD, Lilly Holter, DO, Abisai Rios MD, Tee Paz MD, Patrick Ruelas DO, Cherry Tolliver MD,  Annabelle Acharya DO, Hali Hay MD, Ti Conteh MD, Velvet Hardin, SHERRY, [facility], CNP, Haydee Moore, CNP, Trev Cuevas, CNS, Teena Arriaza, CNP, Tiseha Raza, CNP, Brooklyn Valles, CNP, Chris Neville, CNP, Lakisha Canas, CNP, Tracy Guerrero PA-C, Izzy Brooks, [facility], Sofy Castillo, CNP, Carlos Enrique Arellano, CNP, Yari Dawkins, CNP, Nahomi Nichols, CNP, Ethan Smith, CNP         [facility]   [address] [facility]    Progress Note    11/23/2020    1:30 PM    Name:   Balbina Blackwood  MRN:     [id number]     Acct:      [de-identified]   Room:   [facility]/4540-19   Day:  7  Admit Date:  11/16/2020  3:05 PM    PCP:   Antonia Avilez  Code Status:  Full Code    Subjective:     C/C:   Chief Complaint   Patient presents with    Leg Pain    Groin Pain     Interval History Status: not changed. Patient seen and examined at bedside, no acute events overnight. Looks better this am   Denied communications with oncologist initially but later on when I told her that I asked him and he confirmed full discussion regarding biopsy results and diagnosis of breast cancer she acknowledged and then said she might have just forgotten. Afterward the patient became more straightforward about her current health condition and I updated her about her liver condition and the limits this will put on her treatments. I also updated her that we will initiate transfer to [facility] and decision to operate will be up to them and she is in agreement.   She did say openly I know I neglected my health and that I cannot take it back at this point   VSS overnight   Patient vitals, labs and all providers notes were reviewed,from overnight shift and morning updates were noted and discussed with the nurse    Brief History:     Per chart    The patient is a 60 y.o.  female who is Admitted to the hospital with chief complaints of leg pain and groin pain. The pain started after a fall about 2 days ago. Patient was walking and became unsteady and fell. Patient states that since the incident she has been having worsening pain to the point she could not take it anymore and presented to the hospital.  Denies any passing out episode complains of swelling of her right arm. Patient's work-up in the ER including CT scan showed pathological fracture of left acetabulum also shows osseous metastasis involving bilateral hemipelvis. She was metastatic lymphadenopathy CT chest shows multiple sclerotic and lytic lesions throughout the skeleton consistent metastatic disease. There is bilateral groundglass opacification of the lungs. There is severe hepatic cirrhosis associated with varices concerning for portal hypertension.     Reviewed records reviewed patient was hospitalized back in May 2019 for hepatic encephalopathy. Patient left hospital AMA without completing work-up. Imaging studies at that time showed a lytic lesion involving ileum and L3. Biopsy came back positive for poorly differentiated non-small cell cancer. Stains showed weak positivity for GATA3 concerning for breast primary. Patient during the hospitalization also had EGD done which was negative for malignancy. Review of Systems:     Review of Systems   Constitutional: Positive for activity change. Negative for chills, diaphoresis and fever. HENT: Negative for congestion. Eyes: Negative for visual disturbance. Respiratory: Negative for cough, chest tightness, shortness of breath and wheezing.     Cardiovascular: Negative for chest pain, palpitations and leg swelling. Gastrointestinal: Negative for abdominal pain, blood in stool, constipation, diarrhea, nausea and vomiting. Genitourinary: Negative for difficulty urinating. Musculoskeletal: Positive for back pain and myalgias. Hip pain    Neurological: Negative for dizziness, weakness, light-headedness, numbness and headaches. Psychiatric/Behavioral: The patient is nervous/anxious. All other systems reviewed and are negative. Medications: Allergies:     Allergies   Allergen Reactions    Nsaids Other (See Comments)    Sulfa Antibiotics Other (See Comments) and Hives     Other reaction(s): Unknown  Patient unsure of the reaction    Tape [Adhesive Tape] Rash     Other reaction(s): Unknown       Current Meds:   Scheduled Meds:    morphine  15 mg Oral 2 times per day    aspirin  81 mg Oral Daily    citalopram  40 mg Oral Daily    lactulose  30 g Oral TID    levothyroxine  25 mcg Oral Daily    lisinopril  20 mg Oral Daily    metoprolol succinate  25 mg Oral Daily    pantoprazole  40 mg Oral QAM AC    pramipexole  0.125 mg Oral Nightly    sodium chloride flush  10 mL Intravenous 2 times per day    enoxaparin  40 mg Subcutaneous Daily    insulin lispro  0-12 Units Subcutaneous TID WC    insulin lispro  0-6 Units Subcutaneous Nightly     Continuous Infusions:    sodium chloride 100 mL/hr at 11/21/20 1603    dextrose       PRN Meds: oxyCODONE-acetaminophen, diclofenac sodium, fluticasone, sodium chloride flush, potassium chloride **OR** potassium alternative oral replacement **OR** potassium chloride, magnesium sulfate, acetaminophen **OR** acetaminophen, polyethylene glycol, promethazine **OR** ondansetron, nicotine, glucose, dextrose, glucagon (rDNA), dextrose    Data:     Past Medical History:   has a past medical history of Acute urinary tract infection, Anemia, Anemia, iron deficiency, Anxiety disorder, Arthritis, Asthma, Atopic rhinitis, Bandemia, Blood transfusion reaction, Cellulitis and abscess of trunk, Cellulitis of right breast, Cellulitis of right lower extremity- resolving, COPD (chronic obstructive pulmonary disease) (HCC), CRP elevated, Depression, Diabetes mellitus ([address] Disorder associated with type 2 diabetes mellitus ([address] Disorder of liver, Displacement of lumbar intervertebral disc without myelopathy, Essential hypertension, Fibromyalgia, Full thickness rotator cuff tear, Gastroesophageal reflux disease, GERD (gastroesophageal reflux disease), Hammer toe of right foot, Hernia, abdominal, Hyperglycemia due to type 2 diabetes mellitus ([address] Hyperlipidemia, Hypertension, Hypothyroid, Iron deficiency anemia, Lactic acidosis, Localized, primary osteoarthritis of shoulder region, Mass of right breast, Migraine, Morbid obesity ([address] ARLYN (obstructive sleep apnea), Osteoarthritis, Pure hypercholesterolemia, Restless leg syndrome, Seasonal allergies, Septicemia ([address] SIRS due to infectious process without acute organ dysfunction, Sleep apnea, Spondylosis, Thrombocytopenia ([address] and Thrombocytopenia ([address] Social History:   reports that she has never smoked. She has never used smokeless tobacco. She reports that she does not drink alcohol or use drugs. Family History:   Family History   Problem Relation Age of Onset    Heart Disease Mother     Pacemaker Mother     Hypertension Mother     Diabetes Father     Breast Cancer Neg Hx        Vitals:  BP (!) 112/50   Pulse 80   Temp 98.2 °F (36.8 °C) (Oral)   Resp 18   Ht 5' 7\" (1.702 m)   Wt 180 lb (81.6 kg)   SpO2 95%   BMI 28.19 kg/m²   Temp (24hrs), Av °F (36.7 °C), Min:97.5 °F (36.4 °C), Max:98.3 °F (36.8 °C)    Recent Labs     20  1638 20  2049 20  0748 20  1200   POCGLU 113* 123* 106* 150*       I/O (24Hr):     Intake/Output Summary (Last 24 hours) at 2020 1330  Last data filed at 2020 0656  Gross per 24 hour   Intake --   Output 700 ml   Net -700 ml Labs:  Hematology:  Recent Labs     11/21/20  0604 11/22/20  0412 11/23/20  0525   WBC 10.6 8.4 9.0   RBC 3.65* 3.34* 3.46*   HGB 11.0* 10.1* 10.3*   HCT 34.3* 32.4* 33.2*   MCV 94.0 97.0 96.0   MCH 30.1 30.2 29.8   MCHC 32.1 31.2 31.0   RDW 14.2 14.5* 14.4    148 159   MPV 10.4 10.8 10.4     Chemistry:  Recent Labs     11/20/20  1512 11/21/20  0604    138   K 4.1 3.9   CL  --  106   CO2  --  22   GLUCOSE  --  126*   BUN  --  17   CREATININE  --  0.44*   ANIONGAP  --  10   LABGLOM  --  >60   GFRAA  --  >60   CALCIUM  --  8.6   CAION 1.28  --      Recent Labs     11/21/20  0604  11/22/20  0819 11/22/20  1153 11/22/20  1638 11/22/20  2049 11/23/20  0748 11/23/20  1200   PROT 5.1*  --   --   --   --   --   --   --    LABALBU 2.3*  --   --   --   --   --   --   --    AST 91*  --   --   --   --   --   --   --    ALT 42*  --   --   --   --   --   --   --    ALKPHOS 355*  --   --   --   --   --   --   --    BILITOT 1.11  --   --   --   --   --   --   --    POCGLU  --    < > 107* 140* 113* 123* 106* 150*    < > = values in this interval not displayed. ABG:  Lab Results   Component Value Date    FIO2 55% 11/20/2020     Lab Results   Component Value Date/Time    SPECIAL NOT REPORTED 05/22/2019 08:09 PM     Lab Results   Component Value Date/Time    CULTURE NO GROWTH 6 DAYS 05/22/2019 08:09 PM       Radiology:  Laxmi Mcdermott Pelvis (1-2 Views)    Result Date: 11/19/2020  Mildly displaced fracture left acetabulum     Xr Shoulder Right (min 2 Views)    Result Date: 11/17/2020  1. No evidence for acute fracture or dislocation in the right shoulder, humerus or forearm. 2. Apparent diffuse soft tissue swelling of the elbow and proximal 2/3 of the forearm. No radiopaque foreign body. Xr Humerus Right (min 2 Views)    Result Date: 11/17/2020  1. No evidence for acute fracture or dislocation in the right shoulder, humerus or forearm.  2. Apparent diffuse soft tissue swelling of the elbow and proximal 2/3 of the forearm. No radiopaque foreign body. Xr Radius Ulna Right (2 Views)    Result Date: 11/17/2020  1. No evidence for acute fracture or dislocation in the right shoulder, humerus or forearm. 2. Apparent diffuse soft tissue swelling of the elbow and proximal 2/3 of the forearm. No radiopaque foreign body. Xr Hip Left (2-3 Views)    Result Date: 11/18/2020  1. Possible acute nondisplaced fracture of the left acetabulum. Suggest clinical correlation, and if concerning, consider further characterization with a follow-up left hip CT study. 2. No acute abnormality of the left femur. Xr Femur Left (min 2 Views)    Result Date: 11/18/2020  1. Possible acute nondisplaced fracture of the left acetabulum. Suggest clinical correlation, and if concerning, consider further characterization with a follow-up left hip CT study. 2. No acute abnormality of the left femur. Ct Pelvis Wo Contrast Additional Contrast? None    Result Date: 11/16/2020  1. Pathologic fracture of the left acetabulum centered along the superior acetabulum with fracture lines involving both the anterior and posterior columns. 2. Osseous metastatic disease with destructive lesions involving the bilateral hemipelvis, sacrum, and partially imaged lumbar spine. 3. Metastatic lymphadenopathy within the imaged pelvis with the largest node measuring 2.3 cm in short axis dimension. Xr Pelvis (min 3 Views)    Result Date: 11/18/2020  Limited exam Left acetabular fracture is suspected. Repeat views of the left hip and/or CT scan of the pelvis is requested for further assessment     Ct Chest Abdomen Pelvis W Contrast    Result Date: 11/16/2020  1. Redemonstration of mixed sclerotic and lytic multifocal marrow changes throughout the visualized skeleton consistent with metastatic disease. 2. No definitive identification of primary tumor mass lesion. 3. Multifocal bilateral lung ground-glass opacification, greatest within the left upper lung lobe. Differential diagnostic considerations include association with viral pneumonia, opportunistic infection, hypersensitivity pneumonitis and drug toxicity. Recommend clinical correlation. 4. Trace right-sided layering posterior pleural effusion. 5. Mild cardiomegaly. 6. Severe hepatic cirrhosis with associated scattered marked varices related to portal venous hypertension. 7. Small hiatal hernia. 8. Mild pelvic free fluid. 9. Moderate diffuse abdominal and pelvic wall subcutaneous edema. 10. Suggestion porcelain gallbladder. Xr Hip W Pelvis Min 5 Vws Bilateral    Result Date: 11/16/2020  No acute osseous abnormality. Physical Examination:        Physical Exam  Vitals signs and nursing note reviewed. Constitutional:       General: She is not in acute distress. HENT:      Head: Normocephalic and atraumatic. Eyes:      Conjunctiva/sclera: Conjunctivae normal.      Pupils: Pupils are equal, round, and reactive to light. Cardiovascular:      Rate and Rhythm: Normal rate and regular rhythm. Heart sounds: No murmur. Pulmonary:      Effort: Pulmonary effort is normal. No accessory muscle usage or respiratory distress. Breath sounds: No stridor. No decreased breath sounds, wheezing, rhonchi or rales. Abdominal:      General: Bowel sounds are normal. There is no distension. Palpations: Abdomen is soft. Abdomen is not rigid. Tenderness: There is no abdominal tenderness. There is no guarding. Musculoskeletal:         General: No tenderness. Skin:     General: Skin is warm and dry. Findings: No erythema, lesion or rash. Neurological:      Mental Status: She is alert and oriented to person, place, and time. Cranial Nerves: No cranial nerve deficit. Motor: No seizure activity. Psychiatric:         Speech: Speech normal.         Behavior: Behavior normal. Behavior is cooperative.          Assessment:        Hospital Problems           Last Modified POA    * (Principal) Pathological fracture due to metastatic bone disease 11/16/2020 Yes    Diabetes mellitus ([address] 11/16/2020 Yes    ARLYN (obstructive sleep apnea) 11/16/2020 Yes    Morbid obesity ([address] 11/16/2020 Yes    Essential hypertension 11/16/2020 Yes    Depression 11/16/2020 Yes    Osteoarthritis 11/16/2020 Yes    Anemia, iron deficiency 11/16/2020 Yes    Asthma 11/16/2020 Yes    Hyperlipidemia 11/16/2020 Yes    Pure hypercholesterolemia 11/16/2020 Yes    Cirrhosis ([address] 11/17/2020 Yes    Fall from standing 11/16/2020 Yes    Localized swelling of right upper extremity 11/17/2020 Yes    Malignant neoplasm of pelvic bone ([address] 11/17/2020 Yes    Left hip pain 11/17/2020 Yes    Closed nondisplaced fracture of left acetabulum ([address] 11/19/2020 Yes          Plan:          Pathologic fracture of left acetabulum due to metastatic bone disease: Status post attempted percutaneous screw fixation pelvic ring, failed as the the fracture had undergone further displacement and given significant and extensive osteolytic lesions the surgery carries high risk and orthopedic surgery recommending orthopedic oncologist if patient still want to pursue    Poorly differentiated non-small cell lung cancer on bone biopsy from May 2019 with recommendation of breast as primary, patient never followed up with oncology. 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     DM2 ; Continue diabetes home meds, insulin sliding scale, hypoglycemia protocol, will follow    ARLYN: CPAP @ night    DVT prophylaxis    Discussed with the patient and the nurse    As detailed above I discussed in detail with the patient today, she is still interested to proceed with possible surgery at [facility], she is now after confronting her admits  her diagnosis of cancer and to her liver cirrhosis    Cussed with orthopedic resident, per his report to me Dr. Camilla Gonzales did communicate with Dr. Jimmie Harrison (orthopedic oncology at [facility]) and he is aware of the case    I called to initiate transfer, waiting for callback    I also discussed with Dr. Kandi Ellsworth       Addendum : patient was accepted to [facility] , accepting attending is Dr. Tl Strauss MD  11/23/2020  1:30 PM

## 2020-11-23 NOTE — DISCHARGE INSTR - COC
 Anemia, iron deficiency D50.9    Asthma J45.909    Atopic rhinitis J30.9    Disorder associated with type 2 diabetes mellitus (HCC) E11.8    Disorder of liver K76.9    Displacement of lumbar intervertebral disc without myelopathy M51.26    Fibromyalgia M79.7    Full thickness rotator cuff tear M75.120    Gastroesophageal reflux disease K21.9    Hyperglycemia due to type 2 diabetes mellitus (HCC) E11.65    Hyperlipidemia E78.5    Hypothyroid E03.9    Iron deficiency anemia D50.9    Localized, primary osteoarthritis of shoulder region M19.019    Mass of right breast N63.10    Migraine G43.909    Pure hypercholesterolemia E78.00    Altered mental status R41.82    Cirrhosis (HCC) K74.60    Elevated LFTs R79.89    Hypernatremia E87.0    Elevated amylase R74.8    Pathological fracture due to metastatic bone disease M84.50XA    Fall from standing [address]19. [place]    Localized swelling of right upper extremity R22.31    Malignant neoplasm of pelvic bone (HCC) C41.4    Left hip pain M25.552    Closed nondisplaced fracture of left acetabulum (HCC) S32.402A       Isolation/Infection:   Isolation          No Isolation        Patient Infection Status     None to display          Nurse Assessment:  Last Vital Signs: BP (!) 118/47   Pulse 84   Temp 98.3 °F (36.8 °C) (Oral)   Resp 18   Ht 5' 7\" (1.702 m)   Wt 180 lb (81.6 kg)   SpO2 98%   BMI 28.19 kg/m²     Last documented pain score (0-10 scale): Pain Level: 8  Last Weight:   Wt Readings from Last 1 Encounters:   11/20/20 180 lb (81.6 kg)     Mental Status:  {IP PT MENTAL STATUS:20030}    IV Access:  {[facility] [place]:115023199}    Nursing Mobility/ADLs:  Walking   {[facility] DME YWRY:987164606}  Transfer  {[facility] DME NNZJ:891675287}  Bathing  {[facility] DME VBVI:296099293}  Dressing  {[facility] DME SAUO:081352828}  Toileting  {[facility] DME WUXP:837087931}  Feeding  {[facility] DME KWDC:123244085}  Med Admin  {[facility] DME HXNZ:648966083}  Med Delivery   {[facility] MED Delivery:398488701}    Wound Care Documentation and Therapy:  Wound 08/11/18 Blister Leg Lateral right lateral leg calf (Active)   Number of days: 835       Wound 08/11/18 Blister Foot Right intact blister to top of foot and open blister on top of foot (Active)   Number of days: 835       Wound 09/27/18 Ankle Right;Lateral (Active)   Number of days: 788       Wound 09/27/18 Foot Right;Medial (Active)   Number of days: 788       Wound 09/27/18 Pretibial Right;Proximal (Active)   Number of days: 788       Wound 09/27/18 Pretibial Right;Distal (Active)   Number of days: 788       Wound 09/27/18 Blister Chest Right;Lateral (Active)   Number of days: 788       Incision 09/27/18 Breast Right;Lateral (Active)   Number of days: 788       Incision 09/27/18 Axilla Right (Active)   Number of days: 788       Puncture 05/24/19 Back Left; Lower (Active)   Number of days: 549       Wound 05/22/19 Arm Anterior;Left;Lower (Active)   Number of days: 551       Wound 05/22/19 Arm Anterior;Right;Upper; Inner (Active)   Number of days: 551       Wound 11/20/20 Pretibial Left; Anterior (Active)   Dressing/Treatment Open to air 11/22/20 0800   Wound Assessment Pink/red 11/20/20 1700   Drainage Amount None 11/20/20 1700   Number of days: 3       Wound 11/22/20 Arm Anterior (Active)   Dressing Status Clean;Dry; Intact 11/23/20 0800   Wound Cleansed Cleansed with saline 11/22/20 2130   Dressing/Treatment Foam 11/23/20 0800   Drainage Amount Moderate 11/23/20 0800   Drainage Description Serous 11/23/20 0800   Number of days: 0        Elimination:  Continence:   · Bowel: {YES / BD:57275}  · Bladder: {YES / OB:42712}  Urinary Catheter: {Urinary Catheter:987088969}   Colostomy/Ileostomy/Ileal Conduit: {YES / IH:42665}       Date of Last BM: ***    Intake/Output Summary (Last 24 hours) at 11/23/2020 1858  Last data filed at 11/23/2020 1754  Gross per 24 hour   Intake --   Output 550 ml   Net -550 ml     I/O last 3 completed shifts:  In: -   Out: 700 [Urine:700]    Safety Concerns:     [address] S[address] Safety Concerns:759637985}    Impairments/Disabilities:      [address] [facility] Impairments/Disabilities:181278729}    Nutrition Therapy:  Current Nutrition Therapy:   [address] [facility] Diet List:126495305}    Routes of Feeding: {CHP DME Other Feedings:902075376}  Liquids: {Slp liquid thickness:00902}  Daily Fluid Restriction: {CHP DME Yes amt example:540942831}  Last Modified Barium Swallow with Video (Video Swallowing Test): {Done Not Done JRTJ:972724303}    Treatments at the Time of Hospital Discharge:   Respiratory Treatments: ***  Oxygen Therapy:  {Therapy; copd oxygen:22348}  Ventilator:    { CC Vent YSE}    Rehab Therapies: {THERAPEUTIC INTERVENTION:4053983550}  Weight Bearing Status/Restrictions: [address]  Weight Bearin}  Other Medical Equipment (for information only, NOT a DME order):  {EQUIPMENT:686332873}  Other Treatments: ***    Patient's personal belongings (please select all that are sent with patient):  {P DME Belongings:593499569}    RN SIGNATURE:  {Esignature:301902630}    CASE MANAGEMENT/SOCIAL WORK SECTION    Inpatient Status Date: ***    Readmission Risk Assessment Score:  Readmission Risk              Risk of Unplanned Readmission:        21           Discharging to Facility/ Agency   · Name:   · Address:  · Phone:  · Fax:    Dialysis Facility (if applicable)   · Name:  · Address:  · Dialysis Schedule:  · Phone:  · Fax:    / signature: {Esignature:026902681}    PHYSICIAN SECTION    Prognosis: {Prognosis:5183895859}    Condition at Discharge: [address] Patient Condition:656902218}    Rehab Potential (if transferring to Rehab): {Prognosis:6194166748}    Recommended Labs or Other Treatments After Discharge: ***    Physician Certification: I certify the above information and transfer of Jabari Stephens  is necessary for the continuing treatment of the diagnosis listed and that she requires {Admit to Appropriate Level of Care:92658} for {GREATER/LESS:026241303} 30 days.      Update Admission H&P: {CHP DME Changes in KZEUT:743440189}    PHYSICIAN SIGNATURE:  {Esignature:344333817}

## 2020-11-24 VITALS
DIASTOLIC BLOOD PRESSURE: 41 MMHG | WEIGHT: 180 LBS | TEMPERATURE: 97.4 F | OXYGEN SATURATION: 94 % | HEIGHT: 67 IN | SYSTOLIC BLOOD PRESSURE: 112 MMHG | RESPIRATION RATE: 12 BRPM | HEART RATE: 79 BPM | BODY MASS INDEX: 28.25 KG/M2

## 2020-11-24 LAB
GLUCOSE BLD-MCNC: 88 MG/DL (ref 65–105)
HCT VFR BLD CALC: 31.7 % (ref 36.3–47.1)
HEMOGLOBIN: 9.8 G/DL (ref 11.9–15.1)
MCH RBC QN AUTO: 30.3 PG (ref 25.2–33.5)
MCHC RBC AUTO-ENTMCNC: 30.9 G/DL (ref 28.4–34.8)
MCV RBC AUTO: 98.1 FL (ref 82.6–102.9)
NRBC AUTOMATED: 0 PER 100 WBC
PDW BLD-RTO: 14.6 % (ref 11.8–14.4)
PLATELET # BLD: 176 K/UL (ref 138–453)
PMV BLD AUTO: 10.5 FL (ref 8.1–13.5)
RBC # BLD: 3.23 M/UL (ref 3.95–5.11)
WBC # BLD: 9.8 K/UL (ref 3.5–11.3)

## 2020-11-24 PROCEDURE — 6370000000 HC RX 637 (ALT 250 FOR IP): Performed by: STUDENT IN AN ORGANIZED HEALTH CARE EDUCATION/TRAINING PROGRAM

## 2020-11-24 PROCEDURE — 36415 COLL VENOUS BLD VENIPUNCTURE: CPT

## 2020-11-24 PROCEDURE — 6370000000 HC RX 637 (ALT 250 FOR IP): Performed by: FAMILY MEDICINE

## 2020-11-24 PROCEDURE — 85027 COMPLETE CBC AUTOMATED: CPT

## 2020-11-24 PROCEDURE — 2580000003 HC RX 258: Performed by: STUDENT IN AN ORGANIZED HEALTH CARE EDUCATION/TRAINING PROGRAM

## 2020-11-24 PROCEDURE — 99239 HOSP IP/OBS DSCHRG MGMT >30: CPT | Performed by: FAMILY MEDICINE

## 2020-11-24 PROCEDURE — 82947 ASSAY GLUCOSE BLOOD QUANT: CPT

## 2020-11-24 RX ADMIN — DICLOFENAC 2 G: 10 GEL TOPICAL at 02:55

## 2020-11-24 RX ADMIN — MORPHINE SULFATE 15 MG: 15 TABLET, EXTENDED RELEASE ORAL at 08:45

## 2020-11-24 RX ADMIN — PANTOPRAZOLE SODIUM 40 MG: 40 TABLET, DELAYED RELEASE ORAL at 06:54

## 2020-11-24 RX ADMIN — CITALOPRAM 40 MG: 20 TABLET, FILM COATED ORAL at 08:45

## 2020-11-24 RX ADMIN — LEVOTHYROXINE SODIUM 25 MCG: 25 TABLET ORAL at 06:54

## 2020-11-24 RX ADMIN — METOPROLOL SUCCINATE 25 MG: 25 TABLET, FILM COATED, EXTENDED RELEASE ORAL at 08:47

## 2020-11-24 RX ADMIN — SODIUM CHLORIDE, PRESERVATIVE FREE 10 ML: 5 INJECTION INTRAVENOUS at 08:48

## 2020-11-24 RX ADMIN — OXYCODONE HYDROCHLORIDE AND ACETAMINOPHEN 2 TABLET: 5; 325 TABLET ORAL at 03:42

## 2020-11-24 ASSESSMENT — PAIN SCALES - GENERAL
PAINLEVEL_OUTOF10: 7
PAINLEVEL_OUTOF10: 7

## 2020-11-24 ASSESSMENT — PAIN - FUNCTIONAL ASSESSMENT: PAIN_FUNCTIONAL_ASSESSMENT: PREVENTS OR INTERFERES WITH ALL ACTIVE AND SOME PASSIVE ACTIVITIES

## 2020-11-24 ASSESSMENT — PAIN DESCRIPTION - FREQUENCY: FREQUENCY: CONTINUOUS

## 2020-11-24 ASSESSMENT — PAIN DESCRIPTION - PAIN TYPE: TYPE: ACUTE PAIN;CHRONIC PAIN

## 2020-11-24 ASSESSMENT — PAIN DESCRIPTION - ONSET: ONSET: ON-GOING

## 2020-11-24 ASSESSMENT — PAIN DESCRIPTION - ORIENTATION: ORIENTATION: RIGHT;LEFT

## 2020-11-24 NOTE — PROGRESS NOTES
Received call from [facility] transportation who states they will not be bale to transport patient to [facility] until tomorrow morning at 0930. Spoke with Lynn Bailon RN at [facility] and informed her of delay with transportation until tomorrow. Given new phone number to call for report when patient leaves [phone].

## 2020-11-24 NOTE — PROGRESS NOTES
Occupational Therapy    Occupational Therapy Not Seen Note    DATE: 2020  Name: Yves Foote  : 1955  MRN: [id number]    Patient not available for Occupational Therapy due to:     Other: Per RN pt not appropriate this EM sec to agitation    Next Scheduled Treatment: Attempt at later date    Electronically signed by ANJUM Condon on 2020 at 9:16 AM

## 2020-11-24 NOTE — PLAN OF CARE
Problem: Falls - Risk of:  Goal: Will remain free from falls  Description: Will remain free from falls  11/24/2020 1028 by Adia Sanon RN  Outcome: Completed  11/24/2020 0554 by Lizzeth Chase  Outcome: Ongoing  Goal: Absence of physical injury  Description: Absence of physical injury  11/24/2020 1028 by Adia Sanon RN  Outcome: Completed  11/24/2020 0554 by Lizzeth Chase  Outcome: Ongoing     Problem: Skin Integrity:  Goal: Will show no infection signs and symptoms  Description: Will show no infection signs and symptoms  11/24/2020 1028 by Adia Sanon RN  Outcome: Completed  11/24/2020 0554 by Lizzeth Chase  Outcome: Ongoing  Goal: Absence of new skin breakdown  Description: Absence of new skin breakdown  11/24/2020 1028 by Adia Sanon RN  Outcome: Completed  11/24/2020 0554 by Lizzeth Chase  Outcome: Ongoing     Problem: Pain:  Goal: Pain level will decrease  Description: Pain level will decrease  11/24/2020 1028 by Adia Sanon RN  Outcome: Completed  11/24/2020 0554 by Lizzeth Chase  Outcome: Ongoing  Goal: Control of acute pain  Description: Control of acute pain  11/24/2020 1028 by Adia Sanon RN  Outcome: Completed  11/24/2020 0554 by Lizzeth Chase  Outcome: Ongoing  Goal: Control of chronic pain  Description: Control of chronic pain  11/24/2020 1028 by Adia Sanon RN  Outcome: Completed  11/24/2020 0554 by Lizzeth Chase  Outcome: Ongoing     Problem: Musculor/Skeletal Functional Status  Goal: Highest potential functional level  11/24/2020 1028 by Adia Sanon RN  Outcome: Completed  11/24/2020 0554 by Lizzeth Chase  Outcome: Ongoing     Problem: Nutrition  Goal: Optimal nutrition therapy  Description: Nutrition Problem #1: No nutrition diagnosis at this time  Intervention: Food and/or Nutrient Delivery: Continue Current Diet  Nutritional Goals: PO intake to meet % of estimated nutrition needs     11/24/2020 1028 by Adia Sanon RN  Outcome: Completed  11/24/2020 0554 by Anisa Cummings  Outcome: Ongoing

## 2020-11-24 NOTE — FLOWSHEET NOTE
SPIRITUAL CARE PROGRESS NOTE     Spiritual Assessment: Patient was approachable. Patient seemed to be sad and grieving the loss of her past way of life. Patient seems to be experiencing loneliness and was tearful at times.  Intervention:  provided ministry of presence and active listening. Patient shared some of her feelings of loss and the fear of the unknown. Patient asked for prayer and  prayed with her.  Outcome: Patient shared a life review and seemed less anxious. Patient was able to vent emotion. Patient still seemed to be grieving. Patient expressed gratitude for the visit. 11/23/20 1945   Encounter Summary   Services provided to: Patient   Referral/Consult From: Multi-disciplinary team   Support System Family members   Continue Visiting   (11/23/2020)   Complexity of Encounter Moderate   Length of Encounter 30 minutes   Spiritual Assessment Completed Yes   Routine   Type Initial   Assessment Approachable;Grieving;Loneliness;Sad;Tearful   Intervention Active listening;Prayer;Sustaining presence/ Ministry of presence; Explored feelings, thoughts, concerns   Outcome Expressed gratitude; Shared life review; Less anxious, less agitated;Grieving;Venting emotion     Electronically signed by Linda Delvalle Resident, on 11/23/2020 at 8:10 PM.  [address]  [phone]

## 2020-11-24 NOTE — PLAN OF CARE
Problem: Falls - Risk of:  Goal: Will remain free from falls  Description: Will remain free from falls  Outcome: Ongoing  Goal: Absence of physical injury  Description: Absence of physical injury  Outcome: Ongoing     Problem: Skin Integrity:  Goal: Will show no infection signs and symptoms  Description: Will show no infection signs and symptoms  Outcome: Ongoing  Goal: Absence of new skin breakdown  Description: Absence of new skin breakdown  Outcome: Ongoing     Problem: Pain:  Goal: Pain level will decrease  Description: Pain level will decrease  Outcome: Ongoing  Goal: Control of acute pain  Description: Control of acute pain  Outcome: Ongoing  Goal: Control of chronic pain  Description: Control of chronic pain  Outcome: Ongoing     Problem: Musculor/Skeletal Functional Status  Goal: Highest potential functional level  Outcome: Ongoing     Problem: Nutrition  Goal: Optimal nutrition therapy  Description: Nutrition Problem #1: No nutrition diagnosis at this time  Intervention: Food and/or Nutrient Delivery: Continue Current Diet  Nutritional Goals: PO intake to meet % of estimated nutrition needs     Outcome: Ongoing

## 2020-11-24 NOTE — DISCHARGE SUMMARY
[facility]  Office: [address], DO, Renetta Donahue, DO, Marisa Madden, DO, Marimar Green, DO, Francisca Melgar MD, Vijaya Hennessy MD, Gt Reynolds MD, Cecil Johnston MD, Cyndi Gibson MD, Taye Bird MD, Mila Browne MD, Kingsley Shepherd MD, Courtney Monson MD, Jeppie Soulier, DO, Abel Adams MD, Vivien Romano MD, Charanjit Garcia, DO, Jossue Mccullough MD,  Nubia Faulkner, DO, Nancy Sampson MD, Celia Chandra MD, Isabel Gutierrez, [facility], [facility], CNP, Michelle Tapia, CNP, Argenis Caballero, CNS, Tiff Garcia, CNP, Devin De Oliveira, CNP, William Ernandez, CNP, Ruthann Cuevas, CNP, Adan Gill, CNP, Bc Clemente PA-C, Trisha Montes, [facility], Ashlyn Dc, CNP, Ricardo, CNP, Angelic Mantilal, CNP, Sammi Cho, CNP, Elvis Severs, [facility]    Discharge Summary     Patient ID: Adrian Mace  :  1955   MRN: [id number]     ACCOUNT:  [de-identified]   Patient's PCP: Eunice Almanza Date: 2020   Discharge Date: 2020     Length of Stay: 8  Code Status:  Full Code  Admitting Physician: Nancy Sampson MD  Discharge Physician: Cecil Johnston MD     Active Discharge Diagnoses:     Hospital Problem Lists:  Principal Problem:    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  *      Admission Condition:  poor     Discharged Condition: stable    Hospital Stay:     HPI:     Per chart     The patient is oncology. 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     DM2 ; Continue diabetes home meds, insulin sliding scale, hypoglycemia protocol, will follow     ARLYN: CPAP @ night     DVT prophylaxis     Discussed with the patient and the nurse     As detailed above I discussed in detail with the patient today, she is still interested to proceed with possible surgery at [facility], she is now after confronting her admits  her diagnosis of cancer and to her liver cirrhosis     Cussed with orthopedic resident, per his report to me Dr. Lopez Mars did communicate with Dr. Parveen Ramsey (orthopedic oncology at [facility]) and he is aware of the case     I called to initiate transfer, waiting for callback     I also discussed with Dr. April Mclean , per his note    1.  Reviewed previous medical record  2. Metastatic breast cancer. Needs surgery then XRT   Needs systemic therapy after completing local treatment       Addendum : patient was accepted to [facility] , accepting attending is Dr. Avinash Alaniz rec done  Scripts added   30+ minutes spent      Significant therapeutic interventions: as above    Significant Diagnostic Studies:   L  Radiology:  Xr Pelvis (1-2 Views)    Result Date: 11/19/2020  Mildly displaced fracture left acetabulum     Xr Pelvis (min 3 Views)    Result Date: 11/18/2020  Limited exam Left acetabular fracture is suspected.   Repeat views of the left hip and/or CT scan of the pelvis is requested for further assessment       Consultations:    Consults:     Final Specialist Recommendations/Findings:   IP CONSULT TO ORTHOPEDIC SURGERY  IP CONSULT TO HOSPITALIST  IP CONSULT TO HEM/ONC  IP CONSULT TO GENERAL SURGERY  IP CONSULT TO SPIRITUAL SERVICES      The patient was seen and examined on day of discharge      Physical Exam  Vitals signs and nursing note reviewed. Constitutional:       General: She is not in acute distress. HENT:      Head: Normocephalic and atraumatic. Eyes:      Conjunctiva/sclera: Conjunctivae normal.      Pupils: Pupils are equal, round, and reactive to light. Cardiovascular:      Rate and Rhythm: Normal rate and regular rhythm. Heart sounds: No murmur. Pulmonary:      Effort: Pulmonary effort is normal. No accessory muscle usage or respiratory distress. Breath sounds: No stridor. No decreased breath sounds, wheezing, rhonchi or rales. Abdominal:      General: Bowel sounds are normal. There is no distension. Palpations: Abdomen is soft. Abdomen is not rigid. Tenderness: There is no abdominal tenderness. There is no guarding. Musculoskeletal:         General: No tenderness. Skin:     General: Skin is warm and dry. Findings: No erythema, lesion or rash. Neurological:      Mental Status: She is alert and oriented to person, place, and time. Cranial Nerves: No cranial nerve deficit. Motor: No seizure activity. Psychiatric:         Speech: Speech normal.         Behavior: Behavior normal. Behavior is cooperative.        Discharge plan:     Disposition:  To a non-[facility] facility    Physician Follow Up:     Kunal Morley MD  [address] [place] [address]  [phone]      Oncology follow up        Requiring Further Evaluation/Follow Up POST HOSPITALIZATION/Incidental Findings:     Diet: low salt diet    Activity: As tolerated    Instructions to Patient:     Discharge Medications:      Medication List      CONTINUE taking these medications    aspirin 81 MG EC tablet     baclofen 10 MG tablet  Commonly known as: LIORESAL     citalopram 40 MG tablet  Commonly known as:  CELEXA     Claritin-D 24 Hour  MG per extended release tablet  Generic drug:  loratadine-pseudoephedrine     fluticasone 50 MCG/ACT nasal spray  Commonly known as:  FLONASE     lactulose 10 GM/15ML solution  Commonly known as:  CHRONULAC  TAKE 15 TO 30 ML BY MOUTH DAILY INCREASE TO 60 ML BY MOUTH IF NEEDED TO ACHIEVE 2 TO 3 BOWEL MOVENTS A DAY     levothyroxine 25 MCG tablet  Commonly known as:  SYNTHROID  Take 1 tablet by mouth Daily     lisinopril 20 MG tablet  Commonly known as:  PRINIVIL;ZESTRIL     metFORMIN 500 MG extended release tablet  Commonly known as:  GLUCOPHAGE-XR     metoprolol succinate 25 MG extended release tablet  Commonly known as:  TOPROL XL     omeprazole 20 MG delayed release capsule  Commonly known as:  PRILOSEC     oxyCODONE-acetaminophen 5-325 MG per tablet  Commonly known as:  PERCOCET     pramipexole 0.125 MG tablet  Commonly known as:  MIRAPEX  Take 1 tablet by mouth nightly        STOP taking these medications    DULoxetine 30 MG extended release capsule  Commonly known as:  CYMBALTA            No discharge procedures on file. Time Spent on discharge is  34 mins in patient examination, evaluation, counseling as well as medication reconciliation, prescriptions for required medications, discharge plan and follow up. Electronically signed by   Kortney Vasquez MD  11/24/2020  10:53 AM      Thank you Dr. Mauro Bean for the opportunity to be involved in this patient's care.

## 2020-11-24 NOTE — PROGRESS NOTES
Today's Date: 11/23/2020  Patient Name: Yary Maurer  Date of admission: 11/16/2020  3:05 PM  Patient's age: 60 y.o., 1955  Admission Dx: Pathological fracture due to metastatic bone disease [M84.50XA]    Reason for Consult: management recommendations  Requesting Physician: Svitlana Hernandez MD    CHIEF COMPLAINT: Pelvic pain. Back pain. Weight loss. Right breast swelling. History Obtained From:  patient, electronic medical record    Interval history:    Patient seen and examined. Labs and vitals reviewed  Long discussion about the diagnosis,   I showed her he biopsy results and path report, somehow she keep saying that nobody told her about the diagnosis . HISTORY OF PRESENT ILLNESS:      The patient is a 60 y.o.  female who is Admitted to the hospital with chief complaints of leg pain and groin pain. The pain started after a fall about 2 days ago. Patient was walking and became unsteady and fell. Patient states that since the incident she has been having worsening pain to the point she could not take it anymore and presented to the hospital.  Denies any passing out episode complains of swelling of her right arm. Patient's work-up in the ER including CT scan showed pathological fracture of left acetabulum also shows osseous metastasis involving bilateral hemipelvis. She was metastatic lymphadenopathy CT chest shows multiple sclerotic and lytic lesions throughout the skeleton consistent metastatic disease. There is bilateral groundglass opacification of the lungs. There is severe hepatic cirrhosis associated with varices concerning for portal hypertension. Reviewed records reviewed patient was hospitalized back in May 2019 for hepatic encephalopathy. Patient left hospital AMA without completing work-up. Imaging studies at that time showed a lytic lesion involving ileum and L3. Biopsy came back positive for poorly differentiated non-small cell cancer.   Stains showed weak positivity for GATA3 concerning for breast primary. Patient during the hospitalization also had EGD done which was negative for malignancy. Patient was not seen by oncologist during the hospitalization and did not follow-up with an oncologist either. Past Medical History:   has a past medical history of Acute urinary tract infection, Anemia, Anemia, iron deficiency, Anxiety disorder, Arthritis, Asthma, Atopic rhinitis, Bandemia, Blood transfusion reaction, Cellulitis and abscess of trunk, Cellulitis of right breast, Cellulitis of right lower extremity- resolving, COPD (chronic obstructive pulmonary disease) ([address] CRP elevated, Depression, Diabetes mellitus ([address] Disorder associated with type 2 diabetes mellitus ([address] Disorder of liver, Displacement of lumbar intervertebral disc without myelopathy, Essential hypertension, Fibromyalgia, Full thickness rotator cuff tear, Gastroesophageal reflux disease, GERD (gastroesophageal reflux disease), Hammer toe of right foot, Hernia, abdominal, Hyperglycemia due to type 2 diabetes mellitus ([address] Hyperlipidemia, Hypertension, Hypothyroid, Iron deficiency anemia, Lactic acidosis, Localized, primary osteoarthritis of shoulder region, Mass of right breast, Migraine, Morbid obesity ([address] ARLYN (obstructive sleep apnea), Osteoarthritis, Pure hypercholesterolemia, Restless leg syndrome, Seasonal allergies, Septicemia ([address] SIRS due to infectious process without acute organ dysfunction, Sleep apnea, Spondylosis, Thrombocytopenia ([address] and Thrombocytopenia ([address] Past Surgical History:   has a past surgical history that includes Appendectomy; Rotator cuff repair (Right); Colonoscopy; Upper gastrointestinal endoscopy (05/29/2019); Upper gastrointestinal endoscopy (N/A, 5/29/2019); Pelvic fracture surgery (Left, 11/20/2020); and Pelvic fracture surgery (Left, 11/20/2020). Medications:    Prior to Admission medications    Medication Sig Start Date End Date Taking? Authorizing Provider   lactulose (CHRONULAC) 10 GM/15ML solution TAKE 15 TO 30 ML BY MOUTH DAILY INCREASE TO 60 ML BY MOUTH IF NEEDED TO ACHIEVE 2 TO 3 BOWEL MOVENTS A DAY 11/11/20   Jason Danielle MD   baclofen (LIORESAL) 10 MG tablet  5/27/20   Historical Provider, MD   pramipexole (MIRAPEX) 0.125 MG tablet Take 1 tablet by mouth nightly 5/29/19   Hema Li MD   levothyroxine (SYNTHROID) 25 MCG tablet Take 1 tablet by mouth Daily 5/30/19   Hema Li MD   lisinopril (PRINIVIL;ZESTRIL) 20 MG tablet Take 20 mg by mouth daily    Historical Provider, MD   loratadine-pseudoephedrine (CLARITIN-D 24 HOUR)  MG per extended release tablet Take 1 tablet by mouth daily    Historical Provider, MD   metoprolol succinate (TOPROL XL) 25 MG extended release tablet Take 25 mg by mouth daily    Historical Provider, MD   oxyCODONE-acetaminophen (PERCOCET) 5-325 MG per tablet Take 1 tablet by mouth 3 times daily as needed for Pain. Historical Provider, MD   aspirin 81 MG EC tablet Take 81 mg by mouth daily. Historical Provider, MD   citalopram (CELEXA) 40 MG tablet Take 40 mg by mouth daily. Historical Provider, MD   fluticasone (FLONASE) 50 MCG/ACT nasal spray 1 spray by Nasal route 2 times daily as needed for Rhinitis. Historical Provider, MD   metFORMIN ER (GLUCOPHAGE-XR) 500 MG XR tablet Take 1,000 mg by mouth 2 times daily (with meals) Indications: Take two 500mg tablets BID with meals Take two 500mg tablets BID with meals    Historical Provider, MD   omeprazole (PRILOSEC) 20 MG capsule Take 20 mg by mouth daily.     Historical Provider, MD     Current Facility-Administered Medications   Medication Dose Route Frequency Provider Last Rate Last Dose    morphine (MS CONTIN) extended release tablet 15 mg  15 mg Oral 2 times per day Mio Soliz MD   15 mg at 11/23/20 2156    oxyCODONE-acetaminophen (PERCOCET) 5-325 MG per tablet 2 tablet  2 tablet Oral Q8H PRN Mio Soliz MD   2 tablet at 11/23/20 0206    0.9 % sodium chloride infusion   Intravenous Continuous Shubham Missy,  mL/hr at 11/21/20 1603      diclofenac sodium (VOLTAREN) 1 % gel 2 g  2 g Topical 4x Daily PRN Shubham Missy, DO   2 g at 11/22/20 1057    aspirin EC tablet 81 mg  81 mg Oral Daily Shubham Missy, DO   81 mg at 11/23/20 0951    citalopram (CELEXA) tablet 40 mg  40 mg Oral Daily Shubham Missy, DO   40 mg at 11/23/20 6099    fluticasone (FLONASE) 50 MCG/ACT nasal spray 1 spray  1 spray Nasal BID PRN Shubham Missy, DO   1 spray at 11/22/20 1057    lactulose (CHRONULAC) 10 GM/15ML solution 30 g  30 g Oral TID Shubham Missy, DO   20 g at 11/23/20 1410    levothyroxine (SYNTHROID) tablet 25 mcg  25 mcg Oral Daily Shubham Nitin, DO   25 mcg at 11/23/20 0618    lisinopril (PRINIVIL;ZESTRIL) tablet 20 mg  20 mg Oral Daily Shubham Nitin, DO   20 mg at 11/23/20 7803    metoprolol succinate (TOPROL XL) extended release tablet 25 mg  25 mg Oral Daily Shubham Navarretehmani, DO   25 mg at 11/23/20 3420    pantoprazole (PROTONIX) tablet 40 mg  40 mg Oral QAM AC Shubham Nitin, DO   40 mg at 11/23/20 0618    pramipexole (MIRAPEX) tablet 0.125 mg  0.125 mg Oral Nightly Shubham Missy, DO   0.125 mg at 11/23/20 2156    sodium chloride flush 0.9 % injection 10 mL  10 mL Intravenous 2 times per day Shubham Missy, DO   10 mL at 11/23/20 2156    sodium chloride flush 0.9 % injection 10 mL  10 mL Intravenous PRN Shubham Missy, DO        potassium chloride (KLOR-CON M) extended release tablet 40 mEq  40 mEq Oral PRN Shubham Missy, DO        Or    potassium bicarb-citric acid (EFFER-K) effervescent tablet 40 mEq  40 mEq Oral PRN Shubham Missy, DO        Or    potassium chloride 10 mEq/100 mL IVPB (Peripheral Line)  10 mEq Intravenous PRN Shubham Missy, DO        magnesium sulfate 1 g in dextrose 5% 100 mL IVPB  1 g Intravenous PRN Shubham Louis, DO        acetaminophen (TYLENOL) tablet 650 mg  650 mg Oral Q6H PRN Shubham Louis, DO   325 mg at 11/18/20 0947    Or    acetaminophen (TYLENOL) suppository 650 mg  650 mg Rectal Q6H PRN Shubham Louis, DO        polyethylene glycol (GLYCOLAX) packet 17 g  17 g Oral Daily PRN Shubham Louis, DO        promethazine (PHENERGAN) tablet 12.5 mg  12.5 mg Oral Q6H PRN Shubham Louis, DO        Or    ondansetron (ZOFRAN) injection 4 mg  4 mg Intravenous Q6H PRN Shubham Louis, DO        nicotine (NICODERM CQ) 21 MG/24HR 1 patch  1 patch Transdermal Daily PRN Shubham Louis, DO        enoxaparin (LOVENOX) injection 40 mg  40 mg Subcutaneous Daily Shubham Louis, DO   40 mg at 11/23/20 0952    glucose (GLUTOSE) 40 % oral gel 15 g  15 g Oral PRN Shubham Louis, DO        dextrose 50 % IV solution  12.5 g Intravenous PRN Shubham Louis, DO        glucagon (rDNA) injection 1 mg  1 mg Intramuscular PRN Shubham Louis, DO        dextrose 5 % solution  100 mL/hr Intravenous PRN Shubham Louis, DO        insulin lispro (HUMALOG) injection vial 0-12 Units  0-12 Units Subcutaneous TID WC Shubham Louis, DO   2 Units at 11/23/20 1248    insulin lispro (HUMALOG) injection vial 0-6 Units  0-6 Units Subcutaneous Nightly Shubham Taveras DO           Allergies:  Nsaids; Sulfa antibiotics; and Tape [adhesive tape]    Social History:   reports that she has never smoked. She has never used smokeless tobacco. She reports that she does not drink alcohol or use drugs. Family History: family history includes Diabetes in her father; Heart Disease in her mother; Hypertension in her mother; Pacemaker in her mother. REVIEW OF SYSTEMS:      Constitutional: No fever or chills.  No night sweats, no weight loss   Eyes: No eye discharge, double vision, or eye pain   HEENT: negative for sore mouth, sore throat, hoarseness and voice change   Respiratory: negative for cough , sputum, dyspnea, wheezing, hemoptysis, chest pain   Cardiovascular: negative for chest pain, dyspnea, palpitations, orthopnea, PND   Gastrointestinal: negative for nausea, vomiting, diarrhea, constipation, abdominal pain, Dysphagia, hematemesis and hematochezia   Genitourinary: negative for frequency, dysuria, nocturia, urinary incontinence, and hematuria   Integument: negative for rash, skin lesions, bruises. Hematologic/Lymphatic: negative for easy bruising, bleeding, lymphadenopathy, or petechiae   Endocrine: negative for heat or cold intolerance,weight changes, change in bowel habits and hair loss   Musculoskeletal: n severe pelvic pain. Neurological: negative for headaches, dizziness, seizures, weakness, numbness    PHYSICAL EXAM:        /65   Pulse 86   Temp 98.8 °F (37.1 °C) (Oral)   Resp 17   Ht 5' 7\" (1.702 m)   Wt 180 lb (81.6 kg)   SpO2 95%   BMI 28.19 kg/m²    Temp (24hrs), Av.2 °F (36.8 °C), Min:97.5 °F (36.4 °C), Max:98.8 °F (37.1 °C)      General appearance - well appearing, no in pain or distress   Mental status - alert and cooperative   Eyes - pupils equal and reactive, extraocular eye movements intact   Ears - bilateral TM's and external ear canals normal   Mouth - mucous membranes moist, pharynx normal without lesions   Neck - supple, no significant adenopathy   Lymphatics - no palpable lymphadenopathy, no hepatosplenomegaly   Chest - clear to auscultation, no wheezes, rales or rhonchi, symmetric air entry   Heart - normal rate, regular rhythm, normal S1, S2, no murmurs  Abdomen - soft, nontender, nondistended, no masses or organomegaly   Neurological - alert, oriented, normal speech, no focal findings or movement disorder noted   Musculoskeletal - no joint tenderness, deformity or swelling   Extremities - peripheral pulses normal, no pedal edema, no clubbing or cyanosis   Skin - normal coloration and turgor, no rashes, no suspicious skin lesions noted ,  .     DATA:      Labs:     Results for orders placed or performed during the hospital encounter of 20   COVID-19    Specimen: Other   Result Value Ref Range    SARS-CoV-2 SARS-CoV-2, Rapid Not Detected Not Detected    Source . NASOPHARYNGEAL SWAB     SARS-CoV-2         CBC WITH AUTO DIFFERENTIAL   Result Value Ref Range    WBC 7.0 3.5 - 11.3 k/uL    RBC 4.02 3.95 - 5.11 m/uL    Hemoglobin 12.2 11.9 - 15.1 g/dL    Hematocrit 37.4 36.3 - 47.1 %    MCV 93.0 82.6 - 102.9 fL    MCH 30.3 25.2 - 33.5 pg    MCHC 32.6 28.4 - 34.8 g/dL    RDW 14.3 11.8 - 14.4 %    Platelets 018 274 - 855 k/uL    MPV 10.5 8.1 - 13.5 fL    NRBC Automated 0.0 0.0 per 100 WBC    Differential Type NOT REPORTED     Seg Neutrophils 55 36 - 65 %    Lymphocytes 24 24 - 43 %    Monocytes 15 (H) 3 - 12 %    Eosinophils % 5 (H) 1 - 4 %    Basophils 1 0 - 2 %    Immature Granulocytes 0 0 %    Segs Absolute 3.80 1.50 - 8.10 k/uL    Absolute Lymph # 1.70 1.10 - 3.70 k/uL    Absolute Mono # 1.05 0.10 - 1.20 k/uL    Absolute Eos # 0.34 0.00 - 0.44 k/uL    Basophils Absolute 0.05 0.00 - 0.20 k/uL    Absolute Immature Granulocyte 0.03 0.00 - 0.30 k/uL    WBC Morphology NOT REPORTED     RBC Morphology NOT REPORTED     Platelet Estimate NOT REPORTED    BASIC METABOLIC PANEL   Result Value Ref Range    Glucose 83 70 - 99 mg/dL    BUN 16 8 - 23 mg/dL    CREATININE 0.50 0.50 - 0.90 mg/dL    Bun/Cre Ratio NOT REPORTED 9 - 20    Calcium 9.0 8.6 - 10.4 mg/dL    Sodium 136 135 - 144 mmol/L    Potassium 3.9 3.7 - 5.3 mmol/L    Chloride 103 98 - 107 mmol/L    CO2 24 20 - 31 mmol/L    Anion Gap 9 9 - 17 mmol/L    GFR Non-African American >60 >60 mL/min    GFR African American >60 >60 mL/min    GFR Comment          GFR Staging NOT REPORTED    Basic Metabolic Panel w/ Reflex to MG   Result Value Ref Range    Glucose 78 70 - 99 mg/dL    BUN 16 8 - 23 mg/dL    CREATININE 0.52 0.50 - 0.90 mg/dL    Bun/Cre Ratio NOT REPORTED 9 - 20    Calcium 9.1 8.6 - 10.4 mg/dL    Sodium 139 135 - 144 mmol/L    Potassium 4.1 3.7 - 5.3 mmol/L    Chloride 106 98 - 107 mmol/L    CO2 21 20 - 31 mmol/L    Anion Gap 12 9 - 17 mmol/L    GFR Non-African American >60 >60 mL/min    GFR African American >60 >60 mL/min    GFR Comment          GFR Staging NOT REPORTED    CBC   Result Value Ref Range    WBC 7.4 3.5 - 11.3 k/uL    RBC 3.95 3.95 - 5.11 m/uL    Hemoglobin 11.8 (L) 11.9 - 15.1 g/dL    Hematocrit 37.3 36.3 - 47.1 %    MCV 94.4 82.6 - 102.9 fL    MCH 29.9 25.2 - 33.5 pg    MCHC 31.6 28.4 - 34.8 g/dL    RDW 14.3 11.8 - 14.4 %    Platelets 619 585 - 478 k/uL    MPV 10.6 8.1 - 13.5 fL    NRBC Automated 0.0 0.0 per 100 WBC   Protime-INR   Result Value Ref Range    Protime 14.1 (H) 9.0 - 12.0 sec    INR 1.4    Hemoglobin A1C   Result Value Ref Range    Hemoglobin A1C 5.5 4.0 - 6.0 %    Estimated Avg Glucose 111 mg/dL   CBC   Result Value Ref Range    WBC 7.2 3.5 - 11.3 k/uL    RBC 3.99 3.95 - 5.11 m/uL    Hemoglobin 12.0 11.9 - 15.1 g/dL    Hematocrit 38.3 36.3 - 47.1 %    MCV 96.0 82.6 - 102.9 fL    MCH 30.1 25.2 - 33.5 pg    MCHC 31.3 28.4 - 34.8 g/dL    RDW 14.1 11.8 - 14.4 %    Platelets 917 363 - 983 k/uL    MPV 10.8 8.1 - 13.5 fL    NRBC Automated 0.0 0.0 per 100 WBC   Comp Metabolic w Bili Profile   Result Value Ref Range    Alb 2.4 (L) 3.5 - 5.2 g/dL    Albumin/Globulin Ratio 0.8 (L) 1.0 - 2.5    Alkaline Phosphatase 390 (H) 35 - 104 U/L    ALT 44 (H) 5 - 33 U/L    AST 96 (H) <32 U/L    Total Bilirubin 1.15 0.3 - 1.2 mg/dL    Bilirubin, Direct 0.48 (H) <0.31 mg/dL    Bilirubin, Indirect 0.67 0.00 - 1.00 mg/dL    BUN 16 8 - 23 mg/dL    Calcium 9.2 8.6 - 10.4 mg/dL    CREATININE 0.43 (L) 0.50 - 0.90 mg/dL    Glucose 87 70 - 99 mg/dL    Total Protein 5.4 (L) 6.4 - 8.3 g/dL    Sodium 141 135 - 144 mmol/L    Potassium 4.5 3.7 - 5.3 mmol/L    Chloride 107 98 - 107 mmol/L    CO2 24 20 - 31 mmol/L    Anion Gap 10 9 - 17 mmol/L    GFR Non-African American >60 >60 mL/min    GFR African American >60 >60 mL/min    GFR Comment          GFR Staging NOT REPORTED    CANCER ANTIGEN 15-3   Result Value Ref Range    CA 15-3 58 (H) 0 - 31 U/mL   CANCER ANTIGEN 27.29   Result Value Ref Range CA 27-29 48 (H) 0 - 38 U/mL   Dermatology Pathology   Result Value Ref Range    Dermatology Pathology Report       -- Diagnosis --       SKIN, RIGHT BREAST, PUNCH BIOPSIES:       -  INTRALYMPHATIC ADENOCARCINOMA WITH DERMAL TELANGIECTASIA AND  REACTIVE ANGIOMATOSIS, CONSISTENT WITH INTRALYMPHATIC SPREAD OF  MAMMARY CARCINOMA. Mode Blood Osman Birmingham M.D.  **Electronically Signed Out**  jet/11/20/2020       Clinical Information  Pre-op Diagnosis:  RULING OUT INFLAMMATORY BREAST CANCER, PT WITH  METASTATIC LYTIC LESIONS ON IMAGING AND PREVIOUS BONE BIOPSY  DEMONSTRATING CARCINOMA WITH CK7 AND GATA3 POSITIVITY   Operative Findings:  RIGHT BREAST   Operation Performed:  TWO 6 MM DIAMETER, DEEP PUNCH BIOPSIES OF  PEAU'DE ORANGE APPEARING SKIN    Source of Specimen  1: RIGHT BREAST    Gross Description  \"HONORIO ANIL, UNDESIGNATED\" Two tan punches, 1.2 cm long x 0.4 cm in  diameter and 1.3 cm long x 0.4 cm in diameter. Each is inked and  bisected. Entirely 1cs. tm      Microscopic Description  Two punch biopsies of skin to the subcutaneous fat show dilated dermal  lymphatics and sparse perivascular lymph ocytic inflammation. Surrounding the ectatic blood vessels, there are numerous clusters of  slitlike small vessels consistent with angiomatosis, suggestive of  secondary changes as a result of upstream obstruction. Glandular  breast tissue with not appreciated although focal eccrine coils are  present. Additional H&E levels show rare intralymphatic atypical  glands. A GATA3 and pankeratin immunostain label rare intralymphatic  atypical glands, consistent with intralymphatic spread of mammary  carcinoma. Controls stain appropriately. SURGICAL PATHOLOGY CONSULTATION       Patient Name: Earleen Lesch Rec: [id number]  Path Number: [id number]    [facility] [address]   [facility], [address]  [phone]  Fax: [phone]     CBC Result Value Ref Range    WBC 7.8 3.5 - 11.3 k/uL    RBC 3.77 (L) 3.95 - 5.11 m/uL    Hemoglobin 11.4 (L) 11.9 - 15.1 g/dL    Hematocrit 35.4 (L) 36.3 - 47.1 %    MCV 93.9 82.6 - 102.9 fL    MCH 30.2 25.2 - 33.5 pg    MCHC 32.2 28.4 - 34.8 g/dL    RDW 14.2 11.8 - 14.4 %    Platelets 672 (L) 468 - 453 k/uL    MPV 10.7 8.1 - 13.5 fL    NRBC Automated 0.0 0.0 per 100 WBC   COMPREHENSIVE METABOLIC PANEL   Result Value Ref Range    Glucose 93 70 - 99 mg/dL    BUN 18 8 - 23 mg/dL    CREATININE 0.51 0.50 - 0.90 mg/dL    Bun/Cre Ratio NOT REPORTED 9 - 20    Calcium 8.9 8.6 - 10.4 mg/dL    Sodium 137 135 - 144 mmol/L    Potassium 4.3 3.7 - 5.3 mmol/L    Chloride 104 98 - 107 mmol/L    CO2 23 20 - 31 mmol/L    Anion Gap 10 9 - 17 mmol/L    Alkaline Phosphatase 363 (H) 35 - 104 U/L    ALT 42 (H) 5 - 33 U/L    AST 85 (H) <32 U/L    Total Bilirubin 1.00 0.3 - 1.2 mg/dL    Total Protein 5.1 (L) 6.4 - 8.3 g/dL    Alb 2.2 (L) 3.5 - 5.2 g/dL    Albumin/Globulin Ratio 0.8 (L) 1.0 - 2.5    GFR Non-African American >60 >60 mL/min    GFR African American >60 >60 mL/min    GFR Comment          GFR Staging NOT REPORTED    CV HGB/HCT   Result Value Ref Range    Hemoglobin 9.6 gm/dL    Hematocrit 29.6 %   Glucose, Whole Blood   Result Value Ref Range    POC Glucose 106 (H) 65 - 105 mg/dL   Calcium, Ionized   Result Value Ref Range    Calcium, Ion 1.28 1.13 - 1.33 mmol/L   Chloride, Whole Blood   Result Value Ref Range    Chloride, Whole Blood 111 (H) 98 - 110 mmol/L   Potassium, Whole Blood   Result Value Ref Range    Potassium, Whole Blood 4.1 3.6 - 5.0 mmol/L   Sodium, Whole Blood   Result Value Ref Range    Sodium, Whole Blood 141 136 - 145 mmol/L   Blood Gas, Venous   Result Value Ref Range    pH, Arnoldo 7.497 (H) 7.320 - 7.420    pCO2, Arnoldo 31.3 (L) 39 - 55    pO2, Arnoldo 246.0 (H) 30 - 50    HCO3, Venous 24.0 24 - 30 mmol/L    Positive Base Excess, Arnoldo 1.5 0.0 - 2.0 mmol/L    Negative Base Excess, Arnoldo NOT REPORTED 0.0 - 2.0 mmol/L    O2 Sat, Arnoldo 99.8 (H) 60.0 - 85.0 %    Total Hb NOT REPORTED 12.0 - 16.0 g/dl    Oxyhemoglobin NOT REPORTED 95.0 - 98.0 %    Carboxyhemoglobin 1.3 0 - 5 %    Methemoglobin NOT REPORTED 0.0 - 1.5 %    Pt Temp 37.0     pH, Arnoldo, Temp Adj NOT REPORTED 7.320 - 7.420    pCO2, Arnoldo, Temp Adj NOT REPORTED 39 - 55 mmHg    pO2, Arnoldo, Temp Adj NOT REPORTED 30 - 50 mmHg    O2 Device/Flow/% NOT REPORTED     Respiratory Rate NOT REPORTED     Rodo Test NOT REPORTED     Sample Site NOT REPORTED     Pt.  Position NOT REPORTED     Mode NOT REPORTED     Set Rate NOT REPORTED     Total Rate NOT REPORTED     VT NOT REPORTED     FIO2 55%     Peep/Cpap NOT REPORTED     PSV NOT REPORTED     Text for Respiratory NOT REPORTED     NOTIFICATION NOT REPORTED     NOTIFICATION TIME NOT REPORTED    CBC   Result Value Ref Range    WBC 10.6 3.5 - 11.3 k/uL    RBC 3.65 (L) 3.95 - 5.11 m/uL    Hemoglobin 11.0 (L) 11.9 - 15.1 g/dL    Hematocrit 34.3 (L) 36.3 - 47.1 %    MCV 94.0 82.6 - 102.9 fL    MCH 30.1 25.2 - 33.5 pg    MCHC 32.1 28.4 - 34.8 g/dL    RDW 14.2 11.8 - 14.4 %    Platelets 393 891 - 357 k/uL    MPV 10.4 8.1 - 13.5 fL    NRBC Automated 0.0 0.0 per 100 WBC   COMPREHENSIVE METABOLIC PANEL   Result Value Ref Range    Glucose 126 (H) 70 - 99 mg/dL    BUN 17 8 - 23 mg/dL    CREATININE 0.44 (L) 0.50 - 0.90 mg/dL    Bun/Cre Ratio NOT REPORTED 9 - 20    Calcium 8.6 8.6 - 10.4 mg/dL    Sodium 138 135 - 144 mmol/L    Potassium 3.9 3.7 - 5.3 mmol/L    Chloride 106 98 - 107 mmol/L    CO2 22 20 - 31 mmol/L    Anion Gap 10 9 - 17 mmol/L    Alkaline Phosphatase 355 (H) 35 - 104 U/L    ALT 42 (H) 5 - 33 U/L    AST 91 (H) <32 U/L    Total Bilirubin 1.11 0.3 - 1.2 mg/dL    Total Protein 5.1 (L) 6.4 - 8.3 g/dL    Alb 2.3 (L) 3.5 - 5.2 g/dL    Albumin/Globulin Ratio 0.8 (L) 1.0 - 2.5    GFR Non-African American >60 >60 mL/min    GFR African American >60 >60 mL/min    GFR Comment          GFR Staging NOT REPORTED    CBC   Result Value Ref Range    WBC 8.4 3.5 - 11.3 k/uL    RBC 3.34 (L) 3.95 - 5.11 m/uL    Hemoglobin 10.1 (L) 11.9 - 15.1 g/dL    Hematocrit 32.4 (L) 36.3 - 47.1 %    MCV 97.0 82.6 - 102.9 fL    MCH 30.2 25.2 - 33.5 pg    MCHC 31.2 28.4 - 34.8 g/dL    RDW 14.5 (H) 11.8 - 14.4 %    Platelets 179 850 - 340 k/uL    MPV 10.8 8.1 - 13.5 fL    NRBC Automated 0.0 0.0 per 100 WBC   CBC   Result Value Ref Range    WBC 9.0 3.5 - 11.3 k/uL    RBC 3.46 (L) 3.95 - 5.11 m/uL    Hemoglobin 10.3 (L) 11.9 - 15.1 g/dL    Hematocrit 33.2 (L) 36.3 - 47.1 %    MCV 96.0 82.6 - 102.9 fL    MCH 29.8 25.2 - 33.5 pg    MCHC 31.0 28.4 - 34.8 g/dL    RDW 14.4 11.8 - 14.4 %    Platelets 738 279 - 768 k/uL    MPV 10.4 8.1 - 13.5 fL    NRBC Automated 0.0 0.0 per 100 WBC   POC Glucose Fingerstick   Result Value Ref Range    POC Glucose 74 65 - 105 mg/dL   POC Glucose Fingerstick   Result Value Ref Range    POC Glucose 121 (H) 65 - 105 mg/dL   POC Glucose Fingerstick   Result Value Ref Range    POC Glucose 95 65 - 105 mg/dL   POC Glucose Fingerstick   Result Value Ref Range    POC Glucose 129 (H) 65 - 105 mg/dL   POC Glucose Fingerstick   Result Value Ref Range    POC Glucose 131 (H) 65 - 105 mg/dL   POC Glucose Fingerstick   Result Value Ref Range    POC Glucose 97 65 - 105 mg/dL   POC Glucose Fingerstick   Result Value Ref Range    POC Glucose 78 65 - 105 mg/dL   POC Glucose Fingerstick   Result Value Ref Range    POC Glucose 98 65 - 105 mg/dL   POC Glucose Fingerstick   Result Value Ref Range    POC Glucose 142 (H) 65 - 105 mg/dL   POC Glucose Fingerstick   Result Value Ref Range    POC Glucose 139 (H) 65 - 105 mg/dL   POC Glucose Fingerstick   Result Value Ref Range    POC Glucose 135 (H) 65 - 105 mg/dL   POC Glucose Fingerstick   Result Value Ref Range    POC Glucose 126 (H) 65 - 105 mg/dL   POC Glucose Fingerstick   Result Value Ref Range    POC Glucose 137 (H) 65 - 105 mg/dL   POC Glucose Fingerstick   Result Value Ref Range    POC Glucose 168 (H) 65 - 105 mg/dL   POC Glucose Fingerstick   Result Value Ref Range    POC Glucose 116 (H) 65 - 105 mg/dL   POC Glucose Fingerstick   Result Value Ref Range    POC Glucose 87 65 - 105 mg/dL   POC Glucose Fingerstick   Result Value Ref Range    POC Glucose 79 65 - 105 mg/dL   POC Glucose Fingerstick   Result Value Ref Range    POC Glucose 93 65 - 105 mg/dL   POC Glucose Fingerstick   Result Value Ref Range    POC Glucose 96 65 - 105 mg/dL   POC Glucose Fingerstick   Result Value Ref Range    POC Glucose 69 65 - 105 mg/dL   POC Glucose Fingerstick   Result Value Ref Range    POC Glucose 79 65 - 105 mg/dL   POC Glucose Fingerstick   Result Value Ref Range    POC Glucose 144 (H) 65 - 105 mg/dL   POC Glucose Fingerstick   Result Value Ref Range    POC Glucose 94 65 - 105 mg/dL   POC Glucose Fingerstick   Result Value Ref Range    POC Glucose 110 (H) 65 - 105 mg/dL   POC Glucose Fingerstick   Result Value Ref Range    POC Glucose 165 (H) 65 - 105 mg/dL   POC Glucose Fingerstick   Result Value Ref Range    POC Glucose 102 65 - 105 mg/dL   POC Glucose Fingerstick   Result Value Ref Range    POC Glucose 107 (H) 65 - 105 mg/dL   POC Glucose Fingerstick   Result Value Ref Range    POC Glucose 140 (H) 65 - 105 mg/dL   POC Glucose Fingerstick   Result Value Ref Range    POC Glucose 113 (H) 65 - 105 mg/dL   POC Glucose Fingerstick   Result Value Ref Range    POC Glucose 123 (H) 65 - 105 mg/dL   POC Glucose Fingerstick   Result Value Ref Range    POC Glucose 106 (H) 65 - 105 mg/dL   POC Glucose Fingerstick   Result Value Ref Range    POC Glucose 150 (H) 65 - 105 mg/dL   POC Glucose Fingerstick   Result Value Ref Range    POC Glucose 132 (H) 65 - 105 mg/dL   POC Glucose Fingerstick   Result Value Ref Range    POC Glucose 90 65 - 105 mg/dL   TYPE AND SCREEN   Result Value Ref Range    Expiration Date 11/22/2020,2359     Arm Band Number [id number]     ABO/Rh A POSITIVE     Antibody Screen NEGATIVE     Unit Number [id number]     Product Code Leukocyte Reduced Red Cell     Unit Divison 00     Dispense Status REL FROM [facility]     Transfusion Status OK TO TRANSFUSE     Crossmatch Result COMPATIBLE     Unit Number [id number]     Product Code Leukocyte Reduced Red Cell     Unit Divison 00     Dispense Status REL FROM [facility]     Transfusion Status OK TO TRANSFUSE     Crossmatch Result COMPATIBLE    BLOOD BANK SPECIMEN   Result Value Ref Range    Blood Bank Specimen NOT REPORTED          IMAGING DATA:    Xr Shoulder Right (min 2 Views)    Result Date: 11/17/2020  EXAMINATION: TWO XRAY VIEWS OF THE RIGHT FOREARM; THREE XRAY VIEWS OF THE RIGHT SHOULDER; TWO XRAY VIEWS OF THE RIGHT HUMERUS 11/17/2020 3:42 am COMPARISON: None. HISTORY: ORDERING SYSTEM PROVIDED HISTORY: Swelling and pain TECHNOLOGIST PROVIDED HISTORY: Swelling and pain Reason for Exam: Swelling throughout right arm, no known injury, no complaints of pain. FINDINGS: Normal glenohumeral and acromioclavicular alignment. No acute fracture or dislocation in the shoulder. No lytic or blastic lesions. Humeral shaft shows no evidence for fracture. No abnormal periosteal reaction. Normal alignment at the elbow and wrist.  No evidence for elbow joint effusion. No acute fracture of the radius or ulna. Evaluation of the soft tissues show swelling of the proximal 2/3 of the forearm diffusely which extends into the elbow region. No radiopaque foreign body. 1. No evidence for acute fracture or dislocation in the right shoulder, humerus or forearm. 2. Apparent diffuse soft tissue swelling of the elbow and proximal 2/3 of the forearm. No radiopaque foreign body. Xr Humerus Right (min 2 Views)    Result Date: 11/17/2020  EXAMINATION: TWO XRAY VIEWS OF THE RIGHT FOREARM; THREE XRAY VIEWS OF THE RIGHT SHOULDER; TWO XRAY VIEWS OF THE RIGHT HUMERUS 11/17/2020 3:42 am COMPARISON: None.  HISTORY: ORDERING SYSTEM PROVIDED HISTORY: Swelling and pain TECHNOLOGIST PROVIDED HISTORY: Swelling and pain Reason for Exam: Swelling throughout right arm, no known injury, no complaints of pain. FINDINGS: Normal glenohumeral and acromioclavicular alignment. No acute fracture or dislocation in the shoulder. No lytic or blastic lesions. Humeral shaft shows no evidence for fracture. No abnormal periosteal reaction. Normal alignment at the elbow and wrist.  No evidence for elbow joint effusion. No acute fracture of the radius or ulna. Evaluation of the soft tissues show swelling of the proximal 2/3 of the forearm diffusely which extends into the elbow region. No radiopaque foreign body. 1. No evidence for acute fracture or dislocation in the right shoulder, humerus or forearm. 2. Apparent diffuse soft tissue swelling of the elbow and proximal 2/3 of the forearm. No radiopaque foreign body. Xr Radius Ulna Right (2 Views)    Result Date: 11/17/2020  EXAMINATION: TWO XRAY VIEWS OF THE RIGHT FOREARM; THREE XRAY VIEWS OF THE RIGHT SHOULDER; TWO XRAY VIEWS OF THE RIGHT HUMERUS 11/17/2020 3:42 am COMPARISON: None. HISTORY: ORDERING SYSTEM PROVIDED HISTORY: Swelling and pain TECHNOLOGIST PROVIDED HISTORY: Swelling and pain Reason for Exam: Swelling throughout right arm, no known injury, no complaints of pain. FINDINGS: Normal glenohumeral and acromioclavicular alignment. No acute fracture or dislocation in the shoulder. No lytic or blastic lesions. Humeral shaft shows no evidence for fracture. No abnormal periosteal reaction. Normal alignment at the elbow and wrist.  No evidence for elbow joint effusion. No acute fracture of the radius or ulna. Evaluation of the soft tissues show swelling of the proximal 2/3 of the forearm diffusely which extends into the elbow region. No radiopaque foreign body. 1. No evidence for acute fracture or dislocation in the right shoulder, humerus or forearm. 2. Apparent diffuse soft tissue swelling of the elbow and proximal 2/3 of the forearm. No radiopaque foreign body.      Xr Hip Left (2-3 Views)    Result Date: 11/16/2020  EXAMINATION: TWO XRAY VIEWS OF THE LEFT FEMUR, TWO VIEWS LEFT HIP 11/16/2020 4:26 pm COMPARISON: None. HISTORY: ORDERING SYSTEM PROVIDED HISTORY: fall TECHNOLOGIST PROVIDED HISTORY: fall Reason for Exam: fall Acuity: Acute Type of Exam: Initial Acute left hip and femoral pain FINDINGS: Frontal and frog-leg lateral views of the left hip were performed. Multiple curvilinear lucencies involving the left acetabulum, which could represent acute nondisplaced fractures of the left acetabulum in the right clinical setting. No additional fracture is identified. There is no evidence of dislocation. There is mild left hip osteoarthritis. No destructive osseous lesion is seen. Mild enthesopathic changes are evident. Frontal and lateral views of the proximal and distal aspects of the left femur were performed. There is no acute fracture or dislocation of the left femur. No periosteal reaction or osseous destruction is evident. There is mild osteoarthritis at the left knee joint. No soft tissue abnormality or radiopaque foreign body is evident. 1. Possible acute nondisplaced fracture of the left acetabulum. Suggest clinical correlation, and if concerning, consider further characterization with a follow-up left hip CT study. 2. No acute abnormality of the left femur. Xr Femur Left (min 2 Views)    Result Date: 11/16/2020  EXAMINATION: TWO XRAY VIEWS OF THE LEFT FEMUR, TWO VIEWS LEFT HIP 11/16/2020 4:26 pm COMPARISON: None. HISTORY: ORDERING SYSTEM PROVIDED HISTORY: fall TECHNOLOGIST PROVIDED HISTORY: fall Reason for Exam: fall Acuity: Acute Type of Exam: Initial Acute left hip and femoral pain FINDINGS: Frontal and frog-leg lateral views of the left hip were performed. Multiple curvilinear lucencies involving the left acetabulum, which could represent acute nondisplaced fractures of the left acetabulum in the right clinical setting. No additional fracture is identified. There is no evidence of dislocation. There is mild left hip osteoarthritis. No destructive osseous lesion is seen. Mild enthesopathic changes are evident. Frontal and lateral views of the proximal and distal aspects of the left femur were performed. There is no acute fracture or dislocation of the left femur. No periosteal reaction or osseous destruction is evident. There is mild osteoarthritis at the left knee joint. No soft tissue abnormality or radiopaque foreign body is evident. 1. Possible acute nondisplaced fracture of the left acetabulum. Suggest clinical correlation, and if concerning, consider further characterization with a follow-up left hip CT study. 2. No acute abnormality of the left femur. Ct Pelvis Wo Contrast Additional Contrast? None    Result Date: 11/16/2020  EXAMINATION: CT OF THE PELVIS WITHOUT CONTRAST 11/16/2020 7:52 pm TECHNIQUE: CT of the pelvis was performed without the administration of intravenous contrast.  Multiplanar reformatted images are provided for review. Dose modulation, iterative reconstruction, and/or weight based adjustment of the mA/kV was utilized to reduce the radiation dose to as low as reasonably achievable. COMPARISON: Pelvis and left hip radiograph same day HISTORY: ORDERING SYSTEM PROVIDED HISTORY: Rule out left tab fracture. TECHNOLOGIST PROVIDED HISTORY: 2mm thin cuts. Please include left proximal hip in imaging. Thank you. Rule out left tab fracture. Reason for Exam: Left Leg/Groin pain - Rule out left tab fracture. Acuity: Acute Type of Exam: Initial FINDINGS: Bones demonstrate no destructive sclerotic lesions involving the partially imaged L3 vertebral body and posterior elements, bilateral iliac bones, left greater than right, and sacrum with associated destructive changes of the osseous structures. Findings consistent with metastatic disease.   Lesion within the left iliac bone also extends to involve the acetabulum and superior pubic ramus on the left proximally. Sclerotic lesion also identified at the left ischial tuberosity compatible with metastatic lesion. Acute pathologic fracture of the left acetabulum with fracture lines centered at the superior acetabulum and involving both the anterior and posterior columns. The fracture is mildly displaced. No additional fractures are identified. Mild-to-moderate osteoarthritic changes of the bilateral hips. Moderate to severe degenerative changes of the imaged lower lumbar spine. Visualized intrapelvic structures demonstrate retroperitoneal and intra-abdominal lymphadenopathy most consistent with metastatic disease. Largest node measures approximately 2.3 cm in short axis dimension (image 17 series 2). Severe atherosclerotic disease. Soft tissues demonstrate anasarca. 1. Pathologic fracture of the left acetabulum centered along the superior acetabulum with fracture lines involving both the anterior and posterior columns. 2. Osseous metastatic disease with destructive lesions involving the bilateral hemipelvis, sacrum, and partially imaged lumbar spine. 3. Metastatic lymphadenopathy within the imaged pelvis with the largest node measuring 2.3 cm in short axis dimension. Ct Chest Abdomen Pelvis W Contrast    Result Date: 11/16/2020  EXAMINATION: CT OF THE CHEST, ABDOMEN, AND PELVIS WITH CONTRAST 11/16/2020 10:22 pm TECHNIQUE: CT of the chest, abdomen and pelvis was performed with the administration of intravenous contrast. Multiplanar reformatted images are provided for review. Dose modulation, iterative reconstruction, and/or weight based adjustment of the mA/kV was utilized to reduce the radiation dose to as low as reasonably achievable. COMPARISON: None HISTORY: ORDERING SYSTEM PROVIDED HISTORY: assess for cancer source vs mts TECHNOLOGIST PROVIDED HISTORY: assess for cancer source vs mts Reason for Exam: Assess for cancer source vs mts - Left hip fracture.  Acuity: Acute Type of Exam: Initial FINDINGS: Chest: Mediastinum: The heart is mildly enlarged. No evidence of pericardial effusion is seen. No evidence of mediastinal or hilar lymphadenopathy or mass lesion is identified. Lungs/pleura: Multifocal bilateral lung ill-defined ground-glass opacification is noted, greatest within the left upper lung lobe. Trace right-sided layering posterior pleural effusion is visualized. Soft Tissues/Bones: Mixed sclerotic and lytic multifocal marrow changes are again seen throughout the visualized skeleton. Abdomen/Pelvis: Organs: Nodular contour of the liver is noted with coarse parenchymal appearance consistent with hepatic cirrhosis with associated evidence of portal venous hypertension with is severe varices at the splenic hilum with scattered varices also seen at the gastroesophageal junction, lesser curvature of the stomach and brittany hepatis. Gallbladder wall calcification is noted. The liver, gallbladder, spleen, pancreas, adrenal glands, kidneys, are otherwise unremarkable in appearance. GI/Bowel: Small hiatal hernia is present. The stomach is otherwise unremarkable without wall thickening or distention. Bowel loops are unremarkable in appearance without evidence of obstruction, distension or mucosal thickening. Pelvis: The urinary bladder is well distended and unremarkable in appearance. Mild pelvic free fluid is noted. Peritoneum/Retroperitoneum: No evidence of retroperitoneal or intraperitoneal lymphadenopathy is identified. No further evidence of intraperitoneal free fluid is seen. Bones/Soft Tissues: Moderate scattered subcutaneous fat stranding related to edema is seen. Left acetabular mildly distracted comminuted fracture is again noted. Mixed lytic and sclerotic multifocal marrow changes are again noted throughout the visualized skeleton. 1. Redemonstration of mixed sclerotic and lytic multifocal marrow changes throughout the visualized skeleton consistent with metastatic disease.  2. No definitive identification of primary tumor mass lesion. 3. Multifocal bilateral lung ground-glass opacification, greatest within the left upper lung lobe. Differential diagnostic considerations include association with viral pneumonia, opportunistic infection, hypersensitivity pneumonitis and drug toxicity. Recommend clinical correlation. 4. Trace right-sided layering posterior pleural effusion. 5. Mild cardiomegaly. 6. Severe hepatic cirrhosis with associated scattered marked varices related to portal venous hypertension. 7. Small hiatal hernia. 8. Mild pelvic free fluid. 9. Moderate diffuse abdominal and pelvic wall subcutaneous edema. 10. Suggestion porcelain gallbladder. Xr Hip W Pelvis Min 5 Vws Bilateral    Result Date: 11/16/2020  EXAMINATION: ONE XRAY VIEW OF THE PELVIS AND TWO XRAY VIEWS OF EACH OF THE BILATERAL HIPS 11/16/2020 5:10 pm COMPARISON: None. HISTORY: ORDERING SYSTEM PROVIDED HISTORY: AP pelvis, Inlet, Outlet, Cross-table lateral left hip TECHNOLOGIST PROVIDED HISTORY: Please and thank you. AP pelvis, Inlet, Outlet, Cross-table lateral left hip Reason for Exam: Pain left hip, s/p multiple falls. FINDINGS: There is no evidence of acute fracture. There is normal alignment. No acute joint abnormality. No focal osseous lesion. No focal soft tissue abnormality. No acute osseous abnormality. 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 XXXA] 11/16/2020    Cirrhosis ([address] [K74.60] 05/23/2019    Pure hypercholesterolemia [E78.00] 10/08/2018    Anemia, iron deficiency [D50.9] 10/08/2018    Depression [F32.9] 09/26/2018    Osteoarthritis [M19.90] 09/26/2018    Asthma [J45.909] 09/06/2018    Hyperlipidemia [E78.5] 09/06/2018    ARLYN (obstructive sleep apnea) [G47.33] 08/09/2018    Morbid obesity ([facility] [E66.01] 08/09/2018    Essential hypertension [I10] 08/09/2018    Diabetes mellitus ([facility] [E11.9] 08/09/2018           RECOMMENDATIONS:  1. I personally reviewed results of lab work-up imaging studies and other relevant clinical data. 2. Reviewed previous medical record  3. Metastatic breast cancer. Needs surgery then XRT   Needs systemic therapy after completing local treatment      Discussed with patient and Nurse. Thank you for asking us to see this patient. This note is created with the assistance of a speech recognition program.  While intending to generate a document that actually reflects the content of the visit, the document can still have some errors including those of syntax and sound a like substitutions which may escape proof reading. It such instances, actual meaning can be extrapolated by contextual diversion.

## 2020-11-24 NOTE — DISCHARGE SUMMARY
Patient discharged to [address] [place] [address] via [place]. Nurse Kyleigh Beaulieu notified of patient's departure.

## 2020-11-30 ENCOUNTER — TELEPHONE (OUTPATIENT)
Dept: GASTROENTEROLOGY | Age: 65
End: 2020-11-30

## 2020-11-30 NOTE — OP NOTE
[address] [address]                                OPERATIVE REPORT    PATIENT NAME: Charlie Lacey                     :        1955  MED REC NO:   [id number]                             ROOM:       [id number]  ACCOUNT NO:   [de-identified]                           ADMIT DATE: 2020  PROVIDER:     Tracy Guerrero    DATE OF PROCEDURE:  2020    PREOPERATIVE DIAGNOSIS:  Left pathologic transverse acetabulum fracture. POSTOPERATIVE DIAGNOSIS:  Left pathologic transverse acetabulum  fracture. PROCEDURE:  Open treatment of left transverse acetabulum fracture; CPT  87073. ATTENDING SURGEON:  Tracy Guerrero DO    ASSISTANTS:  1. Stan Fernandes. DO Bennie, PGY-5  2. Shubham Taveras DO, PGY-1    ANESTHESIA:  General.    ESTIMATED BLOOD LOSS:  25 mL. COMPLICATIONS:  None. SPECIMENS:  None. IMPLANTS:  None. INDICATIONS:  The patient is a 44-year-old female who presented to our  institution with left hip pain. Radiographic workup indicated an  essentially nondisplaced left pathologic transverse acetabulum fracture  with extensive bone metastasis throughout her pelvis. Her medical  history was somewhat complicated and was essentially diagnosed with  non-small-cell carcinoma that was believed to be a primary breast cancer  based on staining; however, her workup was incomplete due to social and  financial reasons. She has bone-proven bony metastasis from another  encounter from the left ilium and lumbar spine. There showed to be an  extensive tumor burden throughout the left pelvis and acetabulum, and  the patient was still unclear of her life expectancy as her workup was  incomplete. We had a very lengthy discussion regarding operative versus  nonoperative care.   In short, I felt that due to the pathologic nature  of this fracture it was extremely unlikely that it would heal with  nonoperative care in an acceptable time frame if at all, and there was a  significant risk for increased displacement of the acetabulum with  medialization of the femur; however, nonoperative care avoids all risks  and complications associated surgery, which were discussed in great  detail. Regarding office care, we felt that the invasiveness of the  fully open reduction and internal fixation of her acetabulum would  likely not be tolerated well with her current medical condition, and the  patient was reluctant to undergo such invasive procedure without better  understanding her life expectancy among other reasons. We discussed  that due to her transverse pattern being minimally displaced, I felt  that more minimally invasive fixation with intramedullary screws would  be appropriate for her and would provide inherent stability to hopefully  keep the fracture from displacing and even potentially healing as well  as avoid the morbidity of a full open approach. The patient took  several days to think it over but ultimately agreed and wished to  proceed. The risks, benefits, alternatives, as mentioned, were  discussed in great detail before obtaining written informed consent and  marking the operative site. DESCRIPTION OF PROCEDURE:  The patient was transported to the operating  room, and general anesthesia was administered by the anesthesia  providers without complication. The patient was transferred to a  radiolucent flat-top table in a prone position on blanket rolls to the  chest.  Once the extremities were appropriately positioned, they were  well padded. The patient was adequately secured to the bed and we  confirmed with the anesthesia providers they had adequate access to  airway and IV lines. We then prepped and draped in the usual sterile  fashion. A time-out was performed that included all involved parties in  correctly identifying the patient, planned procedure, and operative  site.   After everyone agreed, we continued. Upon initial fluoroscopic imaging, it was discovered that her fracture  had indeed displaced more so than her injury radiographs. This  significantly narrowed the osseous fixation pathways that were  available. We observed that it would be very tight but thought perhaps  possible to still place a screw down the osseous fixation pathway for  the anterior column of the pelvis. We began by finding the appropriate  start point for an antegrade superior pubic rami screw by inserting a  2.0-mm guidewire and repositioning it until it had the appropriate start  point and trajectory. We then made an incision over skin, subcutaneous  tissue, and fascia and placed a drill sleeve over the wire. The wire  was then exchanged for a 3.5-mm drill that was advanced in antegrade  fashion utilizing the inlet as well as the combined obturator/outlet  radiographs. Multiple attempts were made to pass the drill bit safely  and remain in the interosseous pathway; however, we were unsuccessful  due to this newly discovered displacement. We confirmed complete  chemical paralysis and applied manual traction to the limb as well as a  lateral translation of the femur to try and utilize ligamentotaxis with  no success. We exchanged the drill bit for a smaller-diameter 2.8-mm  guidewire, attempting to manipulate it across the fracture site to  traverse the entire trajectory; however, the quality of the bone was so  extremely poor due to the extensive metastasis that we were unable to  manipulate the bone as we normally would in a regular fracture. After  all techniques had been attempted and multiple attempts had been  exhausted, we elected to remove all implants and forego internal  fixation as we felt continued attempts would place the patient at  significant iatrogenic neurovascular risk.   Options at this time were to  abort the surgical efforts and discuss further with the patient the  following day versus reposition, prep and drape, and perform a fully  invasive open reduction and internal fixation. Due to the fact that I  had had this full conversation with the patient over the preceding days  and she wished to not to go to this extreme as well as the fact that we  were not fully consented for such a procedure, I elected to forego any  further surgical intervention. The wounds were copiously irrigated and  closed in a standard layered fashion. The hip was cleaned and dried,  and a sterile occlusive bandage was applied. The patient was then  extubated and transferred to the recovery room by the anesthesia  providers without complication, where she was found to be in stable  condition. POSTOPERATIVE PLAN:  The patient will remain nonweightbearing for the  left lower extremity. She will receive 23 hours of prophylactic  antibiotics and may resume DVT prophylaxis on postoperative day #1 if  felt to be appropriate by the Primary Service. We have discussed with  the primary team that my recommendation would be transfer to a setting  that has a full Oncological Service available as the patient would  likely need a form of megaprosthesis rather than plate and screw  fixation after experiencing how extremely poor her bone quality is. I  plan to discuss this with the Primary Service as well as the patient  herself regarding transfer to a facility such as [facility]. If all parties are agreeable, I will contact the orthopedic oncologist  at [facility] to assure they are onboard with transfer.         Patricia Jenkins    D: 11/30/2020 16:03:51       T: 11/30/2020 16:11:50     [email or web address]  Job#: [id number]     Doc#: [id number]    CC:

## 2020-12-14 ENCOUNTER — TELEPHONE (OUTPATIENT)
Dept: GASTROENTEROLOGY | Age: 65
End: 2020-12-14

## (undated) DEVICE — GARMENT,MEDLINE,DVT,INT,CALF,MED, GEN2: Brand: MEDLINE

## (undated) DEVICE — 3M™ IOBAN™ 2 ANTIMICROBIAL INCISE DRAPE 6651EZ: Brand: IOBAN™ 2

## (undated) DEVICE — GOWN,AURORA,NONRNF,XL,30/CS: Brand: MEDLINE

## (undated) DEVICE — GLOVE ORTHO 8   MSG9480

## (undated) DEVICE — BIT DRL L300MM DIA5MM CANN QUIK CPL ADJ STP REUSE

## (undated) DEVICE — STOCKINETTE,IMPERVIOUS,12X48,STERILE: Brand: MEDLINE

## (undated) DEVICE — GUIDEWIRE ORTH DIA2.8MM 300/150MM CALIB W/ FLUT FOR 6.5MM

## (undated) DEVICE — SHOULDER SUSPENSION KIT 6 PER BOX

## (undated) DEVICE — TOTAL TRAY, 16FR 10ML SIL FOLEY, URN: Brand: MEDLINE

## (undated) DEVICE — BIT DRL L195MM DIA3.5MM QUIK CPL FOR PELV INSTR SET PRO-PAK

## (undated) DEVICE — INTENDED FOR TISSUE SEPARATION, AND OTHER PROCEDURES THAT REQUIRE A SHARP SURGICAL BLADE TO PUNCTURE OR CUT.: Brand: BARD-PARKER ® CARBON RIB-BACK BLADES

## (undated) DEVICE — FORCEPS BX L240CM JAW DIA2.4MM ORNG L CAP W/ NDL DISP RAD

## (undated) DEVICE — 1016 S-DRAPE IRRIG POUCH 10/BOX: Brand: STERI-DRAPE™

## (undated) DEVICE — 3M™ STERI-STRIP™ COMPOUND BENZOIN TINCTURE 40 BAGS/CARTON 4 CARTONS/CASE C1544: Brand: 3M™ STERI-STRIP™

## (undated) DEVICE — BLADE CLIPPER GEN PURP NS

## (undated) DEVICE — 1010 S-DRAPE TOWEL DRAPE 10/BX: Brand: STERI-DRAPE™

## (undated) DEVICE — GLOVE SURG SZ 65 THK91MIL LTX FREE SYN POLYISOPRENE

## (undated) DEVICE — Device

## (undated) DEVICE — MARKER,SKIN,WI/RULER AND LABELS: Brand: MEDLINE

## (undated) DEVICE — MARKER SURG SKIN UTIL BLK REG TIP NONSMEARING W/ 6IN RUL

## (undated) DEVICE — PROTECTOR ULN NRV PUR FOAM HK LOOP STRP ANATOMICALLY

## (undated) DEVICE — GLOVE ORANGE PI 8 1/2   MSG9085

## (undated) DEVICE — BLOCK BITE 60FR RUBBER ADLT DENTAL

## (undated) DEVICE — GAUZE,SPONGE,FLUFF,6"X6.75",STRL,5/TRAY: Brand: MEDLINE

## (undated) DEVICE — TOWEL,OR,DSP,ST,NATURAL,DLX,4/PK,20PK/CS: Brand: MEDLINE

## (undated) DEVICE — SHEET, T, LAPAROTOMY, STERILE: Brand: MEDLINE

## (undated) DEVICE — GLOVE ORANGE PI 7 1/2   MSG9075

## (undated) DEVICE — SVMMC ORTH SPL DRP PK

## (undated) DEVICE — DRESSING FOAM W4XL4IN AG SIL FACE BORD IONIC ANTIMIC ADH

## (undated) DEVICE — SUTURE VCRL SZ 0 L18IN ABSRB UD L36MM CT-1 1/2 CIR J840D

## (undated) DEVICE — BLADE ES L6IN ELASTOMERIC COAT EXT DURABLE BEND UPTO 90DEG

## (undated) DEVICE — GAUZE,SPONGE,4"X4",16PLY,STRL,LF,10/TRAY: Brand: MEDLINE

## (undated) DEVICE — GLOVE ORANGE PI 7   MSG9070

## (undated) DEVICE — GLOVE ORANGE PI 8   MSG9080

## (undated) DEVICE — APPLICATOR MEDICATED 26 CC SOLUTION HI LT ORNG CHLORAPREP

## (undated) DEVICE — BANDAGE COBAN 6 IN WND 6INX5YD FOAM

## (undated) DEVICE — SUTURE VCRL SZ 2-0 L18IN ABSRB UD CT-1 L36MM 1/2 CIR J839D